# Patient Record
Sex: FEMALE | Race: WHITE | NOT HISPANIC OR LATINO | Employment: OTHER | URBAN - METROPOLITAN AREA
[De-identification: names, ages, dates, MRNs, and addresses within clinical notes are randomized per-mention and may not be internally consistent; named-entity substitution may affect disease eponyms.]

---

## 2017-01-05 ENCOUNTER — ALLSCRIPTS OFFICE VISIT (OUTPATIENT)
Dept: OTHER | Facility: OTHER | Age: 62
End: 2017-01-05

## 2017-03-10 ENCOUNTER — GENERIC CONVERSION - ENCOUNTER (OUTPATIENT)
Dept: OTHER | Facility: OTHER | Age: 62
End: 2017-03-10

## 2017-03-10 DIAGNOSIS — R79.89 OTHER SPECIFIED ABNORMAL FINDINGS OF BLOOD CHEMISTRY: ICD-10-CM

## 2017-03-10 DIAGNOSIS — F41.9 ANXIETY DISORDER: ICD-10-CM

## 2017-03-10 DIAGNOSIS — K21.9 GASTRO-ESOPHAGEAL REFLUX DISEASE WITHOUT ESOPHAGITIS: ICD-10-CM

## 2017-03-10 DIAGNOSIS — Z12.31 ENCOUNTER FOR SCREENING MAMMOGRAM FOR MALIGNANT NEOPLASM OF BREAST: ICD-10-CM

## 2017-03-10 DIAGNOSIS — E01.0 IODINE-DEFICIENCY RELATED DIFFUSE GOITER: ICD-10-CM

## 2017-03-21 ENCOUNTER — LAB CONVERSION - ENCOUNTER (OUTPATIENT)
Dept: OTHER | Facility: OTHER | Age: 62
End: 2017-03-21

## 2017-03-21 LAB
A/G RATIO (HISTORICAL): 1.5 (CALC) (ref 1–2.5)
ALBUMIN SERPL BCP-MCNC: 3.7 G/DL (ref 3.6–5.1)
ALP SERPL-CCNC: 73 U/L (ref 33–130)
ALT SERPL W P-5'-P-CCNC: 16 U/L (ref 6–29)
AST SERPL W P-5'-P-CCNC: 18 U/L (ref 10–35)
BASOPHILS # BLD AUTO: 0.3 %
BASOPHILS # BLD AUTO: 45 CELLS/UL (ref 0–200)
BILIRUB SERPL-MCNC: 0.5 MG/DL (ref 0.2–1.2)
BUN SERPL-MCNC: 18 MG/DL (ref 7–25)
BUN/CREA RATIO (HISTORICAL): ABNORMAL (CALC) (ref 6–22)
CALCIUM SERPL-MCNC: 8.5 MG/DL (ref 8.6–10.4)
CHLORIDE SERPL-SCNC: 106 MMOL/L (ref 98–110)
CO2 SERPL-SCNC: 30 MMOL/L (ref 20–31)
CREAT SERPL-MCNC: 0.87 MG/DL (ref 0.5–0.99)
DEPRECATED RDW RBC AUTO: 14 % (ref 11–15)
EGFR AFRICAN AMERICAN (HISTORICAL): 83 ML/MIN/1.73M2
EGFR-AMERICAN CALC (HISTORICAL): 72 ML/MIN/1.73M2
EOSINOPHIL # BLD AUTO: 1.9 %
EOSINOPHIL # BLD AUTO: 283 CELLS/UL (ref 15–500)
GAMMA GLOBULIN (HISTORICAL): 2.4 G/DL (CALC) (ref 1.9–3.7)
GLUCOSE (HISTORICAL): 85 MG/DL (ref 65–99)
HCT VFR BLD AUTO: 49.1 % (ref 35–45)
HGB BLD-MCNC: 15.9 G/DL (ref 11.7–15.5)
LYMPHOCYTES # BLD AUTO: 13.1 %
LYMPHOCYTES # BLD AUTO: 1952 CELLS/UL (ref 850–3900)
MCH RBC QN AUTO: 30.3 PG (ref 27–33)
MCHC RBC AUTO-ENTMCNC: 32.4 G/DL (ref 32–36)
MCV RBC AUTO: 93.4 FL (ref 80–100)
MONOCYTES # BLD AUTO: 775 CELLS/UL (ref 200–950)
MONOCYTES (HISTORICAL): 5.2 %
NEUTROPHILS # BLD AUTO: 79.5 %
NEUTROPHILS # BLD AUTO: ABNORMAL CELLS/UL (ref 1500–7800)
PLATELET # BLD AUTO: 198 THOUSAND/UL (ref 140–400)
PMV BLD AUTO: 7.8 FL (ref 7.5–12.5)
POTASSIUM SERPL-SCNC: 3.7 MMOL/L (ref 3.5–5.3)
RBC # BLD AUTO: 5.26 MILLION/UL (ref 3.8–5.1)
SODIUM SERPL-SCNC: 143 MMOL/L (ref 135–146)
TOTAL PROTEIN (HISTORICAL): 6.1 G/DL (ref 6.1–8.1)
TSH SERPL DL<=0.05 MIU/L-ACNC: 0.52 MIU/L (ref 0.4–4.5)
WBC # BLD AUTO: 14.9 THOUSAND/UL (ref 3.8–10.8)

## 2017-03-28 ENCOUNTER — ALLSCRIPTS OFFICE VISIT (OUTPATIENT)
Dept: OTHER | Facility: OTHER | Age: 62
End: 2017-03-28

## 2017-03-30 ENCOUNTER — ALLSCRIPTS OFFICE VISIT (OUTPATIENT)
Dept: OTHER | Facility: OTHER | Age: 62
End: 2017-03-30

## 2017-04-05 ENCOUNTER — GENERIC CONVERSION - ENCOUNTER (OUTPATIENT)
Dept: OTHER | Facility: OTHER | Age: 62
End: 2017-04-05

## 2017-05-10 ENCOUNTER — GENERIC CONVERSION - ENCOUNTER (OUTPATIENT)
Dept: OTHER | Facility: OTHER | Age: 62
End: 2017-05-10

## 2017-05-10 ENCOUNTER — CONVERSION ENCOUNTER (OUTPATIENT)
Dept: MAMMOGRAPHY | Facility: CLINIC | Age: 62
End: 2017-05-10

## 2017-05-15 ENCOUNTER — GENERIC CONVERSION - ENCOUNTER (OUTPATIENT)
Dept: OTHER | Facility: OTHER | Age: 62
End: 2017-05-15

## 2017-06-01 DIAGNOSIS — E04.9 NONTOXIC GOITER: ICD-10-CM

## 2017-06-01 DIAGNOSIS — E04.2 NONTOXIC MULTINODULAR GOITER: ICD-10-CM

## 2017-06-22 ENCOUNTER — ALLSCRIPTS OFFICE VISIT (OUTPATIENT)
Dept: OTHER | Facility: OTHER | Age: 62
End: 2017-06-22

## 2017-06-30 ENCOUNTER — ALLSCRIPTS OFFICE VISIT (OUTPATIENT)
Dept: OTHER | Facility: OTHER | Age: 62
End: 2017-06-30

## 2017-06-30 LAB
BACTERIA UR QL AUTO: NORMAL
CLUE CELL (HISTORICAL): NORMAL
HYPHAL YEAST (HISTORICAL): NORMAL
KOH PREP (HISTORICAL): NORMAL
TRICHOMONAS (HISTORICAL): NORMAL
YEAST (HISTORICAL): NORMAL

## 2017-07-20 ENCOUNTER — ALLSCRIPTS OFFICE VISIT (OUTPATIENT)
Dept: OTHER | Facility: OTHER | Age: 62
End: 2017-07-20

## 2017-07-26 DIAGNOSIS — Z00.00 ENCOUNTER FOR GENERAL ADULT MEDICAL EXAMINATION WITHOUT ABNORMAL FINDINGS: ICD-10-CM

## 2017-08-04 ENCOUNTER — LAB CONVERSION - ENCOUNTER (OUTPATIENT)
Dept: OTHER | Facility: OTHER | Age: 62
End: 2017-08-04

## 2017-08-04 LAB — VARICELLA ZOSTER IGG (HISTORICAL): 1284 INDEX

## 2017-10-13 ENCOUNTER — GENERIC CONVERSION - ENCOUNTER (OUTPATIENT)
Dept: OTHER | Facility: OTHER | Age: 62
End: 2017-10-13

## 2017-11-10 ENCOUNTER — LAB REQUISITION (OUTPATIENT)
Dept: LAB | Facility: HOSPITAL | Age: 62
End: 2017-11-10
Payer: COMMERCIAL

## 2017-11-10 ENCOUNTER — GENERIC CONVERSION - ENCOUNTER (OUTPATIENT)
Dept: OTHER | Facility: OTHER | Age: 62
End: 2017-11-10

## 2017-11-10 DIAGNOSIS — M25.562 PAIN IN LEFT KNEE: ICD-10-CM

## 2017-11-10 DIAGNOSIS — Z12.31 ENCOUNTER FOR SCREENING MAMMOGRAM FOR MALIGNANT NEOPLASM OF BREAST: ICD-10-CM

## 2017-11-10 DIAGNOSIS — Z12.4 ENCOUNTER FOR SCREENING FOR MALIGNANT NEOPLASM OF CERVIX: ICD-10-CM

## 2017-11-10 PROCEDURE — G0145 SCR C/V CYTO,THINLAYER,RESCR: HCPCS | Performed by: OBSTETRICS & GYNECOLOGY

## 2017-11-20 ENCOUNTER — GENERIC CONVERSION - ENCOUNTER (OUTPATIENT)
Dept: OTHER | Facility: OTHER | Age: 62
End: 2017-11-20

## 2017-11-20 LAB
LAB AP GYN PRIMARY INTERPRETATION: NORMAL
Lab: NORMAL

## 2017-11-28 ENCOUNTER — GENERIC CONVERSION - ENCOUNTER (OUTPATIENT)
Dept: OTHER | Facility: OTHER | Age: 62
End: 2017-11-28

## 2018-01-09 NOTE — PROGRESS NOTES
Assessment    1  Encounter for preventive health examination (V70 0) (Z00 00)   2  URI, acute (465 9) (J06 9)    Plan  Anxiety    · LORazepam 0 5 MG Oral Tablet (Ativan); TAKE 1 TABLET EVERY 12 HOURS  AS NEEDED  Health Maintenance    · Zostavax 97380 UNT/0 65ML Subcutaneous Solution Reconstituted; INJECT 0 5   ML Subcutaneous  Health Maintenance, URI, acute    · Follow-up visit in 1 year Evaluation and Treatment  Follow-up  Status: Hold For -  Scheduling  Requested for: 61WLQ0231  URI, acute    · LevoFLOXacin 500 MG Oral Tablet; Take 1 tablet daily    Discussion/Summary  Patient discussion: discussed with the patient  Labs reviewed with the patient  Chief Complaint  PATIENT PRESENTS TODAY FOR A YEARLY PREV  WELL VISIT  PATIENT IS DOING WELL, PATIENT WILL NEED WRITTEN SCRIPT FOR LORAZAEPAM 0 5MG REFILL FOR SHE IS GOING TO [place] FOR AWHILE  History of Present Illness  HPI: Patient is here for wellness exam  Patient status post having laboratory studies and HDL 62 triglycerides 140 blood pressure 127/62 glucose 77 and waist circumference 34 at screening  Review of Systems    Constitutional: No fever, no chills, feels well, no tiredness, no recent weight gain or weight loss  Eyes: No complaints of eye pain, no red eyes, no eyesight problems, no discharge, no dry eyes, no itching of eyes  ENT: no complaints of earache, no loss of hearing, no nose bleeds, no nasal discharge, no sore throat, no hoarseness  Cardiovascular: No complaints of slow heart rate, no fast heart rate, no chest pain, no palpitations, no leg claudication, no lower extremity edema  Respiratory: No complaints of shortness of breath, no wheezing, no cough, no SOB on exertion, no orthopnea, no PND  Gastrointestinal: No complaints of abdominal pain, no constipation, no nausea or vomiting, no diarrhea, no bloody stools     Genitourinary: No complaints of dysuria, no incontinence, no pelvic pain, no dysmenorrhea, no vaginal discharge or bleeding  Musculoskeletal: No complaints of arthralgias, no myalgias, no joint swelling or stiffness, no limb pain or swelling  Integumentary: No complaints of skin rash or lesions, no itching, no skin wounds, no breast pain or lump  Neurological: No complaints of headache, no confusion, no convulsions, no numbness, no dizziness or fainting, no tingling, no limb weakness, no difficulty walking  Psychiatric: Not suicidal, no sleep disturbance, no anxiety or depression, no change in personality, no emotional problems  Endocrine: No complaints of proptosis, no hot flashes, no muscle weakness, no deepening of the voice, no feelings of weakness  Hematologic/Lymphatic: No complaints of swollen glands, no swollen glands in the neck, does not bleed easily, does not bruise easily  Active Problems    1  Abnormal CBC (790 6) (R79 89)   2  Anxiety (300 00) (F41 9)   3  Current Every Day Smoker (305 1)   4  Dense breasts (793 82) (R92 2)   5  Dysuria (788 1) (R30 0)   6  Enlarged thyroid (240 9) (E01 0)   7  GERD (gastroesophageal reflux disease) (530 81) (K21 9)   8  Hematuria (599 70) (R31 9)   9  Denied: History of self breast exam   10  Incomplete uterovaginal prolapse (618 2) (N81 2)   11  Lung cancer (162 9) (C34 90)   12  Multiple thyroid nodules (241 1) (E04 2)   13  Osteoporosis (733 00) (M81 0)   14  Prolapsing Mitral Valve Leaflet Syndrome (424 0)   15  Sjogren's syndrome (710 2) (M35 00)   16  Systemic lupus erythematosus (710 0) (M32 9)   17  URI, acute (465 9) (J06 9)   18  Uterine prolapse (618 1) (N81 4)   19   Visit for screening mammogram (V76 12) (Z12 31)    Past Medical History    · History of Arthritis (716 90) (M19 90)   · History of Carcinoma Of The Lung (V10 11)   · History of Cardiac dysrhythmia (427 9) (I49 9)   · History of Fracture Of The Foot (827 0)   · Denied: History of Genetic screening   · History of  1 (V22 0) (Z34 00)   · H/O transvaginal ultrasound (V1 89) (Z92 89)   · Denied: History of abnormal cervical Pap smear   · History of mammography, screening (V15 89) (Z92 89)   · History of pregnancy (V13 29)   · Denied: History of self breast exam   · History of Hypertension (401 9) (I10)   · History of Pap smear for cervical cancer screening (V76 2) (Z12 4)   · History of Varicella (052 9) (B01 9)    Surgical History    · History of Lung Lobectomy   · History of Oral Surgery Tooth Extraction   · History of Reported Prior Surgical / Procedural History    Family History  Mother    · Family history of Arthritis   · Family history of Asthma   · Family history of Dementia   · Family history of Heart disease   · Family history of Hypothyroidism   · Family history of Irritable bowel syndrome (IBS)   · Family history of Uterine cancer  Father    · Family history of Myocardial infarction   · Family history of Stroke  Sister    · Family history of Hyperlipidemia   · Family history of Uterine cancer  Maternal Grandfather    · Family history of Myocardial infarction  Family History    · Family history of Asthma   · Family history of Family Health Status Of Father -    · FH: cataracts (V19 19) (Z83 518)   · Family history of Thyroid disorder    Social History    · Always uses seat belt   · Caffeine use (V49 89) (F15 90)   · Current Every Day Smoker (305 1)   · Has ceased smoking cigarette/cigar:  (1 ppd) lung CA 51 yo   ·    · No drug use   · Occasional alcohol use   · One child   · Primary spoken language English   · Mormonism   · Racial background   ·    · Tea    Current Meds   1  Gertrude-C Oral Tablet; TAKE 1 TABLET DAILY; Therapy: 19NTI1008 to Recorded   2  Fish Oil-Vitamin D CAPS; Therapy: (Brad Martinez) to Recorded   3  Hydroxychloroquine Sulfate 200 MG Oral Tablet; take 2 tablets  daily; Therapy: (Recorded:91Ked6029) to Recorded   4  Klor-Con 10 10 MEQ Oral Tablet Extended Release; TAKE 1 TABLET 3 TIMES DAILY;    Therapy: 00SSA0889 to (Shelly )  Requested for: 87NKV6662 Recorded   5  LORazepam 0 5 MG Oral Tablet; TAKE 1 TABLET EVERY 12 HOURS AS NEEDED; Therapy: 15VEC0466 to (Evaluate:17Nov2016); Last Rx:18Oct2016 Ordered   6  Multivitamins TABS; TAKE 1 TABLET DAILY; Therapy: 24YLE7354 to Recorded   7  Pepcid AC TABS; Therapy: (Recorded:59Hdm4913) to Recorded   8  PredniSONE (Terrance) 10 MG TABS; TAKE 1 TABLET DAILY; Therapy: (Recorded:49Dxp8003) to Recorded   9  PreviDent 5000 Plus 1 1 % Dental Cream;   Therapy: 05SUT5441 to (Last NP:72CFC7556)  Requested for: 06Apr2012 Ordered   10  Vitamin B12 100 MCG Oral Tablet; TAKE 1 TABLET DAILY AS DIRECTED; Therapy: 23CBF8120 to Recorded    Allergies    1  Neoporacin OINT   2  Penicillins   3  [address]   4  Bactrim TABS    5  Animal dander   6  Dust   7  Mold   8  Pollen   9  Seasonal    Vitals   Recorded: 72DDB6780 85:24WL   Systolic 419, RUE, Sitting   Diastolic 70, RUE, Sitting   Height 5 ft 4 5 in   Weight 152 lb    BMI Calculated 25 69   BSA Calculated 1 75     Physical Exam    Constitutional   General appearance: No acute distress, well appearing and well nourished  Eyes   Conjunctiva and lids: No swelling, erythema or discharge  Pupils and irises: Equal, round and reactive to light  Ears, Nose, Mouth, and Throat   External inspection of ears and nose: Normal     Otoscopic examination: Tympanic membranes translucent with normal light reflex  Canals patent without erythema  Oropharynx: Normal with no erythema, edema, exudate or lesions  Pulmonary   Respiratory effort: No increased work of breathing or signs of respiratory distress  Auscultation of lungs: Clear to auscultation  Cardiovascular   Palpation of heart: Normal PMI, no thrills  Auscultation of heart: Normal rate and rhythm, normal S1 and S2, without murmurs  Examination of extremities for edema and/or varicosities: Normal     Abdomen   Abdomen: Non-tender, no masses      Liver and spleen: No hepatomegaly or splenomegaly  Lymphatic   Palpation of lymph nodes in neck: No lymphadenopathy  Musculoskeletal   Gait and station: Normal     Digits and nails: Normal without clubbing or cyanosis  Inspection/palpation of joints, bones, and muscles: Normal     Skin   Skin and subcutaneous tissue: Normal without rashes or lesions  Neurologic   Cranial nerves: Cranial nerves 2-12 intact  Reflexes: 2+ and symmetric  Sensation: No sensory loss  Psychiatric   Orientation to person, place, and time: Normal     Mood and affect: Normal        Future Appointments    Date/Time Provider Specialty Site   06/22/2017 12:50 PM ANN-MARIE Smith   Endocrinology [facility]   03/30/2017 01:00 PM Sohail Edmond DO Obstetrics/Gynecology [facility]     Signatures   Electronically signed by : Disha Hawkins DO; Mar 28 2017  1:34PM EST                       (Author)

## 2018-01-10 NOTE — PROGRESS NOTES
Assessment    1  Encounter for preventive health examination (V70 0) (Z00 00)    Plan  Anxiety    · LORazepam 0 5 MG Oral Tablet (Ativan); TAKE 1 TABLET EVERY 12 HOURS  AS NEEDED  Health Maintenance    · Follow-up visit in 1 year Evaluation and Treatment  Follow-up  Status: Hold For -  Scheduling  Requested for: 21ULA8896    Chief Complaint  PT PRESENTS FOR A WELL VISIT  PT REPORTS DOING WELL  History of Present Illness  HPI: Patient is here for well visit  Patient doing well this time  Pt  with increased stress  pt with problems with mother  Review of Systems    Constitutional: as noted in HPI  Eyes: No complaints of eye pain, no red eyes, no eyesight problems, no discharge, no dry eyes, no itching of eyes  ENT: no complaints of earache, no loss of hearing, no nose bleeds, no nasal discharge, no sore throat, no hoarseness  Cardiovascular: No complaints of slow heart rate, no fast heart rate, no chest pain, no palpitations, no leg claudication, no lower extremity edema  Respiratory: No complaints of shortness of breath, no wheezing, no cough, no SOB on exertion, no orthopnea, no PND  Gastrointestinal: No complaints of abdominal pain, no constipation, no nausea or vomiting, no diarrhea, no bloody stools  Genitourinary: No complaints of dysuria, no incontinence, no pelvic pain, no dysmenorrhea, no vaginal discharge or bleeding  Musculoskeletal: No complaints of arthralgias, no myalgias, no joint swelling or stiffness, no limb pain or swelling  Integumentary: No complaints of skin rash or lesions, no itching, no skin wounds, no breast pain or lump  Neurological: No complaints of headache, no confusion, no convulsions, no numbness, no dizziness or fainting, no tingling, no limb weakness, no difficulty walking  Psychiatric: Not suicidal, no sleep disturbance, no anxiety or depression, no change in personality, no emotional problems     Endocrine: No complaints of proptosis, no hot flashes, no muscle weakness, no deepening of the voice, no feelings of weakness  Hematologic/Lymphatic: No complaints of swollen glands, no swollen glands in the neck, does not bleed easily, does not bruise easily  Active Problems    1  Abnormal CBC (790 6) (R79 89)   2  Anxiety (300 00) (F41 9)   3  Current Every Day Smoker (305 1)   4  Dense breasts (793 82) (R92 2)   5  Dysuria (788 1) (R30 0)   6  Encounter for screening for malignant neoplasm of cervix (V76 2) (Z12 4)   7  Enlarged thyroid (240 9) (E04 9)   8  GERD (gastroesophageal reflux disease) (530 81) (K21 9)   9  Hematuria (599 70) (R31 9)   10  Denied: History of self breast exam   11  Hypercholesterolemia (272 0) (E78 0)   12  Incomplete uterovaginal prolapse (618 2) (N81 2)   13  Lung cancer (162 9) (C34 90)   14  Multiple thyroid nodules (241 1) (E04 2)   15  Osteoporosis (733 00) (M81 0)   16  Prolapsing Mitral Valve Leaflet Syndrome (424 0)   17  Sinusitis, acute (461 9) (J01 90)   18  Sjogren's syndrome (710 2) (M35 00)   19  Systemic lupus erythematosus (710 0) (M32 9)   20  URI, acute (465 9) (J06 9)   21  Uterine prolapse (618 1) (N81 4)   22  Visit for routine gyn exam (V72 31) (Z01 419)   23   Visit for screening mammogram (V76 12) (Z12 31)    Past Medical History    · History of Arthritis (716 90) (M19 90)   · History of Carcinoma Of The Lung (V10 11)   · History of Cardiac dysrhythmia (427 9) (I49 9)   · History of Fracture Of The Foot (827 0)   · Denied: History of Genetic screening   · History of  1 (V22 0) (Z34 00)   · H/O transvaginal ultrasound (V15 89) (Z92 89)   · Denied: History of abnormal cervical Pap smear   · History of mammography, screening (V15 89) (Z92 89)   · History of pregnancy (V13 29)   · Denied: History of self breast exam   · History of Hypertension (401 9) (I10)   · History of Need for immunization against influenza (V04 81) (Z23)   · History of Pap smear for cervical cancer screening (V76 2) (Z12 4)   · History of Varicella (052 9) (B01 9)    Surgical History    · History of Lung Lobectomy   · History of Oral Surgery Tooth Extraction   · History of Reported Prior Surgical / Procedural History    Family History    · Family history of Arthritis   · Family history of Asthma   · Family history of Dementia   · Family history of Heart disease   · Family history of Hypothyroidism   · Family history of Irritable bowel syndrome (IBS)   · Family history of Uterine cancer    · Family history of Myocardial infarction   · Family history of Stroke    · Family history of Hyperlipidemia   · Family history of Uterine cancer    · Family history of Myocardial infarction    · Family history of Asthma   · Family history of Family Health Status Of Father -    · FH: cataracts (V19 19) (Z83 518)   · Family history of Thyroid disorder    Social History    · Always uses seat belt   · Caffeine use (V49 89) (F15 90)   · Current Every Day Smoker (305 1)   · Has ceased smoking cigarette/cigar:  (1 ppd) lung CA 51 yo   ·    · No drug use   · Occasional alcohol use   · One child   · Primary spoken language English   · Faith   · Racial background   ·     Current Meds   1  Gertrude-C Oral Tablet; TAKE 1 TABLET DAILY; Therapy: 72DUH3687 to Recorded   2  Fish Oil-Vitamin D CAPS; Therapy: (Alonashley Perry) to Recorded   3  Fluticasone Propionate 50 MCG/ACT Nasal Suspension; USE 1 SPRAY IN EACH   NOSTRIL TWICE DAILY; Therapy: 76QNH4197 to (Evaluate:2015)  Requested for: 47TVY2440; Last   Rx:2015 Ordered   4  Hydroxychloroquine Sulfate 200 MG Oral Tablet; take 2 tablets  daily; Therapy: (Recorded:92Bis7323) to Recorded   5  Klor-Con 10 10 MEQ Oral Tablet Extended Release; TAKE 1 TABLET 3 TIMES DAILY; Therapy: 62AGH7969 to (Lauren Duke)  Requested for: 97AVK4322 Recorded   6  LORazepam 0 5 MG Oral Tablet; TAKE 1 TABLET EVERY 12 HOURS AS NEEDED; Therapy: 71GPF4072 to (Evaluate:2015);  Last WA:70FHK1095 Ordered   7  Multivitamins TABS; TAKE 1 TABLET DAILY; Therapy: 70DTX9353 to Recorded   8  Patanol 0 1 % Ophthalmic Solution; INSTILL 1 DROP INTO BOTH EYES TWICE DAILY   AT 6-8 HOUR INTERVALS; Therapy: 98OHR0687 to (Last Rx:57Mbs1800)  Requested for: 56ZZO8798 Ordered   9  Pepcid AC TABS; Therapy: (Recorded:87Mde5660) to Recorded   10  PredniSONE (Terrance) 10 MG Oral Tablet; TAKE 1 TABLET DAILY; Therapy: (Recorded:35Dsf6557) to Recorded   11  PreviDent 5000 Plus 1 1 % Dental Cream;    Therapy: 51IOB6263 to (Last DY:84SGY5459)  Requested for: 01Rre5976 Ordered   12  Vitamin B12 100 MCG Oral Tablet; TAKE 1 TABLET DAILY AS DIRECTED; Therapy: 77ISG3750 to Recorded   13  Vitamin E 400 UNIT Oral Tablet; Therapy: (Recorded:21Mze5188) to Recorded    Allergies    1  Neoporacin OINT   2  Penicillins   3  [address]   4  Bactrim TABS    Vitals   Recorded: 46EDK1747 12:58PM   Temperature 98 F, Tympanic   Heart Rate 80, R Radial   Pulse Quality Regular, R Radial   Systolic 446, LUE, Sitting   Diastolic 64, LUE, Sitting   Height 5 ft 4 5 in   Weight 154 lb    BMI Calculated 26 03   BSA Calculated 1 76   O2 Saturation 96     Physical Exam    Constitutional   General appearance: No acute distress, well appearing and well nourished  Eyes   Conjunctiva and lids: No swelling, erythema or discharge  Pupils and irises: Equal, round and reactive to light  Ears, Nose, Mouth, and Throat   External inspection of ears and nose: Normal     Otoscopic examination: Tympanic membranes translucent with normal light reflex  Canals patent without erythema  Oropharynx: Normal with no erythema, edema, exudate or lesions  Pulmonary   Respiratory effort: No increased work of breathing or signs of respiratory distress  Auscultation of lungs: Abnormal   decreased bs  Cardiovascular   Palpation of heart: Normal PMI, no thrills  Auscultation of heart: Normal rate and rhythm, normal S1 and S2, without murmurs  Examination of extremities for edema and/or varicosities: Normal     Abdomen   Abdomen: Non-tender, no masses  Liver and spleen: No hepatomegaly or splenomegaly  Lymphatic   Palpation of lymph nodes in neck: No lymphadenopathy  Musculoskeletal   Gait and station: Normal     Digits and nails: Normal without clubbing or cyanosis  Inspection/palpation of joints, bones, and muscles: Normal     Skin   Skin and subcutaneous tissue: Normal without rashes or lesions  Neurologic   Cranial nerves: Cranial nerves 2-12 intact  Reflexes: 2+ and symmetric  Sensation: No sensory loss      Psychiatric   Orientation to person, place, and time: Normal     Mood and affect: Normal        Future Appointments    Date/Time Provider Specialty Site   05/05/2016 01:00 PM Zack Rai DO Obstetrics/Gynecology [facility]     Signatures   Electronically signed by : Spencer Concepcion DO; Mar 23 2016  1:10PM EST                       (Author)

## 2018-01-11 NOTE — MISCELLANEOUS
Message   Recorded as Task   Date: 11/20/2017 12:41 PM, Created By: Melanie Byrne   Task Name: Go to Result   Assigned To: Magnus Espinoza   Regarding Patient: Yordan Andrade, Status: Active   Comment:    Melanie Byrne - 20 Nov 2017 12:41 PM     TASK CREATED  nml reshma   Nory Wells - 20 Nov 2017 1:17 PM     TASK EDITED  normal card mailed        Active Problems    1  Abnormal CBC (790 6) (R79 89)   2  Acute upper respiratory infection (465 9) (J06 9)   3  Anxiety (300 00) (F41 9)   4  Cervical cancer screening (V76 2) (Z12 4)   5  Colon cancer screening (V76 51) (Z12 11)   6  Current Every Day Smoker (305 1)   7  Dense breasts (793 82) (R92 2)   8  Dysuria (788 1) (R30 0)   9  Enlarged thyroid (240 9) (E04 9)   10  GERD (gastroesophageal reflux disease) (530 81) (K21 9)   11  Hematuria (599 70) (R31 9)   12  Denied: History of self breast exam   13  Incomplete uterovaginal prolapse (618 2) (N81 2)   14  Lung cancer (162 9) (C34 90)   15  Multiple thyroid nodules (241 1) (E04 2)   16  Need for influenza vaccination (V04 81) (Z23)   17  Osteoporosis (733 00) (M81 0)   18  Prolapsing Mitral Valve Leaflet Syndrome (424 0)   19  Screening for HPV (human papillomavirus) (V73 81) (Z11 51)   20  Shortness of breath (786 05) (R06 02)   21  Sjogren's syndrome (710 2) (M35 00)   22  Systemic lupus erythematosus (710 0) (M32 9)   23  URI, acute (465 9) (J06 9)   24  Uterine prolapse (618 1) (N81 4)   25  Vaginal discharge (623 5) (N89 8)   26  Visit for routine gyn exam (V72 31) (Z01 419)   27  Visit for screening mammogram (V76 12) (Z12 31)   28  Vulvar burning (625 9) (N94 89)    Current Meds   1  Gertrude-C Oral Tablet; TAKE 1 TABLET DAILY; Therapy: 30JHW1404 to Recorded   2  Fish Oil-Vitamin D CAPS; Therapy: (Chitra Ramming) to Recorded   3  Hydroxychloroquine Sulfate 200 MG Oral Tablet; Take 1 tablet daily; Therapy: (Recorded:30Jun2017) to Recorded   4   Klor-Con 10 10 MEQ Oral Tablet Extended Release; TAKE 1 TABLET 3 TIMES DAILY; Therapy: 22HLU9074 to (Donnie Beach)  Requested for: 26KWS4876 Recorded   5  LORazepam 0 5 MG Oral Tablet (Ativan); TAKE 1 TABLET EVERY 12 HOURS AS   NEEDED; Therapy: 66QAN2095 to (Evaluate:27Apr2017); Last Rx:28Mar2017 Ordered   6  Magnesium 500 MG Oral Capsule; Therapy: (Recorded:30Mar2017) to Recorded   7  Multivitamins TABS; TAKE 1 TABLET DAILY; Therapy: 67QTZ2734 to Recorded   8  Pepcid AC TABS; Therapy: (Recorded:70Ytm5115) to Recorded   9  PredniSONE (Terrance) 10 MG TABS; TAKE 1 TABLET DAILY; Therapy: (Recorded:29Gzf5072) to Recorded   10  PreviDent 5000 Plus 1 1 % Dental Cream;    Therapy: 66STL8253 to (Last YE:14NZP3079)  Requested for: 06Apr2012 Ordered   11  Vitamin B12 100 MCG Oral Tablet; TAKE 1 TABLET DAILY AS DIRECTED; Therapy: 92WEH5171 to Recorded    Allergies    1  Neoporacin OINT   2  Penicillins   3  [address]   4  Bactrim TABS    5  Animal dander   6  Dust   7  Mold   8  Pollen   9   Seasonal    Signatures   Electronically signed by : Damita Osler, ; Nov 20 2017  1:17PM EST                       (Author)

## 2018-01-12 VITALS
WEIGHT: 152 LBS | DIASTOLIC BLOOD PRESSURE: 70 MMHG | BODY MASS INDEX: 25.33 KG/M2 | HEIGHT: 65 IN | SYSTOLIC BLOOD PRESSURE: 122 MMHG

## 2018-01-12 VITALS
SYSTOLIC BLOOD PRESSURE: 110 MMHG | BODY MASS INDEX: 25.18 KG/M2 | HEIGHT: 65 IN | WEIGHT: 151.13 LBS | HEART RATE: 76 BPM | DIASTOLIC BLOOD PRESSURE: 70 MMHG

## 2018-01-12 VITALS
BODY MASS INDEX: 25.08 KG/M2 | WEIGHT: 150.5 LBS | HEIGHT: 65 IN | SYSTOLIC BLOOD PRESSURE: 120 MMHG | DIASTOLIC BLOOD PRESSURE: 70 MMHG

## 2018-01-12 NOTE — MISCELLANEOUS
Message   Recorded as Task   Date: 04/04/2017 02:12 PM, Created By: Soha Hinkle   Task Name: Miscellaneous   Assigned To: Noni Crowley   Regarding Patient: Angelica Neves, Status: Active   CommentBabarchiara Benson - 04 Apr 2017 2:12 PM     TASK CREATED  Patient called to report she is happy with her new pessary  States it is much more effective and works much better  She wanted to thank you  Tierra Naval - 04 Apr 2017 10:20 PM     TASK REPLIED TO: Previously Assigned To Tierra Naval  thanks        Active Problems    1  Abnormal CBC (790 6) (R79 89)   2  Anxiety (300 00) (F41 9)   3  Current Every Day Smoker (305 1)   4  Dense breasts (793 82) (R92 2)   5  Dysuria (788 1) (R30 0)   6  Enlarged thyroid (240 9) (E01 0)   7  GERD (gastroesophageal reflux disease) (530 81) (K21 9)   8  Hematuria (599 70) (R31 9)   9  Denied: History of self breast exam   10  Incomplete uterovaginal prolapse (618 2) (N81 2)   11  Lung cancer (162 9) (C34 90)   12  Multiple thyroid nodules (241 1) (E04 2)   13  Osteoporosis (733 00) (M81 0)   14  Prolapsing Mitral Valve Leaflet Syndrome (424 0)   15  Sjogren's syndrome (710 2) (M35 00)   16  Systemic lupus erythematosus (710 0) (M32 9)   17  URI, acute (465 9) (J06 9)   18  Uterine prolapse (618 1) (N81 4)   19  Visit for screening mammogram (V76 12) (Z12 31)    Current Meds   1  Gertrude-C Oral Tablet; TAKE 1 TABLET DAILY; Therapy: 70HKC8017 to Recorded   2  Fish Oil-Vitamin D CAPS; Therapy: (Lashawn Ruiz) to Recorded   3  Hydroxychloroquine Sulfate 200 MG Oral Tablet; take 2 tablets  daily; Therapy: (Recorded:83Tth1817) to Recorded   4  Klor-Con 10 10 MEQ Oral Tablet Extended Release; TAKE 1 TABLET 3 TIMES DAILY; Therapy: 81GRS4902 to (0489 33 97 26)  Requested for: 77PKW4864 Recorded   5  LevoFLOXacin 500 MG Oral Tablet; Take 1 tablet daily; Therapy: 35ALE2106 to (Complete:07Apr2017); Last Rx:28Mar2017 Ordered   6   LORazepam 0 5 MG Oral Tablet (Ativan); TAKE 1 TABLET EVERY 12 HOURS AS   NEEDED; Therapy: 04AJN8142 to (Evaluate:27Apr2017); Last Rx:28Mar2017 Ordered   7  Magnesium 500 MG Oral Capsule; Therapy: (Recorded:30Mar2017) to Recorded   8  Multivitamins TABS; TAKE 1 TABLET DAILY; Therapy: 15WHL5941 to Recorded   9  Pepcid AC TABS; Therapy: (Recorded:53Tpj3018) to Recorded   10  PredniSONE (Terrance) 10 MG TABS; TAKE 1 TABLET DAILY; Therapy: (Recorded:00Mig8040) to Recorded   11  PreviDent 5000 Plus 1 1 % Dental Cream;    Therapy: 46KBU6392 to (Last KR:65RMC5108)  Requested for: 06Apr2012 Ordered   12  Vitamin B12 100 MCG Oral Tablet; TAKE 1 TABLET DAILY AS DIRECTED; Therapy: 89GKP3831 to Recorded   13  Zostavax 17246 UNT/0 65ML Subcutaneous Solution Reconstituted (Zostavax 96314    UNT/0 65ML Subcutaneous Solution Reconstituted); INJECT 0 5  ML Subcutaneous; Therapy: 68HPV9274 to (Last Rx:28Mar2017) Ordered    Allergies    1  Neoporacin OINT   2  Penicillins   3  [address]   4  Bactrim TABS    5  Animal dander   6  Dust   7  Mold   8  Pollen   9   Seasonal    Signatures   Electronically signed by : Lisha Price, ; Apr 5 2017  8:03AM EST                       (Author)

## 2018-01-13 VITALS
DIASTOLIC BLOOD PRESSURE: 72 MMHG | SYSTOLIC BLOOD PRESSURE: 118 MMHG | HEIGHT: 65 IN | BODY MASS INDEX: 25.01 KG/M2 | WEIGHT: 150.13 LBS

## 2018-01-13 VITALS
HEIGHT: 65 IN | DIASTOLIC BLOOD PRESSURE: 68 MMHG | WEIGHT: 149.03 LBS | HEART RATE: 84 BPM | SYSTOLIC BLOOD PRESSURE: 114 MMHG | BODY MASS INDEX: 24.83 KG/M2

## 2018-01-13 VITALS
DIASTOLIC BLOOD PRESSURE: 70 MMHG | SYSTOLIC BLOOD PRESSURE: 116 MMHG | HEIGHT: 65 IN | WEIGHT: 150.38 LBS | BODY MASS INDEX: 25.05 KG/M2

## 2018-01-14 NOTE — MISCELLANEOUS
Message   Recorded as Task   Date: 05/12/2017 12:51 PM, Created By: Donna Kearney   Task Name: Review Document   Assigned To: Kwadwo Robert   Regarding Patient: Samia Motta, Status: Active   Comment:    Donna Kearney - 12 May 2017 12:51 PM     TASK CREATED  stable osteopenia, CA, vit D, exercise   Nory Wells - 15 May 2017 1:45 PM     TASK EDITED  PT INFORMED        Active Problems    1  Abnormal CBC (790 6) (R79 89)   2  Anxiety (300 00) (F41 9)   3  Current Every Day Smoker (305 1)   4  Dense breasts (793 82) (R92 2)   5  Dysuria (788 1) (R30 0)   6  Enlarged thyroid (240 9) (E01 0)   7  GERD (gastroesophageal reflux disease) (530 81) (K21 9)   8  Hematuria (599 70) (R31 9)   9  Denied: History of self breast exam   10  Incomplete uterovaginal prolapse (618 2) (N81 2)   11  Lung cancer (162 9) (C34 90)   12  Multiple thyroid nodules (241 1) (E04 2)   13  Osteoporosis (733 00) (M81 0)   14  Prolapsing Mitral Valve Leaflet Syndrome (424 0)   15  Sjogren's syndrome (710 2) (M35 00)   16  Systemic lupus erythematosus (710 0) (M32 9)   17  URI, acute (465 9) (J06 9)   18  Uterine prolapse (618 1) (N81 4)   19  Visit for screening mammogram (V76 12) (Z12 31)    Current Meds   1  Gertrude-C Oral Tablet; TAKE 1 TABLET DAILY; Therapy: 14CZQ4692 to Recorded   2  Fish Oil-Vitamin D CAPS; Therapy: (Delroy Adams) to Recorded   3  Hydroxychloroquine Sulfate 200 MG Oral Tablet; take 2 tablets  daily; Therapy: (Recorded:95Uft6334) to Recorded   4  Klor-Con 10 10 MEQ Oral Tablet Extended Release; TAKE 1 TABLET 3 TIMES DAILY; Therapy: 51RTI1757 to (910 34 832)  Requested for: 12ASH9398 Recorded   5  LORazepam 0 5 MG Oral Tablet (Ativan); TAKE 1 TABLET EVERY 12 HOURS AS   NEEDED; Therapy: 58TOA2208 to (Evaluate:27Apr2017); Last Rx:28Mar2017 Ordered   6  Magnesium 500 MG Oral Capsule; Therapy: (Recorded:30Mar2017) to Recorded   7  Multivitamins TABS; TAKE 1 TABLET DAILY;    Therapy: 37YRA1953 to Recorded   8  Pepcid AC TABS; Therapy: (Recorded:87Kke1197) to Recorded   9  PredniSONE (Terrance) 10 MG TABS; TAKE 1 TABLET DAILY; Therapy: (Recorded:26Esu0460) to Recorded   10  PreviDent 5000 Plus 1 1 % Dental Cream;    Therapy: 57QGK3851 to (Last [id number])  Requested for: 2012 Ordered   11  Vitamin B12 100 MCG Oral Tablet; TAKE 1 TABLET DAILY AS DIRECTED; Therapy: 28CNR5371 to Recorded   12  Zostavax 58039 UNT/0 65ML Subcutaneous Solution Reconstituted (Zostavax 16444    UNT/0 65ML Subcutaneous Solution Reconstituted); INJECT 0 5  ML Subcutaneous; Therapy: 99KIG4360 to (Last Rx:2017) Ordered    Allergies    1  Neoporacin OINT   2  Penicillins   3   [address]   4  Bactrim TABS    5  Animal dander   6  Dust   7  Mold   8  Pollen   9   Seasonal    Signatures   Electronically signed by : Janae July, ; May 15 2017  1:45PM EST                       (Author)

## 2018-01-22 VITALS
TEMPERATURE: 98.5 F | HEIGHT: 65 IN | BODY MASS INDEX: 25.66 KG/M2 | SYSTOLIC BLOOD PRESSURE: 122 MMHG | DIASTOLIC BLOOD PRESSURE: 80 MMHG | WEIGHT: 154 LBS

## 2018-01-22 VITALS
BODY MASS INDEX: 25.53 KG/M2 | WEIGHT: 153.25 LBS | HEIGHT: 65 IN | DIASTOLIC BLOOD PRESSURE: 78 MMHG | SYSTOLIC BLOOD PRESSURE: 126 MMHG

## 2018-01-22 VITALS
SYSTOLIC BLOOD PRESSURE: 120 MMHG | HEIGHT: 65 IN | DIASTOLIC BLOOD PRESSURE: 76 MMHG | WEIGHT: 151 LBS | TEMPERATURE: 97.8 F | BODY MASS INDEX: 25.16 KG/M2

## 2018-02-15 ENCOUNTER — OFFICE VISIT (OUTPATIENT)
Dept: OBGYN CLINIC | Facility: CLINIC | Age: 63
End: 2018-02-15
Payer: COMMERCIAL

## 2018-02-15 VITALS
WEIGHT: 154.2 LBS | BODY MASS INDEX: 25.69 KG/M2 | DIASTOLIC BLOOD PRESSURE: 80 MMHG | SYSTOLIC BLOOD PRESSURE: 120 MMHG | HEIGHT: 65 IN

## 2018-02-15 DIAGNOSIS — N76.2 ACUTE VULVITIS: ICD-10-CM

## 2018-02-15 DIAGNOSIS — N81.4 UTERINE PROLAPSE: Primary | ICD-10-CM

## 2018-02-15 PROCEDURE — 99213 OFFICE O/P EST LOW 20 MIN: CPT | Performed by: OBSTETRICS & GYNECOLOGY

## 2018-02-15 RX ORDER — UBIDECARENONE 75 MG
1 CAPSULE ORAL DAILY
COMMUNITY
Start: 2015-01-07

## 2018-02-15 RX ORDER — FAMOTIDINE 10 MG
20 TABLET ORAL AS NEEDED
COMMUNITY

## 2018-02-15 RX ORDER — POTASSIUM CHLORIDE 750 MG/1
1 TABLET, FILM COATED, EXTENDED RELEASE ORAL 3 TIMES DAILY
COMMUNITY
Start: 2011-12-08 | End: 2020-11-16 | Stop reason: SDUPTHER

## 2018-02-15 RX ORDER — PREDNISONE 10 MG/1
1 TABLET ORAL DAILY
COMMUNITY
End: 2018-12-04 | Stop reason: SDUPTHER

## 2018-02-15 RX ORDER — FOLIC ACID 0.8 MG
TABLET ORAL DAILY
COMMUNITY

## 2018-02-15 RX ORDER — SODIUM FLUORIDE 5 MG/ML
PASTE, DENTIFRICE DENTAL
COMMUNITY
Start: 2012-04-06 | End: 2019-08-22

## 2018-02-15 RX ORDER — MULTIVIT-MINERALS/FA/LYCOPENE 0.4 MG-6
1 TABLET ORAL DAILY
COMMUNITY
Start: 2013-03-05 | End: 2018-06-26 | Stop reason: CLARIF

## 2018-02-15 RX ORDER — CHLORAL HYDRATE 500 MG
CAPSULE ORAL DAILY
COMMUNITY
End: 2019-01-03

## 2018-02-15 RX ORDER — LORAZEPAM 0.5 MG/1
1 TABLET ORAL EVERY 12 HOURS PRN
COMMUNITY
Start: 2013-03-05 | End: 2018-04-06 | Stop reason: SDUPTHER

## 2018-02-15 NOTE — PROGRESS NOTES
Assessment/Plan:     Uterine prolapse - pessary removed and reinserted without difficulty    Vulvitis -she will use some triamcinolone cream for a week or 2 and then taper this  If it recurs or worsens, she will call and we will consider vulvar biopsy  Return in 3 months for pessary check     There are no diagnoses linked to this encounter  Subjective:     Patient ID: Saranya Webb is a 58 y o  female  Pt here for pessary check, she has no concerns regarding her pessary  It is working well and she uses Replens  She has been having what appears to be a reaction and is having breakouts of blisters on her years and around her mouth  They are not evident now  She is also having vulvar burning and itching but no blisters or ulcers  She feels this is some systemic type of reaction  She is currently on an antibiotic prescribed by her family doctor  She is under a great deal of stress as her mother who has dementia is living with her and her brother-in-law just passed away        Review of Systems   Reason unable to perform ROS: Vaginal burning and itching  All other systems reviewed and are negative          Objective:     Physical Exam   Genitourinary: Vagina normal and uterus normal    Genitourinary Comments: Vulva is mildly erythematous, no lesions noted    Pessary removed, cleaned and reinserted without difficulty, no vaginal excoriation

## 2018-02-27 ENCOUNTER — TELEPHONE (OUTPATIENT)
Dept: FAMILY MEDICINE CLINIC | Facility: CLINIC | Age: 63
End: 2018-02-27

## 2018-02-27 ENCOUNTER — HOSPITAL ENCOUNTER (OUTPATIENT)
Dept: NON INVASIVE DIAGNOSTICS | Facility: HOSPITAL | Age: 63
Discharge: HOME | End: 2018-02-27
Payer: COMMERCIAL

## 2018-02-27 ENCOUNTER — OFFICE VISIT (OUTPATIENT)
Dept: FAMILY MEDICINE CLINIC | Facility: CLINIC | Age: 63
End: 2018-02-27
Payer: COMMERCIAL

## 2018-02-27 VITALS
DIASTOLIC BLOOD PRESSURE: 80 MMHG | BODY MASS INDEX: 35.83 KG/M2 | HEIGHT: 55 IN | SYSTOLIC BLOOD PRESSURE: 120 MMHG | WEIGHT: 154.8 LBS

## 2018-02-27 DIAGNOSIS — R60.0 LOCALIZED EDEMA: Primary | ICD-10-CM

## 2018-02-27 DIAGNOSIS — R60.0 LOCALIZED EDEMA: ICD-10-CM

## 2018-02-27 PROBLEM — G89.29 CHRONIC BILATERAL LOW BACK PAIN WITH BILATERAL SCIATICA: Status: ACTIVE | Noted: 2017-11-28

## 2018-02-27 PROBLEM — M54.42 CHRONIC BILATERAL LOW BACK PAIN WITH BILATERAL SCIATICA: Status: ACTIVE | Noted: 2017-11-28

## 2018-02-27 PROBLEM — M54.41 CHRONIC BILATERAL LOW BACK PAIN WITH BILATERAL SCIATICA: Status: ACTIVE | Noted: 2017-11-28

## 2018-02-27 PROCEDURE — 93971 EXTREMITY STUDY: CPT | Performed by: SURGERY

## 2018-02-27 PROCEDURE — 99213 OFFICE O/P EST LOW 20 MIN: CPT | Performed by: FAMILY MEDICINE

## 2018-02-27 PROCEDURE — 93971 EXTREMITY STUDY: CPT

## 2018-02-27 RX ORDER — CLINDAMYCIN HYDROCHLORIDE 150 MG/1
150 CAPSULE ORAL EVERY 8 HOURS SCHEDULED
Qty: 21 CAPSULE | Refills: 0 | Status: SHIPPED | OUTPATIENT
Start: 2018-02-27 | End: 2018-03-06

## 2018-02-27 NOTE — PROGRESS NOTES
Assessment/Plan:   patient go for stat Doppler left lower extremity  Patient will try to elevate leg  Patient will start clindamycin if Doppler study negative  No problem-specific Assessment & Plan notes found for this encounter  Diagnoses and all orders for this visit:    Localized edema  -     clindamycin (CLEOCIN) 150 mg capsule; Take 1 capsule (150 mg total) by mouth every 8 (eight) hours for 7 days  -     VAS lower limb venous duplex study, unilateral/limited; Future          Subjective:      Patient ID: Miguel Neighbor is a 58 y o  female  Patient is here with swelling over left leg ends along with redness and pain Achilles region over the past 3 days  No trauma noted no bite  No chest pain or shortness of breath        The following portions of the patient's history were reviewed and updated as appropriate: allergies, current medications, past family history, past medical history, past social history, past surgical history and problem list     Review of Systems   Constitutional: Negative  HENT: Negative  Eyes: Negative  Respiratory: Negative  Cardiovascular: Positive for leg swelling  Gastrointestinal: Negative  Endocrine: Negative  Genitourinary: Negative  Musculoskeletal: Positive for arthralgias  Skin: Negative  Allergic/Immunologic: Negative  Neurological: Negative  Hematological: Negative  Psychiatric/Behavioral: Negative  Objective:      /80 (BP Location: Left arm, Patient Position: Sitting, Cuff Size: Standard)   Ht 1' 7 81" (0 503 m)   Wt 70 2 kg (154 lb 12 8 oz)    28 kg/m²          Physical Exam   Constitutional: She is oriented to person, place, and time  She appears well-developed and well-nourished  No distress  HENT:   Head: Normocephalic  Right Ear: External ear normal    Left Ear: External ear normal    Mouth/Throat: Oropharynx is clear and moist  No oropharyngeal exudate     Eyes: EOM are normal  Pupils are equal, round, and reactive to light  Right eye exhibits no discharge  Left eye exhibits no discharge  No scleral icterus  Neck: Normal range of motion  Neck supple  No thyromegaly present  Cardiovascular: Normal rate, regular rhythm, normal heart sounds and intact distal pulses  Exam reveals no gallop and no friction rub  No murmur heard  Pulmonary/Chest: Effort normal and breath sounds normal  No respiratory distress  She has no wheezes  She has no rales  She exhibits no tenderness  Abdominal: Soft  Bowel sounds are normal  She exhibits no distension  There is no tenderness  There is no rebound and no guarding  Musculoskeletal: Normal range of motion  She exhibits tenderness  She exhibits no edema  Left lower extremity edema and calf tenderness with palpation   Lymphadenopathy:     She has no cervical adenopathy  Neurological: She is oriented to person, place, and time  No cranial nerve deficit  She exhibits normal muscle tone  Coordination normal    Skin: Skin is warm and dry  No rash noted  She is not diaphoretic  There is erythema  No pallor  Erythema over Achilles tendon  Psychiatric: She has a normal mood and affect  Her behavior is normal  Judgment and thought content normal    Nursing note and vitals reviewed

## 2018-02-28 ENCOUNTER — TELEPHONE (OUTPATIENT)
Dept: FAMILY MEDICINE CLINIC | Facility: CLINIC | Age: 63
End: 2018-02-28

## 2018-04-06 ENCOUNTER — OFFICE VISIT (OUTPATIENT)
Dept: FAMILY MEDICINE CLINIC | Facility: CLINIC | Age: 63
End: 2018-04-06
Payer: COMMERCIAL

## 2018-04-06 VITALS
SYSTOLIC BLOOD PRESSURE: 132 MMHG | WEIGHT: 155.2 LBS | DIASTOLIC BLOOD PRESSURE: 68 MMHG | HEIGHT: 59 IN | BODY MASS INDEX: 31.29 KG/M2

## 2018-04-06 DIAGNOSIS — G89.29 CHRONIC PAIN OF LEFT ANKLE: ICD-10-CM

## 2018-04-06 DIAGNOSIS — Z23 IMMUNIZATION DUE: ICD-10-CM

## 2018-04-06 DIAGNOSIS — N30.00 ACUTE CYSTITIS WITHOUT HEMATURIA: ICD-10-CM

## 2018-04-06 DIAGNOSIS — M15.9 PRIMARY OSTEOARTHRITIS INVOLVING MULTIPLE JOINTS: ICD-10-CM

## 2018-04-06 DIAGNOSIS — R31.9 URINARY TRACT INFECTION WITH HEMATURIA, SITE UNSPECIFIED: ICD-10-CM

## 2018-04-06 DIAGNOSIS — N39.0 URINARY TRACT INFECTION WITH HEMATURIA, SITE UNSPECIFIED: ICD-10-CM

## 2018-04-06 DIAGNOSIS — M25.572 CHRONIC PAIN OF LEFT ANKLE: ICD-10-CM

## 2018-04-06 DIAGNOSIS — F41.9 ANXIETY: Primary | ICD-10-CM

## 2018-04-06 LAB
BACTERIA UR QL AUTO: NORMAL /HPF
BILIRUB UR QL STRIP: NEGATIVE
CLARITY UR: CLEAR
COLOR UR: YELLOW
GLUCOSE UR STRIP-MCNC: NEGATIVE MG/DL
HGB UR QL STRIP.AUTO: ABNORMAL
KETONES UR STRIP-MCNC: NEGATIVE MG/DL
LEUKOCYTE ESTERASE UR QL STRIP: NEGATIVE
NITRITE UR QL STRIP: NEGATIVE
NON-SQ EPI CELLS URNS QL MICRO: NORMAL /HPF
PH UR STRIP.AUTO: 6 [PH] (ref 4.5–8)
PROT UR STRIP-MCNC: NEGATIVE MG/DL
RBC #/AREA URNS AUTO: NORMAL /HPF
SL AMB  POCT GLUCOSE, UA: NORMAL
SL AMB LEUKOCYTE ESTERASE,UA: ABNORMAL
SL AMB POCT BILIRUBIN,UA: ABNORMAL
SL AMB POCT BLOOD,UA: ABNORMAL
SL AMB POCT CLARITY,UA: ABNORMAL
SL AMB POCT COLOR,UA: YELLOW
SL AMB POCT KETONES,UA: ABNORMAL
SL AMB POCT NITRITE,UA: ABNORMAL
SL AMB POCT PH,UA: ABNORMAL
SL AMB POCT SPECIFIC GRAVITY,UA: 1.03
SL AMB POCT URINE PROTEIN: ABNORMAL
SL AMB POCT UROBILINOGEN: ABNORMAL
SP GR UR STRIP.AUTO: 1.03 (ref 1–1.03)
UROBILINOGEN UR QL STRIP.AUTO: 0.2 E.U./DL
WBC #/AREA URNS AUTO: NORMAL /HPF

## 2018-04-06 PROCEDURE — 90732 PPSV23 VACC 2 YRS+ SUBQ/IM: CPT | Performed by: FAMILY MEDICINE

## 2018-04-06 PROCEDURE — 90471 IMMUNIZATION ADMIN: CPT | Performed by: FAMILY MEDICINE

## 2018-04-06 PROCEDURE — 81001 URINALYSIS AUTO W/SCOPE: CPT | Performed by: FAMILY MEDICINE

## 2018-04-06 PROCEDURE — 81002 URINALYSIS NONAUTO W/O SCOPE: CPT | Performed by: FAMILY MEDICINE

## 2018-04-06 PROCEDURE — 99214 OFFICE O/P EST MOD 30 MIN: CPT | Performed by: FAMILY MEDICINE

## 2018-04-06 RX ORDER — LORAZEPAM 0.5 MG/1
0.5 TABLET ORAL EVERY 12 HOURS PRN
Qty: 30 TABLET | Refills: 0 | Status: CANCELLED | OUTPATIENT
Start: 2018-04-06

## 2018-04-06 RX ORDER — LEVOFLOXACIN 500 MG/1
500 TABLET, FILM COATED ORAL EVERY 24 HOURS
Qty: 7 TABLET | Refills: 0 | Status: SHIPPED | OUTPATIENT
Start: 2018-04-06 | End: 2018-04-13

## 2018-04-06 RX ORDER — LORAZEPAM 0.5 MG/1
0.5 TABLET ORAL EVERY 12 HOURS PRN
Qty: 30 TABLET | Refills: 0 | Status: SHIPPED | OUTPATIENT
Start: 2018-04-06 | End: 2019-07-11 | Stop reason: SDUPTHER

## 2018-04-06 NOTE — PROGRESS NOTES
Assessment/Plan:    No problem-specific Assessment & Plan notes found for this encounter  Diagnoses and all orders for this visit:    Anxiety  -     LORazepam (ATIVAN) 0 5 mg tablet; Take 1 tablet (0 5 mg total) by mouth every 12 (twelve) hours as needed for anxiety    Urinary tract infection with hematuria, site unspecified  -     POCT urine dip    Primary osteoarthritis involving multiple joints  -     PNEUMOCOCCAL POLYSACCHARIDE VACCINE 23-VALENT =>1YO SQ IM    Chronic pain of left ankle  -     XR ankle 3+ vw left; Future    Acute cystitis without hematuria  -     UA w Reflex to Microscopic w Reflex to Culture - Clinic Collect  -     levofloxacin (LEVAQUIN) 500 mg tablet; Take 1 tablet (500 mg total) by mouth every 24 hours for 7 days    Other orders  -     Cancel: LORazepam (ATIVAN) 0 5 mg tablet; Take 1 tablet (0 5 mg total) by mouth every 12 (twelve) hours as needed for anxiety          Subjective:      Patient ID: Davion Nolan is a 58 y o  female  Pt  Is here for anxiety, joint pain and uti  Pt  Wishes pneumovax  Pt  Needs refills  Pt with blisters on hands  Pt  With swelling left ankle  All other ros negative  The following portions of the patient's history were reviewed and updated as appropriate: allergies, current medications, past family history, past medical history, past social history, past surgical history and problem list     Review of Systems   Constitutional: Negative  HENT: Negative  Eyes: Negative  Respiratory: Negative  Cardiovascular: Negative  Gastrointestinal: Negative  Endocrine: Negative  Genitourinary: Positive for dysuria  Musculoskeletal: Positive for arthralgias  Skin: Positive for wound  Allergic/Immunologic: Negative  Neurological: Negative  Hematological: Negative  Psychiatric/Behavioral: The patient is nervous/anxious            Objective:      /68 (BP Location: Right arm, Patient Position: Sitting, Cuff Size: Standard)  4' 11 17" (1 503 m)   Wt 70 4 kg (155 lb 3 2 oz)   BMI 31 16 kg/m²          Physical Exam   Constitutional: She is oriented to person, place, and time  She appears well-developed and well-nourished  No distress  HENT:   Head: Normocephalic  Right Ear: External ear normal    Left Ear: External ear normal    Mouth/Throat: Oropharynx is clear and moist  No oropharyngeal exudate  Eyes: EOM are normal  Pupils are equal, round, and reactive to light  Right eye exhibits no discharge  Left eye exhibits no discharge  No scleral icterus  Neck: Normal range of motion  Neck supple  No thyromegaly present  Cardiovascular: Normal rate, regular rhythm, normal heart sounds and intact distal pulses  Exam reveals no gallop and no friction rub  No murmur heard  Pulmonary/Chest: Effort normal and breath sounds normal  No respiratory distress  She has no wheezes  She has no rales  She exhibits no tenderness  Abdominal: Soft  Bowel sounds are normal  She exhibits no distension  There is no tenderness  There is no rebound and no guarding  Musculoskeletal: Normal range of motion  She exhibits tenderness  She exhibits no edema  Left ankle pain posteriorly   Lymphadenopathy:     She has no cervical adenopathy  Neurological: She is oriented to person, place, and time  No cranial nerve deficit  She exhibits normal muscle tone  Coordination normal    Skin: Skin is warm and dry  Rash noted  She is not diaphoretic  No erythema  No pallor  The patient also with the lesion on right 2nd finger distally   Psychiatric: She has a normal mood and affect  Her behavior is normal  Judgment and thought content normal    Nursing note and vitals reviewed

## 2018-04-12 ENCOUNTER — HOSPITAL ENCOUNTER (OUTPATIENT)
Dept: RADIOLOGY | Facility: HOSPITAL | Age: 63
Discharge: HOME | End: 2018-04-12
Payer: COMMERCIAL

## 2018-04-12 DIAGNOSIS — M25.572 CHRONIC PAIN OF LEFT ANKLE: ICD-10-CM

## 2018-04-12 DIAGNOSIS — G89.29 CHRONIC PAIN OF LEFT ANKLE: ICD-10-CM

## 2018-04-12 PROCEDURE — 73610 X-RAY EXAM OF ANKLE: CPT

## 2018-04-16 ENCOUNTER — TELEPHONE (OUTPATIENT)
Dept: FAMILY MEDICINE CLINIC | Facility: CLINIC | Age: 63
End: 2018-04-16

## 2018-04-16 NOTE — TELEPHONE ENCOUNTER
----- Message from Maria Isabel Ramos DO sent at 4/15/2018  7:56 PM EDT -----  Call patient    Left ankle and foot x-rays normal

## 2018-05-17 ENCOUNTER — OFFICE VISIT (OUTPATIENT)
Dept: OBGYN CLINIC | Facility: CLINIC | Age: 63
End: 2018-05-17
Payer: COMMERCIAL

## 2018-05-17 VITALS
HEIGHT: 65 IN | BODY MASS INDEX: 25.99 KG/M2 | WEIGHT: 156 LBS | DIASTOLIC BLOOD PRESSURE: 60 MMHG | SYSTOLIC BLOOD PRESSURE: 112 MMHG

## 2018-05-17 DIAGNOSIS — N81.4 UTERINE PROLAPSE: Primary | ICD-10-CM

## 2018-05-17 DIAGNOSIS — N76.2 ACUTE VULVITIS: ICD-10-CM

## 2018-05-17 PROCEDURE — 99213 OFFICE O/P EST LOW 20 MIN: CPT | Performed by: OBSTETRICS & GYNECOLOGY

## 2018-05-17 RX ORDER — HYDROXYCHLOROQUINE SULFATE 200 MG/1
200 TABLET, FILM COATED ORAL 2 TIMES DAILY WITH MEALS
COMMUNITY
End: 2018-08-30

## 2018-05-17 NOTE — PROGRESS NOTES
Assessment/Plan:     Uterine prolapse-will try a slightly smaller shaatz pessary at her next visit  she currently has a 3-1/4 shaatz She will continue Replens  We also discussed the possibility of vaginal estrogen to see if this helps with removal and insertion of the pessary  She has not had an issue prior to today    Vulvitis-I recommended a vulvar biopsy to determine the cause of the itching, she is agreeable and will return for this in the near future  There are no diagnoses linked to this encounter  Subjective:     Patient ID: Haley Stephens is a 61 y o  female  Patient presents for pessary check  She has no complaints regarding reviewed the vulvar itching that started around the time of her last visit has persisted  She does not have burning, pain or bleeding  She has been using some triamcinolone cream with minimal relief  She has tried eliminating certain things to see if it could be an allergy but she has not noticed any changes  She is under a great deal of stress taking care of her elderly mother  Review of Systems   Genitourinary:        Vulvar itching   All other systems reviewed and are negative          Objective:     Physical Exam   Genitourinary: Vagina normal and uterus normal    Genitourinary Comments: Vulvar erythema, no lesions  Pessary removed, cleaned and reinserted to  Pessary was slightly difficult to remove

## 2018-05-24 ENCOUNTER — CONVERSION ENCOUNTER (OUTPATIENT)
Dept: MAMMOGRAPHY | Facility: CLINIC | Age: 63
End: 2018-05-24

## 2018-06-04 ENCOUNTER — OFFICE VISIT (OUTPATIENT)
Dept: FAMILY MEDICINE CLINIC | Facility: CLINIC | Age: 63
End: 2018-06-04
Payer: COMMERCIAL

## 2018-06-04 VITALS
BODY MASS INDEX: 25.86 KG/M2 | DIASTOLIC BLOOD PRESSURE: 80 MMHG | SYSTOLIC BLOOD PRESSURE: 126 MMHG | WEIGHT: 155.2 LBS | TEMPERATURE: 98.4 F | HEIGHT: 65 IN

## 2018-06-04 DIAGNOSIS — N39.0 URINARY TRACT INFECTION WITHOUT HEMATURIA, SITE UNSPECIFIED: Primary | ICD-10-CM

## 2018-06-04 DIAGNOSIS — R30.0 DYSURIA: ICD-10-CM

## 2018-06-04 DIAGNOSIS — N30.00 ACUTE CYSTITIS WITHOUT HEMATURIA: ICD-10-CM

## 2018-06-04 LAB
SL AMB  POCT GLUCOSE, UA: NEGATIVE
SL AMB LEUKOCYTE ESTERASE,UA: NEGATIVE
SL AMB POCT BILIRUBIN,UA: NEGATIVE
SL AMB POCT BLOOD,UA: ABNORMAL
SL AMB POCT CLARITY,UA: ABNORMAL
SL AMB POCT COLOR,UA: ABNORMAL
SL AMB POCT KETONES,UA: NEGATIVE
SL AMB POCT NITRITE,UA: POSITIVE
SL AMB POCT PH,UA: 5
SL AMB POCT SPECIFIC GRAVITY,UA: 1.03
SL AMB POCT URINE PROTEIN: ABNORMAL
SL AMB POCT UROBILINOGEN: NEGATIVE

## 2018-06-04 PROCEDURE — 81001 URINALYSIS AUTO W/SCOPE: CPT | Performed by: FAMILY MEDICINE

## 2018-06-04 PROCEDURE — 81002 URINALYSIS NONAUTO W/O SCOPE: CPT | Performed by: FAMILY MEDICINE

## 2018-06-04 PROCEDURE — 3008F BODY MASS INDEX DOCD: CPT | Performed by: FAMILY MEDICINE

## 2018-06-04 PROCEDURE — 87086 URINE CULTURE/COLONY COUNT: CPT | Performed by: FAMILY MEDICINE

## 2018-06-04 PROCEDURE — 87077 CULTURE AEROBIC IDENTIFY: CPT | Performed by: FAMILY MEDICINE

## 2018-06-04 PROCEDURE — 99213 OFFICE O/P EST LOW 20 MIN: CPT | Performed by: FAMILY MEDICINE

## 2018-06-04 PROCEDURE — 87186 SC STD MICRODIL/AGAR DIL: CPT | Performed by: FAMILY MEDICINE

## 2018-06-04 RX ORDER — CIPROFLOXACIN 500 MG/1
500 TABLET, FILM COATED ORAL EVERY 12 HOURS SCHEDULED
Qty: 20 TABLET | Refills: 0 | Status: SHIPPED | OUTPATIENT
Start: 2018-06-04 | End: 2018-06-14

## 2018-06-04 NOTE — PROGRESS NOTES
Assessment/Plan:   urinary sent for UA C&S  Patient with blood as well as positive nitrates on urine dipstick  Patient will be treated with Cipro twice daily for 10 days       Diagnoses and all orders for this visit:    Urinary tract infection without hematuria, site unspecified    Dysuria  -     POCT urine dip    Acute cystitis without hematuria  -     ciprofloxacin (CIPRO) 500 mg tablet; Take 1 tablet (500 mg total) by mouth every 12 (twelve) hours for 10 days    Other orders  -     MULTIPLE VITAMIN PO; Take by mouth          Subjective:      Patient ID: Vijay Levi is a 61 y o  female  Pt  Is here for uti s/s  No fever or vomiting  Patient with chills and dysuria and urinary frequency and urgency  The following portions of the patient's history were reviewed and updated as appropriate: allergies, current medications, past family history, past medical history, past social history, past surgical history and problem list     Review of Systems   Constitutional: Negative  HENT: Negative  Eyes: Negative  Respiratory: Negative  Cardiovascular: Negative  Gastrointestinal: Negative  Endocrine: Negative  Genitourinary: Positive for dysuria, frequency and urgency  Musculoskeletal: Negative  Skin: Negative  Allergic/Immunologic: Negative  Neurological: Negative  Hematological: Negative  Psychiatric/Behavioral: Negative  Objective:      /80 (BP Location: Right arm, Patient Position: Sitting, Cuff Size: Standard)   Temp 98 4 °F (36 9 °C)   Ht 5' 5" (1 651 m)   Wt 70 4 kg (155 lb 3 2 oz)   BMI 25 83 kg/m²          Physical Exam   Constitutional: She is oriented to person, place, and time  She appears well-developed and well-nourished  No distress  HENT:   Head: Normocephalic  Right Ear: External ear normal    Left Ear: External ear normal    Mouth/Throat: Oropharynx is clear and moist  No oropharyngeal exudate     Eyes: EOM are normal  Pupils are equal, round, and reactive to light  Right eye exhibits no discharge  Left eye exhibits no discharge  No scleral icterus  Neck: Normal range of motion  Neck supple  No thyromegaly present  Cardiovascular: Normal rate, regular rhythm, normal heart sounds and intact distal pulses  Exam reveals no gallop and no friction rub  No murmur heard  Pulmonary/Chest: Effort normal and breath sounds normal  No respiratory distress  She has no wheezes  She has no rales  She exhibits no tenderness  Abdominal: Soft  Bowel sounds are normal  She exhibits no distension  There is no tenderness  There is no rebound and no guarding  Musculoskeletal: Normal range of motion  She exhibits no edema or tenderness  Lymphadenopathy:     She has no cervical adenopathy  Neurological: She is oriented to person, place, and time  No cranial nerve deficit  She exhibits normal muscle tone  Coordination normal    Skin: Skin is warm and dry  No rash noted  She is not diaphoretic  No erythema  No pallor  Psychiatric: She has a normal mood and affect  Her behavior is normal  Judgment and thought content normal    Nursing note and vitals reviewed

## 2018-06-05 ENCOUNTER — TELEPHONE (OUTPATIENT)
Dept: OBGYN CLINIC | Facility: CLINIC | Age: 63
End: 2018-06-05

## 2018-06-05 LAB
BACTERIA UR QL AUTO: ABNORMAL /HPF
BILIRUB UR QL STRIP: NEGATIVE
CAOX CRY URNS QL MICRO: ABNORMAL /HPF
CLARITY UR: ABNORMAL
COLOR UR: YELLOW
GLUCOSE UR STRIP-MCNC: NEGATIVE MG/DL
HGB UR QL STRIP.AUTO: ABNORMAL
KETONES UR STRIP-MCNC: NEGATIVE MG/DL
LEUKOCYTE ESTERASE UR QL STRIP: ABNORMAL
MUCOUS THREADS UR QL AUTO: ABNORMAL
NITRITE UR QL STRIP: POSITIVE
NON-SQ EPI CELLS URNS QL MICRO: ABNORMAL /HPF
PH UR STRIP.AUTO: 5.5 [PH] (ref 4.5–8)
PROT UR STRIP-MCNC: NEGATIVE MG/DL
RBC #/AREA URNS AUTO: ABNORMAL /HPF
SP GR UR STRIP.AUTO: 1.02 (ref 1–1.03)
UROBILINOGEN UR QL STRIP.AUTO: 0.2 E.U./DL
WBC #/AREA URNS AUTO: ABNORMAL /HPF

## 2018-06-07 ENCOUNTER — PROCEDURE VISIT (OUTPATIENT)
Dept: OBGYN CLINIC | Facility: CLINIC | Age: 63
End: 2018-06-07
Payer: COMMERCIAL

## 2018-06-07 VITALS — SYSTOLIC BLOOD PRESSURE: 140 MMHG | WEIGHT: 156 LBS | BODY MASS INDEX: 25.96 KG/M2 | DIASTOLIC BLOOD PRESSURE: 80 MMHG

## 2018-06-07 DIAGNOSIS — N76.2 ACUTE VULVITIS: Primary | ICD-10-CM

## 2018-06-07 PROCEDURE — 88312 SPECIAL STAINS GROUP 1: CPT | Performed by: PATHOLOGY

## 2018-06-07 PROCEDURE — 88342 IMHCHEM/IMCYTCHM 1ST ANTB: CPT | Performed by: PATHOLOGY

## 2018-06-07 PROCEDURE — 56605 BIOPSY OF VULVA/PERINEUM: CPT | Performed by: OBSTETRICS & GYNECOLOGY

## 2018-06-07 PROCEDURE — 88305 TISSUE EXAM BY PATHOLOGIST: CPT | Performed by: PATHOLOGY

## 2018-06-07 PROCEDURE — 88344 IMHCHEM/IMCYTCHM EA MLT ANTB: CPT | Performed by: PATHOLOGY

## 2018-06-07 NOTE — PROGRESS NOTES
Skin excision  Date/Time: 6/8/2018 10:48 AM  Performed by: Ada Ghotra  Authorized by: Ada Ghotra     Procedure Details - Skin Excision:     Number of Lesions:  1  Lesion 1:     Body area: Anogenital    Skin lesion 1 size (mm): 4 mm punch biopsy  Malignancy: benign lesion      Surgical defect:  4 mm  Lesion 6:          Patient has had ongoing vulvar itching for several months  It does not appear to be candidiasis  We empirically treated her but she has had no relief  At her last visit, we discussed a vulvar biopsy  This was done today without difficulty  There was excellent hemostasis with silver nitrate and Monsel's  We will await results  Of note, we were going to order a smaller shaatz pessary for her, to be inserted at her next visit  She currently has a 3-1/4 shaatz

## 2018-06-08 LAB — BACTERIA UR CULT: ABNORMAL

## 2018-06-13 ENCOUNTER — TRANSCRIBE ORDERS (OUTPATIENT)
Dept: ADMINISTRATIVE | Facility: HOSPITAL | Age: 63
End: 2018-06-13

## 2018-06-13 DIAGNOSIS — R92.8 ABNORMAL MAMMOGRAM: Primary | ICD-10-CM

## 2018-06-26 ENCOUNTER — OFFICE VISIT (OUTPATIENT)
Dept: ENDOCRINOLOGY | Facility: CLINIC | Age: 63
End: 2018-06-26
Payer: COMMERCIAL

## 2018-06-26 VITALS
WEIGHT: 158.8 LBS | HEART RATE: 70 BPM | BODY MASS INDEX: 26.46 KG/M2 | DIASTOLIC BLOOD PRESSURE: 80 MMHG | HEIGHT: 65 IN | SYSTOLIC BLOOD PRESSURE: 118 MMHG

## 2018-06-26 DIAGNOSIS — M81.0 AGE-RELATED OSTEOPOROSIS WITHOUT CURRENT PATHOLOGICAL FRACTURE: ICD-10-CM

## 2018-06-26 DIAGNOSIS — E04.2 MULTIPLE THYROID NODULES: Primary | ICD-10-CM

## 2018-06-26 PROCEDURE — 99213 OFFICE O/P EST LOW 20 MIN: CPT | Performed by: INTERNAL MEDICINE

## 2018-06-26 NOTE — PROGRESS NOTES
Conner Evans 61 y o  female MRN: [id number]    Encounter: [id number]      Assessment/Plan     Assessment: This is a 61y o -year-old female with thyroid nodules and osteoporosis  Plan:  1  Thyroid nodules are stable based on the most recent ultrasound  Check TSH and free T4  Repeat ultrasound in a year  2   Osteoporosis is being managed by Rheumatology  CC:   Thyroid nodules    History of Present Illness     HPI:  27-year-old woman with thyroid nodules for about three years who presents for follow-up  She denies any difficulty with swallowing or breathing  She denies any constipation, diarrhea, hair or skin changes, weight changes and temperature intolerance  There is family history of thyroid nodules  Her osteoporosis is being managed by Rheumatology  Review of Systems   Constitutional: Negative for chills and fever  Respiratory: Negative for shortness of breath  Cardiovascular: Negative for chest pain  Gastrointestinal: Negative for constipation, diarrhea, nausea and vomiting  Endocrine: Negative for cold intolerance and heat intolerance  Neurological: Negative for numbness  All other systems reviewed and are negative  Historical Information   Past Medical History:   Diagnosis Date    Abnormal Pap smear of cervix     Arthritis 2014    Cardiac dysrhythmia 2014    Fracture of foot     Leftt foot     1 para 1     H/O transvaginal ultrasound 10/10/2007    transvaginal USG 45 yo GS P2 w/postmenopausal bleeding  Pls do TVS to assess endometrial width      Hypertension     Knee clicking     Lung cancer ([address] )     Pregnancy     Total preg's 1 F/T & living/vaginal    Varicella 2014     Past Surgical History:   Procedure Laterality Date    LUNG LOBECTOMY Left     Left lower lung lobectomy for cancer    LUNG REMOVAL, PARTIAL  2004    Prior Surgical/Procedural History - /4 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 - lung resection - subtotal    MOUTH SURGERY      Tooth extraction     Social History   History   Alcohol Use    Yes     Comment: Occasional alcohol use     History   Drug Use No     History   Smoking Status    Current Every Day Smoker   Smokeless Tobacco    Never Used     Comment: Has ceased smoking cigarette/cigare: 17/49 (1 ppd) lung CA 53 yo     Family History:   Family History   Problem Relation Age of Onset    Arthritis Mother     Asthma Mother     Dementia Mother     Heart disease Mother     Hypothyroidism Mother     Irritable bowel syndrome Mother     Uterine cancer Mother     Heart attack Father     Stroke Father     Hyperlipidemia Sister     Uterine cancer Sister     Heart attack Maternal Grandfather     Asthma Family     Cataracts Family     Thyroid disease Family        Meds/Allergies   Current Outpatient Prescriptions   Medication Sig Dispense Refill    cyanocobalamin (VITAMIN B-12) 100 mcg tablet Take 1 tablet by mouth daily      famotidine (PEPCID AC) 10 mg tablet Take by mouth as needed        LORazepam (ATIVAN) 0 5 mg tablet Take 1 tablet (0 5 mg total) by mouth every 12 (twelve) hours as needed for anxiety 30 tablet 0    Magnesium 500 MG CAPS Take by mouth daily        MULTIPLE VITAMIN PO Take by mouth      Omega-3 1000 MG CAPS Take by mouth daily        potassium chloride (KLOR-CON 10) 10 mEq tablet Take 1 tablet by mouth 3 (three) times a day      PredniSONE 10 MG (21) TBPK Take 1 tablet by mouth daily      SODIUM FLUORIDE, DENTAL GEL, (PREVIDENT 5000 PLUS) 1 1 % CREA Apply to teeth      Vitamin Mixture (JOSE RAMON-C PO) Take 1 tablet by mouth daily      hydroxychloroquine (PLAQUENIL) 200 mg tablet Take 200 mg by mouth 2 (two) times a day with meals      triamcinolone (KENALOG) 0 1 % ointment Apply topically 2 (two) times a day Apply sparingly BID to affected area for 1-2 weeks and then taper over 1-2 weeks 30 g 1     No current facility-administered medications for this visit  Allergies   Allergen Reactions    Penicillins Shortness Of Breath and Edema    Dust Mite Extract     Neomycin-Bacitracin Zn-Polymyx      Other reaction(s): REDNESS    Omeprazole     Other     Sulfamethoxazole-Trimethoprim        Objective   Vitals: Blood pressure 118/80, pulse 70, height 5' 5" (1 651 m), weight 72 kg (158 lb 12 8 oz)  Physical Exam   Constitutional: She is oriented to person, place, and time  She appears well-developed and well-nourished  No distress  HENT:   Head: Normocephalic and atraumatic  Mouth/Throat: Oropharynx is clear and moist and mucous membranes are normal  No oropharyngeal exudate  Eyes: Conjunctivae, EOM and lids are normal  Right eye exhibits no discharge  Left eye exhibits no discharge  No scleral icterus  Neck: Neck supple  There is a palpable right mid pole nodule that is approximately 1 5 cm in size  Cardiovascular: Normal rate, regular rhythm and normal heart sounds  Exam reveals no gallop and no friction rub  No murmur heard  Pulmonary/Chest: Effort normal and breath sounds normal  No respiratory distress  She has no wheezes  Abdominal: Soft  Bowel sounds are normal  She exhibits no distension  There is no tenderness  Musculoskeletal: Normal range of motion  She exhibits no edema, tenderness or deformity  Lymphadenopathy:        Head (right side): No occipital adenopathy present  Head (left side): No occipital adenopathy present  Right: No supraclavicular adenopathy present  Left: No supraclavicular adenopathy present  Neurological: She is alert and oriented to person, place, and time  No cranial nerve deficit  Skin: Skin is warm and intact  No rash noted  She is not diaphoretic  No erythema  Psychiatric: She has a normal mood and affect  Vitals reviewed  The history was obtained from the review of the chart, patient      Lab Results:        Imaging Studies:   Results for orders placed in visit on 06/12/18 US thyroid    [place] [facility] [address]  shows bilateral thyroid nodules dinner stable  Most of these are subcentimeter in size except for the right mid and lower pole nodules  I have personally reviewed pertinent reports  Portions of the record may have been created with voice recognition software  Occasional wrong word or "sound a like" substitutions may have occurred due to the inherent limitations of voice recognition software  Read the chart carefully and recognize, using context, where substitutions have occurred

## 2018-06-30 LAB
T4 FREE SERPL-MCNC: 1.1 NG/DL (ref 0.8–1.8)
TSH SERPL-ACNC: 0.19 MIU/L (ref 0.4–4.5)

## 2018-06-30 NOTE — PROGRESS NOTES
Please call the patient regarding her abnormal result  TSH is suppressed but the T4 is normal  Repeat TSH and free T4 in 6 to 8 weeks

## 2018-07-02 ENCOUNTER — TELEPHONE (OUTPATIENT)
Dept: ENDOCRINOLOGY | Facility: CLINIC | Age: 63
End: 2018-07-02

## 2018-07-02 DIAGNOSIS — E04.2 MULTIPLE THYROID NODULES: Primary | ICD-10-CM

## 2018-07-02 NOTE — TELEPHONE ENCOUNTER
----- Message from Jef Nevarez MD sent at 6/30/2018 11:18 AM EDT -----  Please call the patient regarding her abnormal result  TSH is suppressed but the T4 is normal  Repeat TSH and free T4 in 6 to 8 weeks

## 2018-07-06 ENCOUNTER — TELEPHONE (OUTPATIENT)
Dept: OBGYN CLINIC | Facility: CLINIC | Age: 63
End: 2018-07-06

## 2018-07-06 DIAGNOSIS — R92.8 ABNORMAL MAMMOGRAM: Primary | ICD-10-CM

## 2018-07-06 NOTE — TELEPHONE ENCOUNTER
[facility] called, needs order to be signed  Patient has appointment for Dx follow up on Tuesday 7/10/18

## 2018-07-10 ENCOUNTER — HOSPITAL ENCOUNTER (OUTPATIENT)
Dept: MAMMOGRAPHY | Facility: CLINIC | Age: 63
Discharge: HOME | End: 2018-07-10
Payer: COMMERCIAL

## 2018-07-10 ENCOUNTER — HOSPITAL ENCOUNTER (OUTPATIENT)
Dept: ULTRASOUND IMAGING | Facility: CLINIC | Age: 63
Discharge: HOME | End: 2018-07-10
Payer: COMMERCIAL

## 2018-07-10 DIAGNOSIS — R92.8 ABNORMAL MAMMOGRAM: ICD-10-CM

## 2018-07-10 PROCEDURE — 76642 ULTRASOUND BREAST LIMITED: CPT

## 2018-07-10 PROCEDURE — G0279 TOMOSYNTHESIS, MAMMO: HCPCS

## 2018-07-10 PROCEDURE — 77065 DX MAMMO INCL CAD UNI: CPT

## 2018-08-16 ENCOUNTER — TELEPHONE (OUTPATIENT)
Dept: ENDOCRINOLOGY | Facility: CLINIC | Age: 63
End: 2018-08-16

## 2018-08-16 LAB
ALBUMIN SERPL-MCNC: 4.1 G/DL (ref 3.6–5.1)
ALBUMIN/GLOB SERPL: 2 (CALC) (ref 1–2.5)
ALP SERPL-CCNC: 79 U/L (ref 33–130)
ALT SERPL-CCNC: 21 U/L (ref 6–29)
AST SERPL-CCNC: 20 U/L (ref 10–35)
BASOPHILS # BLD AUTO: 69 CELLS/UL (ref 0–200)
BASOPHILS NFR BLD AUTO: 0.7 %
BILIRUB SERPL-MCNC: 0.6 MG/DL (ref 0.2–1.2)
BUN SERPL-MCNC: 18 MG/DL (ref 7–25)
BUN/CREAT SERPL: NORMAL (CALC) (ref 6–22)
CALCIUM SERPL-MCNC: 9.3 MG/DL (ref 8.6–10.4)
CHLORIDE SERPL-SCNC: 105 MMOL/L (ref 98–110)
CO2 SERPL-SCNC: 31 MMOL/L (ref 20–32)
CREAT SERPL-MCNC: 0.83 MG/DL (ref 0.5–0.99)
EOSINOPHIL # BLD AUTO: 347 CELLS/UL (ref 15–500)
EOSINOPHIL NFR BLD AUTO: 3.5 %
ERYTHROCYTE [DISTWIDTH] IN BLOOD BY AUTOMATED COUNT: 13 % (ref 11–15)
ERYTHROCYTE [SEDIMENTATION RATE] IN BLOOD BY WESTERGREN METHOD: 2 MM/H
GLOBULIN SER CALC-MCNC: 2.1 G/DL (CALC) (ref 1.9–3.7)
GLUCOSE SERPL-MCNC: 94 MG/DL (ref 65–139)
HCT VFR BLD AUTO: 47.8 % (ref 35–45)
HGB BLD-MCNC: 16.3 G/DL (ref 11.7–15.5)
LYMPHOCYTES # BLD AUTO: 2703 CELLS/UL (ref 850–3900)
LYMPHOCYTES NFR BLD AUTO: 27.3 %
MCH RBC QN AUTO: 31 PG (ref 27–33)
MCHC RBC AUTO-ENTMCNC: 34.1 G/DL (ref 32–36)
MCV RBC AUTO: 91 FL (ref 80–100)
MONOCYTES # BLD AUTO: 812 CELLS/UL (ref 200–950)
MONOCYTES NFR BLD AUTO: 8.2 %
NEUTROPHILS # BLD AUTO: 5970 CELLS/UL (ref 1500–7800)
NEUTROPHILS NFR BLD AUTO: 60.3 %
PLATELET # BLD AUTO: 192 THOUSAND/UL (ref 140–400)
PMV BLD REES-ECKER: 9.7 FL (ref 7.5–12.5)
POTASSIUM SERPL-SCNC: 4 MMOL/L (ref 3.5–5.3)
PROT SERPL-MCNC: 6.2 G/DL (ref 6.1–8.1)
RBC # BLD AUTO: 5.25 MILLION/UL (ref 3.8–5.1)
SL AMB EGFR AFRICAN AMERICAN: 87 ML/MIN/1.73M2
SL AMB EGFR NON AFRICAN AMERICAN: 75 ML/MIN/1.73M2
SODIUM SERPL-SCNC: 142 MMOL/L (ref 135–146)
T4 FREE SERPL-MCNC: 1.2 NG/DL (ref 0.8–1.8)
TSH SERPL-ACNC: 0.98 MIU/L (ref 0.4–4.5)
WBC # BLD AUTO: 9.9 THOUSAND/UL (ref 3.8–10.8)

## 2018-08-16 NOTE — TELEPHONE ENCOUNTER
----- Message from Bhargavi Bauer MD sent at 8/16/2018 10:19 AM EDT -----  The laboratory testing results are normal

## 2018-08-29 ENCOUNTER — CLINICAL SUPPORT (OUTPATIENT)
Dept: FAMILY MEDICINE CLINIC | Facility: CLINIC | Age: 63
End: 2018-08-29
Payer: COMMERCIAL

## 2018-08-29 ENCOUNTER — TELEPHONE (OUTPATIENT)
Dept: FAMILY MEDICINE CLINIC | Facility: CLINIC | Age: 63
End: 2018-08-29

## 2018-08-29 DIAGNOSIS — Z23 NEED FOR ZOSTER VACCINATION: Primary | ICD-10-CM

## 2018-08-29 PROCEDURE — 90716 VAR VACCINE LIVE SUBQ: CPT | Performed by: FAMILY MEDICINE

## 2018-08-29 PROCEDURE — 90471 IMMUNIZATION ADMIN: CPT | Performed by: FAMILY MEDICINE

## 2018-08-29 NOTE — TELEPHONE ENCOUNTER
Patient called in concerns about get a shingles vaccine  because her mother is a nursing home and RN over at nursing home is request she do so   She state at her visit in April you discuss with her about getting vaccine   Does patient have to be seen before she getting vaccine  ?  Please address

## 2018-08-30 ENCOUNTER — OFFICE VISIT (OUTPATIENT)
Dept: OBGYN CLINIC | Facility: CLINIC | Age: 63
End: 2018-08-30
Payer: COMMERCIAL

## 2018-08-30 VITALS
SYSTOLIC BLOOD PRESSURE: 144 MMHG | DIASTOLIC BLOOD PRESSURE: 80 MMHG | WEIGHT: 158 LBS | HEIGHT: 65 IN | BODY MASS INDEX: 26.33 KG/M2

## 2018-08-30 DIAGNOSIS — N81.4 UTERINE PROLAPSE: Primary | ICD-10-CM

## 2018-08-30 PROCEDURE — 99213 OFFICE O/P EST LOW 20 MIN: CPT | Performed by: OBSTETRICS & GYNECOLOGY

## 2018-08-31 NOTE — PROGRESS NOTES
Assessment/Plan:     Uterine prolapse - RTO 3 months for pessary check  If needed, we can go to a 2 3/4 pessary  She will continue the replens  There are no diagnoses linked to this encounter  Subjective:     Patient ID: Meenu Edmonds is a 61 y o  female  PT here for pessary check  We planned to placed a slightly smaller shaatz pessary in today because she was uncomfortable last time  Also, her itching has resolved after being treated for yeast   She had a vulvar bx  Review of Systems   Constitutional: Negative  HENT: Negative  Eyes: Negative  Respiratory: Negative  Cardiovascular: Negative  Gastrointestinal: Negative  Endocrine: Negative  Genitourinary: Negative  Musculoskeletal: Negative  Skin: Negative  Allergic/Immunologic: Negative  Neurological: Negative  Hematological: Negative  Psychiatric/Behavioral: Negative  Objective:     Physical Exam   Genitourinary: Vagina normal and uterus normal    Genitourinary Comments: 3 1/4 shaatz removed with some difficulty and a #3 shaatz was placed  Vagina was without excoriation

## 2018-09-14 ENCOUNTER — TELEPHONE (OUTPATIENT)
Dept: OBGYN CLINIC | Facility: CLINIC | Age: 63
End: 2018-09-14

## 2018-09-14 NOTE — TELEPHONE ENCOUNTER
Patient called to ask if will be ok to wait until her yearly in January to have her next pessary check which is actually due in November  Patient states she is not having any problems or issues with her pessary  Per Dr Jacy Wong, ok to wait as long as there are no problems

## 2018-10-08 ENCOUNTER — OFFICE VISIT (OUTPATIENT)
Dept: FAMILY MEDICINE CLINIC | Facility: CLINIC | Age: 63
End: 2018-10-08
Payer: COMMERCIAL

## 2018-10-08 VITALS
DIASTOLIC BLOOD PRESSURE: 78 MMHG | BODY MASS INDEX: 26.82 KG/M2 | TEMPERATURE: 99.7 F | SYSTOLIC BLOOD PRESSURE: 134 MMHG | WEIGHT: 161 LBS | HEIGHT: 65 IN

## 2018-10-08 DIAGNOSIS — N39.0 URINARY TRACT INFECTION WITHOUT HEMATURIA, SITE UNSPECIFIED: ICD-10-CM

## 2018-10-08 DIAGNOSIS — R30.0 DYSURIA: Primary | ICD-10-CM

## 2018-10-08 LAB
SL AMB  POCT GLUCOSE, UA: NORMAL
SL AMB LEUKOCYTE ESTERASE,UA: ABNORMAL
SL AMB POCT BILIRUBIN,UA: ABNORMAL
SL AMB POCT BLOOD,UA: ABNORMAL
SL AMB POCT CLARITY,UA: ABNORMAL
SL AMB POCT COLOR,UA: ABNORMAL
SL AMB POCT KETONES,UA: ABNORMAL
SL AMB POCT NITRITE,UA: ABNORMAL
SL AMB POCT PH,UA: 5
SL AMB POCT SPECIFIC GRAVITY,UA: 1.02
SL AMB POCT URINE PROTEIN: ABNORMAL
SL AMB POCT UROBILINOGEN: NORMAL

## 2018-10-08 PROCEDURE — 81002 URINALYSIS NONAUTO W/O SCOPE: CPT | Performed by: FAMILY MEDICINE

## 2018-10-08 PROCEDURE — 99212 OFFICE O/P EST SF 10 MIN: CPT | Performed by: FAMILY MEDICINE

## 2018-10-08 RX ORDER — CIPROFLOXACIN 500 MG/1
500 TABLET, FILM COATED ORAL EVERY 12 HOURS SCHEDULED
Qty: 20 TABLET | Refills: 0 | Status: SHIPPED | OUTPATIENT
Start: 2018-10-08 | End: 2018-10-18

## 2018-10-08 NOTE — PROGRESS NOTES
Assessment/Plan:    55-year-old woman with:  UTI  Discussed treatment options with risks and benefits  Will empirically treat with Cipro and advised to call back if symptoms are not improving or if they worsen  No problem-specific Assessment & Plan notes found for this encounter  Diagnoses and all orders for this visit:    Dysuria  -     POCT urine dip  -     Cancel: Urine culture    Urinary tract infection without hematuria, site unspecified  -     ciprofloxacin (CIPRO) 500 mg tablet; Take 1 tablet (500 mg total) by mouth every 12 (twelve) hours for 10 days          Subjective:   Chief Complaint   Patient presents with    Urinary Tract Infection     Patient has had urgency and frequency since early in weekend  Urine dip results done today are finalized  If patient needs prescription, please print out prescription as patient has no prescription coverage   Immunizations     Patient was offered flu vaccine today but declined  Patient ID: Víctor Ramirez is a 61 y o  female  Patient is a 55-year-old woman who presents complaining of some urinary burning and frequency for the past several days  No fevers chills nausea vomiting  No flank pain  Tolerating p o  intake  Urinary Tract Infection          The following portions of the patient's history were reviewed and updated as appropriate: allergies, current medications, past family history, past medical history, past social history, past surgical history and problem list     Review of Systems   Constitutional: Negative  HENT: Negative  Eyes: Negative  Respiratory: Negative  Cardiovascular: Negative  Gastrointestinal: Negative  Endocrine: Negative  Genitourinary: Positive for dysuria  Musculoskeletal: Negative  Allergic/Immunologic: Negative  Neurological: Negative  Hematological: Negative  Psychiatric/Behavioral: Negative  All other systems reviewed and are negative          Objective:      /78 (BP Location: Right arm, Patient Position: Sitting, Cuff Size: Adult)   Temp 99 7 °F (37 6 °C) (Tympanic)   Ht 5' 5" (1 651 m)   Wt 73 kg (161 lb)   LMP  (LMP Unknown)   BMI 26 79 kg/m²          Physical Exam   Constitutional: She is oriented to person, place, and time  She appears well-developed and well-nourished  HENT:   Head: Atraumatic  Right Ear: External ear normal    Left Ear: External ear normal    Eyes: Pupils are equal, round, and reactive to light  Conjunctivae and EOM are normal    Neck: Normal range of motion  Cardiovascular: Normal rate, regular rhythm and normal heart sounds  Pulmonary/Chest: Effort normal and breath sounds normal  No respiratory distress  Abdominal: Soft  She exhibits no distension  There is no tenderness  There is no rebound and no guarding  Musculoskeletal: Normal range of motion  Neurological: She is alert and oriented to person, place, and time  No cranial nerve deficit  Skin: Skin is warm and dry  Psychiatric: She has a normal mood and affect   Her behavior is normal  Judgment and thought content normal

## 2018-10-16 ENCOUNTER — TELEPHONE (OUTPATIENT)
Dept: FAMILY MEDICINE CLINIC | Facility: CLINIC | Age: 63
End: 2018-10-16

## 2018-12-04 ENCOUNTER — OFFICE VISIT (OUTPATIENT)
Dept: FAMILY MEDICINE CLINIC | Facility: CLINIC | Age: 63
End: 2018-12-04
Payer: COMMERCIAL

## 2018-12-04 VITALS
WEIGHT: 160 LBS | DIASTOLIC BLOOD PRESSURE: 84 MMHG | BODY MASS INDEX: 26.66 KG/M2 | HEIGHT: 65 IN | TEMPERATURE: 97.6 F | SYSTOLIC BLOOD PRESSURE: 142 MMHG

## 2018-12-04 DIAGNOSIS — M79.7 FIBROMYALGIA: ICD-10-CM

## 2018-12-04 DIAGNOSIS — G89.29 CHRONIC BILATERAL LOW BACK PAIN WITH BILATERAL SCIATICA: ICD-10-CM

## 2018-12-04 DIAGNOSIS — M54.42 CHRONIC BILATERAL LOW BACK PAIN WITH BILATERAL SCIATICA: ICD-10-CM

## 2018-12-04 DIAGNOSIS — I99.8 ISCHEMIA OF TOE: Primary | ICD-10-CM

## 2018-12-04 DIAGNOSIS — S91.109A OPEN TOE WOUND, INITIAL ENCOUNTER: ICD-10-CM

## 2018-12-04 DIAGNOSIS — Z12.11 COLON CANCER SCREENING: ICD-10-CM

## 2018-12-04 DIAGNOSIS — M54.41 CHRONIC BILATERAL LOW BACK PAIN WITH BILATERAL SCIATICA: ICD-10-CM

## 2018-12-04 PROBLEM — G62.9 NEUROPATHY: Status: ACTIVE | Noted: 2018-12-04

## 2018-12-04 PROCEDURE — 99214 OFFICE O/P EST MOD 30 MIN: CPT | Performed by: FAMILY MEDICINE

## 2018-12-04 PROCEDURE — 3008F BODY MASS INDEX DOCD: CPT | Performed by: FAMILY MEDICINE

## 2018-12-04 RX ORDER — CLINDAMYCIN HYDROCHLORIDE 150 MG/1
150 CAPSULE ORAL EVERY 8 HOURS SCHEDULED
Qty: 30 CAPSULE | Refills: 0 | Status: SHIPPED | OUTPATIENT
Start: 2018-12-04 | End: 2018-12-14

## 2018-12-04 RX ORDER — PREDNISONE 10 MG/1
1 TABLET ORAL DAILY
Qty: 90 EACH | Refills: 1 | Status: SHIPPED | OUTPATIENT
Start: 2018-12-04 | End: 2019-02-25

## 2018-12-04 NOTE — PROGRESS NOTES
Assessment/Plan:  Patient already had flu shot for the year on October 18th  Patient will go for arterial Doppler study bilateral lower extremities  Patient use clindamycin  Diagnoses and all orders for this visit:    Ischemia of toe  -     VAS lower limb arterial duplex, complete bilateral; Future  -     CBC and differential; Future  -     Comprehensive metabolic panel; Future  -     Lipid panel; Future  -     TSH, 3rd generation with Free T4 reflex; Future  -     Hemoglobin A1C; Future  -     Magnesium; Future  -     Vitamin B12; Future    Colon cancer screening  -     Ambulatory referral to Gastroenterology; Future  -     Occult Blood, Fecal Immunochemical; Future    Chronic bilateral low back pain with bilateral sciatica  -     CBC and differential; Future  -     Comprehensive metabolic panel; Future  -     Lipid panel; Future  -     TSH, 3rd generation with Free T4 reflex; Future  -     Hemoglobin A1C; Future  -     Magnesium; Future  -     Vitamin B12; Future    Open toe wound, initial encounter  -     clindamycin (CLEOCIN) 150 mg capsule; Take 1 capsule (150 mg total) by mouth every 8 (eight) hours for 10 days    Fibromyalgia  -     Sedimentation rate, automated; Future  -     PredniSONE 10 MG (21) TBPK; Take 1 each by mouth daily          Subjective:      Patient ID: Kayla Young is a 61 y o  female  The patient is here with changes to her toes on left foot consistent with Raynaud's as her toes will turn blue and white  Patient does have a history of Raynaud's but is only affected her fingers in the past   Patient also with history of lupus fibromyalgia etc no medication changes noted  No direct trauma noted    Patient also with sciatica        The following portions of the patient's history were reviewed and updated as appropriate: allergies, current medications, past family history, past medical history, past social history, past surgical history and problem list     Review of Systems Constitutional: Negative  HENT: Negative  Eyes: Negative  Respiratory: Negative  Cardiovascular: Negative  Gastrointestinal: Negative  Endocrine: Negative  Genitourinary: Negative  Musculoskeletal: Positive for back pain  Skin: Positive for color change and wound  Allergic/Immunologic: Negative  Neurological: Negative  Hematological: Negative  Psychiatric/Behavioral: Negative  Objective:      /84 (BP Location: Right arm, Patient Position: Sitting, Cuff Size: Standard)   Temp 97 6 °F (36 4 °C) (Tympanic)   Ht 5' 5" (1 651 m)   Wt 72 6 kg (160 lb)   LMP  (LMP Unknown)   BMI 26 63 kg/m²          Physical Exam   Constitutional: She is oriented to person, place, and time  She appears well-developed and well-nourished  No distress  HENT:   Head: Normocephalic  Right Ear: External ear normal    Left Ear: External ear normal    Mouth/Throat: Oropharynx is clear and moist  No oropharyngeal exudate  Eyes: Pupils are equal, round, and reactive to light  EOM are normal  Right eye exhibits no discharge  Left eye exhibits no discharge  No scleral icterus  Neck: Normal range of motion  Neck supple  No thyromegaly present  Cardiovascular: Normal rate, regular rhythm, normal heart sounds and intact distal pulses  Exam reveals no gallop and no friction rub  No murmur heard  Pulmonary/Chest: Effort normal and breath sounds normal  No respiratory distress  She has no wheezes  She has no rales  She exhibits no tenderness  Abdominal: Soft  Bowel sounds are normal  She exhibits no distension  There is no tenderness  There is no rebound and no guarding  Musculoskeletal: She exhibits tenderness  She exhibits no edema  Lymphadenopathy:     She has no cervical adenopathy  Neurological: She is oriented to person, place, and time  No cranial nerve deficit  She exhibits normal muscle tone  Coordination normal    Skin: Skin is warm  No rash noted   She is not diaphoretic  No erythema  There is pallor  wound left 1st toe with bluish hue   Psychiatric: She has a normal mood and affect  Her behavior is normal  Judgment and thought content normal    Nursing note and vitals reviewed

## 2018-12-07 ENCOUNTER — TELEPHONE (OUTPATIENT)
Dept: FAMILY MEDICINE CLINIC | Facility: CLINIC | Age: 63
End: 2018-12-07

## 2018-12-10 NOTE — TELEPHONE ENCOUNTER
Spoke with patient about Dr Anna Montilla suggestion  Patient will try Aleve 2 tablets twice daily with food and will let us know later in week if pain is not relieved

## 2018-12-21 ENCOUNTER — TELEPHONE (OUTPATIENT)
Dept: FAMILY MEDICINE CLINIC | Facility: CLINIC | Age: 63
End: 2018-12-21

## 2018-12-21 NOTE — TELEPHONE ENCOUNTER
Pt called asking for insurance codes to be able to check with her insurance about coverage  [Perimenopausal] : perimenopausal

## 2018-12-21 NOTE — TELEPHONE ENCOUNTER
I attempted to contact the pt back - received vmail  I left a message asking pt to call back  I explained I am personally only here until 330 today and off on Monday  If it is an urgent matter she can call back and someone else should be able to help otherwise she may call me back next Wednesday to discuss further

## 2018-12-26 ENCOUNTER — TELEPHONE (OUTPATIENT)
Dept: FAMILY MEDICINE CLINIC | Facility: CLINIC | Age: 63
End: 2018-12-26

## 2018-12-26 NOTE — TELEPHONE ENCOUNTER
Spoke with pt on Friday 12/21  She was explaining she called the insurance to verify labs drawn at [facility]  The codes that were provided we coded as "routine" and the insurance will not cover the TSH w/ reflex and A1C as "routine"  She stated the insurance requires updated ICD 10 codes for coverage  She indicated she will be going after the new year to have the labs drawn so she does not need this immediately

## 2018-12-26 NOTE — TELEPHONE ENCOUNTER
Pt called on 12/21 stating she received lab slips to have drawn  She is planning on having them done after the new year but her insurance is requiring additional codes as the ones provided are "routine"  I found additional codes for the TSH w/ reflex but I am unable to find a(n) appropriate code(s) for the A1C  It was coded with ischemia of toe and back pain with sciatica  These two codes will not cover the A1C and she did not have any abnormal glucoses in the past  Can you please advise of what other codes would be appropriate that I could use?

## 2018-12-28 NOTE — TELEPHONE ENCOUNTER
I spoke with the pt  I provided her with the codes for the labs  A1C: R73 9,  TSH w/ Reflex: R94 6, E06 3 and E04 1  She will contact the insurance and/or the lab to provide these codes  She will notify our office if new scripts need to be printed with these codes on it

## 2019-01-03 ENCOUNTER — ANNUAL EXAM (OUTPATIENT)
Dept: OBGYN CLINIC | Facility: CLINIC | Age: 64
End: 2019-01-03
Payer: COMMERCIAL

## 2019-01-03 VITALS
HEIGHT: 64 IN | SYSTOLIC BLOOD PRESSURE: 148 MMHG | WEIGHT: 162.4 LBS | BODY MASS INDEX: 27.72 KG/M2 | DIASTOLIC BLOOD PRESSURE: 100 MMHG

## 2019-01-03 DIAGNOSIS — Z01.419 ENCOUNTER FOR ANNUAL ROUTINE GYNECOLOGICAL EXAMINATION: Primary | ICD-10-CM

## 2019-01-03 DIAGNOSIS — Z12.31 ENCOUNTER FOR SCREENING MAMMOGRAM FOR BREAST CANCER: ICD-10-CM

## 2019-01-03 PROCEDURE — S0612 ANNUAL GYNECOLOGICAL EXAMINA: HCPCS | Performed by: OBSTETRICS & GYNECOLOGY

## 2019-01-03 RX ORDER — MULTIVITAMIN
1 TABLET ORAL DAILY
COMMUNITY
End: 2020-09-02 | Stop reason: ALTCHOICE

## 2019-01-03 RX ORDER — PREDNISONE 10 MG/1
10 TABLET ORAL DAILY
Refills: 1 | COMMUNITY
Start: 2018-12-04 | End: 2019-01-30 | Stop reason: SDUPTHER

## 2019-01-03 NOTE — PROGRESS NOTES
Assessment/Plan:    Pap is up to date    I reviewed her diagnostic mammogram and ultrasound with her  3D mammogram ordered for later this year  Discussed self-breast exams  Uterine prolapse-her regular pessary was difficult to remove, I replaced it with a 2 1/2 shaatz, if this does not work, consider ring with support as it will be more flexible  Colon cancer screening discussed, she had been doing Hemoccult tests  Discussed regular exercise    Return in 3 months for pessary check  No problem-specific Assessment & Plan notes found for this encounter  Diagnoses and all orders for this visit:    Encounter for annual routine gynecological examination    Encounter for screening mammogram for breast cancer  -     Mammo screening bilateral w 3d & cad; Future    Other orders  -     predniSONE 10 mg tablet; Take 10 mg by mouth daily  -     Multiple Vitamin (MULTIVITAMIN) tablet; Take 1 tablet by mouth daily          Subjective:      Patient ID: Melchor Chavira is a 61 y o  female  Patient here for pessary check an yearly  She has no gyn complaints  She has a 2-3/4 Ena De which is working well for her however it is sometimes difficult to remove  She uses Replens  Normal Pap in 2017  Last year she had to return for a diagnostic mammogram and ultrasound but she can now return to routine screening  She has scattered fibroglandular densities  Her mother passed away a few months ago  The following portions of the patient's history were reviewed and updated as appropriate: allergies, current medications, past family history, past medical history, past social history, past surgical history and problem list     Review of Systems   Constitutional: Negative  HENT: Negative  Eyes: Negative  Respiratory: Negative  Cardiovascular: Negative  Gastrointestinal: Negative  Endocrine: Negative  Genitourinary: Negative  Musculoskeletal: Negative  Skin: Negative  Allergic/Immunologic: Negative  Neurological: Negative  Hematological: Negative  Psychiatric/Behavioral: Negative  Objective:      /100 (BP Location: Left arm, Patient Position: Sitting, Cuff Size: Standard)   Ht 5' 4 25" (1 632 m)   Wt 73 7 kg (162 lb 6 4 oz)   LMP  (LMP Unknown)   BMI 27 66 kg/m²          Physical Exam   Constitutional: She appears well-developed  Neck: No tracheal deviation present  No thyromegaly present  Cardiovascular: Normal rate and regular rhythm  Pulmonary/Chest: Effort normal and breath sounds normal  Right breast exhibits no inverted nipple, no mass, no nipple discharge, no skin change and no tenderness  Left breast exhibits no inverted nipple, no mass, no nipple discharge, no skin change and no tenderness  Breasts are symmetrical    Examined seated and supine   Abdominal: Soft  She exhibits no distension and no mass  There is no tenderness  Genitourinary: Rectum normal, vagina normal and uterus normal  No labial fusion  There is no rash, tenderness, lesion or injury on the right labia  There is no rash, tenderness, lesion or injury on the left labia  Cervix exhibits no motion tenderness, no discharge and no friability  Right adnexum displays no mass, no tenderness and no fullness  Left adnexum displays no mass, no tenderness and no fullness  Genitourinary Comments: Pessary removed, cleaned and replaced with a 2 and half shaatz as the to and 3/4 was very difficult to remove  No vaginal excoriation   Vitals reviewed

## 2019-01-17 ENCOUNTER — HOSPITAL ENCOUNTER (OUTPATIENT)
Dept: NON INVASIVE DIAGNOSTICS | Facility: CLINIC | Age: 64
Discharge: HOME | End: 2019-01-17
Payer: COMMERCIAL

## 2019-01-17 DIAGNOSIS — I99.8 ISCHEMIA OF TOE: ICD-10-CM

## 2019-01-17 PROCEDURE — 93925 LOWER EXTREMITY STUDY: CPT | Performed by: SURGERY

## 2019-01-17 PROCEDURE — 93923 UPR/LXTR ART STDY 3+ LVLS: CPT

## 2019-01-17 PROCEDURE — 93925 LOWER EXTREMITY STUDY: CPT

## 2019-01-17 PROCEDURE — 93922 UPR/L XTREMITY ART 2 LEVELS: CPT | Performed by: SURGERY

## 2019-01-18 ENCOUNTER — TELEPHONE (OUTPATIENT)
Dept: FAMILY MEDICINE CLINIC | Facility: CLINIC | Age: 64
End: 2019-01-18

## 2019-01-18 DIAGNOSIS — I73.9: Primary | ICD-10-CM

## 2019-01-18 NOTE — TELEPHONE ENCOUNTER
----- Message from Lanre Rodriguez DO sent at 1/18/2019  7:40 AM EST -----  Call patient  Arterial Doppler show significant inclusion on the left leg along with moderate claudication range NILSA on the right  Patient needs to see vascular surgery  Please refer to vascular surgery as soon as possible  Patient to stop smoking

## 2019-01-22 ENCOUNTER — TELEPHONE (OUTPATIENT)
Dept: FAMILY MEDICINE CLINIC | Facility: CLINIC | Age: 64
End: 2019-01-22

## 2019-01-22 NOTE — TELEPHONE ENCOUNTER
Left VM for patient to return call for lab results  As per Dr Aaron Crump notation, patients' cholesterol  was high showing 230 should be less than 200, white blood count 16 3 should be between 11 5-14 5, Hematocrit 48 9 should be between 35 0-43 0, WBC 11 1 should be between 4 0-10 0

## 2019-01-30 ENCOUNTER — OFFICE VISIT (OUTPATIENT)
Dept: VASCULAR SURGERY | Facility: CLINIC | Age: 64
End: 2019-01-30
Payer: COMMERCIAL

## 2019-01-30 ENCOUNTER — TELEPHONE (OUTPATIENT)
Dept: VASCULAR SURGERY | Facility: CLINIC | Age: 64
End: 2019-01-30

## 2019-01-30 VITALS
HEART RATE: 88 BPM | BODY MASS INDEX: 28.17 KG/M2 | WEIGHT: 165 LBS | DIASTOLIC BLOOD PRESSURE: 72 MMHG | SYSTOLIC BLOOD PRESSURE: 128 MMHG | HEIGHT: 64 IN

## 2019-01-30 DIAGNOSIS — F17.200 TOBACCO DEPENDENCE: ICD-10-CM

## 2019-01-30 DIAGNOSIS — M79.7 FIBROMYALGIA: ICD-10-CM

## 2019-01-30 DIAGNOSIS — I73.9: ICD-10-CM

## 2019-01-30 DIAGNOSIS — I73.00 RAYNAUD'S DISEASE WITHOUT GANGRENE: ICD-10-CM

## 2019-01-30 DIAGNOSIS — I70.209 OCCLUSION OF COMMON FEMORAL ARTERY (HCC): Primary | ICD-10-CM

## 2019-01-30 PROCEDURE — 99244 OFF/OP CNSLTJ NEW/EST MOD 40: CPT | Performed by: SURGERY

## 2019-01-30 NOTE — PATIENT INSTRUCTIONS

## 2019-01-30 NOTE — ASSESSMENT & PLAN NOTE
Occluded left common femoral artery per duplex with NILSA 0 57  Patient with short distance claudication    Recommend CTA abdomen pelvis with runoff

## 2019-01-30 NOTE — ASSESSMENT & PLAN NOTE
Patient reports longstanding Raynaud's disease which has been getting progressively worse over the last 6 months

## 2019-01-30 NOTE — PROGRESS NOTES
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 with NILSA 0 57  Patient with short distance claudication  Recommend CTA abdomen pelvis with runoff    Raynaud's disease without gangrene  Patient reports longstanding Raynaud's disease which has been getting progressively worse over the last 6 months  Tobacco dependence  Smokes 1 pack of cigarettes per day  Currently not interested in smoking cessation      Assessment/Plan   Diagnoses and all orders for this visit:    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HCC)  -     Basic metabolic panel; Future  -     CTA abdominal w run off w wo contrast; Future    Moderate claudication (HCC)  -     Ambulatory referral to Vascular Surgery    Tobacco dependence    Raynaud's disease without gangrene    Fibromyalgia        Chief Complaint   Patient presents with    Leg Pain       Subjective   Patient ID: Mini Lopez is a 61 y o  female  Pt is new to our office  She was referred her by Dr Lazarus Hall DO for claudication pain  Pt had a VISH done 1/17/2019  Pt has a hx of Cedric's syndrome  Pt states that she gets pain in her right calf when walking up and down steps  She describes the pain as feeling achy  She states that she has had the pain in the right leg  Pt denies leg swelling or wounds  She is a current daily smoker  Lena Toro is a 60-year-old with history of lupus/raynaud's who was referred to our office due to finding on arterial duplex suggesting 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  Patient has had long history of Raynaud disease which she reports has progressively worsened over the last 6 months  Patient reports she is in constant pain  Patient denies any rest pain  Patient attributes her left leg pain to her knee and a pulled muscle in the groin          The following portions of the patient's history were reviewed and updated as appropriate: allergies, current medications, past family history, past medical history, past social history, past surgical history and problem list     Review of Systems   Constitutional: Positive for fatigue  HENT: Positive for postnasal drip and rhinorrhea  Eyes: Negative  Respiratory: Positive for cough  Cardiovascular: Negative  Gastrointestinal: Negative  Endocrine: Positive for cold intolerance  Genitourinary: Negative  Musculoskeletal: Positive for arthralgias, back pain and joint swelling  Skin: Negative  Allergic/Immunologic: Positive for environmental allergies  Neurological: Positive for numbness  Hematological: Bruises/bleeds easily  Psychiatric/Behavioral: Positive for sleep disturbance  The patient is nervous/anxious  I have personally reviewed the ROS entered by MA and agree as documented      Patient Active Problem List   Diagnosis    Anxiety    Chronic bilateral low back pain with bilateral sciatica    Fibromyalgia    GERD (gastroesophageal reflux disease)    Lung cancer ([facility] [address] )    Osteoporosis    Primary osteoarthritis involving multiple joints    [facility] lupus erythematosus) ([facility])    Localized edema    Chronic pain of left ankle    Acute cystitis without hematuria    Ischemia of toe    Open toe wound, initial encounter    Neuropathy    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[facility])    Raynaud's disease without gangrene    Tobacco dependence       Past Surgical History:   Procedure Laterality Date    LUNG LOBECTOMY Left     Left lower lung lobectomy for cancer    LUNG REMOVAL, PARTIAL  05/01/2004    Prior Surgical/Procedural History - 5/4 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 - lung resection - subtotal    MOUTH SURGERY      Tooth extraction       Family History   Problem Relation Age of Onset    Arthritis Mother     Asthma Mother     Dementia Mother     Heart disease Mother     Hypothyroidism Mother     Irritable bowel syndrome Mother     Uterine cancer Mother     Heart attack Father     Stroke Father    [facility] Hyperlipidemia Sister     Uterine cancer Sister     Heart attack Maternal Grandfather     Asthma Family     Cataracts Family     Thyroid disease Family        Social History     Social History    Marital status: /Civil Union     Spouse name: N/A    Number of children: 1    Years of education: N/A     Occupational History    Not on file       Social History Main Topics    Smoking status: Current Every Day Smoker    Smokeless tobacco: Never Used      Comment: Has ceased smoking cigarette/cigare: 17/49 (1 ppd) lung CA 51 yo    Alcohol use Yes      Comment: Occasional alcohol use    Drug use: No    Sexual activity: No     Other Topics Concern    Not on file     Social History Narrative    Always uses seat belt    Caffeine use    Primary spoken language English    Caodaism    Racial background:      Drinks Tea       Allergies   Allergen Reactions    Penicillins Shortness Of Breath and Edema    Dust Mite Extract     Neomycin-Bacitracin Zn-Polymyx      Other reaction(s): REDNESS    Omeprazole     Other     Sulfamethoxazole-Trimethoprim          Current Outpatient Prescriptions:     cyanocobalamin (VITAMIN B-12) 100 mcg tablet, Take 1 tablet by mouth daily, Disp: , Rfl:     famotidine (PEPCID AC) 10 mg tablet, Take by mouth as needed  , Disp: , Rfl:     LORazepam (ATIVAN) 0 5 mg tablet, Take 1 tablet (0 5 mg total) by mouth every 12 (twelve) hours as needed for anxiety, Disp: 30 tablet, Rfl: 0    Magnesium 500 MG CAPS, Take by mouth daily  , Disp: , Rfl:     Multiple Vitamin (MULTIVITAMIN) tablet, Take 1 tablet by mouth daily, Disp: , Rfl:     potassium chloride (KLOR-CON 10) 10 mEq tablet, Take 1 tablet by mouth 3 (three) times a day, Disp: , Rfl:     PredniSONE 10 MG (21) TBPK, Take 1 each by mouth daily, Disp: 90 each, Rfl: 1    SODIUM FLUORIDE, DENTAL GEL, (PREVIDENT 5000 PLUS) 1 1 % CREA, Apply to teeth, Disp: , Rfl:     Vitamin Mixture (JOSE RAMON-C PO), Take 1 tablet by mouth daily, Disp: , Rfl:     Objective       Physical Exam:    General appearance: alert and oriented, in no acute distress  Skin: Skin color, texture, turgor normal  No rashes or lesions  Neurologic: Grossly normal  Head: Normocephalic, without obvious abnormality, atraumatic  Eyes: conjunctivae/corneas clear  EOM's intact  Neck: no adenopathy, no carotid bruit, no JVD and supple, symmetrical, trachea midline  Back:  No CVA tenderness  Lungs: clear to auscultation bilaterally  Chest wall: no tenderness  Heart: regular rate and rhythm, S1, S2 normal, no murmur, click, rub or gallop  Abdomen: soft, non-tender; bowel sounds normal; no masses,  no organomegaly  Extremities: extremities normal, warm and well-perfused; no cyanosis, clubbing, or edema    Pulse exam:  Radial: Right: 2+ Left[de-identified] 2+  Femoral: Right: 1+ Left: non-palpable  Popliteal: Right: non-palpable Left: non-palpable  DP: Right: non-palpable Left: non-palpable  PT: Right: non-palpable Left: non-palpable      CTA:  CONCLUSION:     Impression:  RIGHT LOWER LIMB:  This resting evaluation shows no evidence of significant lower extremity  arterial occlusive disease  Based on the decreased NILSA there is suggestion of  aortoiliac disease  Ankle/Brachial index: 0 78 Moderate claudication range  PVR/ PPG tracings are dampened  Metatarsal pressure of 53mmHg  Great toe pressure of mmHg, within the healing range  LEFT LOWER LIMB:  There is an occlusion of the common femoral artery, and it reconstitutes in the  proximal Superficial Femoral Artery  Ankle/Brachial index: 0 57 Severe claudication range  PVR/ PPG tracings are dampened  Metatarsal pressure and Great toe pressure were unobtainable secondary to  attenuated waveform

## 2019-01-30 NOTE — TELEPHONE ENCOUNTER
The patient was seen on 01/30/19 by Doctor Reji Betancourt  Doctor Reji Betancourt ordered her to get a CTA Abdominal with run off w wo contrast  The patient was adamant that she would schedule her appointment and give us a call back to scheduled her follow up with Doctor Reji Betancourt  I gave the patient the order and blood work script and highlighted the central scheduling phone number

## 2019-02-13 ENCOUNTER — HOSPITAL ENCOUNTER (OUTPATIENT)
Dept: CT IMAGING | Facility: HOSPITAL | Age: 64
Discharge: HOME | End: 2019-02-13
Attending: SURGERY
Payer: COMMERCIAL

## 2019-02-13 DIAGNOSIS — I70.209 OCCLUSION OF COMMON FEMORAL ARTERY (HCC): ICD-10-CM

## 2019-02-13 PROCEDURE — 75635 CT ANGIO ABDOMINAL ARTERIES: CPT

## 2019-02-13 RX ADMIN — IOHEXOL 145 ML: 350 INJECTION, SOLUTION INTRAVENOUS at 19:59

## 2019-02-25 ENCOUNTER — OFFICE VISIT (OUTPATIENT)
Dept: FAMILY MEDICINE CLINIC | Facility: CLINIC | Age: 64
End: 2019-02-25
Payer: COMMERCIAL

## 2019-02-25 VITALS
BODY MASS INDEX: 28.92 KG/M2 | HEIGHT: 64 IN | SYSTOLIC BLOOD PRESSURE: 134 MMHG | DIASTOLIC BLOOD PRESSURE: 90 MMHG | WEIGHT: 169.4 LBS

## 2019-02-25 DIAGNOSIS — M54.42 CHRONIC BILATERAL LOW BACK PAIN WITH BILATERAL SCIATICA: ICD-10-CM

## 2019-02-25 DIAGNOSIS — M54.41 CHRONIC BILATERAL LOW BACK PAIN WITH BILATERAL SCIATICA: ICD-10-CM

## 2019-02-25 DIAGNOSIS — R30.0 DYSURIA: ICD-10-CM

## 2019-02-25 DIAGNOSIS — M15.9 PRIMARY OSTEOARTHRITIS INVOLVING MULTIPLE JOINTS: ICD-10-CM

## 2019-02-25 DIAGNOSIS — N30.00 ACUTE CYSTITIS WITHOUT HEMATURIA: Primary | ICD-10-CM

## 2019-02-25 DIAGNOSIS — G89.29 CHRONIC BILATERAL LOW BACK PAIN WITH BILATERAL SCIATICA: ICD-10-CM

## 2019-02-25 DIAGNOSIS — M79.7 FIBROMYALGIA: ICD-10-CM

## 2019-02-25 LAB
BACTERIA UR QL AUTO: ABNORMAL /HPF
BILIRUB UR QL STRIP: NEGATIVE
CLARITY UR: ABNORMAL
COLOR UR: YELLOW
GLUCOSE UR STRIP-MCNC: NEGATIVE MG/DL
HGB UR QL STRIP.AUTO: ABNORMAL
HYALINE CASTS #/AREA URNS LPF: ABNORMAL /LPF
KETONES UR STRIP-MCNC: NEGATIVE MG/DL
LEUKOCYTE ESTERASE UR QL STRIP: ABNORMAL
NITRITE UR QL STRIP: POSITIVE
NON-SQ EPI CELLS URNS QL MICRO: ABNORMAL /HPF
PH UR STRIP.AUTO: 6.5 [PH] (ref 4.5–8)
PROT UR STRIP-MCNC: NEGATIVE MG/DL
RBC #/AREA URNS AUTO: ABNORMAL /HPF
SL AMB  POCT GLUCOSE, UA: NORMAL
SL AMB LEUKOCYTE ESTERASE,UA: NEGATIVE
SL AMB POCT BILIRUBIN,UA: NEGATIVE
SL AMB POCT BLOOD,UA: 2
SL AMB POCT CLARITY,UA: ABNORMAL
SL AMB POCT COLOR,UA: ABNORMAL
SL AMB POCT KETONES,UA: NEGATIVE
SL AMB POCT NITRITE,UA: ABNORMAL
SL AMB POCT PH,UA: 6
SL AMB POCT SPECIFIC GRAVITY,UA: 1.02
SL AMB POCT URINE PROTEIN: ABNORMAL
SL AMB POCT UROBILINOGEN: NORMAL
SP GR UR STRIP.AUTO: 1.03 (ref 1–1.03)
UROBILINOGEN UR QL STRIP.AUTO: 0.2 E.U./DL
WBC #/AREA URNS AUTO: ABNORMAL /HPF

## 2019-02-25 PROCEDURE — 99214 OFFICE O/P EST MOD 30 MIN: CPT | Performed by: FAMILY MEDICINE

## 2019-02-25 PROCEDURE — 87086 URINE CULTURE/COLONY COUNT: CPT | Performed by: FAMILY MEDICINE

## 2019-02-25 PROCEDURE — 81001 URINALYSIS AUTO W/SCOPE: CPT | Performed by: FAMILY MEDICINE

## 2019-02-25 PROCEDURE — 81002 URINALYSIS NONAUTO W/O SCOPE: CPT | Performed by: FAMILY MEDICINE

## 2019-02-25 PROCEDURE — 87077 CULTURE AEROBIC IDENTIFY: CPT | Performed by: FAMILY MEDICINE

## 2019-02-25 PROCEDURE — 87186 SC STD MICRODIL/AGAR DIL: CPT | Performed by: FAMILY MEDICINE

## 2019-02-25 RX ORDER — CIPROFLOXACIN 500 MG/1
500 TABLET, FILM COATED ORAL EVERY 12 HOURS SCHEDULED
Qty: 20 TABLET | Refills: 0 | Status: SHIPPED | OUTPATIENT
Start: 2019-02-25 | End: 2019-03-07

## 2019-02-25 RX ORDER — DULOXETIN HYDROCHLORIDE 30 MG/1
30 CAPSULE, DELAYED RELEASE ORAL DAILY
Qty: 90 CAPSULE | Refills: 0 | Status: SHIPPED | OUTPATIENT
Start: 2019-02-25 | End: 2019-03-07

## 2019-02-25 NOTE — PROGRESS NOTES
Assessment/Plan:      Diagnoses and all orders for this visit:    Acute cystitis without hematuria  -     ciprofloxacin (CIPRO) 500 mg tablet; Take 1 tablet (500 mg total) by mouth every 12 (twelve) hours for 10 days  -     Urine culture; Future  -     nystatin (MYCOSTATIN) 100,000 units/mL suspension; Apply 5 mL (500,000 Units total) to the mouth or throat 4 (four) times a day    Dysuria  -     POCT urine dip    Chronic bilateral low back pain with bilateral sciatica    Primary osteoarthritis involving multiple joints    Fibromyalgia  -     DULoxetine (CYMBALTA) 30 mg delayed release capsule; Take 1 capsule (30 mg total) by mouth daily          Subjective:      Patient ID: Abhinav Flower is a 61 y o  female  Patient is here with back pain since Saturday  The patient did have diarrhea which resolved  Patient with suprapubic pain, urgency frequency and dysuria  No gross hematuria  The following portions of the patient's history were reviewed and updated as appropriate: allergies, current medications, past family history, past medical history, past social history, past surgical history and problem list     Review of Systems   Constitutional: Negative  HENT: Negative  Eyes: Negative  Respiratory: Negative  Cardiovascular: Negative  Gastrointestinal: Negative  Endocrine: Negative  Genitourinary: Positive for dysuria, frequency and urgency  Negative for hematuria  Musculoskeletal: Negative  Skin: Negative  Allergic/Immunologic: Negative  Neurological: Negative  Hematological: Negative  Psychiatric/Behavioral: Negative  Objective:      /90 (BP Location: Right arm, Patient Position: Sitting, Cuff Size: Standard)   Ht 5' 4" (1 626 m)   Wt 76 8 kg (169 lb 6 4 oz)   LMP  (LMP Unknown)   BMI 29 08 kg/m²          Physical Exam   Constitutional: She appears well-developed and well-nourished  HENT:   Head: Normocephalic     Cardiovascular: Normal rate and regular rhythm  Pulmonary/Chest: Effort normal    Musculoskeletal: She exhibits tenderness  Neurological: She is alert  Nursing note and vitals reviewed

## 2019-02-26 NOTE — PROGRESS NOTES
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HCC)  Layla Greenwood returns to the office to review results of her CTA of the abdomen and pelvis with runoff  Her past medical history is complicated by history of fibromyalgia, chronic back pain with sciatica as well as extensive peripheral arterial occlusive disease  Her CTA does confirm aortoiliac occlusive disease with 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  The findings were reviewed with patient in the office  Patient reports that she is in constant pain from her eyes down to her feet  Her overall activity is limited by her chronic pain  A left common femoral endarterectomy with possible iliac and SFA intervention were recommended  Patient had numerous questions which I attempted to answered to the best of my ability  Patient stated that I do not think that the CT scan was going to show anything and all of this is quite overwhelming  Patient would like additional time to contemplate whether she wishes to proceed with intervention  She understands that despite successful revascularization that continued smoking would compromise overall outcomes  Having said that patient is reluctant to accept the ill effects of continued smoking on peripheral arterial occlusive disease  Assessment/Plan   Diagnoses and all orders for this visit:    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HCC)    Aortoiliac occlusive disease ([address] )    PAD (peripheral artery disease) ([address] )    Raynaud's disease without gangrene    Chronic bilateral low back pain with bilateral sciatica    Neuropathy    Tobacco dependence    Fibromyalgia        No chief complaint on file  Subjective   Patient ID: Lanny Thompson is a 61 y o  female  Chief complaint: Pt is here to review CTA of abd w/ runoff done 2/13/19  Pt is c/o bilateral calf pain when walking up and down steps       Layla Greenwood returns to the office to review results of her CTA of the abdomen and pelvis with bilateral lower extremity runoff via there been no new changes in the interim since last office visit  She continues to complain of essentially whole body pain due to her underlying fibromyalgia/chronic back pain with sciatica  She does report bilateral calf claudication (left leg worse than right leg )  She continues to smoke with no intentions on quitting  The following portions of the patient's history were reviewed and updated as appropriate: allergies, current medications, past family history, past medical history, past social history, past surgical history and problem list     Review of Systems   Constitutional: Negative  HENT: Negative  Eyes: Negative  Respiratory: Positive for shortness of breath  Cardiovascular: Positive for leg swelling  Gastrointestinal: Negative  Endocrine: Negative  Genitourinary: Negative  Musculoskeletal: Negative  Leg pain   Skin: Negative  Allergic/Immunologic: Negative  Neurological: Negative  Hematological: Negative  Psychiatric/Behavioral: Negative  I have personally reviewed the ROS entered by MA and agree as documented      Patient Active Problem List   Diagnosis    Anxiety    Chronic bilateral low back pain with bilateral sciatica    Fibromyalgia    GERD (gastroesophageal reflux disease)    Lung [facility]    Osteoporosis    Primary osteoarthritis involving multiple joints    SLE [facility]systemic lupus erythematosus) ([facility])    Localized edema    Chronic pain of left ankle    Acute cystitis without hematuria    Ischemia of toe    Open toe wound, initial encounter    Neuropathy    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HCC)    Raynaud's disease without gangrene    Tobacco dependence    Aortoiliac occlusive disease ([facility])    PAD (peripheral artery disease) ([facility])       Past Surgical History:   Procedure Laterality Date    LUNG LOBECTOMY Left     Left lower lung lobectomy for cancer    LUNG REMOVAL, PARTIAL  05/01/2004    Prior Surgical/Procedural History - 5/4 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 - lung resection - subtotal    MOUTH SURGERY      Tooth extraction       Family History   Problem Relation Age of Onset    Arthritis Mother     Asthma Mother     Dementia Mother     Heart disease Mother     Hypothyroidism Mother     Irritable bowel syndrome Mother     Uterine cancer Mother     Heart attack Father     Stroke Father     Hyperlipidemia Sister     Uterine cancer Sister     Heart attack Maternal Grandfather     Asthma Family     Cataracts Family     Thyroid disease Family        Social History     Socioeconomic History    Marital status: /Civil Union     Spouse name: Not on file    Number of children: 1    Years of education: Not on file    Highest education level: Not on file   Occupational History    Not on file   Social Needs    Financial resource strain: Not on file    Food insecurity:     Worry: Not on file     Inability: Not on file    Transportation needs:     Medical: Not on file     Non-medical: Not on file   Tobacco Use    Smoking status: Current Every Day Smoker    Smokeless tobacco: Never Used    Tobacco comment: Has ceased smoking cigarette/cigare: 17/49 (1 ppd) lung CA 53 yo   Substance and Sexual Activity    Alcohol use: Yes     Comment: Occasional alcohol use    Drug use: No    Sexual activity: Never     Partners: Male   Lifestyle    Physical activity:     Days per week: Not on file     Minutes per session: Not on file    Stress: Not on file   Relationships    Social connections:     Talks on phone: Not on file     Gets together: Not on file     Attends [employer] service: Not on file     Active member of club or organization: Not on file     Attends meetings of clubs or organizations: Not on file     Relationship status: Not on file    Intimate partner violence:     Fear of current or ex partner: Not on file     Emotionally abused: Not on file     Physically abused: Not on file     Forced sexual activity: Not on file   Other Topics Concern    Not on file   Social History Narrative    Always uses seat belt    Caffeine use    Primary spoken language English    Cheondoism    Racial background:      Drinks Tea       Allergies   Allergen Reactions    Penicillins Shortness Of Breath and Edema    Dust Mite Extract     Neomycin-Bacitracin Zn-Polymyx      Other reaction(s): REDNESS    Omeprazole     Other     Sulfamethoxazole-Trimethoprim          Current Outpatient Medications:     ciprofloxacin (CIPRO) 500 mg tablet, Take 1 tablet (500 mg total) by mouth every 12 (twelve) hours for 10 days, Disp: 20 tablet, Rfl: 0    cyanocobalamin (VITAMIN B-12) 100 mcg tablet, Take 1 tablet by mouth daily, Disp: , Rfl:     DULoxetine (CYMBALTA) 30 mg delayed release capsule, Take 1 capsule (30 mg total) by mouth daily, Disp: 90 capsule, Rfl: 0    famotidine (PEPCID AC) 10 mg tablet, Take by mouth as needed  , Disp: , Rfl:     LORazepam (ATIVAN) 0 5 mg tablet, Take 1 tablet (0 5 mg total) by mouth every 12 (twelve) hours as needed for anxiety, Disp: 30 tablet, Rfl: 0    Magnesium 500 MG CAPS, Take by mouth daily  , Disp: , Rfl:     Multiple Vitamin (MULTIVITAMIN) tablet, Take 1 tablet by mouth daily, Disp: , Rfl:     nystatin (MYCOSTATIN) 100,000 units/mL suspension, Apply 5 mL (500,000 Units total) to the mouth or throat 4 (four) times a day, Disp: 60 mL, Rfl: 0    potassium chloride (KLOR-CON 10) 10 mEq tablet, Take 1 tablet by mouth 3 (three) times a day, Disp: , Rfl:     SODIUM FLUORIDE, DENTAL GEL, (PREVIDENT 5000 PLUS) 1 1 % CREA, Apply to teeth, Disp: , Rfl:     Vitamin Mixture (JOSE RAMON-C PO), Take 1 tablet by mouth daily, Disp: , Rfl:     Objective       Physical Exam:    General appearance: alert and oriented, in no acute distress  Skin: Skin color, texture, turgor normal  No rashes or lesions  Neurologic: Grossly normal  Head: Normocephalic, without obvious abnormality, atraumatic  Eyes: conjunctivae/corneas clear, EOM's intact  Neck: no adenopathy, no carotid bruit, no JVD and supple, symmetrical, trachea midline  Back:  No CVA tenderness  Lungs: clear to auscultation bilaterally  Chest wall: no tenderness  Heart: regular rate and rhythm, S1, S2 normal, no murmur, click, rub or gallop  Abdomen: soft, non-tender; bowel sounds normal; no masses,  no organomegaly  Extremities: extremities normal, warm and well-perfused; no cyanosis, clubbing, or edema    Pulse exam:  Radial: Right: 2+ Left[de-identified] 2+  Femoral: Right: 1+ Left: non-palpable  Popliteal: Right: non-palpable Left: non-palpable  DP: Right: non-palpable Left: non-palpable  PT: Right: non-palpable Left: non-palpable     CTA:   IMPRESSION:     High-grade stenosis of the right common iliac      Multifocal disease of the left common iliac, and the origin of the left external iliac  Infrainguinal occlusion of the common femoral which extends into the origin of the superficial femoral, as well as the origin, and the proximal portions of the branches of the profunda

## 2019-02-27 ENCOUNTER — OFFICE VISIT (OUTPATIENT)
Dept: VASCULAR SURGERY | Facility: CLINIC | Age: 64
End: 2019-02-27
Payer: COMMERCIAL

## 2019-02-27 VITALS
SYSTOLIC BLOOD PRESSURE: 150 MMHG | TEMPERATURE: 96.2 F | HEIGHT: 64 IN | BODY MASS INDEX: 29.08 KG/M2 | DIASTOLIC BLOOD PRESSURE: 92 MMHG

## 2019-02-27 DIAGNOSIS — G89.29 CHRONIC BILATERAL LOW BACK PAIN WITH BILATERAL SCIATICA: ICD-10-CM

## 2019-02-27 DIAGNOSIS — I70.209 OCCLUSION OF COMMON FEMORAL ARTERY (HCC): Primary | ICD-10-CM

## 2019-02-27 DIAGNOSIS — M54.42 CHRONIC BILATERAL LOW BACK PAIN WITH BILATERAL SCIATICA: ICD-10-CM

## 2019-02-27 DIAGNOSIS — G62.9 NEUROPATHY: ICD-10-CM

## 2019-02-27 DIAGNOSIS — F17.200 TOBACCO DEPENDENCE: ICD-10-CM

## 2019-02-27 DIAGNOSIS — I73.00 RAYNAUD'S DISEASE WITHOUT GANGRENE: ICD-10-CM

## 2019-02-27 DIAGNOSIS — M54.41 CHRONIC BILATERAL LOW BACK PAIN WITH BILATERAL SCIATICA: ICD-10-CM

## 2019-02-27 DIAGNOSIS — I74.09 AORTOILIAC OCCLUSIVE DISEASE (HCC): ICD-10-CM

## 2019-02-27 DIAGNOSIS — I73.9 PAD (PERIPHERAL ARTERY DISEASE) (HCC): ICD-10-CM

## 2019-02-27 DIAGNOSIS — M79.7 FIBROMYALGIA: ICD-10-CM

## 2019-02-27 LAB — BACTERIA UR CULT: ABNORMAL

## 2019-02-27 PROCEDURE — 99214 OFFICE O/P EST MOD 30 MIN: CPT | Performed by: SURGERY

## 2019-03-01 NOTE — ASSESSMENT & PLAN NOTE
Layla Greenwood returns to the office to review results of her CTA of the abdomen and pelvis with runoff  Her past medical history is complicated by history of fibromyalgia, chronic back pain with sciatica as well as extensive peripheral arterial occlusive disease  Her CTA does confirm aortoiliac occlusive disease with 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  The findings were reviewed with patient in the office  Patient reports that she is in constant pain from her eyes down to her feet  Her overall activity is limited by her chronic pain  A left common femoral endarterectomy with possible iliac and SFA intervention were recommended  Patient had numerous questions which I attempted to answered to the best of my ability  Patient stated that I do not think that the CT scan was going to show anything and all of this is quite overwhelming  Patient would like additional time to contemplate whether she wishes to proceed with intervention  She understands that despite successful revascularization that continued smoking would compromise overall outcomes  Having said that patient is reluctant to accept the ill effects of continued smoking on peripheral arterial occlusive disease

## 2019-03-01 NOTE — PATIENT INSTRUCTIONS
Peripheral Artery Disease   AMBULATORY CARE:   Peripheral artery disease (PAD)  is narrow, weak, or blocked arteries  It may affect any arteries outside of your heart and brain  PAD is usually the result of a buildup of fat and cholesterol, also called plaque, along your artery walls  Inflammation, a blood clot, or abnormal cell growth could also block your arteries  PAD prevents normal blood flow to your legs and arms  You are at risk of an amputation if poor blood flow keeps wounds from healing or causes gangrene (tissue death)  Without treatment, PAD can also cause a heart attack or stroke  Common symptoms include:  Mild PAD usually does not cause symptoms  As the disease worsens over time, you may have the following:  · Pain or cramps in your leg or hip while you walk     · A numb, weak, or heavy feeling in your legs     · Dry, scaly, red, or pale skin on your legs     · Thick or brittle nails, or hair loss on your arms and legs     · Foot sores that will not heal     · Burning or aching in your feet and toes while resting (this may be worse when you lie down)  Call 911 for the following:   · You have any of the following signs of a heart attack:      ¨ Squeezing, pressure, or pain in your chest that lasts longer than 5 minutes or returns    ¨ Discomfort or pain in your back, neck, jaw, stomach, or arm     ¨ Trouble breathing    ¨ Nausea or vomiting    ¨ Lightheadedness or a sudden cold sweat, especially with chest pain or trouble breathing    · You have any of the following signs of a stroke:      ¨ Numbness or drooping on one side of your face     ¨ Weakness in an arm or leg    ¨ Confusion or difficulty speaking    ¨ Dizziness, a severe headache, or vision loss  Seek care immediately if:   · You have sores or wounds that will not heal      · You notice black or discolored skin on your arm or leg  · Your skin is cool to the touch    Contact your healthcare provider if:   · You have leg pain when you walk 1/8 mile (200 meters) or less, even with treatment  · Your legs are red, dry, or pale, even with treatment  · You have questions or concerns about your condition or care  Treatment for PAD  can help reduce your risk of a heart attack, stroke, or amputation  You may need more than one of the following:  · Medicines  may be given to prevent blood clots and reduce the risk of a heart attack or stroke  You may be given medicine to help prevent your PAD from getting worse  · A supervised exercise program  helps you stay active in normal daily activities and may prevent disability  Healthcare providers will help you safely walk or do strength training exercises 3 times a week for 30 to 60 minutes  You will do this for several months, then transition to walking on your own  · Angioplasty  is a procedure to open your artery so blood can flow through normally  A thin tube called a catheter is used to insert a small balloon into your artery  The balloon is inflated to open your blocked artery, and then removed  A tube called a stent may be placed in your artery to hold it open  · Bypass surgery  is used to make a new connection to your artery with a vein from another part of your body, or an artificial graft  The vein or graft is attached to your artery above and below your blockage  This allows blood to flow around the blocked portion of your artery  Manage and prevent PAD:   · Walk for 30 to 60 minutes at least 4 times a week  Your healthcare provider may also refer you to an supervised exercise program  The program helps increase how far you can walk without pain  It also helps you stay active in normal daily activities and may prevent disability caused by PAD  · Do not smoke  Nicotine and other chemicals in cigarettes and cigars can worsen PAD  They can also increase your risk for a heart attack or stroke  Ask your healthcare provider for information if you currently smoke and need help to quit  E-cigarettes or smokeless tobacco still contain nicotine  Talk to your healthcare provider before you use these products  · Manage other health conditions  Take your medicines as directed and follow your healthcare provider's instructions if you have high blood pressure or high cholesterol  Perform foot care and check your blood sugar levels as directed if you have diabetes  · Eat heart healthy foods  Eat whole grains, fruits, and vegetables every day  Limit salt and high-fat foods  Ask your healthcare provider for more information on a heart healthy diet  Ask if you need to lose weight  Your healthcare provider can help you create a healthy weight-loss plan  Follow up with your healthcare provider as directed:  Write down your questions so you remember to ask them during your visits  © 2017 [facility] Information is for End User's use only and may not be sold, redistributed or otherwise used for commercial purposes  All illustrations and images included in CareNotes® are the copyrighted property of A D A M [employer] Inc  or Anjum Acosta  The above information is an  only  It is not intended as medical advice for individual conditions or treatments  Talk to your doctor, nurse or pharmacist before following any medical regimen to see if it is safe and effective for you

## 2019-03-07 ENCOUNTER — OFFICE VISIT (OUTPATIENT)
Dept: FAMILY MEDICINE CLINIC | Facility: CLINIC | Age: 64
End: 2019-03-07
Payer: COMMERCIAL

## 2019-03-07 VITALS
SYSTOLIC BLOOD PRESSURE: 122 MMHG | TEMPERATURE: 98.7 F | WEIGHT: 163.8 LBS | BODY MASS INDEX: 27.96 KG/M2 | DIASTOLIC BLOOD PRESSURE: 78 MMHG | HEIGHT: 64 IN

## 2019-03-07 DIAGNOSIS — B37.0 THRUSH: ICD-10-CM

## 2019-03-07 DIAGNOSIS — M54.50 ACUTE RIGHT-SIDED LOW BACK PAIN WITHOUT SCIATICA: ICD-10-CM

## 2019-03-07 DIAGNOSIS — M54.50 LUMBAR PAIN: Primary | ICD-10-CM

## 2019-03-07 DIAGNOSIS — R31.29 MICROSCOPIC HEMATURIA: ICD-10-CM

## 2019-03-07 LAB
SL AMB  POCT GLUCOSE, UA: NORMAL
SL AMB LEUKOCYTE ESTERASE,UA: NEGATIVE
SL AMB POCT BILIRUBIN,UA: NEGATIVE
SL AMB POCT BLOOD,UA: ABNORMAL
SL AMB POCT CLARITY,UA: CLEAR
SL AMB POCT COLOR,UA: ABNORMAL
SL AMB POCT KETONES,UA: NEGATIVE
SL AMB POCT NITRITE,UA: NEGATIVE
SL AMB POCT PH,UA: 5
SL AMB POCT SPECIFIC GRAVITY,UA: 1.02
SL AMB POCT URINE PROTEIN: ABNORMAL
SL AMB POCT UROBILINOGEN: NORMAL

## 2019-03-07 PROCEDURE — 99214 OFFICE O/P EST MOD 30 MIN: CPT | Performed by: FAMILY MEDICINE

## 2019-03-07 PROCEDURE — 3008F BODY MASS INDEX DOCD: CPT | Performed by: FAMILY MEDICINE

## 2019-03-07 PROCEDURE — 87086 URINE CULTURE/COLONY COUNT: CPT | Performed by: FAMILY MEDICINE

## 2019-03-07 PROCEDURE — 81002 URINALYSIS NONAUTO W/O SCOPE: CPT | Performed by: FAMILY MEDICINE

## 2019-03-07 RX ORDER — METHOCARBAMOL 500 MG/1
500 TABLET, FILM COATED ORAL 4 TIMES DAILY
Qty: 60 TABLET | Refills: 0 | Status: SHIPPED | OUTPATIENT
Start: 2019-03-07 | End: 2019-07-11

## 2019-03-07 RX ORDER — CLOTRIMAZOLE 10 MG/1
10 LOZENGE ORAL; TOPICAL
Qty: 70 TABLET | Refills: 1 | Status: SHIPPED | OUTPATIENT
Start: 2019-03-07 | End: 2019-07-11

## 2019-03-07 RX ORDER — PREDNISONE 10 MG/1
20 TABLET ORAL DAILY
Qty: 60 TABLET | Refills: 0 | Status: SHIPPED | OUTPATIENT
Start: 2019-03-07 | End: 2019-04-12

## 2019-03-07 NOTE — PROGRESS NOTES
Assessment/Plan:  Urine dip negative  Will send urine for urine culture for microscopic hematuria  Patient use Mycelex for the thrush  Patient will increase prednisone 20 mg daily for the next week or so until pain resolves  Patient use methocarbamol as directed  Follow-up as needed     Diagnoses and all orders for this visit:    Lumbar pain  -     predniSONE 10 mg tablet; Take 2 tablets (20 mg total) by mouth daily  -     methocarbamol (ROBAXIN) 500 mg tablet; Take 1 tablet (500 mg total) by mouth 4 (four) times a day    Acute right-sided low back pain without sciatica  -     POCT urine dip  -     Urine culture    Thrush  -     clotrimazole (MYCELEX) 10 mg valencia; Take 1 tablet (10 mg total) by mouth 5 (five) times a day    Microscopic hematuria          Subjective:      Patient ID: Spike Purdy is a 61 y o  female  Patient is here with decreased energy along with some right sided flank pain  It starts in the lumbar region  Patient also with cough  The no significant sputum production or dysuria or hematuria or suprapubic pain  Patient woke up with the pain  No trauma noted  This started on Tuesday morning  Symptoms have worsened  The following portions of the patient's history were reviewed and updated as appropriate: allergies, current medications, past family history, past medical history, past social history, past surgical history and problem list     Review of Systems   Constitutional: Negative  HENT: Negative  Eyes: Negative  Respiratory: Negative  Cardiovascular: Negative  Gastrointestinal: Negative  Endocrine: Negative  Genitourinary: Negative  Musculoskeletal: Positive for arthralgias and back pain  Skin: Negative  Allergic/Immunologic: Negative  Neurological: Negative  Hematological: Negative  Psychiatric/Behavioral: Negative            Objective:      /78 (BP Location: Right arm, Patient Position: Sitting, Cuff Size: Adult)   Temp 98 7 °F (37 1 °C) (Tympanic)   Ht 5' 4" (1 626 m)   Wt 74 3 kg (163 lb 12 8 oz)   LMP  (LMP Unknown)   BMI 28 12 kg/m²          Physical Exam   Constitutional: She is oriented to person, place, and time  She appears well-developed and well-nourished  No distress  HENT:   Head: Normocephalic  Right Ear: External ear normal    Left Ear: External ear normal    Mouth/Throat: Oropharynx is clear and moist  No oropharyngeal exudate  Eyes: Pupils are equal, round, and reactive to light  EOM are normal  Right eye exhibits no discharge  Left eye exhibits no discharge  No scleral icterus  Neck: Normal range of motion  Neck supple  No thyromegaly present  Cardiovascular: Normal rate, regular rhythm, normal heart sounds and intact distal pulses  Exam reveals no gallop and no friction rub  No murmur heard  Pulmonary/Chest: Effort normal and breath sounds normal  No respiratory distress  She has no wheezes  She has no rales  She exhibits no tenderness  Abdominal: Soft  Bowel sounds are normal  She exhibits no distension  There is no tenderness  There is no rebound and no guarding  Musculoskeletal: She exhibits tenderness  She exhibits no edema  Lumbar pain on the right   Lymphadenopathy:     She has no cervical adenopathy  Neurological: She is oriented to person, place, and time  No cranial nerve deficit  She exhibits normal muscle tone  Coordination normal    Skin: Skin is warm and dry  No rash noted  She is not diaphoretic  No erythema  No pallor  Psychiatric: She has a normal mood and affect  Her behavior is normal  Judgment and thought content normal    Nursing note and vitals reviewed

## 2019-03-08 LAB — BACTERIA UR CULT: ABNORMAL

## 2019-03-18 DIAGNOSIS — N30.00 ACUTE CYSTITIS WITHOUT HEMATURIA: ICD-10-CM

## 2019-03-18 DIAGNOSIS — N39.0 URINARY TRACT INFECTION WITHOUT HEMATURIA, SITE UNSPECIFIED: Primary | ICD-10-CM

## 2019-03-18 RX ORDER — CIPROFLOXACIN 500 MG/1
500 TABLET, FILM COATED ORAL EVERY 12 HOURS SCHEDULED
Qty: 14 TABLET | Refills: 0 | Status: SHIPPED | OUTPATIENT
Start: 2019-03-18 | End: 2019-03-25

## 2019-03-18 NOTE — TELEPHONE ENCOUNTER
Call patient  Start Cipro 500 mg twice daily 14  No refills  Patient to stop Cymbalta due to watery diarrhea the  Patient may try to reinitiate Cymbalta after antibiotics are done    Patient will call back if recurrence of diarrhea occurs

## 2019-03-18 NOTE — TELEPHONE ENCOUNTER
Spoke with patient about starting course of Cipro and discontinuing Cymbalta until antibiotics are completed  Patient understands  Have placed order to go to [facility] pharmacy  Thank you

## 2019-03-18 NOTE — TELEPHONE ENCOUNTER
Pt called stating she was seen approximately 1 week ago for UTI and back pain  She has not heard back regarding the Urine culture from 3/7; (which was abnormal)  She stated the UTI is full force again  She is requesting something be called in for her again  Also, pt noted she took 1 dose of the cymbalta and she has "straight water diarrhea"  She noted she went 4 times in an hour  Please advise how to proceed

## 2019-03-26 ENCOUNTER — TELEPHONE (OUTPATIENT)
Dept: OBGYN CLINIC | Facility: CLINIC | Age: 64
End: 2019-03-26

## 2019-03-26 NOTE — TELEPHONE ENCOUNTER
Patient called questioning the code on her mammogram   Informed that the code is a screening code and that a screening mammogram was ordered

## 2019-04-11 ENCOUNTER — TELEPHONE (OUTPATIENT)
Dept: FAMILY MEDICINE CLINIC | Facility: CLINIC | Age: 64
End: 2019-04-11

## 2019-04-12 ENCOUNTER — OFFICE VISIT (OUTPATIENT)
Dept: FAMILY MEDICINE CLINIC | Facility: CLINIC | Age: 64
End: 2019-04-12
Payer: COMMERCIAL

## 2019-04-12 VITALS
DIASTOLIC BLOOD PRESSURE: 68 MMHG | BODY MASS INDEX: 28 KG/M2 | WEIGHT: 164 LBS | SYSTOLIC BLOOD PRESSURE: 118 MMHG | TEMPERATURE: 99.1 F | HEIGHT: 64 IN

## 2019-04-12 DIAGNOSIS — R31.29 MICROSCOPIC HEMATURIA: ICD-10-CM

## 2019-04-12 DIAGNOSIS — G89.29 CHRONIC BILATERAL LOW BACK PAIN WITH BILATERAL SCIATICA: ICD-10-CM

## 2019-04-12 DIAGNOSIS — I70.209 OCCLUSION OF COMMON FEMORAL ARTERY (HCC): ICD-10-CM

## 2019-04-12 DIAGNOSIS — M54.42 CHRONIC BILATERAL LOW BACK PAIN WITH BILATERAL SCIATICA: ICD-10-CM

## 2019-04-12 DIAGNOSIS — M15.9 PRIMARY OSTEOARTHRITIS INVOLVING MULTIPLE JOINTS: ICD-10-CM

## 2019-04-12 DIAGNOSIS — M54.50 LUMBAR PAIN: ICD-10-CM

## 2019-04-12 DIAGNOSIS — R10.9 RIGHT FLANK PAIN: ICD-10-CM

## 2019-04-12 DIAGNOSIS — Z12.11 SCREENING FOR COLON CANCER: ICD-10-CM

## 2019-04-12 DIAGNOSIS — N30.01 ACUTE CYSTITIS WITH HEMATURIA: Primary | ICD-10-CM

## 2019-04-12 DIAGNOSIS — M54.41 CHRONIC BILATERAL LOW BACK PAIN WITH BILATERAL SCIATICA: ICD-10-CM

## 2019-04-12 PROBLEM — R31.1 BENIGN ESSENTIAL MICROSCOPIC HEMATURIA: Status: ACTIVE | Noted: 2019-04-12

## 2019-04-12 LAB
SL AMB  POCT GLUCOSE, UA: 50
SL AMB LEUKOCYTE ESTERASE,UA: ABNORMAL
SL AMB POCT BILIRUBIN,UA: NORMAL
SL AMB POCT BLOOD,UA: 250
SL AMB POCT CLARITY,UA: ABNORMAL
SL AMB POCT COLOR,UA: ABNORMAL
SL AMB POCT KETONES,UA: NORMAL
SL AMB POCT NITRITE,UA: ABNORMAL
SL AMB POCT PH,UA: 5
SL AMB POCT SPECIFIC GRAVITY,UA: 1.02
SL AMB POCT URINE PROTEIN: NORMAL
SL AMB POCT UROBILINOGEN: NORMAL

## 2019-04-12 PROCEDURE — 81002 URINALYSIS NONAUTO W/O SCOPE: CPT | Performed by: FAMILY MEDICINE

## 2019-04-12 PROCEDURE — 3008F BODY MASS INDEX DOCD: CPT | Performed by: FAMILY MEDICINE

## 2019-04-12 PROCEDURE — 99214 OFFICE O/P EST MOD 30 MIN: CPT | Performed by: FAMILY MEDICINE

## 2019-04-12 PROCEDURE — 87186 SC STD MICRODIL/AGAR DIL: CPT | Performed by: FAMILY MEDICINE

## 2019-04-12 PROCEDURE — 87086 URINE CULTURE/COLONY COUNT: CPT | Performed by: FAMILY MEDICINE

## 2019-04-12 PROCEDURE — 87077 CULTURE AEROBIC IDENTIFY: CPT | Performed by: FAMILY MEDICINE

## 2019-04-12 RX ORDER — TRAMADOL HYDROCHLORIDE 50 MG/1
50 TABLET ORAL 2 TIMES DAILY PRN
Qty: 60 TABLET | Refills: 0 | Status: SHIPPED | OUTPATIENT
Start: 2019-04-12 | End: 2019-07-11 | Stop reason: SDUPTHER

## 2019-04-12 RX ORDER — PREDNISONE 10 MG/1
10 TABLET ORAL DAILY
Qty: 60 TABLET | Refills: 0
Start: 2019-04-12 | End: 2020-06-09 | Stop reason: SDUPTHER

## 2019-04-14 LAB — BACTERIA UR CULT: ABNORMAL

## 2019-04-15 DIAGNOSIS — N30.01 ACUTE CYSTITIS WITH HEMATURIA: Primary | ICD-10-CM

## 2019-04-15 RX ORDER — NITROFURANTOIN 25; 75 MG/1; MG/1
100 CAPSULE ORAL 2 TIMES DAILY
Qty: 20 CAPSULE | Refills: 0 | Status: SHIPPED | OUTPATIENT
Start: 2019-04-15 | End: 2019-04-25

## 2019-04-19 ENCOUNTER — TELEPHONE (OUTPATIENT)
Dept: FAMILY MEDICINE CLINIC | Facility: CLINIC | Age: 64
End: 2019-04-19

## 2019-04-19 DIAGNOSIS — N30.01 ACUTE CYSTITIS WITH HEMATURIA: Primary | ICD-10-CM

## 2019-04-19 RX ORDER — LEVOFLOXACIN 500 MG/1
500 TABLET, FILM COATED ORAL EVERY 24 HOURS
Qty: 7 TABLET | Refills: 0
Start: 2019-04-19 | End: 2019-04-26

## 2019-04-22 ENCOUNTER — TELEPHONE (OUTPATIENT)
Dept: ADMINISTRATIVE | Facility: HOSPITAL | Age: 64
End: 2019-04-22

## 2019-05-02 ENCOUNTER — OFFICE VISIT (OUTPATIENT)
Dept: OBGYN CLINIC | Facility: CLINIC | Age: 64
End: 2019-05-02
Payer: COMMERCIAL

## 2019-05-02 VITALS
WEIGHT: 159.6 LBS | HEIGHT: 64 IN | DIASTOLIC BLOOD PRESSURE: 70 MMHG | SYSTOLIC BLOOD PRESSURE: 124 MMHG | BODY MASS INDEX: 27.25 KG/M2

## 2019-05-02 DIAGNOSIS — N95.2 VAGINAL ATROPHY: Primary | ICD-10-CM

## 2019-05-02 DIAGNOSIS — N81.4 UTERINE PROLAPSE: ICD-10-CM

## 2019-05-02 PROCEDURE — 99213 OFFICE O/P EST LOW 20 MIN: CPT | Performed by: OBSTETRICS & GYNECOLOGY

## 2019-05-02 RX ORDER — ESTRADIOL 10 UG/1
1 INSERT VAGINAL 2 TIMES WEEKLY
Qty: 25.2 EACH | Refills: 3 | Status: SHIPPED | OUTPATIENT
Start: 2019-05-02 | End: 2020-06-09 | Stop reason: SDUPTHER

## 2019-05-28 ENCOUNTER — HOSPITAL ENCOUNTER (OUTPATIENT)
Dept: MAMMOGRAPHY | Facility: MEDICAL CENTER | Age: 64
Discharge: HOME | End: 2019-05-28
Payer: COMMERCIAL

## 2019-05-28 VITALS — HEIGHT: 64 IN | BODY MASS INDEX: 27.14 KG/M2 | WEIGHT: 159 LBS

## 2019-05-28 DIAGNOSIS — Z12.31 ENCOUNTER FOR SCREENING MAMMOGRAM FOR BREAST CANCER: ICD-10-CM

## 2019-05-28 PROCEDURE — 77063 BREAST TOMOSYNTHESIS BI: CPT

## 2019-05-28 PROCEDURE — 77067 SCR MAMMO BI INCL CAD: CPT

## 2019-07-11 ENCOUNTER — HOSPITAL ENCOUNTER (OUTPATIENT)
Dept: RADIOLOGY | Facility: HOSPITAL | Age: 64
Discharge: HOME | End: 2019-07-11
Payer: COMMERCIAL

## 2019-07-11 ENCOUNTER — OFFICE VISIT (OUTPATIENT)
Dept: FAMILY MEDICINE CLINIC | Facility: CLINIC | Age: 64
End: 2019-07-11
Payer: COMMERCIAL

## 2019-07-11 VITALS
SYSTOLIC BLOOD PRESSURE: 134 MMHG | WEIGHT: 150 LBS | BODY MASS INDEX: 25.61 KG/M2 | HEIGHT: 64 IN | DIASTOLIC BLOOD PRESSURE: 70 MMHG | TEMPERATURE: 100 F

## 2019-07-11 DIAGNOSIS — M79.671 RIGHT FOOT PAIN: Primary | ICD-10-CM

## 2019-07-11 DIAGNOSIS — M54.50 LUMBAR PAIN: ICD-10-CM

## 2019-07-11 DIAGNOSIS — M54.41 CHRONIC BILATERAL LOW BACK PAIN WITH BILATERAL SCIATICA: ICD-10-CM

## 2019-07-11 DIAGNOSIS — F41.9 ANXIETY: ICD-10-CM

## 2019-07-11 DIAGNOSIS — M54.42 CHRONIC BILATERAL LOW BACK PAIN WITH BILATERAL SCIATICA: ICD-10-CM

## 2019-07-11 DIAGNOSIS — L03.031 CELLULITIS OF TOE OF RIGHT FOOT: ICD-10-CM

## 2019-07-11 DIAGNOSIS — M79.671 RIGHT FOOT PAIN: ICD-10-CM

## 2019-07-11 DIAGNOSIS — G89.29 CHRONIC BILATERAL LOW BACK PAIN WITH BILATERAL SCIATICA: ICD-10-CM

## 2019-07-11 DIAGNOSIS — M15.9 PRIMARY OSTEOARTHRITIS INVOLVING MULTIPLE JOINTS: ICD-10-CM

## 2019-07-11 DIAGNOSIS — Z12.11 SCREENING FOR COLON CANCER: ICD-10-CM

## 2019-07-11 PROBLEM — L03.90 CELLULITIS: Status: ACTIVE | Noted: 2019-07-11

## 2019-07-11 PROCEDURE — 73630 X-RAY EXAM OF FOOT: CPT

## 2019-07-11 PROCEDURE — 99213 OFFICE O/P EST LOW 20 MIN: CPT | Performed by: FAMILY MEDICINE

## 2019-07-11 RX ORDER — LORAZEPAM 0.5 MG/1
0.5 TABLET ORAL EVERY 12 HOURS PRN
Qty: 30 TABLET | Refills: 0 | Status: SHIPPED | OUTPATIENT
Start: 2019-07-11 | End: 2020-03-19 | Stop reason: SDUPTHER

## 2019-07-11 RX ORDER — TRAMADOL HYDROCHLORIDE 50 MG/1
50 TABLET ORAL 2 TIMES DAILY PRN
Qty: 60 TABLET | Refills: 0 | Status: SHIPPED | OUTPATIENT
Start: 2019-07-11 | End: 2020-09-02 | Stop reason: ALTCHOICE

## 2019-07-11 RX ORDER — CLINDAMYCIN HYDROCHLORIDE 150 MG/1
150 CAPSULE ORAL EVERY 8 HOURS SCHEDULED
Qty: 21 CAPSULE | Refills: 0 | Status: SHIPPED | OUTPATIENT
Start: 2019-07-11 | End: 2019-07-18

## 2019-07-11 NOTE — PROGRESS NOTES
Assessment/Plan:     Diagnoses and all orders for this visit:    Screening for colon cancer  -     Cologuard; Future    Anxiety  -     LORazepam (ATIVAN) 0 5 mg tablet; Take 1 tablet (0 5 mg total) by mouth every 12 (twelve) hours as needed for anxiety    Chronic bilateral low back pain with bilateral sciatica  -     traMADol (ULTRAM) 50 mg tablet; Take 1 tablet (50 mg total) by mouth 2 (two) times a day as needed for moderate pain    Lumbar pain  -     traMADol (ULTRAM) 50 mg tablet; Take 1 tablet (50 mg total) by mouth 2 (two) times a day as needed for moderate pain    Primary osteoarthritis involving multiple joints  -     traMADol (ULTRAM) 50 mg tablet; Take 1 tablet (50 mg total) by mouth 2 (two) times a day as needed for moderate pain          Subjective:      Patient ID: Minh Mccoy is a 59 y o  female  The patient is here with open sore of right foot all lateral aspect of the 5th toe for by roughly 2 weeks  No discharge  The patient is trans ago  Patient does notice swelling of the 5th toe on the right  The following portions of the patient's history were reviewed and updated as appropriate: allergies, current medications, past family history, past medical history, past social history, past surgical history and problem list     Review of Systems   Constitutional: Negative  HENT: Negative  Eyes: Negative  Respiratory: Negative  Cardiovascular: Negative  Gastrointestinal: Negative  Endocrine: Negative  Genitourinary: Negative  Musculoskeletal: Positive for arthralgias  Skin: Positive for wound  Allergic/Immunologic: Negative  Neurological: Negative  Hematological: Negative  Psychiatric/Behavioral: Negative            Objective:      /70 (BP Location: Right arm, Patient Position: Sitting, Cuff Size: Adult)   Temp 100 °F (37 8 °C) (Tympanic)   Ht 5' 4" (1 626 m)   Wt 68 kg (150 lb)   LMP  (LMP Unknown)   BMI 25 75 kg/m²          Physical Exam Constitutional: She appears well-developed and well-nourished  Cardiovascular: Normal rate, regular rhythm and normal heart sounds  Pulmonary/Chest: Effort normal and breath sounds normal    Musculoskeletal: Normal range of motion  Skin:   Cellulitis right foot 5th toe and MTP joint the region   Nursing note and vitals reviewed

## 2019-07-12 ENCOUNTER — TELEPHONE (OUTPATIENT)
Dept: FAMILY MEDICINE CLINIC | Facility: CLINIC | Age: 64
End: 2019-07-12

## 2019-07-12 DIAGNOSIS — Z72.0 TOBACCO ABUSE: Primary | ICD-10-CM

## 2019-07-12 RX ORDER — VARENICLINE TARTRATE 25 MG
KIT ORAL
Qty: 53 TABLET | Refills: 0 | Status: SHIPPED | OUTPATIENT
Start: 2019-07-12 | End: 2020-06-09

## 2019-07-12 NOTE — TELEPHONE ENCOUNTER
Patient called and LM on the nurse line to request a script for Chantex  She said you and she discussed this at the end of her visit yesterday, but when she went to the pharmacy it was not there  Also, she said when she went for her xray yesterday, the tech told her that radiology is backed up 4-7 days for a read/report  The tech did tell her that you can view the images yourself however if you want in the system  Thank you

## 2019-07-15 ENCOUNTER — TELEPHONE (OUTPATIENT)
Dept: ADMINISTRATIVE | Facility: HOSPITAL | Age: 64
End: 2019-07-15

## 2019-07-15 NOTE — TELEPHONE ENCOUNTER
7/17/19 2:30  Hilda Castro DO [[id number]] [facility] [[id number][facility] O[facility] [[id number]]     Called pt to reschedule office visit with Nirmala Ramírez on 7/17 due to Nirmala Ramírez being in surgery- left message

## 2019-07-16 ENCOUNTER — TELEPHONE (OUTPATIENT)
Dept: ENDOCRINOLOGY | Facility: CLINIC | Age: 64
End: 2019-07-16

## 2019-07-16 NOTE — TELEPHONE ENCOUNTER
----- Message from Yordan Padilla MD sent at 7/15/2019 12:15 PM EDT -----  Please call the patient regarding her abnormal result  Thyroid nodules are stable

## 2019-07-24 ENCOUNTER — OFFICE VISIT (OUTPATIENT)
Dept: VASCULAR SURGERY | Facility: CLINIC | Age: 64
End: 2019-07-24
Payer: COMMERCIAL

## 2019-07-24 VITALS
TEMPERATURE: 99 F | BODY MASS INDEX: 25.61 KG/M2 | HEART RATE: 110 BPM | WEIGHT: 150 LBS | HEIGHT: 64 IN | SYSTOLIC BLOOD PRESSURE: 138 MMHG | DIASTOLIC BLOOD PRESSURE: 62 MMHG

## 2019-07-24 DIAGNOSIS — I74.09 AORTOILIAC OCCLUSIVE DISEASE (HCC): ICD-10-CM

## 2019-07-24 DIAGNOSIS — I70.209 OCCLUSION OF FEMORAL ARTERY (HCC): Primary | ICD-10-CM

## 2019-07-24 DIAGNOSIS — I73.9 PAD (PERIPHERAL ARTERY DISEASE) (HCC): ICD-10-CM

## 2019-07-24 DIAGNOSIS — I70.209 OCCLUSION OF COMMON FEMORAL ARTERY (HCC): Primary | ICD-10-CM

## 2019-07-24 DIAGNOSIS — F17.200 TOBACCO DEPENDENCE: ICD-10-CM

## 2019-07-24 PROCEDURE — 99214 OFFICE O/P EST MOD 30 MIN: CPT | Performed by: SURGERY

## 2019-07-24 RX ORDER — VANCOMYCIN HYDROCHLORIDE 1 G/200ML
15 INJECTION, SOLUTION INTRAVENOUS ONCE
Status: CANCELLED | OUTPATIENT
Start: 2019-09-10 | End: 2019-07-24

## 2019-07-24 RX ORDER — CHLORHEXIDINE GLUCONATE 0.12 MG/ML
15 RINSE ORAL EVERY 12 HOURS SCHEDULED
Status: CANCELLED | OUTPATIENT
Start: 2019-09-10

## 2019-07-24 NOTE — PROGRESS NOTES
Assessment/Plan:    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HCC)  Alfreda Gunderson returns to the office to rediscuss possible surgical intervention for occluded left common femoral artery noted on prior CTA in February  At that time was reluctant to proceed with surgical intervention as she did not think it was necessary  She reports that she seek a 2nd opinion at [facility]  She now returns to our office to [address] [place] [address] left common femoral endarterectomy  Once again I recommend left common femoral endarterectomy with completion arteriogram possible SFA/popliteal balloon angioplasty/stent  Of note patient had a plethora of questions today in the office  We discussed that the planned surgery will likely not improve her lower extremity swelling  She does report she has a right 5th toe wound that she recently completed a course of antibiotics and is slowly healing  Diagnoses and all orders for this visit:    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[address] )  -     Case request operating room: ENDARTERECTOMY ARTERIAL FEMORAL, completion arteriogram with possible SFA/popliteal angioplasty/stent; Standing  -     Type and screen; Future  -     Basic metabolic panel; Future  -     CBC and Platelet; Future  -     EKG 12 lead; Future  -     Case request operating room: ENDARTERECTOMY ARTERIAL FEMORAL, completion arteriogram with possible SFA/popliteal angioplasty/stent    Aortoiliac occlusive disease (HCC)    PAD (peripheral artery disease) (HCC)    Tobacco dependence    Other orders  -     Diet NPO; Sips with meds; Standing  -     Void on call to OR; Standing  -     Insert peripheral IV; Standing  -     Place sequential compression device; Standing  -     chlorhexidine (PERIDEX) 0 12 % oral rinse 15 mL  -     vancomycin (VANCOCIN) IVPB (premix) 1,000 mg         Patient ID: Markus Wilson is a 59 y o  female  Chief complaint: pt is here to discuss sx  Pt admits to bilateral calf pain and swelling   Pt admits to having a sore to right foot that is now healing  Pt admits to numbness to feet  Pt is smoking  Abi Graf returns to the office to reddovuss possible surgical intervention for known left common femoral artery occlusion which was noted on CT imaging back in February  At our last office visit I had recommended left common femoral endarterectomy however patient was reluctant to proceed at that time  She now returns to the office to rediscuss surgery  She reports that she did seek a 2nd opinion at [facility]  She continues to smoke  She reports that she is not able to [facility] away and do what she wants to  Symptoms are now adversely affecting her overall quality life  The following portions of the patient's history were reviewed and updated as appropriate: allergies, current medications, past family history, past medical history, past social history, past surgical history and problem list     Review of Systems   Constitutional: Negative  HENT: Negative  Eyes: Negative  Respiratory: Positive for shortness of breath  Cardiovascular: Positive for leg swelling  Gastrointestinal: Negative  Endocrine: Negative  Genitourinary: Negative  Musculoskeletal: Negative  Leg pain   Skin: Positive for color change  Allergic/Immunologic: Positive for environmental allergies  Neurological: Positive for weakness and numbness  Hematological: Negative  Psychiatric/Behavioral: Positive for sleep disturbance  I have personally reviewed the ROS entered by MA and agree as documented  Objective:      /62 (BP Location: Right arm, Patient Position: Sitting, Cuff Size: Adult)   Pulse (!) 110   Temp 99 °F (37 2 °C) (Tympanic)   Ht 5' 4" (1 626 m)   Wt 68 kg (150 lb)   LMP  (LMP Unknown)   BMI 25 75 kg/m²          Physical Exam   Constitutional: She is oriented to person, place, and time  She appears well-developed and well-nourished  No distress  HENT:   Head: Normocephalic and atraumatic     Eyes: Conjunctivae and EOM are normal  No scleral icterus  Neck: Normal range of motion  Neck supple  No tracheal deviation present  Cardiovascular: Normal rate, regular rhythm and normal heart sounds  Pulses:       Radial pulses are 2+ on the right side, and 2+ on the left side  Femoral pulses are 1+ on the right side, and 0 on the left side  Popliteal pulses are 0 on the right side, and 0 on the left side  Dorsalis pedis pulses are 0 on the right side, and 0 on the left side  Pulmonary/Chest: Effort normal and breath sounds normal    Abdominal: Soft  She exhibits no distension  Mass: no appreciable aortic pulstion/aneurysm  There is no tenderness  There is no rebound and no guarding  Musculoskeletal: Normal range of motion  Neurological: She is alert and oriented to person, place, and time  Skin: Skin is warm and dry  Psychiatric: She has a normal mood and affect   Her behavior is normal  Judgment and thought content normal

## 2019-07-24 NOTE — ASSESSMENT & PLAN NOTE
Nova Cristina returns to the office to rediscuss possible surgical intervention for occluded left common femoral artery noted on prior CTA and February  Once again I recommend left common femoral endarterectomy with completion arteriogram possible SFA/popliteal balloon angioplasty/stent

## 2019-07-24 NOTE — PATIENT INSTRUCTIONS
Peripheral Artery Disease   AMBULATORY CARE:   Peripheral artery disease (PAD)  is narrow, weak, or blocked arteries  It may affect any arteries outside of your heart and brain  PAD is usually the result of a buildup of fat and cholesterol, also called plaque, along your artery walls  Inflammation, a blood clot, or abnormal cell growth could also block your arteries  PAD prevents normal blood flow to your legs and arms  You are at risk of an amputation if poor blood flow keeps wounds from healing or causes gangrene (tissue death)  Without treatment, PAD can also cause a heart attack or stroke  Common symptoms include:  Mild PAD usually does not cause symptoms  As the disease worsens over time, you may have the following:  · Pain or cramps in your leg or hip while you walk     · A numb, weak, or heavy feeling in your legs     · Dry, scaly, red, or pale skin on your legs     · Thick or brittle nails, or hair loss on your arms and legs     · Foot sores that will not heal     · Burning or aching in your feet and toes while resting (this may be worse when you lie down)  Call 911 for the following:   · You have any of the following signs of a heart attack:      ¨ Squeezing, pressure, or pain in your chest that lasts longer than 5 minutes or returns    ¨ Discomfort or pain in your back, neck, jaw, stomach, or arm     ¨ Trouble breathing    ¨ Nausea or vomiting    ¨ Lightheadedness or a sudden cold sweat, especially with chest pain or trouble breathing    · You have any of the following signs of a stroke:      ¨ Numbness or drooping on one side of your face     ¨ Weakness in an arm or leg    ¨ Confusion or difficulty speaking    ¨ Dizziness, a severe headache, or vision loss  Seek care immediately if:   · You have sores or wounds that will not heal      · You notice black or discolored skin on your arm or leg  · Your skin is cool to the touch    Contact your healthcare provider if:   · You have leg pain when you walk 1/8 mile (200 meters) or less, even with treatment  · Your legs are red, dry, or pale, even with treatment  · You have questions or concerns about your condition or care  Treatment for PAD  can help reduce your risk of a heart attack, stroke, or amputation  You may need more than one of the following:  · Medicines  may be given to prevent blood clots and reduce the risk of a heart attack or stroke  You may be given medicine to help prevent your PAD from getting worse  · A supervised exercise program  helps you stay active in normal daily activities and may prevent disability  Healthcare providers will help you safely walk or do strength training exercises 3 times a week for 30 to 60 minutes  You will do this for several months, then transition to walking on your own  · Angioplasty  is a procedure to open your artery so blood can flow through normally  A thin tube called a catheter is used to insert a small balloon into your artery  The balloon is inflated to open your blocked artery, and then removed  A tube called a stent may be placed in your artery to hold it open  · Bypass surgery  is used to make a new connection to your artery with a vein from another part of your body, or an artificial graft  The vein or graft is attached to your artery above and below your blockage  This allows blood to flow around the blocked portion of your artery  Manage and prevent PAD:   · Walk for 30 to 60 minutes at least 4 times a week  Your healthcare provider may also refer you to an supervised exercise program  The program helps increase how far you can walk without pain  It also helps you stay active in normal daily activities and may prevent disability caused by PAD  · Do not smoke  Nicotine and other chemicals in cigarettes and cigars can worsen PAD  They can also increase your risk for a heart attack or stroke  Ask your healthcare provider for information if you currently smoke and need help to quit  E-cigarettes or smokeless tobacco still contain nicotine  Talk to your healthcare provider before you use these products  · Manage other health conditions  Take your medicines as directed and follow your healthcare provider's instructions if you have high blood pressure or high cholesterol  Perform foot care and check your blood sugar levels as directed if you have diabetes  · Eat heart healthy foods  Eat whole grains, fruits, and vegetables every day  Limit salt and high-fat foods  Ask your healthcare provider for more information on a heart healthy diet  Ask if you need to lose weight  Your healthcare provider can help you create a healthy weight-loss plan  Follow up with your healthcare provider as directed:  Write down your questions so you remember to ask them during your visits  © 2017 [facility] Information is for End User's use only and may not be sold, redistributed or otherwise used for commercial purposes  All illustrations and images included in CareNotes® are the copyrighted property of A D A M , Inc  or Anjum Acosta  The above information is an  only  It is not intended as medical advice for individual conditions or treatments  Talk to your doctor, nurse or pharmacist before following any medical regimen to see if it is safe and effective for you

## 2019-07-26 ENCOUNTER — TELEPHONE (OUTPATIENT)
Dept: FAMILY MEDICINE CLINIC | Facility: CLINIC | Age: 64
End: 2019-07-26

## 2019-07-31 ENCOUNTER — HOSPITAL ENCOUNTER (OUTPATIENT)
Dept: NON INVASIVE DIAGNOSTICS | Facility: CLINIC | Age: 64
Discharge: HOME | End: 2019-07-31
Payer: COMMERCIAL

## 2019-07-31 DIAGNOSIS — I70.209 OCCLUSION OF FEMORAL ARTERY (HCC): ICD-10-CM

## 2019-07-31 PROCEDURE — 93923 UPR/LXTR ART STDY 3+ LVLS: CPT

## 2019-07-31 PROCEDURE — 93925 LOWER EXTREMITY STUDY: CPT

## 2019-08-01 PROCEDURE — 93925 LOWER EXTREMITY STUDY: CPT | Performed by: SURGERY

## 2019-08-01 PROCEDURE — 93922 UPR/L XTREMITY ART 2 LEVELS: CPT | Performed by: SURGERY

## 2019-08-07 ENCOUNTER — PREP FOR PROCEDURE (OUTPATIENT)
Dept: VASCULAR SURGERY | Facility: CLINIC | Age: 64
End: 2019-08-07

## 2019-08-07 ENCOUNTER — TELEPHONE (OUTPATIENT)
Dept: VASCULAR SURGERY | Facility: CLINIC | Age: 64
End: 2019-08-07

## 2019-08-07 DIAGNOSIS — I70.209 OCCLUSION OF COMMON FEMORAL ARTERY (HCC): Primary | ICD-10-CM

## 2019-08-07 NOTE — TELEPHONE ENCOUNTER
S/w pt to schedule her surgery with Dr Brando Barksdale on 9-10-19 at [address] [facility]  She will have her blood work and EKG done next week  No holds on any medications  Nothing to eat or drink after midnight 9-9-19  Surgery scheduling form sent to the prior authorization department to get authorization for surgery

## 2019-08-15 ENCOUNTER — OFFICE VISIT (OUTPATIENT)
Dept: OBGYN CLINIC | Facility: CLINIC | Age: 64
End: 2019-08-15
Payer: COMMERCIAL

## 2019-08-15 VITALS
HEIGHT: 65 IN | BODY MASS INDEX: 24.32 KG/M2 | DIASTOLIC BLOOD PRESSURE: 70 MMHG | SYSTOLIC BLOOD PRESSURE: 128 MMHG | WEIGHT: 146 LBS

## 2019-08-15 DIAGNOSIS — N81.4 UTERINE PROLAPSE: Primary | ICD-10-CM

## 2019-08-15 PROCEDURE — 99213 OFFICE O/P EST LOW 20 MIN: CPT | Performed by: OBSTETRICS & GYNECOLOGY

## 2019-08-15 RX ORDER — SODIUM FLUORIDE 5 MG/ML
PASTE, DENTIFRICE DENTAL
COMMUNITY
Start: 2015-10-15 | End: 2020-09-02 | Stop reason: ALTCHOICE

## 2019-08-15 NOTE — PROGRESS NOTES
Assessment/Plan:     Uterine prolapse-she will continue the shaatz pessary and the yuvafem  She will return in 3 months for pessary check  If she needs the pessary remove prior to her surgery, she will call  She will also stop the yuvafem 1 week prior to surgery  I believe it adds very little risk but would not hurt to discontinue it and restart it a few weeks after surgery if her recovery is going well  She is agreeable  There are no diagnoses linked to this encounter  Subjective:     Patient ID: Quin Bartlett is a 59 y o  female  Patient here for pessary check  Recently, we placed a smaller shaatz because it was becoming difficult to remove her old 1  She is also using yuvafem and is happy with this  She is having vascular surgery in her left leg next month and is unsure if the pessary can stay in  Review of Systems   Constitutional: Negative  HENT: Negative  Eyes: Negative  Respiratory: Negative  Cardiovascular: Negative  Gastrointestinal: Negative  Endocrine: Negative  Genitourinary: Negative  Musculoskeletal: Negative  Skin: Negative  Allergic/Immunologic: Negative  Neurological: Negative  Hematological: Negative  Psychiatric/Behavioral: Negative  Objective:     Physical Exam   Genitourinary: Vagina normal and uterus normal  Cervix exhibits no motion tenderness, no discharge and no friability  Right adnexum displays no mass, no tenderness and no fullness  Left adnexum displays no mass, no tenderness and no fullness     Genitourinary Comments: shaatz pessary removed without difficulty, it was cleaned, and reinserted without difficulty  No vaginal excoriation from the pessary

## 2019-08-21 ENCOUNTER — ANESTHESIA EVENT (OUTPATIENT)
Dept: PERIOP | Facility: HOSPITAL | Age: 64
DRG: 253 | End: 2019-08-21
Payer: COMMERCIAL

## 2019-08-22 ENCOUNTER — APPOINTMENT (OUTPATIENT)
Dept: LAB | Facility: HOSPITAL | Age: 64
End: 2019-08-22
Attending: SURGERY
Payer: COMMERCIAL

## 2019-08-22 ENCOUNTER — HOSPITAL ENCOUNTER (OUTPATIENT)
Dept: NON INVASIVE DIAGNOSTICS | Facility: HOSPITAL | Age: 64
Discharge: HOME | End: 2019-08-22
Attending: SURGERY
Payer: COMMERCIAL

## 2019-08-22 DIAGNOSIS — I70.209 OCCLUSION OF COMMON FEMORAL ARTERY (HCC): ICD-10-CM

## 2019-08-22 LAB
ABO GROUP BLD: NORMAL
ANION GAP SERPL CALCULATED.3IONS-SCNC: 8 MMOL/L (ref 4–13)
BLD GP AB SCN SERPL QL: NEGATIVE
BUN SERPL-MCNC: 14 MG/DL (ref 5–25)
CALCIUM SERPL-MCNC: 9 MG/DL (ref 8.3–10.1)
CHLORIDE SERPL-SCNC: 106 MMOL/L (ref 100–108)
CO2 SERPL-SCNC: 28 MMOL/L (ref 21–32)
CREAT SERPL-MCNC: 0.8 MG/DL (ref 0.6–1.3)
ERYTHROCYTE [DISTWIDTH] IN BLOOD BY AUTOMATED COUNT: 14.2 % (ref 11.6–15.1)
GFR SERPL CREATININE-BSD FRML MDRD: 78 ML/MIN/1.73SQ M
GLUCOSE P FAST SERPL-MCNC: 77 MG/DL (ref 65–99)
HCT VFR BLD AUTO: 51.5 % (ref 34.8–46.1)
HGB BLD-MCNC: 16.6 G/DL (ref 11.5–15.4)
MCH RBC QN AUTO: 29.9 PG (ref 26.8–34.3)
MCHC RBC AUTO-ENTMCNC: 32.2 G/DL (ref 31.4–37.4)
MCV RBC AUTO: 93 FL (ref 82–98)
PLATELET # BLD AUTO: 199 THOUSANDS/UL (ref 149–390)
PMV BLD AUTO: 9.1 FL (ref 8.9–12.7)
POTASSIUM SERPL-SCNC: 4 MMOL/L (ref 3.5–5.3)
RBC # BLD AUTO: 5.56 MILLION/UL (ref 3.81–5.12)
RH BLD: POSITIVE
SODIUM SERPL-SCNC: 142 MMOL/L (ref 136–145)
SPECIMEN EXPIRATION DATE: NORMAL
WBC # BLD AUTO: 11.11 THOUSAND/UL (ref 4.31–10.16)

## 2019-08-22 PROCEDURE — 86900 BLOOD TYPING SEROLOGIC ABO: CPT

## 2019-08-22 PROCEDURE — 85027 COMPLETE CBC AUTOMATED: CPT

## 2019-08-22 PROCEDURE — 80048 BASIC METABOLIC PNL TOTAL CA: CPT

## 2019-08-22 PROCEDURE — 86850 RBC ANTIBODY SCREEN: CPT

## 2019-08-22 PROCEDURE — 36415 COLL VENOUS BLD VENIPUNCTURE: CPT

## 2019-08-22 PROCEDURE — 86901 BLOOD TYPING SEROLOGIC RH(D): CPT

## 2019-08-22 NOTE — PRE-PROCEDURE INSTRUCTIONS
Pre-Surgery Instructions:   Medication Instructions    cyanocobalamin (VITAMIN B-12) 100 mcg tablet Instructed patient per Anesthesia Guidelines   estradiol (VAGIFEM, YUVAFEM) 10 MCG TABS vaginal tablet Instructed patient per Anesthesia Guidelines   famotidine (PEPCID AC) 10 mg tablet Instructed patient per Anesthesia Guidelines   LORazepam (ATIVAN) 0 5 mg tablet Instructed patient per Anesthesia Guidelines   Magnesium 500 MG CAPS Instructed patient per Anesthesia Guidelines   Multiple Vitamin (MULTIVITAMIN) tablet Instructed patient per Anesthesia Guidelines   potassium chloride (KLOR-CON 10) 10 mEq tablet Instructed patient per Anesthesia Guidelines   predniSONE 10 mg tablet Instructed patient per Anesthesia Guidelines   SODIUM FLUORIDE, DENTAL GEL, (DENTA 5000 PLUS) 1 1 % CREA Instructed patient per Anesthesia Guidelines   traMADol (ULTRAM) 50 mg tablet Instructed patient per Anesthesia Guidelines   varenicline (CHANTIX SERAFIN) 0 5 MG X 11 & 1 MG X 42 tablet Instructed patient per Anesthesia Guidelines   Vitamin Mixture (JOSE RAMON-C PO) Instructed patient per Anesthesia Guidelines  Instructed to take prednisone am of surgery with sip ofw ater per anesthesia DR Conde Favors   To take lorazepam and or famotidine if needed

## 2019-08-22 NOTE — ANESTHESIA PREPROCEDURE EVALUATION
Review of Systems/Medical History  Patient summary reviewed  Chart reviewed  No history of anesthetic complications     Cardiovascular  Negative cardio ROS PVD,    Pulmonary  Smoker cigarette smoker  ,   Comment: LLLobectomy for CA     GI/Hepatic    GERD well controlled,   Comment: IBS     Negative  ROS        Endo/Other  Negative endo/other ROS History of thyroid disease , hypothyroidism,   Comment: SLE/raynaud's/    GYN  Negative gynecology ROS          Hematology  Negative hematology ROS      Musculoskeletal  Back pain , lumbar pain, Sciatica (bilateral),   Arthritis     Neurology    Headaches, Fibromyalgia  Comment: neuropathy Psychology   Anxiety,              Physical Exam    Airway    Mallampati score: II  TM Distance: >3 FB  Neck ROM: full     Dental   No notable dental hx     Cardiovascular  Comment: Negative ROS, Rhythm: regular, Rate: normal, Cardiovascular exam normal    Pulmonary  Pulmonary exam normal Breath sounds clear to auscultation,     Other Findings        Anesthesia Plan  ASA Score- 3     Anesthesia Type- general with ASA Monitors  Additional Monitors:   Airway Plan:         Plan Factors-Patient not instructed to abstain from smoking on day of procedure  Patient did not smoke on day of surgery  Induction- intravenous  Postoperative Plan- Plan for postoperative opioid use  Informed Consent- Anesthetic plan and risks discussed with patient

## 2019-09-10 ENCOUNTER — HOSPITAL ENCOUNTER (OUTPATIENT)
Dept: RADIOLOGY | Facility: HOSPITAL | Age: 64
Setting detail: SURGERY ADMIT
Discharge: HOME | DRG: 253 | End: 2019-09-10
Payer: COMMERCIAL

## 2019-09-10 ENCOUNTER — HOSPITAL ENCOUNTER (INPATIENT)
Facility: HOSPITAL | Age: 64
LOS: 8 days | Discharge: HOME | DRG: 253 | End: 2019-09-18
Attending: SURGERY | Admitting: SURGERY
Payer: COMMERCIAL

## 2019-09-10 ENCOUNTER — ANESTHESIA (OUTPATIENT)
Dept: PERIOP | Facility: HOSPITAL | Age: 64
DRG: 253 | End: 2019-09-10
Payer: COMMERCIAL

## 2019-09-10 DIAGNOSIS — I70.209 OCCLUSION OF COMMON FEMORAL ARTERY (HCC): ICD-10-CM

## 2019-09-10 DIAGNOSIS — Z72.0 TOBACCO USER: ICD-10-CM

## 2019-09-10 DIAGNOSIS — I73.9 PAD (PERIPHERAL ARTERY DISEASE) (HCC): ICD-10-CM

## 2019-09-10 DIAGNOSIS — M32.9 SYSTEMIC LUPUS ERYTHEMATOSUS, UNSPECIFIED SLE TYPE, UNSPECIFIED ORGAN INVOLVEMENT STATUS (HCC): Primary | ICD-10-CM

## 2019-09-10 DIAGNOSIS — L03.116 CELLULITIS OF LEFT LOWER EXTREMITY: ICD-10-CM

## 2019-09-10 DIAGNOSIS — F41.9 ANXIETY: ICD-10-CM

## 2019-09-10 DIAGNOSIS — I74.09 AORTOILIAC OCCLUSIVE DISEASE (HCC): ICD-10-CM

## 2019-09-10 LAB
INR PPP: 1.04 (ref 0.84–1.19)
KCT BLD-ACNC: 126 SEC (ref 89–137)
KCT BLD-ACNC: 224 SEC (ref 89–137)
KCT BLD-ACNC: 236 SEC (ref 89–137)
KCT BLD-ACNC: 266 SEC (ref 89–137)
PROTHROMBIN TIME: 13.7 SECONDS (ref 11.6–14.5)
SPECIMEN SOURCE: ABNORMAL
SPECIMEN SOURCE: NORMAL

## 2019-09-10 PROCEDURE — 04UL0JZ SUPPLEMENT LEFT FEMORAL ARTERY WITH SYNTHETIC SUBSTITUTE, OPEN APPROACH: ICD-10-PCS | Performed by: SURGERY

## 2019-09-10 PROCEDURE — 85610 PROTHROMBIN TIME: CPT | Performed by: SURGERY

## 2019-09-10 PROCEDURE — 04CL0ZZ EXTIRPATION OF MATTER FROM LEFT FEMORAL ARTERY, OPEN APPROACH: ICD-10-PCS | Performed by: SURGERY

## 2019-09-10 PROCEDURE — 35371 RECHANNELING OF ARTERY: CPT | Performed by: PHYSICIAN ASSISTANT

## 2019-09-10 PROCEDURE — C1894 INTRO/SHEATH, NON-LASER: HCPCS | Performed by: SURGERY

## 2019-09-10 PROCEDURE — 85347 COAGULATION TIME ACTIVATED: CPT

## 2019-09-10 PROCEDURE — 4A133B1 MONITORING OF ARTERIAL PRESSURE, PERIPHERAL, PERCUTANEOUS APPROACH: ICD-10-PCS | Performed by: SURGERY

## 2019-09-10 PROCEDURE — NC001 PR NO CHARGE: Performed by: PHYSICIAN ASSISTANT

## 2019-09-10 PROCEDURE — 4A133J1 MONITORING OF ARTERIAL PULSE, PERIPHERAL, PERCUTANEOUS APPROACH: ICD-10-PCS | Performed by: SURGERY

## 2019-09-10 PROCEDURE — 35371 RECHANNELING OF ARTERY: CPT | Performed by: SURGERY

## 2019-09-10 PROCEDURE — 03HY32Z INSERTION OF MONITORING DEVICE INTO UPPER ARTERY, PERCUTANEOUS APPROACH: ICD-10-PCS | Performed by: SURGERY

## 2019-09-10 DEVICE — XENOSURE BIOLOGIC PATCH, 0.8CM X 8CM, EIFU
Type: IMPLANTABLE DEVICE | Site: ARTERIAL | Status: FUNCTIONAL
Brand: XENOSURE BIOLOGIC PATCH

## 2019-09-10 RX ORDER — GLYCOPYRROLATE 0.2 MG/ML
INJECTION INTRAMUSCULAR; INTRAVENOUS AS NEEDED
Status: DISCONTINUED | OUTPATIENT
Start: 2019-09-10 | End: 2019-09-10 | Stop reason: SURG

## 2019-09-10 RX ORDER — OXYCODONE HYDROCHLORIDE AND ACETAMINOPHEN 5; 325 MG/1; MG/1
2 TABLET ORAL EVERY 6 HOURS PRN
Status: DISCONTINUED | OUTPATIENT
Start: 2019-09-10 | End: 2019-09-18 | Stop reason: HOSPADM

## 2019-09-10 RX ORDER — FENTANYL CITRATE 50 UG/ML
INJECTION, SOLUTION INTRAMUSCULAR; INTRAVENOUS AS NEEDED
Status: DISCONTINUED | OUTPATIENT
Start: 2019-09-10 | End: 2019-09-10 | Stop reason: SURG

## 2019-09-10 RX ORDER — TRAMADOL HYDROCHLORIDE 50 MG/1
50 TABLET ORAL 2 TIMES DAILY PRN
Status: DISCONTINUED | OUTPATIENT
Start: 2019-09-10 | End: 2019-09-18 | Stop reason: HOSPADM

## 2019-09-10 RX ORDER — FENTANYL CITRATE/PF 50 MCG/ML
25 SYRINGE (ML) INJECTION
Status: COMPLETED | OUTPATIENT
Start: 2019-09-10 | End: 2019-09-10

## 2019-09-10 RX ORDER — CLOPIDOGREL BISULFATE 75 MG/1
75 TABLET ORAL DAILY
Status: DISCONTINUED | OUTPATIENT
Start: 2019-09-10 | End: 2019-09-18 | Stop reason: HOSPADM

## 2019-09-10 RX ORDER — PROTAMINE SULFATE 10 MG/ML
INJECTION, SOLUTION INTRAVENOUS AS NEEDED
Status: DISCONTINUED | OUTPATIENT
Start: 2019-09-10 | End: 2019-09-10 | Stop reason: SURG

## 2019-09-10 RX ORDER — FAMOTIDINE 20 MG/1
10 TABLET, FILM COATED ORAL
Status: DISCONTINUED | OUTPATIENT
Start: 2019-09-11 | End: 2019-09-18 | Stop reason: HOSPADM

## 2019-09-10 RX ORDER — SODIUM CHLORIDE 9 MG/ML
125 INJECTION, SOLUTION INTRAVENOUS CONTINUOUS
Status: DISCONTINUED | OUTPATIENT
Start: 2019-09-10 | End: 2019-09-11

## 2019-09-10 RX ORDER — PREDNISONE 10 MG/1
10 TABLET ORAL DAILY
Status: DISCONTINUED | OUTPATIENT
Start: 2019-09-11 | End: 2019-09-18 | Stop reason: HOSPADM

## 2019-09-10 RX ORDER — ROCURONIUM BROMIDE 10 MG/ML
INJECTION, SOLUTION INTRAVENOUS AS NEEDED
Status: DISCONTINUED | OUTPATIENT
Start: 2019-09-10 | End: 2019-09-10 | Stop reason: SURG

## 2019-09-10 RX ORDER — DEXTROSE, SODIUM CHLORIDE, AND POTASSIUM CHLORIDE 5; .45; .15 G/100ML; G/100ML; G/100ML
100 INJECTION INTRAVENOUS CONTINUOUS
Status: DISCONTINUED | OUTPATIENT
Start: 2019-09-10 | End: 2019-09-11

## 2019-09-10 RX ORDER — ASPIRIN 81 MG/1
81 TABLET, CHEWABLE ORAL DAILY
Status: DISCONTINUED | OUTPATIENT
Start: 2019-09-10 | End: 2019-09-18 | Stop reason: HOSPADM

## 2019-09-10 RX ORDER — LORAZEPAM 1 MG/1
0.5 TABLET ORAL EVERY 12 HOURS PRN
Status: DISCONTINUED | OUTPATIENT
Start: 2019-09-10 | End: 2019-09-18 | Stop reason: HOSPADM

## 2019-09-10 RX ORDER — HEPARIN SODIUM 1000 [USP'U]/ML
INJECTION, SOLUTION INTRAVENOUS; SUBCUTANEOUS AS NEEDED
Status: DISCONTINUED | OUTPATIENT
Start: 2019-09-10 | End: 2019-09-10 | Stop reason: SURG

## 2019-09-10 RX ORDER — HEPARIN SODIUM 5000 [USP'U]/ML
5000 INJECTION, SOLUTION INTRAVENOUS; SUBCUTANEOUS EVERY 8 HOURS SCHEDULED
Status: DISCONTINUED | OUTPATIENT
Start: 2019-09-10 | End: 2019-09-18 | Stop reason: HOSPADM

## 2019-09-10 RX ORDER — HYDROMORPHONE HCL/PF 1 MG/ML
0.5 SYRINGE (ML) INJECTION EVERY 6 HOURS PRN
Status: DISCONTINUED | OUTPATIENT
Start: 2019-09-10 | End: 2019-09-18 | Stop reason: HOSPADM

## 2019-09-10 RX ORDER — MIDAZOLAM HYDROCHLORIDE 1 MG/ML
INJECTION INTRAMUSCULAR; INTRAVENOUS AS NEEDED
Status: DISCONTINUED | OUTPATIENT
Start: 2019-09-10 | End: 2019-09-10 | Stop reason: SURG

## 2019-09-10 RX ORDER — CHLORHEXIDINE GLUCONATE 0.12 MG/ML
15 RINSE ORAL EVERY 12 HOURS SCHEDULED
Status: DISCONTINUED | OUTPATIENT
Start: 2019-09-10 | End: 2019-09-10 | Stop reason: HOSPADM

## 2019-09-10 RX ORDER — ONDANSETRON 2 MG/ML
4 INJECTION INTRAMUSCULAR; INTRAVENOUS EVERY 6 HOURS PRN
Status: DISCONTINUED | OUTPATIENT
Start: 2019-09-10 | End: 2019-09-18 | Stop reason: HOSPADM

## 2019-09-10 RX ORDER — HYDROMORPHONE HCL/PF 1 MG/ML
0.5 SYRINGE (ML) INJECTION
Status: DISCONTINUED | OUTPATIENT
Start: 2019-09-10 | End: 2019-09-10 | Stop reason: HOSPADM

## 2019-09-10 RX ORDER — DEXAMETHASONE SODIUM PHOSPHATE 4 MG/ML
INJECTION, SOLUTION INTRA-ARTICULAR; INTRALESIONAL; INTRAMUSCULAR; INTRAVENOUS; SOFT TISSUE AS NEEDED
Status: DISCONTINUED | OUTPATIENT
Start: 2019-09-10 | End: 2019-09-10 | Stop reason: SURG

## 2019-09-10 RX ORDER — SODIUM CHLORIDE 9 MG/ML
INJECTION, SOLUTION INTRAVENOUS CONTINUOUS PRN
Status: DISCONTINUED | OUTPATIENT
Start: 2019-09-10 | End: 2019-09-10 | Stop reason: SURG

## 2019-09-10 RX ORDER — TRAMADOL HYDROCHLORIDE 50 MG/1
50 TABLET ORAL 2 TIMES DAILY PRN
Status: DISCONTINUED | OUTPATIENT
Start: 2019-09-10 | End: 2019-09-10

## 2019-09-10 RX ORDER — PROPOFOL 10 MG/ML
INJECTION, EMULSION INTRAVENOUS AS NEEDED
Status: DISCONTINUED | OUTPATIENT
Start: 2019-09-10 | End: 2019-09-10 | Stop reason: SURG

## 2019-09-10 RX ORDER — OXYCODONE HYDROCHLORIDE AND ACETAMINOPHEN 5; 325 MG/1; MG/1
1 TABLET ORAL EVERY 4 HOURS PRN
Status: DISCONTINUED | OUTPATIENT
Start: 2019-09-10 | End: 2019-09-18 | Stop reason: HOSPADM

## 2019-09-10 RX ORDER — SUCCINYLCHOLINE/SOD CL,ISO/PF 100 MG/5ML
SYRINGE (ML) INTRAVENOUS AS NEEDED
Status: DISCONTINUED | OUTPATIENT
Start: 2019-09-10 | End: 2019-09-10 | Stop reason: SURG

## 2019-09-10 RX ORDER — ONDANSETRON 2 MG/ML
4 INJECTION INTRAMUSCULAR; INTRAVENOUS ONCE AS NEEDED
Status: DISCONTINUED | OUTPATIENT
Start: 2019-09-10 | End: 2019-09-10 | Stop reason: HOSPADM

## 2019-09-10 RX ORDER — ONDANSETRON 2 MG/ML
INJECTION INTRAMUSCULAR; INTRAVENOUS AS NEEDED
Status: DISCONTINUED | OUTPATIENT
Start: 2019-09-10 | End: 2019-09-10 | Stop reason: SURG

## 2019-09-10 RX ORDER — VARENICLINE TARTRATE 1 MG/1
1 TABLET, FILM COATED ORAL 2 TIMES DAILY
Status: DISCONTINUED | OUTPATIENT
Start: 2019-09-10 | End: 2019-09-18 | Stop reason: HOSPADM

## 2019-09-10 RX ORDER — VANCOMYCIN HYDROCHLORIDE 1 G/200ML
15 INJECTION, SOLUTION INTRAVENOUS ONCE
Status: COMPLETED | OUTPATIENT
Start: 2019-09-10 | End: 2019-09-10

## 2019-09-10 RX ORDER — NEOSTIGMINE METHYLSULFATE 1 MG/ML
INJECTION INTRAVENOUS AS NEEDED
Status: DISCONTINUED | OUTPATIENT
Start: 2019-09-10 | End: 2019-09-10 | Stop reason: SURG

## 2019-09-10 RX ADMIN — MIDAZOLAM 2 MG: 1 INJECTION INTRAMUSCULAR; INTRAVENOUS at 11:59

## 2019-09-10 RX ADMIN — FENTANYL CITRATE 50 MCG: 50 INJECTION, SOLUTION INTRAMUSCULAR; INTRAVENOUS at 13:15

## 2019-09-10 RX ADMIN — PROPOFOL 140 MG: 10 INJECTION, EMULSION INTRAVENOUS at 12:12

## 2019-09-10 RX ADMIN — PHENYLEPHRINE HYDROCHLORIDE 100 MCG: 10 INJECTION INTRAVENOUS at 12:58

## 2019-09-10 RX ADMIN — VANCOMYCIN HYDROCHLORIDE 1000 MG: 1 INJECTION, SOLUTION INTRAVENOUS at 11:53

## 2019-09-10 RX ADMIN — HEPARIN SODIUM 5000 UNITS: 5000 INJECTION INTRAVENOUS; SUBCUTANEOUS at 21:21

## 2019-09-10 RX ADMIN — PHENYLEPHRINE HYDROCHLORIDE 100 MCG: 10 INJECTION INTRAVENOUS at 12:37

## 2019-09-10 RX ADMIN — HEPARIN SODIUM 1000 UNITS: 1000 INJECTION INTRAVENOUS; SUBCUTANEOUS at 14:09

## 2019-09-10 RX ADMIN — NEOSTIGMINE METHYLSULFATE 3 MG: 1 INJECTION, SOLUTION INTRAVENOUS at 14:56

## 2019-09-10 RX ADMIN — HEPARIN SODIUM 7000 UNITS: 1000 INJECTION INTRAVENOUS; SUBCUTANEOUS at 13:18

## 2019-09-10 RX ADMIN — FENTANYL CITRATE 25 MCG: 50 INJECTION INTRAMUSCULAR; INTRAVENOUS at 15:36

## 2019-09-10 RX ADMIN — Medication 100 MG: at 12:12

## 2019-09-10 RX ADMIN — SODIUM CHLORIDE: 0.9 INJECTION, SOLUTION INTRAVENOUS at 12:20

## 2019-09-10 RX ADMIN — PROTAMINE SULFATE 30 MG: 10 INJECTION, SOLUTION INTRAVENOUS at 14:49

## 2019-09-10 RX ADMIN — ONDANSETRON 4 MG: 2 INJECTION INTRAMUSCULAR; INTRAVENOUS at 14:53

## 2019-09-10 RX ADMIN — ASPIRIN 81 MG 81 MG: 81 TABLET ORAL at 18:32

## 2019-09-10 RX ADMIN — ROCURONIUM BROMIDE 5 MG: 50 INJECTION, SOLUTION INTRAVENOUS at 12:12

## 2019-09-10 RX ADMIN — GLYCOPYRROLATE 0.5 MG: 0.2 INJECTION INTRAMUSCULAR; INTRAVENOUS at 14:56

## 2019-09-10 RX ADMIN — VARENICLINE TARTRATE 1 MG: 1 TABLET, FILM COATED ORAL at 20:14

## 2019-09-10 RX ADMIN — DEXAMETHASONE SODIUM PHOSPHATE 4 MG: 4 INJECTION, SOLUTION INTRAMUSCULAR; INTRAVENOUS at 12:12

## 2019-09-10 RX ADMIN — DEXTROSE, SODIUM CHLORIDE, AND POTASSIUM CHLORIDE 100 ML/HR: 5; .45; .15 INJECTION INTRAVENOUS at 19:18

## 2019-09-10 RX ADMIN — PHENYLEPHRINE HYDROCHLORIDE 40 MCG/MIN: 10 INJECTION INTRAVENOUS at 13:34

## 2019-09-10 RX ADMIN — FENTANYL CITRATE 50 MCG: 50 INJECTION, SOLUTION INTRAMUSCULAR; INTRAVENOUS at 14:43

## 2019-09-10 RX ADMIN — PHENYLEPHRINE HYDROCHLORIDE 100 MCG: 10 INJECTION INTRAVENOUS at 12:28

## 2019-09-10 RX ADMIN — FENTANYL CITRATE 25 MCG: 50 INJECTION INTRAMUSCULAR; INTRAVENOUS at 15:59

## 2019-09-10 RX ADMIN — LIDOCAINE HYDROCHLORIDE 60 MG: 20 INJECTION, SOLUTION INTRAVENOUS at 12:12

## 2019-09-10 RX ADMIN — HYDROMORPHONE HYDROCHLORIDE 0.5 MG: 1 INJECTION, SOLUTION INTRAMUSCULAR; INTRAVENOUS; SUBCUTANEOUS at 16:29

## 2019-09-10 RX ADMIN — CHLORHEXIDINE GLUCONATE 0.12% ORAL RINSE 15 ML: 1.2 LIQUID ORAL at 10:12

## 2019-09-10 RX ADMIN — SODIUM CHLORIDE: 0.9 INJECTION, SOLUTION INTRAVENOUS at 11:57

## 2019-09-10 RX ADMIN — FENTANYL CITRATE 25 MCG: 50 INJECTION INTRAMUSCULAR; INTRAVENOUS at 15:47

## 2019-09-10 RX ADMIN — OXYCODONE HYDROCHLORIDE AND ACETAMINOPHEN 1 TABLET: 5; 325 TABLET ORAL at 18:42

## 2019-09-10 RX ADMIN — FENTANYL CITRATE 100 MCG: 50 INJECTION, SOLUTION INTRAMUSCULAR; INTRAVENOUS at 12:12

## 2019-09-10 RX ADMIN — ROCURONIUM BROMIDE 45 MG: 50 INJECTION, SOLUTION INTRAVENOUS at 12:33

## 2019-09-10 RX ADMIN — PHENYLEPHRINE HYDROCHLORIDE 100 MCG: 10 INJECTION INTRAVENOUS at 13:25

## 2019-09-10 RX ADMIN — FENTANYL CITRATE 25 MCG: 50 INJECTION INTRAMUSCULAR; INTRAVENOUS at 16:16

## 2019-09-10 RX ADMIN — SODIUM CHLORIDE: 0.9 INJECTION, SOLUTION INTRAVENOUS at 12:54

## 2019-09-10 NOTE — PLAN OF CARE
Problem: Prexisting or High Potential for Compromised Skin Integrity  Goal: Skin integrity is maintained or improved  Description  INTERVENTIONS:  - Identify patients at risk for skin breakdown  - Assess and monitor skin integrity  - Assess and monitor nutrition and hydration status  - Monitor labs   - Assess for incontinence   - Turn and reposition patient  - Assist with mobility/ambulation  - Relieve pressure over bony prominences  - Avoid friction and shearing  - Provide appropriate hygiene as needed including keeping skin clean and dry  - Evaluate need for skin moisturizer/barrier cream  - Collaborate with interdisciplinary team   - Patient/family teaching  - Consider wound care consult   Outcome: Progressing     Problem: PAIN - ADULT  Goal: Verbalizes/displays adequate comfort level or baseline comfort level  Description  Interventions:  - Encourage patient to monitor pain and request assistance  - Assess pain using appropriate pain scale  - Administer analgesics based on type and severity of pain and evaluate response  - Implement non-pharmacological measures as appropriate and evaluate response  - Consider cultural and social influences on pain and pain management  - Notify physician/advanced practitioner if interventions unsuccessful or patient reports new pain  Outcome: Progressing     Problem: SAFETY ADULT  Goal: Patient will remain free of falls  Description  INTERVENTIONS:  - Assess patient frequently for physical needs  -  Identify cognitive and physical deficits and behaviors that affect risk of falls    -  [place] fall precautions as indicated by assessment   - Educate patient/family on patient safety including physical limitations  - Instruct patient to call for assistance with activity based on assessment  - Modify environment to reduce risk of injury  - Consider OT/PT consult to assist with strengthening/mobility  Outcome: Progressing  Goal: Maintain or return to baseline ADL function  Description  INTERVENTIONS:  -  Assess patient's ability to carry out ADLs; assess patient's baseline for ADL function and identify physical deficits which impact ability to perform ADLs (bathing, care of mouth/teeth, toileting, grooming, dressing, etc )  - Assess/evaluate cause of self-care deficits   - Assess range of motion  - Assess patient's mobility; develop plan if impaired  - Assess patient's need for assistive devices and provide as appropriate  - Encourage maximum independence but intervene and supervise when necessary  - Involve family in performance of ADLs  - Assess for home care needs following discharge   - Consider OT consult to assist with ADL evaluation and planning for discharge  - Provide patient education as appropriate  Outcome: Progressing  Goal: Maintain or return mobility status to optimal level  Description  INTERVENTIONS:  - Assess patient's baseline mobility status (ambulation, transfers, stairs, etc )    - Identify cognitive and physical deficits and behaviors that affect mobility  - Identify mobility aids required to assist with transfers and/or ambulation (gait belt, sit-to-stand, lift, walker, cane, etc )  - [place] fall precautions as indicated by assessment  - Record patient progress and toleration of activity level on Mobility SBAR; progress patient to next Phase/Stage  - Instruct patient to call for assistance with activity based on assessment  - Consider rehabilitation consult to assist with strengthening/weightbearing, etc   Outcome: Progressing     Problem: DISCHARGE PLANNING  Goal: Discharge to home or other facility with appropriate resources  Description  INTERVENTIONS:  - Identify barriers to discharge w/patient and caregiver  - Arrange for needed discharge resources and transportation as appropriate  - Identify discharge learning needs (meds, wound care, etc )  - Arrange for interpretive services to assist at discharge as needed  - Refer to Case Management Department for coordinating discharge planning if the patient needs post-hospital services based on physician/advanced practitioner order or complex needs related to functional status, cognitive ability, or social support system  Outcome: Progressing     Problem: Knowledge Deficit  Goal: Patient/family/caregiver demonstrates understanding of disease process, treatment plan, medications, and discharge instructions  Description  Complete learning assessment and assess knowledge base    Interventions:  - Provide teaching at level of understanding  - Provide teaching via preferred learning methods  Outcome: Progressing     Problem: HEMATOLOGIC - ADULT  Goal: Maintains hematologic stability  Description  INTERVENTIONS  - Assess for signs and symptoms of bleeding or hemorrhage  - Monitor labs  - Administer supportive blood products/factors as ordered and appropriate  Outcome: Progressing

## 2019-09-10 NOTE — ANESTHESIA PROCEDURE NOTES
Arterial Line Insertion  Performed by: Aisha Ferguson CRNA  Authorized by: Rey Rodriges DO   Consent: Verbal consent obtained  Written consent obtained  Risks and benefits: risks, benefits and alternatives were discussed  Consent given by: patient  Patient understanding: patient states understanding of the procedure being performed  Patient consent: the patient's understanding of the procedure matches consent given  Procedure consent: procedure consent matches procedure scheduled  Relevant documents: relevant documents present and verified  Test results: test results available and properly labeled  Site marked: the operative site was marked  Radiology Images: Radiology Images displayed and confirmed  If images not available, report reviewed  Required items: required blood products, implants, devices, and special equipment available  Patient identity confirmed: arm band and provided demographic data  Time out: Immediately prior to procedure a "time out" was called to verify the correct patient, procedure, equipment, support staff and site/side marked as required  Preparation: Patient was prepped and draped in the usual sterile fashion    Indications: hemodynamic monitoring  Orientation:  Right  Location: radial artery  Sedation:  Patient sedated: no    Procedure Details:  Dano's test normal: no  Needle gauge: 20  Seldinger technique: Seldinger technique used  Number of attempts: 1    Post-procedure:  Post-procedure: dressing applied  Waveform: good waveform

## 2019-09-10 NOTE — H&P
H&P Exam - Vascular Surgery   Torie Chacon 59 y o  female MRN: [id number]  Unit/Bed#: OR POOL Encounter: [id number]    Assessment/Plan     Assessment:  59year old smoker presenting with an 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  She is scheduled to undergo left CFEA with Dr Diego Guidry today  Plan:    1  Occlusion of common femoral artery, left   -Pt last seen by Dr Diego Guidry in the office 7/27/19- imaging reviewed, pt would benefit from CFEA with possible SFA/pop balloon angioplasty/stenting   -She denies change in symptoms, increased pain or swelling   -Labs and vitals reviewed   -Can also refer to H&P from 7/24/19  -Plan to proceed with left CFEA with possible angioplasty/stenting     History of Present Illness     HPI:  Torie Chacon is a 59 y o  female who presents with an 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  She reports bilateral calf pain and swelling which has not changed since her last visit in the office  She also has been experiencing numbness of the feel bilaterally  This has also not changed from the last visit in the office  She denies chest pain, chest tightness or shortness of breath  She does also report left knee pain which is chronic and has not changed  She is in agreement with the current plan and all questions/concerns addressed at this time  Review of Systems   Constitutional: Negative for activity change, chills and fever  HENT: Negative for congestion, hearing loss and trouble swallowing  Eyes: Negative for pain, discharge and itching  Respiratory: Negative for cough, chest tightness and shortness of breath  Cardiovascular: Negative for chest pain, palpitations and leg swelling  Gastrointestinal: Negative for abdominal distention, abdominal pain, constipation, nausea and vomiting  Endocrine: Negative for cold intolerance and heat intolerance  Genitourinary: Negative for difficulty urinating, flank pain and hematuria     Musculoskeletal: Positive for arthralgias  Negative for back pain and joint swelling  Skin: Positive for color change  Negative for rash and wound  Allergic/Immunologic: Negative for environmental allergies, food allergies and immunocompromised state  Neurological: Positive for numbness  Negative for dizziness and syncope  Hematological: Negative for adenopathy  Does not bruise/bleed easily  Psychiatric/Behavioral: Negative          Historical Information   Past Medical History:   Diagnosis Date    Anxiety     Arthritis 12/20/2014    Cataract     eulalia    Discolored skin     bles    Disease of thyroid gland     nodules    Fibromyalgia, primary     Frequent UTI     Irritable bowel syndrome     Knee clicking     left    Lactose intolerance     Lung cancer (HCC)     lower left lobe removed - 2004    Lupus ([address] )     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    PAD (peripheral artery disease) (HCC)     Presence of pessary     Raynaud disease     Sciatica     Sinus headache     Sjogren's syndrome (HCC)     Smoker     Use of cane as ambulatory aid     quad cane or walker    Wears glasses     Wears partial dentures     upper and lowers     Past Surgical History:   Procedure Laterality Date    LUNG REMOVAL, PARTIAL  05/01/2004    Prior Surgical/Procedural History - 5/4 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 - lung resection - subtotal    MOUTH SURGERY      Tooth extraction    SINUS SURGERY       Social History   Social History     Substance and Sexual Activity   Alcohol Use Yes    Frequency: 2-4 times a month    Drinks per session: 1 or 2    Binge frequency: Never    Comment: liquor     Social History     Substance and Sexual Activity   Drug Use No     Social History     Tobacco Use   Smoking Status Current Every Day Smoker    Packs/day: 1 00    Years: 40 00    Pack years: 40 00    Types: Cigarettes   Smokeless Tobacco Never Used   Tobacco Comment    on chantix - started 9/1/2019     Family History:   Family History   Problem Relation Age of Onset    Arthritis Mother     Asthma Mother     Dementia Mother     Heart disease Mother     Hypothyroidism Mother     Irritable bowel syndrome Mother     Uterine cancer Mother 72    Heart attack Father    Tyro Ravel Stroke Father     Hyperlipidemia Sister     Uterine cancer Sister         age unknown    Heart attack Maternal Grandfather     Asthma Family     Cataracts Family     Thyroid disease Family     No Known Problems Paternal Aunt     No Known Problems Maternal Aunt        Meds/Allergies   current meds:   Current Facility-Administered Medications   Medication Dose Route Frequency    chlorhexidine (PERIDEX) 0 12 % oral rinse 15 mL  15 mL Swish & Spit Q12H [facility]    sodium chloride 0 9 % infusion  125 mL/hr Intravenous Continuous    vancomycin (VANCOCIN) IVPB (premix) 1,000 mg  15 mg/kg Intravenous Once     Allergies   Allergen Reactions    Penicillins Shortness Of Breath and Edema    Cymbalta [Duloxetine Hcl] Diarrhea    Duloxetine Diarrhea    Dust Mite Extract     Lactose GI Intolerance    Neosporin [Neomycin-Bacitracin Zn-Polymyx]      Other reaction(s): REDNESS    Nitrofurantoin Rash    Omeprazole Rash    Sulfamethoxazole-Trimethoprim Rash       Objective   Vitals: Blood pressure 117/56, pulse 75, temperature 97 8 °F (36 6 °C), temperature source Tympanic, resp  rate 16, height 5' 4" (1 626 m), weight 66 2 kg (146 lb), SpO2 100 %, not currently breastfeeding  ,Body mass index is 25 06 kg/m²  No intake or output data in the 24 hours ending 09/10/19 1052  Invasive Devices     Peripheral Intravenous Line            Peripheral IV 09/10/19 Left Forearm less than 1 day                Physical Exam   Constitutional: She is oriented to person, place, and time  She appears well-developed and well-nourished  No distress  Pt sitting comfortably in chair, in no acute distress  HENT:   Head: Normocephalic and atraumatic     Eyes: Pupils are equal, round, and reactive to light  EOM are normal    Neck: Normal range of motion  Neck supple  Cardiovascular: Normal rate, regular rhythm and normal heart sounds  Exam reveals no gallop and no friction rub  No murmur heard  2+ radial pulses bilaterally  Nonpalpable left femoral, popliteal, DP or PT pulses  Pulmonary/Chest: Effort normal and breath sounds normal  No respiratory distress  Abdominal: Soft  There is no tenderness  Musculoskeletal: Normal range of motion  Neurological: She is alert and oriented to person, place, and time  Skin: Skin is warm and dry  She is not diaphoretic  Psychiatric: She has a normal mood and affect  Her behavior is normal        Lab Results: Coags: No results found for: PT, PTT, INR, Creatinine: No results found for: CREATININE, CBC with diff: No results found for: WBC, HGB, HCT, MCV, PLT, ADJUSTEDWBC, MCH, MCHC, RDW, MPV, NRBC  Imaging: I have personally reviewed pertinent reports  EKG, Pathology, and Other Studies: I have personally reviewed pertinent reports        Code Status: No Order  Advance Directive and Living Will:      Power of :    POLST:

## 2019-09-10 NOTE — INTERIM OP NOTE
ENDARTERECTOMY ARTERIAL FEMORAL,  WITH BOVINE GRAFT AND PROFUNDOPLASTY  Postoperative Note  PATIENT NAME: Quin Bartlett  : [id number]  MRN: [id number]  [facility] OR ROOM 02    Surgery Date: 9/10/2019    Preop Diagnosis:  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[address] ) [I70 209]    Post-Op Diagnosis Codes:     * 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HCC) [I70 209]    Procedure(s) (LRB):  ENDARTERECTOMY ARTERIAL FEMORAL,  WITH BOVINE GRAFT AND PROFUNDOPLASTY (Left)    Surgeon(s) and Role:     * Nolvia Howard DO - Primary     * Jean Fagan PA-C - Assisting    Specimens:  * No specimens in log *  IVF: 2100ml  UOP: 350ml  Estimated Blood Loss: 100ml      Anesthesia Type:   General     Findings:   L CFA occlusion with eccentric plaque and central chronic appearing thrombus  Post endarterctomy patient with L DP & PT doppler signals  Complications:   None    SIGNATURE: Nolvia Howard DO   DATE: September 10, 2019   TIME: 3:00 PM

## 2019-09-10 NOTE — ANESTHESIA POSTPROCEDURE EVALUATION
Post-Op Assessment Note    CV Status:  Stable    Pain management: adequate     Mental Status:  Alert and awake   Hydration Status:  Euvolemic   PONV Controlled:  Controlled   Airway Patency:  Patent  Airway: intubated   Post Op Vitals Reviewed: Yes      Staff: Anesthesiologist           /57 (09/10/19 1640)    Temp     Pulse 58 (09/10/19 1640)   Resp 19 (09/10/19 1640)    SpO2 98 % (09/10/19 1640)

## 2019-09-10 NOTE — QUICK NOTE
Lazara Regalado is POD#0 s/p left common femoral endarterectomy with patch secondary to an 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  Vitals:    09/10/19 1626 09/10/19 1640 09/10/19 1654 09/10/19 1800   BP: 108/53 117/57     Pulse: 56 58 (!) 42 (!) 46   Resp: 15 19 (!) 10 21   Temp:       TempSrc:       SpO2: 98% 98% 98% 98%   Weight:       Height:         Post operatively she is stable, nontoxic  She had a few episodes of asymptomatic bradycardia after surgery  Her pressures have remained stable and she continues to be asymptomatic  She complains of some post operative pain to the left groin which is to be expected  The LLE is neurovascularly intact, pink/warm with +DP/PT signals  Will continue bedrest x2 hours  After that she can be up with assistance  Will ask PT to evaluate prior to discharge  ASA and Plavix to be started tonight  Continue to trend vitals, neurovascular exams & pain control as needed

## 2019-09-11 PROBLEM — E87.6 CHRONIC HYPOKALEMIA: Status: ACTIVE | Noted: 2019-09-11

## 2019-09-11 PROBLEM — Z72.0 TOBACCO USER: Status: ACTIVE | Noted: 2019-09-11

## 2019-09-11 LAB
ANION GAP SERPL CALCULATED.3IONS-SCNC: 10 MMOL/L (ref 4–13)
BUN SERPL-MCNC: 7 MG/DL (ref 5–25)
CALCIUM SERPL-MCNC: 7.8 MG/DL (ref 8.3–10.1)
CHLORIDE SERPL-SCNC: 107 MMOL/L (ref 100–108)
CO2 SERPL-SCNC: 22 MMOL/L (ref 21–32)
CREAT SERPL-MCNC: 0.71 MG/DL (ref 0.6–1.3)
ERYTHROCYTE [DISTWIDTH] IN BLOOD BY AUTOMATED COUNT: 13.9 % (ref 11.6–15.1)
GFR SERPL CREATININE-BSD FRML MDRD: 90 ML/MIN/1.73SQ M
GLUCOSE SERPL-MCNC: 144 MG/DL (ref 65–140)
HCT VFR BLD AUTO: 39.3 % (ref 34.8–46.1)
HGB BLD-MCNC: 12.8 G/DL (ref 11.5–15.4)
MCH RBC QN AUTO: 29.7 PG (ref 26.8–34.3)
MCHC RBC AUTO-ENTMCNC: 32.6 G/DL (ref 31.4–37.4)
MCV RBC AUTO: 91 FL (ref 82–98)
PLATELET # BLD AUTO: 138 THOUSANDS/UL (ref 149–390)
PMV BLD AUTO: 8.9 FL (ref 8.9–12.7)
POTASSIUM SERPL-SCNC: 4.2 MMOL/L (ref 3.5–5.3)
RBC # BLD AUTO: 4.31 MILLION/UL (ref 3.81–5.12)
SODIUM SERPL-SCNC: 139 MMOL/L (ref 136–145)
WBC # BLD AUTO: 11.43 THOUSAND/UL (ref 4.31–10.16)

## 2019-09-11 PROCEDURE — 97163 PT EVAL HIGH COMPLEX 45 MIN: CPT

## 2019-09-11 PROCEDURE — G8979 MOBILITY GOAL STATUS: HCPCS

## 2019-09-11 PROCEDURE — 80048 BASIC METABOLIC PNL TOTAL CA: CPT | Performed by: SURGERY

## 2019-09-11 PROCEDURE — G8978 MOBILITY CURRENT STATUS: HCPCS

## 2019-09-11 PROCEDURE — 99024 POSTOP FOLLOW-UP VISIT: CPT | Performed by: PHYSICIAN ASSISTANT

## 2019-09-11 PROCEDURE — 85027 COMPLETE CBC AUTOMATED: CPT | Performed by: SURGERY

## 2019-09-11 PROCEDURE — 99253 IP/OBS CNSLTJ NEW/EST LOW 45: CPT | Performed by: INTERNAL MEDICINE

## 2019-09-11 RX ORDER — POTASSIUM CHLORIDE 20 MEQ/1
20 TABLET, EXTENDED RELEASE ORAL ONCE
Status: COMPLETED | OUTPATIENT
Start: 2019-09-11 | End: 2019-09-11

## 2019-09-11 RX ORDER — ATORVASTATIN CALCIUM 10 MG/1
10 TABLET, FILM COATED ORAL
Status: DISCONTINUED | OUTPATIENT
Start: 2019-09-11 | End: 2019-09-18 | Stop reason: HOSPADM

## 2019-09-11 RX ADMIN — PREDNISONE 10 MG: 10 TABLET ORAL at 09:06

## 2019-09-11 RX ADMIN — SODIUM CHLORIDE 125 ML/HR: 0.9 INJECTION, SOLUTION INTRAVENOUS at 05:21

## 2019-09-11 RX ADMIN — ATORVASTATIN CALCIUM 10 MG: 10 TABLET, FILM COATED ORAL at 18:13

## 2019-09-11 RX ADMIN — HEPARIN SODIUM 5000 UNITS: 5000 INJECTION INTRAVENOUS; SUBCUTANEOUS at 13:45

## 2019-09-11 RX ADMIN — OXYCODONE HYDROCHLORIDE AND ACETAMINOPHEN 1 TABLET: 5; 325 TABLET ORAL at 13:39

## 2019-09-11 RX ADMIN — HEPARIN SODIUM 5000 UNITS: 5000 INJECTION INTRAVENOUS; SUBCUTANEOUS at 05:18

## 2019-09-11 RX ADMIN — FAMOTIDINE 10 MG: 20 TABLET ORAL at 09:05

## 2019-09-11 RX ADMIN — POTASSIUM CHLORIDE 30 MEQ: 750 TABLET, EXTENDED RELEASE ORAL at 21:17

## 2019-09-11 RX ADMIN — CLOPIDOGREL BISULFATE 75 MG: 75 TABLET ORAL at 13:39

## 2019-09-11 RX ADMIN — HEPARIN SODIUM 5000 UNITS: 5000 INJECTION INTRAVENOUS; SUBCUTANEOUS at 21:18

## 2019-09-11 RX ADMIN — OXYCODONE HYDROCHLORIDE AND ACETAMINOPHEN 1 TABLET: 5; 325 TABLET ORAL at 09:06

## 2019-09-11 RX ADMIN — POTASSIUM CHLORIDE 20 MEQ: 1500 TABLET, EXTENDED RELEASE ORAL at 11:07

## 2019-09-11 RX ADMIN — OXYCODONE HYDROCHLORIDE AND ACETAMINOPHEN 2 TABLET: 5; 325 TABLET ORAL at 18:13

## 2019-09-11 RX ADMIN — ASPIRIN 81 MG 81 MG: 81 TABLET ORAL at 13:39

## 2019-09-11 RX ADMIN — VARENICLINE TARTRATE 1 MG: 1 TABLET, FILM COATED ORAL at 09:08

## 2019-09-11 RX ADMIN — VARENICLINE TARTRATE 1 MG: 1 TABLET, FILM COATED ORAL at 18:45

## 2019-09-11 NOTE — OP NOTE
OPERATIVE REPORT  PATIENT NAME: Vanessa Rosado    :    MRN: [id number]  Pt Location: [facility] OR ROOM 02    SURGERY DATE: 9/10/2019    Surgeon(s) and Role:     * Nolvia Howard,  - Primary     * Dreama Hodgkin, PA-C - Assisting    Preop Diagnosis:  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[address] ) [I70 209]    Post-Op Diagnosis Codes:     * 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HCC) [I70 209]    Procedure(s) (LRB):  ENDARTERECTOMY ARTERIAL FEMORAL,  WITH BOVINE GRAFT AND PROFUNDOPLASTY (Left)    Specimen(s):  * No specimens in log *  IVF: 2100ml  UOP: 350mlEstimated Blood Loss:   100 mL    Drains:  [REMOVED] Urethral Catheter Latex 16 Fr  (Removed)   Site Assessment Clean;Skin intact 9/10/2019  4:40 PM   Collection Container Standard drainage bag 9/10/2019  4:40 PM   Securement Method Securing device (Describe) 9/10/2019  4:40 PM   Output (mL) 250 mL 2019  5:08 AM   Number of days: 1       Anesthesia Type:   General    Operative Indications:  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[address] ) [I70 209]  Chris Lewis is a pleasant 70-year-old woman longstanding smoker her with known left common femoral artery occlusion  She was seen back in the office in July at which time left common femoral artery endarterectomy was recommended however patient was reluctant to proceed attributing to her symptoms to her chronic back pain/issues  She returns to the office recently with persistent/worsening left lower extremity symptoms  Noninvasive studies again confirmed common femoral artery occlusion  Left common femoral artery endarterectomy was again recommended  After a lengthy discussion regarding procedure, risks, benefits, alternatives and anticipated postop course patient was agreeable to proceed  Written consent was obtained  Patient brought to the operating room for the above-mentioned procedure      Operative Findings:  L CFA occlusion with eccentric plaque and central chronic appearing thrombus  Post endarterctomy patient with L DP & PT doppler signals    Complications:   None    Procedure and Technique:  Patient was properly identified in the preop holding area  Patient's name, laterality, and nature procedure verified  The operative site was marked  Patient brought to the operating room where she was positioned supine on the OR table  After adequate induction general anesthesia via endotracheal tube the patient's left lower abdomen/groin and thigh were prepped using chlorhexidine and draped in usual sterile fashion  Ioban skin barrier was also placed  Preop dose of antibiotics was administered  Radial artery monitoring line was placed by Anesthesia  A formal OR time-out was performed  A left groin vertical incision was carried out with a 15  Scalpel  This was deepened down through the subcutaneous tissue using the electrocautery  Self-retaining retractors were positioned to facilitate exposure  Sharp dissection was carried down onto the common femoral artery which was noted to be pulseless and heavily calcified consistent with noninvasive studies  Dissection was carried cephalad to the inguinal ligament  The distal external iliac artery was circumferentially dissected free from surrounding tissue and encircled with vessel loops  Several circumflex vessels were also isolated and encircled with vessel loops  Dissection was next carried distally and both the proximal superficial femoral and profunda femoris were similarly dissected and encircled with silastic loops  Patient was systemically heparinized  After appropriate circulation time proximal control was obtained using a Murillo Derra clamp  The remaining vessel loops were pulled taut  A 11  Scalpel was used to create the arteriotomy was extended from the proximal SFA up to the distal left external iliac artery  A Saco elevator was next used to perform the endarterectomy in the usual fashion    Following endarterectomy the endarterectomized surface was copiously irrigated with heparinized saline  All visible loose debris was removed  Proximal inflow was checked and was noted to be adequate  There was good backbleeding from the superficial femoral artery  There was however no backbleeding from the profunda femoris  As such a limited profundoplasty was performed/eversion endarterectomy  Following this there was now adequate backbleeding from from the profunda femoris  A bovine pericardial patch 0 8 cm x 8 cm was opened, prepped and brought onto the field  This was sewn into place using 6-0 Prolene suture  Prior to completing the suture line the vessels were appropriately back bled and flushed with heparinized saline  The suture line was completed and flow restored to the left lower extremity  Topical hemostatic agents were applied to assist with needle hole oozing  The vessels were interrogated with Doppler pencil and noted to have good sounds  Patient was also now noted to have a palpable 1+ dorsalis pedis pulse  The left groin wound was irrigated  The wound was closed in a multilayer fashion using Monocryl suture  The skin was reapproximated using a 4-0 subcuticular Monocryl stitch  A sterile dressing was applied  All needles, instruments, sponge counts reported correct  Patient tolerated procedure well  She was awakened, extubated and transferred to recovery room in stable condition     I was present for the entire procedure, A qualified resident physician was not available and A physician assistant was required during the procedure for retraction tissue handling,dissection and suturing    Patient Disposition:  PACU     SIGNATURE: Cassandra Ríos DO  DATE: September 11, 2019  TIME: 10:45 AM

## 2019-09-11 NOTE — PROGRESS NOTES
Progress Note - Azul Montemayor 1955, 59 y o  female MRN: [id number]    Unit/Bed#: ICU 09 Encounter: [id number]    Primary Care Provider: Mendel Rodriguez DO   Date and time admitted to hospital: 9/10/2019  9:18 AM      * 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[address] )  Assessment & Plan  59 F aortoiliac occlusive disease, extensive peripheral arterial disease, claudication, systemic lupus erythematosus with polyarthralgias, fibromyalgia, Raynaud's, sciatica, neuropathy, Hx lung CA who is admitted for treatment of LEFT common femoral artery occlusion for which she underwent a common femoral artery endarterectomy and profundoplasty  LEFT groin moderate pain, swelling and tenderness  She has been up to commode  POD #1 LEFT CFA endarterectomy and profundoplasty  - Doing well  Mild to moderate groin pain and asking for percocet  - Bandage c/d/i  - Feet warm; DP/PT signals present   - Advance activity and physical therapy  - Add asa, Plavix and atorvastatin  - Ok to transfer out of ICU to medical floor  Tobacco user  Assessment & Plan  -smoking cessation was encouraged    SLE (systemic lupus erythematosus) ([facility])  Assessment & Plan  -chronic prednisone; patient being followed by Internal Medicine      Subjective:    POD #1  "My swelling is better "  Patient with history of claudication without rest pain or wounds  She is asking for Percocet due to LEFT groin pain and the area is quite tender  She has been up to the commode twice  She has not been up walking or to physical therapy yet  She has no chest pain or shortness of breath  No fevers or chills  She reports concern about taking antiplatelet therapy  We discussed that she should be on aspirin enteric-coated 81 long-term and Plavix at least intermediate term  She should also be on a cholesterol medicine which she is somewhat resistant to starting    We discussed the rationale for cholesterol medication and will start just a low dose to see how she tolerates that as she does have baseline musculoskeletal problems  Nursing reports asymptomatic bradycardia down to 38 last night while she was sleeping  Heart rate improves while she is awake and walking  Lipid panel profile 1/16/19:  Tg 157 H 57 L 142    Vitals:  /51   Pulse 68   Temp 98 8 °F (37 1 °C) (Temporal)   Resp 22   Ht 5' 4" (1 626 m)   Wt 66 2 kg (146 lb)   LMP  (LMP Unknown)   SpO2 97%   Breastfeeding? No   BMI 25 06 kg/m²     I/Os:  I/O last 3 completed shifts:   In: 3783 3 [I V :3783 3]  Out: 2700 [Urine:1715; Blood:100]  I/O this shift:  In: 636 3 [P O :180; I V :456 3]  Out: 50 [Urine:50]    Lab Results and Cultures:   Lab Results   Component Value Date    WBC 11 43 (H) 09/11/2019    HGB 12 8 09/11/2019    HCT 39 3 09/11/2019    MCV 91 09/11/2019     (L) 09/11/2019     Lab Results   Component Value Date    CALCIUM 7 8 (L) 09/11/2019     03/20/2017    K 4 2 09/11/2019    CO2 22 09/11/2019     09/11/2019    BUN 7 09/11/2019    CREATININE 0 71 09/11/2019     Lab Results   Component Value Date    INR 1 04 09/10/2019    PROTIME 13 7 09/10/2019     Blood Culture: No results found for: BLOODCX,       Medications:  Current Facility-Administered Medications   Medication Dose Route Frequency    aspirin chewable tablet 81 mg  81 mg Oral Daily    clopidogrel (PLAVIX) tablet 75 mg  75 mg Oral Daily    famotidine (PEPCID) tablet 10 mg  10 mg Oral Daily Before Breakfast    heparin (porcine) subcutaneous injection 5,000 Units  5,000 Units Subcutaneous Q8H [address]    HYDROmorphone (DILAUDID) injection 0 5 mg  0 5 mg Intravenous Q6H PRN    LORazepam (ATIVAN) tablet 0 5 mg  0 5 mg Oral Q12H PRN    ondansetron (ZOFRAN) injection 4 mg  4 mg Intravenous Q6H PRN    oxyCODONE-acetaminophen (PERCOCET) 5-325 mg per tablet 1 tablet  1 tablet Oral Q4H PRN    oxyCODONE-acetaminophen (PERCOCET) 5-325 mg per tablet 2 tablet  2 tablet Oral Q6H PRN    potassium chloride (K-DUR,KLOR-CON) CR tablet 30 mEq  30 mEq Oral HS    predniSONE tablet 10 mg  10 mg Oral Daily    sodium chloride 0 9 % infusion  125 mL/hr Intravenous Continuous    traMADol (ULTRAM) tablet 50 mg  50 mg Oral BID PRN    varenicline (CHANTIX) tablet 1 mg  1 mg Oral BID       Imaging:    PreOp VISH 7/31/19    Impression:  RIGHT LOWER LIMB:  This resting evaluation shows no evidence of significant lower extremity  arterial occlusive disease  Based on the decreased NILSA there is suggestion of  aortoiliac disease  Ankle/Brachial index: 0 79, which is within the moderate claudication range  Prior: 0 78  PVR/ PPG tracings are dampened  Metatarsal pressure of 72 mmHg  Prior: 53 mmHg  Great toe pressure of 46 mmHg, within the healing range  Prior: Not obtained  LEFT LOWER LIMB:  There is an occlusion of the common femoral artery, with a collateral vessel  seen supplying flow to the proximal Superficial Femoral Artery  The proximal profunda artery is patent and is receiving blood supply from  retrograde flow in the proximal superficial femoral artery  Ankle/Brachial index: 0 64, which is within the severe claudication range  Prior: 0 57  PVR/ PPG tracings are dampened  Metatarsal pressure of 41 mmHg  Prior: Not obtained  Great toe pressure of 34 mmHg, within the healing range, Prior: Not obtained  Physical Exam:    General appearance: alert and oriented, in no acute distress  Skin: Skin color, texture, turgor normal  No rashes or lesions  Neurologic: Grossly normal  Head: Normocephalic, without obvious abnormality, atraumatic  Eyes: non-icteric  Throat: lips, mucosa, and tongue normal; teeth and gums normal  Neck: no adenopathy, no carotid bruit, no JVD and supple, symmetrical, trachea midline  Back: symmetric, no curvature  ROM normal  No CVA tenderness    Lungs:  Absent breath sounds left lower lung; otherwise, clear to auscultation bilaterally  Chest wall: no tenderness  Heart: regular rate and rhythm, S1, S2 normal, no murmur, click, rub or gallop  Abdomen: soft, non-tender; bowel sounds normal; no masses,  no organomegaly  Extremities: Trace edema  Feet warm and well-perfused  Chronic venous stasis changes  Incision:  LEFT groin surgical bandage in place C/D/I  There is mild to moderate edema in the L thigh and mild ecchymosis  Feet are warm, M-S intact     R and L DP/AT signals          [place], [place]  9/11/2019  [facility]

## 2019-09-11 NOTE — PHYSICAL THERAPY NOTE
PT EVALUATION    Pt  Name: Quin Bartlett  Pt  Age: 59 y o    MRN: [id number]  LENGTH OF STAY: 1    Patient Active Problem List   Diagnosis    Anxiety    Chronic bilateral low back pain with bilateral sciatica    Fibromyalgia    GERD (gastroesophageal reflux disease)    Lung cancer ([facility] [address] )    Osteoporosis    Primary osteoarthritis involving multiple joints    SLE (systemic lupus erythematosus) (HCC)    Localized edema    Chronic pain of left ankle    Acute cystitis without hematuria    Ischemia of toe    Open toe wound, initial encounter    Neuropathy    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HCC)    Raynaud's disease without gangrene    Tobacco dependence    Aortoiliac occlusive disease (HCC)    [facility]AD [facility]) ([facility]    Lumbar pain    Thrush    Microscopic hematuria    Right flank pain    Benign essential microscopic hematuria    Cellulitis    Right foot pain    History of lung cancer    Chronic hypokalemia    Tobacco user       Admitting Diagnoses:   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[facility] [address] ) [[id number]]    Past Medical History:   Diagnosis Date    Anxiety     Arthritis 12/20/2014    Cataract     eulalia    Disease of thyroid gland     nodules    Fibromyalgia, primary     Frequent UTI     History of lung cancer     lower left lobe removed - 2004    Irritable bowel syndrome     Lactose intolerance     Lupus (HCC)     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    PAD [facility]peripheral artery disease) ([facility])     Presence of pessary     Raynaud disease     Sciatica     Sinus headache     Sjogren's syndrome ([facility] [address] )     Smoker        Past Surgical History:   Procedure Laterality Date    LUNG REMOVAL, PARTIAL  05/01/2004    Prior Surgical/Procedural History - 5/4 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 - lung resection - subtotal    MOUTH SURGERY      Tooth extraction    ID THROMBOENDARTECTMY FEMORAL COMMON Left 9/10/2019    Procedure: ENDARTERECTOMY ARTERIAL FEMORAL,  WITH BOVINE GRAFT AND PROFUNDOPLASTY;  Surgeon: Beverly Pulido DO;  Location: [facility];  Service: Vascular    SINUS SURGERY         Imaging Studies:  No orders to display        09/11/19 1608   Note Type   Note type Eval only   Pain Assessment   Pain Score 5   Pain Type Acute pain;Surgical pain   Pain Location Groin   Pain Orientation Left   Hospital Pain Intervention(s) Medication (See MAR); Repositioned; Ambulation/increased activity; Emotional support; Rest   Home Living   Type of [address] One level; Laundry in basement  (1STE; full flight to basement laundry)   Home Equipment Walker;Quad cane   Prior Function   Level of [place] Independent with ADLs and functional mobility  (w/ RW household amb; SBQC for community amb)   Lives With Spouse   Receives Help From Family   ADL Assistance Independent   Falls in the last 6 months 0   Restrictions/Precautions   Weight Bearing Precautions Per Order No   Other Precautions Fall Risk;Pain   General   Family/Caregiver Present No   Cognition   Overall Cognitive Status WFL   Arousal/Participation Alert   Orientation Level Oriented X4   Following Commands Follows all commands and directions without difficulty   RUE Assessment   RUE Assessment WFL  (4+/5 grossly)   LUE Assessment   LUE Assessment WFL  (4+/5 grossly)   RLE Assessment   RLE Assessment WFL  (4+/5 grossly)   LLE Assessment   LLE Assessment X  (4/5 grossly except hip 3-/5)   Coordination   Movements are Fluid and Coordinated 1   Sensation WFL   Bed Mobility   Supine to Sit 4  Minimal assistance   Additional items Assist x 1;HOB elevated; Increased time required;Verbal cues;LE management   Sit to Supine 4  Minimal assistance   Additional items Assist x 1; Increased time required;Verbal cues;LE management   Additional Comments cues for techniques   Transfers   Sit to Stand 5  Supervision   Additional items Increased time required;Verbal cues   Stand to Sit 5  Supervision Additional items Increased time required;Verbal cues   Additional Comments cues for techniques   Ambulation/Elevation   Gait pattern Antalgic; Wide YOUSUF; Decreased L stance; Excessively slow   Gait Assistance 5  Supervision   Additional items Verbal cues   Assistive Device Rolling walker   Distance 80'x1   Balance   Static Sitting Good   Static Standing Fair +  (w/ RW)   Ambulatory Fair  (w/ RW)   Activity Tolerance   Activity Tolerance Patient limited by pain   Nurse Made Aware RN Porfirio   Assessment   Prognosis Good   Problem List Decreased strength;Decreased endurance; Impaired balance;Decreased mobility;Pain   Assessment Pt  59 y  o female admitted for 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[address] )  S/p L common femoral endarterectomy with patch on 9/10/19  Pt referred to PT for mobility assessment & D/C planning w/ orders of up w/ assistance  PTA, pt reports being I w/ RW household amb; QC for community amb  Pt lives w/  in 1 level house w/ 1 WICHO & full flight to basement (laundry)  On eval, pt demonstrate minimal dec mobility, balance, endurance & amb  Pt require S for transfers & amb w/ RW however require minAx1 for bed mobility for LLE mgt in/OOB + cues for techniques  Gait deviations as above, slow & antalgic but no gross LOB noted  No dizziness reported t/o session  Nsg staff most recent vital signs as follows: /56 (BP Location: Left arm)   Pulse 56   Temp 97 6 °F (36 4 °C) (Temporal)   Resp 20   Ht 5' 4" (1 626 m)   Wt 66 2 kg (146 lb)   LMP  (LMP Unknown)   SpO2 96%   Breastfeeding? No   BMI 25 06 kg/m²   At end of session, pt back in bed w/o issues, call bell & phone in reach  Fall precautions reinforced w/ good understanding  Will continue skilled PT to improve function & safety  At this time, will anticipate good progress for safe D/C to home w/ family support    Nsg staff to continue to mobilized pt (OOB in chair for all meals & ambulate in room/unit) as tolerated to prevent further decline in function  Nsg notified  Barriers to Discharge None   Goals   Patient Goals to go home   STG Expiration Date 09/18/19   Short Term Goal #1  Goals to be met in 7 days; pt will be able to: 1) inc strength & balance by 1/2 grade to improve overall functional mobility & dec fall risk; 2) inc bed mobility to modified I for pt to be able to get in/OOB safely w/ proper techniques 100% of the time, to dec caregiver assistance & safely function at home; 3) inc transfers to modified I for pt to transition safely from one surface to another w/o % of the time, to dec caregiver assistance & safely function at home; 4) inc amb w/ RW approx  >350' w/ modified I for pt to ambulate community distances w/o any % of the time, to dec caregiver assistance & safely function at home; 5) inc barthel score to 100 to decrease overall risk for falls; 6) negotiate stairs w/ modified I for inc safety during stair mgt inside/outside of home & dec caregiver assistance; 7) pt/caregiver ed   Treatment Day 0   Plan   Treatment/Interventions Functional transfer training;LE strengthening/ROM; Elevations; Therapeutic exercise; Endurance training;Patient/family training;Bed mobility;Gait training;Spoke to nursing   PT Frequency 2-3x/wk   Recommendation   Recommendation Home with family support   Equipment Recommended Walker  (RW)   PT - OK to Discharge Yes  (when medically cleared)   Barthel Index   Feeding 10   Bathing 5   Grooming Score 5   Dressing Score 5   Bladder Score 10   Bowels Score 10   Toilet Use Score 5   Transfers (Bed/Chair) Score 10   Mobility (Level Surface) Score 0   Stairs Score 0   Barthel Index Score 60   Hx/personal factors: co-morbidities, inaccessible home, use of AD, pain, fall risk and assist w/ ADL's  Examination: dec mobility, dec balance, dec endurance, dec amb, moderate fall risk, pain  Clinical: unpredictable (ongoing medical status, abnormal lab values, moderate fall risk and pain mgt)  Complexity: high Jeremiah Decker, PT

## 2019-09-11 NOTE — PLAN OF CARE
Problem: Prexisting or High Potential for Compromised Skin Integrity  Goal: Skin integrity is maintained or improved  Description  INTERVENTIONS:  - Identify patients at risk for skin breakdown  - Assess and monitor skin integrity  - Assess and monitor nutrition and hydration status  - Monitor labs   - Assess for incontinence   - Turn and reposition patient  - Assist with mobility/ambulation  - Relieve pressure over bony prominences  - Avoid friction and shearing  - Provide appropriate hygiene as needed including keeping skin clean and dry  - Evaluate need for skin moisturizer/barrier cream  - Collaborate with interdisciplinary team   - Patient/family teaching  - Consider wound care consult   Outcome: Progressing     Problem: PAIN - ADULT  Goal: Verbalizes/displays adequate comfort level or baseline comfort level  Description  Interventions:  - Encourage patient to monitor pain and request assistance  - Assess pain using appropriate pain scale  - Administer analgesics based on type and severity of pain and evaluate response  - Implement non-pharmacological measures as appropriate and evaluate response  - Consider cultural and social influences on pain and pain management  - Notify physician/advanced practitioner if interventions unsuccessful or patient reports new pain  Outcome: Progressing     Problem: SAFETY ADULT  Goal: Patient will remain free of falls  Description  INTERVENTIONS:  - Assess patient frequently for physical needs  -  Identify cognitive and physical deficits and behaviors that affect risk of falls    -  [place] fall precautions as indicated by assessment   - Educate patient/family on patient safety including physical limitations  - Instruct patient to call for assistance with activity based on assessment  - Modify environment to reduce risk of injury  - Consider OT/PT consult to assist with strengthening/mobility  Outcome: Progressing  Goal: Maintain or return to baseline ADL function  Description  INTERVENTIONS:  -  Assess patient's ability to carry out ADLs; assess patient's baseline for ADL function and identify physical deficits which impact ability to perform ADLs (bathing, care of mouth/teeth, toileting, grooming, dressing, etc )  - Assess/evaluate cause of self-care deficits   - Assess range of motion  - Assess patient's mobility; develop plan if impaired  - Assess patient's need for assistive devices and provide as appropriate  - Encourage maximum independence but intervene and supervise when necessary  - Involve family in performance of ADLs  - Assess for home care needs following discharge   - Consider OT consult to assist with ADL evaluation and planning for discharge  - Provide patient education as appropriate  Outcome: Progressing  Goal: Maintain or return mobility status to optimal level  Description  INTERVENTIONS:  - Assess patient's baseline mobility status (ambulation, transfers, stairs, etc )    - Identify cognitive and physical deficits and behaviors that affect mobility  - Identify mobility aids required to assist with transfers and/or ambulation (gait belt, sit-to-stand, lift, walker, cane, etc )  - [place] fall precautions as indicated by assessment  - Record patient progress and toleration of activity level on Mobility SBAR; progress patient to next Phase/Stage  - Instruct patient to call for assistance with activity based on assessment  - Consider rehabilitation consult to assist with strengthening/weightbearing, etc   Outcome: Progressing     Problem: Knowledge Deficit  Goal: Patient/family/caregiver demonstrates understanding of disease process, treatment plan, medications, and discharge instructions  Description  Complete learning assessment and assess knowledge base    Interventions:  - Provide teaching at level of understanding  - Provide teaching via preferred learning methods  Outcome: Progressing     Problem: HEMATOLOGIC - ADULT  Goal: Maintains hematologic stability  Description  INTERVENTIONS  - Assess for signs and symptoms of bleeding or hemorrhage  - Monitor labs  - Administer supportive blood products/factors as ordered and appropriate  Outcome: Progressing     Problem: Potential for Falls  Goal: Patient will remain free of falls  Description  INTERVENTIONS:  - Assess patient frequently for physical needs  -  Identify cognitive and physical deficits and behaviors that affect risk of falls    -  [place] fall precautions as indicated by assessment   - Educate patient/family on patient safety including physical limitations  - Instruct patient to call for assistance with activity based on assessment  - Modify environment to reduce risk of injury  - Consider OT/PT consult to assist with strengthening/mobility  Outcome: Progressing

## 2019-09-11 NOTE — ASSESSMENT & PLAN NOTE
Systemic lupus on prednisone 10 mg daily    No evidence of adrenal insufficiency requiring stress dosing of steroids

## 2019-09-11 NOTE — PROGRESS NOTES
Patient refusing Plavix, stated the medication was never reviewed with her and that she was never informed she would have to be taking it post-op  Education provided to patient on importance of Plavix post-op  At this time patient is still refusing to take, an education hand-out was provided to the patient

## 2019-09-11 NOTE — CONSULTS
Consult- Lakshmi Gonzalez 1955, 59 y o  female MRN: [id number]  Unit/Bed#: ICU 09 Encounter: [id number]  Primary Care Provider: Karma Estes DO   Date and time admitted to hospital: 9/10/2019  9:18 AM      Inpatient consult to Internal Medicine  Consult performed by: Mireya Acosta DO  Consult ordered by: Mio Sierra DO      ASSESSMENT AND PLAN  * 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[address] )  Assessment & Plan  Common femoral artery occlusion status post endarterectomy  Management per primary service  Tobacco user  Assessment & Plan  Tobacco use on varenicline    Chronic hypokalemia  Assessment & Plan  Chronic hypokalemia on potassium 30 mEq daily    History of lung cancer  Assessment & Plan  History of lung cancer status post left lower lobectomy    SLE (systemic lupus erythematosus) (HCC)  Assessment & Plan  Systemic lupus on prednisone 10 mg daily  No evidence of adrenal insufficiency requiring stress dosing of steroids    GERD (gastroesophageal reflux disease)  Assessment & Plan  GERD  Continue famotidine    Fibromyalgia  Assessment & Plan  Reported history of fibromyalgia on tramadol prior to admission as needed for pain      Thank you for this consultation, we will follow along with you during this hospitalization  _____________________________________________________________________________    HPI: Lakshmi Gonzalez is a 59y o  year old female who has been having claudication of left lower extremity  She was subsequently found to have occlusion of left common femoral artery and presented for elective endarterectomy and bovine grafting  At time of examination the patient denies any chest pain or shortness of breath  She feels well without any nausea or vomiting        ALLERGIES  Allergies   Allergen Reactions    Penicillins Shortness Of Breath and Edema    Cymbalta [Duloxetine Hcl] Diarrhea    Duloxetine Diarrhea    Dust Mite Extract     Lactose GI Intolerance    Neosporin [Neomycin-Bacitracin Zn-Polymyx]      Other reaction(s): REDNESS    Nitrofurantoin Rash    Omeprazole Rash    Sulfamethoxazole-Trimethoprim Rash     HOME MEDICATIONS    Prior to Admission Medications   Prescriptions Last Dose Informant Patient Reported? Taking?    LORazepam (ATIVAN) 0 5 mg tablet 9/10/2019 at 0830  No Yes   Sig: Take 1 tablet (0 5 mg total) by mouth every 12 (twelve) hours as needed for anxiety   Magnesium 500 MG CAPS 2019 at Unknown time Self Yes Yes   Sig: Take by mouth daily     Multiple Vitamin (MULTIVITAMIN) tablet 2019 at Unknown time Self Yes Yes   Sig: Take 1 tablet by mouth daily   SODIUM FLUORIDE, DENTAL GEL, (DENTA 5000 PLUS) 1 1 % CREA   Yes Yes   Si-2 x wk   Vitamin Mixture (JOSE RAMON-C PO) 2019 at Unknown time Self Yes Yes   Sig: Take 1 tablet by mouth daily   cyanocobalamin (VITAMIN B-12) 100 mcg tablet 2019 at Unknown time Self Yes Yes   Sig: Take 1 tablet by mouth daily   estradiol (VAGIFEM, YUVAFEM) 10 MCG TABS vaginal tablet   No Yes   Sig: Insert 1 tablet (10 mcg total) into the vagina 2 (two) times a week   Patient taking differently: Insert 1 tablet into the vagina Every 5-6 days   famotidine (PEPCID AC) 10 mg tablet 9/10/2019 at 0830 Self Yes Yes   Sig: Take by mouth as needed     potassium chloride (KLOR-CON 10) 10 mEq tablet 2019 at Unknown time Self Yes Yes   Sig: Take 1 tablet by mouth 3 (three) times a day   predniSONE 10 mg tablet 9/10/2019 at 0830  No Yes   Sig: Take 1 tablet (10 mg total) by mouth daily   traMADol (ULTRAM) 50 mg tablet 2019  No Yes   Sig: Take 1 tablet (50 mg total) by mouth 2 (two) times a day as needed for moderate pain   varenicline (CHANTIX SERAFIN) 0 5 MG X 11 & 1 MG X 42 tablet 9/10/2019 at 0100  No Yes   Sig: Take one 0 5mg tab by mouth 1x daily for 3 days, then increase to one 0 5mg tab 2x daily for 3 days, then increase to one 1mg tab 2x daily      Facility-Administered Medications: None     CURRENT MEDICATIONS  Current Facility-Administered Medications   Medication Dose Route Frequency Provider Last Rate Last Dose    aspirin chewable tablet 81 mg  81 mg Oral Daily Calogero Anita, DO   81 mg at 09/10/19 1832    clopidogrel (PLAVIX) tablet 75 mg  75 mg Oral Daily Calogero Anita, DO        famotidine (PEPCID) tablet 10 mg  10 mg Oral Daily Before Breakfast Calogero Anita, DO   10 mg at 09/11/19 0905    heparin (porcine) subcutaneous injection 5,000 Units  5,000 Units Subcutaneous Q8H [address] Calogero Anita, DO   5,000 Units at 09/11/19 0518    HYDROmorphone (DILAUDID) injection 0 5 mg  0 5 mg Intravenous Q6H PRN Calogero Anita, DO        LORazepam (ATIVAN) tablet 0 5 mg  0 5 mg Oral Q12H PRN Calogero Anita, DO        ondansetron (ZOFRAN) injection 4 mg  4 mg Intravenous Q6H PRN Calogero Anita, DO        oxyCODONE-acetaminophen (PERCOCET) 5-325 mg per tablet 1 tablet  1 tablet Oral Q4H PRN Calogero Anita, DO   1 tablet at 09/11/19 0906    oxyCODONE-acetaminophen (PERCOCET) 5-325 mg per tablet 2 tablet  2 tablet Oral Q6H PRN Calogero Anita, DO        potassium chloride (K-DUR,KLOR-CON) CR tablet 20 mEq  20 mEq Oral Once Juan Payton,         potassium chloride (K-DUR,KLOR-CON) CR tablet 30 mEq  30 mEq Oral HS Juan Payton,         predniSONE tablet 10 mg  10 mg Oral Daily Calogero Anita, DO   10 mg at 09/11/19 1259    sodium chloride 0 9 % infusion  125 mL/hr Intravenous Continuous Elver Ferrari, DO   Stopped at 09/11/19 0900    traMADol (ULTRAM) tablet 50 mg  50 mg Oral BID PRN Calogero Anita, DO        varenicline (CHANTIX) tablet 1 mg  1 mg Oral BID Juan Payton, DO   1 mg at 09/11/19 0908     PAST HISTORY  Past Medical History:   Diagnosis Date    Anxiety     Arthritis 12/20/2014    Cataract     eulalia    Disease of thyroid gland     nodules    Fibromyalgia, primary     Frequent UTI     History of lung cancer     lower left lobe removed - 2004    Irritable bowel syndrome  Lactose intolerance     Lupus (HCC)     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    PAD (peripheral artery disease) (HCC)     Presence of pessary     Raynaud disease     Sciatica     Sinus headache     Sjogren's syndrome ([facility] [address]     Smoker      Past Surgical History:   Procedure Laterality Date    LUNG REMOVAL, PARTIAL  05/01/2004    Prior Surgical/Procedural History - 5/4 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 - lung resection - subtotal    MOUTH SURGERY      Tooth extraction    WA THROMBOENDARTECTMY FEMORAL COMMON Left 9/10/2019    Procedure: ENDARTERECTOMY ARTERIAL FEMORAL,  WITH BOVINE GRAFT AND PROFUNDOPLASTY;  Surgeon: Erika Licea DO;  Location: [facility];  Service: Vascular    SINUS SURGERY       SOCIAL HISTORY  Social History     Socioeconomic History    Marital status: /Civil Union     Spouse name: Not on file    Number of children: 1    Years of education: Not on file    Highest education level: Not on file   Occupational History    Not on file   Social Needs    Financial resource strain: Not on file    Food insecurity:     Worry: Not on file     Inability: Not on file    Transportation needs:     Medical: Not on file     Non-medical: Not on file   Tobacco Use    Smoking status: Current Every Day Smoker     Packs/day: 1 00     Years: 40 00     Pack years: 40 00     Types: Cigarettes    Smokeless tobacco: Never Used    Tobacco comment: on chantix - started 9/1/2019   Substance and Sexual Activity    Alcohol use: Yes     Frequency: 2-4 times a month     Drinks per session: 1 or 2     Binge frequency: Never     Comment: liquor    Drug use: No    Sexual activity: Never     Partners: Male   Lifestyle    Physical activity:     Days per week: Not on file     Minutes per session: Not on file    Stress: Not on file   Relationships    Social connections:     Talks on phone: Not on file     Gets together: Not on file     Attends Zoroastrian service: Not on file     Active member of club or organization: Not on file     Attends meetings of clubs or organizations: Not on file     Relationship status: Not on file    Intimate partner violence:     Fear of current or ex partner: Not on file     Emotionally abused: Not on file     Physically abused: Not on file     Forced sexual activity: Not on file   Other Topics Concern    Not on file   Social History Narrative    Always uses seat belt    Caffeine use    Primary spoken language English    [employer]    Racial background:      Drinks Tea     FAMILY HISTORY  Family History   Problem Relation Age of Onset    Arthritis Mother     Asthma Mother     Dementia Mother     Heart disease Mother     Hypothyroidism Mother     Irritable bowel syndrome Mother     Uterine cancer Mother 72    Heart attack Father     Stroke Father     Hyperlipidemia Sister     Uterine cancer Sister         age unknown    Heart attack Maternal Grandfather     Asthma Family     Cataracts Family     Thyroid disease Family     No Known Problems Paternal Aunt     No Known Problems Maternal Aunt        REVIEW OF SYSTEMS  History obtained from chart review and the patient  General ROS: negative for - chills or fever  Psychological ROS: negative for - hallucinations or irritability  Ophthalmic ROS: negative for - blurry vision or loss of vision  Respiratory ROS: negative for - cough or shortness of breath  Cardiovascular ROS: negative for - chest pain  Gastrointestinal ROS: negative for - abdominal pain  Genito-Urinary ROS: negative for - hematuria  Musculoskeletal ROS: positive for - muscle pain  Neurological ROS: negative for - seizures  Otherwise, all other 12 point review of systems normal     OBJECTIVE  Temp:  [97 1 °F (36 2 °C)-98 6 °F (37 °C)] 98 6 °F (37 °C)  HR:  [42-68] 68  Resp:  [10-39] 22  BP: (100-125)/(23-59) 101/51  Arterial Line BP: ()/(38-71) 118/52    PHYSICAL EXAM  General appearance: alert, appears stated age and cooperative  Skin: Skin color, texture, turgor normal  No rashes or lesions  Head: Normocephalic, without obvious abnormality, atraumatic  Eyes: conjunctivae/corneas clear  PERRL, EOM's intact  Lungs: clear to auscultation bilaterally  Heart: regular rate and rhythm  Abdomen: soft, non-tender; bowel sounds normal; no masses,  no organomegaly  Back: range of motion normal  Extremities: no ulcers, gangrene or trophic changes  Neurologic: Grossly normal    Lab Results: I have personally reviewed pertinent reports  Labs:  Results from last 7 days   Lab Units 09/11/19  0501 09/10/19  1047   WBC Thousand/uL 11 43*  --    HEMOGLOBIN g/dL 12 8  --    HEMATOCRIT % 39 3  --    MCV fL 91  --    PLATELETS Thousands/uL 138*  --    INR   --  1 04       Results from last 7 days   Lab Units 09/11/19  0501   SODIUM mmol/L 139   POTASSIUM mmol/L 4 2   CHLORIDE mmol/L 107   CO2 mmol/L 22   ANION GAP mmol/L 10   BUN mg/dL 7   CREATININE mg/dL 0 71   CALCIUM mg/dL 7 8*   EGFR ml/min/1 73sq m 90   GLUCOSE RANDOM mg/dL 144*       Imaging:   No results found  Total Time for Visit, including Counseling / Coordination of Care: 35 mins  Greater than 50% of this total time spent on direct patient counseling and coordination of care  ** Please Note: This note has been constructed using a voice recognition system   **

## 2019-09-11 NOTE — ASSESSMENT & PLAN NOTE
59 F aortoiliac occlusive disease, extensive peripheral arterial disease, claudication, systemic lupus erythematosus with polyarthralgias, fibromyalgia, Raynaud's, sciatica, neuropathy, Hx lung CA who is admitted for treatment of LEFT common femoral artery occlusion for which she underwent a common femoral artery endarterectomy and profundoplasty  LEFT groin moderate pain, swelling and tenderness  She has been up to commode  POD #1 LEFT CFA endarterectomy and profundoplasty  - Doing well  Mild to moderate groin pain and asking for percocet  - Bandage c/d/i  - Feet warm; DP/PT signals present   - Advance activity and physical therapy  - Add asa, Plavix and atorvastatin  - Ok to transfer out of ICU to medical floor

## 2019-09-11 NOTE — PLAN OF CARE
Problem: PHYSICAL THERAPY ADULT  Goal: Performs mobility at highest level of function for planned discharge setting  See evaluation for individualized goals  Description  Treatment/Interventions: Functional transfer training, LE strengthening/ROM, Elevations, Therapeutic exercise, Endurance training, Patient/family training, Bed mobility, Gait training, Spoke to nursing  Equipment Recommended: Walker(RW)       See flowsheet documentation for full assessment, interventions and recommendations  Note:   Prognosis: Good  Problem List: Decreased strength, Decreased endurance, Impaired balance, Decreased mobility, Pain  Assessment: Pt  59 y  o female admitted for 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[facility] [address] )  S/p L common femoral endarterectomy with patch on 9/10/19  Pt referred to PT for mobility assessment & D/C planning w/ orders of up w/ assistance  PTA, pt reports being I w/ RW household amb; QC for community amb  Pt lives w/  in 1 level house w/ 1 WICHO & full flight to basement (laundry)  On eval, pt demonstrate minimal dec mobility, balance, endurance & amb  Pt require S for transfers & amb w/ RW however require minAx1 for bed mobility for LLE mgt in/OOB + cues for techniques  Gait deviations as above, slow & antalgic but no gross LOB noted  No dizziness reported t/o session  Nsg staff most recent vital signs as follows: /56 (BP Location: Left arm)   Pulse 56   Temp 97 6 °F (36 4 °C) (Temporal)   Resp 20   Ht 5' 4" (1 626 m)   Wt 66 2 kg (146 lb)   LMP  (LMP Unknown)   SpO2 96%   Breastfeeding? No   BMI 25 06 kg/m²   At end of session, pt back in bed w/o issues, call bell & phone in reach  Fall precautions reinforced w/ good understanding  Will continue skilled PT to improve function & safety  At this time, will anticipate good progress for safe D/C to home w/ family support    Nsg staff to continue to mobilized pt (OOB in chair for all meals & ambulate in room/unit) as tolerated to prevent further decline in function  Nsg notified  Barriers to Discharge: None     Recommendation: Home with family support     PT - OK to Discharge: Yes(when medically cleared)    See flowsheet documentation for full assessment

## 2019-09-12 PROBLEM — D62 ACUTE BLOOD LOSS AS CAUSE OF POSTOPERATIVE ANEMIA: Status: ACTIVE | Noted: 2019-09-12

## 2019-09-12 LAB
ALBUMIN SERPL BCP-MCNC: 2.6 G/DL (ref 3.5–5)
ALP SERPL-CCNC: 73 U/L (ref 46–116)
ALT SERPL W P-5'-P-CCNC: 17 U/L (ref 12–78)
ANION GAP SERPL CALCULATED.3IONS-SCNC: 9 MMOL/L (ref 4–13)
AST SERPL W P-5'-P-CCNC: 16 U/L (ref 5–45)
BASOPHILS # BLD AUTO: 0.05 THOUSANDS/ΜL (ref 0–0.1)
BASOPHILS NFR BLD AUTO: 0 % (ref 0–1)
BILIRUB SERPL-MCNC: 0.26 MG/DL (ref 0.2–1)
BUN SERPL-MCNC: 14 MG/DL (ref 5–25)
CALCIUM SERPL-MCNC: 8.3 MG/DL (ref 8.3–10.1)
CHLORIDE SERPL-SCNC: 108 MMOL/L (ref 100–108)
CO2 SERPL-SCNC: 24 MMOL/L (ref 21–32)
CREAT SERPL-MCNC: 0.73 MG/DL (ref 0.6–1.3)
EOSINOPHIL # BLD AUTO: 0.13 THOUSAND/ΜL (ref 0–0.61)
EOSINOPHIL NFR BLD AUTO: 1 % (ref 0–6)
ERYTHROCYTE [DISTWIDTH] IN BLOOD BY AUTOMATED COUNT: 14.3 % (ref 11.6–15.1)
GFR SERPL CREATININE-BSD FRML MDRD: 87 ML/MIN/1.73SQ M
GLUCOSE SERPL-MCNC: 103 MG/DL (ref 65–140)
HCT VFR BLD AUTO: 43.5 % (ref 34.8–46.1)
HGB BLD-MCNC: 13.9 G/DL (ref 11.5–15.4)
IMM GRANULOCYTES # BLD AUTO: 0.08 THOUSAND/UL (ref 0–0.2)
IMM GRANULOCYTES NFR BLD AUTO: 1 % (ref 0–2)
LYMPHOCYTES # BLD AUTO: 1.58 THOUSANDS/ΜL (ref 0.6–4.47)
LYMPHOCYTES NFR BLD AUTO: 13 % (ref 14–44)
MCH RBC QN AUTO: 29.4 PG (ref 26.8–34.3)
MCHC RBC AUTO-ENTMCNC: 32 G/DL (ref 31.4–37.4)
MCV RBC AUTO: 92 FL (ref 82–98)
MONOCYTES # BLD AUTO: 1.09 THOUSAND/ΜL (ref 0.17–1.22)
MONOCYTES NFR BLD AUTO: 9 % (ref 4–12)
NEUTROPHILS # BLD AUTO: 9.24 THOUSANDS/ΜL (ref 1.85–7.62)
NEUTS SEG NFR BLD AUTO: 76 % (ref 43–75)
NRBC BLD AUTO-RTO: 0 /100 WBCS
PLATELET # BLD AUTO: 167 THOUSANDS/UL (ref 149–390)
PMV BLD AUTO: 9.4 FL (ref 8.9–12.7)
POTASSIUM SERPL-SCNC: 3.5 MMOL/L (ref 3.5–5.3)
PROT SERPL-MCNC: 5.7 G/DL (ref 6.4–8.2)
RBC # BLD AUTO: 4.72 MILLION/UL (ref 3.81–5.12)
SODIUM SERPL-SCNC: 141 MMOL/L (ref 136–145)
WBC # BLD AUTO: 12.17 THOUSAND/UL (ref 4.31–10.16)

## 2019-09-12 PROCEDURE — 85025 COMPLETE CBC W/AUTO DIFF WBC: CPT | Performed by: INTERNAL MEDICINE

## 2019-09-12 PROCEDURE — 99232 SBSQ HOSP IP/OBS MODERATE 35: CPT | Performed by: INTERNAL MEDICINE

## 2019-09-12 PROCEDURE — 99024 POSTOP FOLLOW-UP VISIT: CPT | Performed by: NURSE PRACTITIONER

## 2019-09-12 PROCEDURE — 80053 COMPREHEN METABOLIC PANEL: CPT | Performed by: INTERNAL MEDICINE

## 2019-09-12 RX ORDER — CALCIUM CARBONATE 200(500)MG
500 TABLET,CHEWABLE ORAL 3 TIMES DAILY PRN
Status: DISCONTINUED | OUTPATIENT
Start: 2019-09-12 | End: 2019-09-18 | Stop reason: HOSPADM

## 2019-09-12 RX ADMIN — VARENICLINE TARTRATE 1 MG: 1 TABLET, FILM COATED ORAL at 09:46

## 2019-09-12 RX ADMIN — FAMOTIDINE 10 MG: 20 TABLET ORAL at 06:34

## 2019-09-12 RX ADMIN — VARENICLINE TARTRATE 1 MG: 1 TABLET, FILM COATED ORAL at 17:23

## 2019-09-12 RX ADMIN — HEPARIN SODIUM 5000 UNITS: 5000 INJECTION INTRAVENOUS; SUBCUTANEOUS at 06:35

## 2019-09-12 RX ADMIN — POTASSIUM CHLORIDE 30 MEQ: 750 TABLET, EXTENDED RELEASE ORAL at 21:54

## 2019-09-12 RX ADMIN — HEPARIN SODIUM 5000 UNITS: 5000 INJECTION INTRAVENOUS; SUBCUTANEOUS at 14:55

## 2019-09-12 RX ADMIN — OXYCODONE HYDROCHLORIDE AND ACETAMINOPHEN 2 TABLET: 5; 325 TABLET ORAL at 06:35

## 2019-09-12 RX ADMIN — PREDNISONE 10 MG: 10 TABLET ORAL at 08:58

## 2019-09-12 RX ADMIN — CALCIUM CARBONATE (ANTACID) CHEW TAB 500 MG 500 MG: 500 CHEW TAB at 12:37

## 2019-09-12 RX ADMIN — OXYCODONE HYDROCHLORIDE AND ACETAMINOPHEN 2 TABLET: 5; 325 TABLET ORAL at 12:33

## 2019-09-12 RX ADMIN — ATORVASTATIN CALCIUM 10 MG: 10 TABLET, FILM COATED ORAL at 17:23

## 2019-09-12 RX ADMIN — CLOPIDOGREL BISULFATE 75 MG: 75 TABLET ORAL at 08:58

## 2019-09-12 RX ADMIN — TRAMADOL HYDROCHLORIDE 50 MG: 50 TABLET, FILM COATED ORAL at 09:46

## 2019-09-12 RX ADMIN — HEPARIN SODIUM 5000 UNITS: 5000 INJECTION INTRAVENOUS; SUBCUTANEOUS at 21:54

## 2019-09-12 RX ADMIN — ASPIRIN 81 MG 81 MG: 81 TABLET ORAL at 08:58

## 2019-09-12 RX ADMIN — OXYCODONE HYDROCHLORIDE AND ACETAMINOPHEN 2 TABLET: 5; 325 TABLET ORAL at 19:45

## 2019-09-12 NOTE — UTILIZATION REVIEW
Continued Stay Review    Date: 9/12/19              POD:  #2 ENDARTERECTOMY ARTERIAL FEMORAL,  WITH BOVINE GRAFT AND PROFUNDOPLASTY (Left)           Current Patient Class: IP    Current Level of Care: MED SURG     HPI:64 y o  female initially admitted on 9/10/19    Assessment/Plan:   POD #2 L CFA ENDARTERECTOMY - PT C/O PAIN AND SWELLING AT SURGICAL SITE   + ERYTHEMA AND ECCYMOSIS AT SITE WITH NO INCREASE IN WBC  CHECKING CBC OVERNIGHT  CONTINUE [address]  CONTINUE ASA, PLAVIX  NEEDS TO ADVANCE ACTIVITY  PAIN MEDS PRN        Pertinent Labs/Diagnostic Results:   Results from last 7 days   Lab Units 09/12/19  1111 09/11/19  0501   WBC Thousand/uL 12 17* 11 43*   HEMOGLOBIN g/dL 13 9 12 8   HEMATOCRIT % 43 5 39 3   PLATELETS Thousands/uL 167 138*   NEUTROS ABS Thousands/µL 9 24*  --      Results from last 7 days   Lab Units 09/12/19  1111 09/11/19  0501   SODIUM mmol/L 141 139   POTASSIUM mmol/L 3 5 4 2   CHLORIDE mmol/L 108 107   CO2 mmol/L 24 22   ANION GAP mmol/L 9 10   BUN mg/dL 14 7   CREATININE mg/dL 0 73 0 71   EGFR ml/min/1 73sq m 87 90   CALCIUM mg/dL 8 3 7 8*     Results from last 7 days   Lab Units 09/12/19  1111   AST U/L 16   ALT U/L 17   ALK PHOS U/L 73   TOTAL PROTEIN g/dL 5 7*   ALBUMIN g/dL 2 6*   TOTAL BILIRUBIN mg/dL 0 26     Results from last 7 days   Lab Units 09/12/19  1111 09/11/19  0501   GLUCOSE RANDOM mg/dL 103 144*     Results from last 7 days   Lab Units 09/10/19  1047   PROTIME seconds 13 7   INR  1 04     Vital Signs:   09/12/19 1519  97 °F (36 1 °C)Abnormal   63  18  112/52        95 %  None (Room air)   09/12/19 0700  97 5 °F (36 4 °C)  61  20  123/60        93 %  None (Room air)   09/12/19 0006  97 °F (36 1 °C)Abnormal   55  20  109/52        94 %     09/11/19 1527  97 6 °F (36 4 °C)  56  20  109/56        96 %  None (Room air)   09/11/19 1310    62  20  123/53  75             Medications:   Scheduled Meds:   Current Facility-Administered Medications:  aspirin 81 mg Oral Daily    atorvastatin 10 mg Oral Daily With Dinner    calcium carbonate 500 mg Oral TID PRN X1 9/12   clopidogrel 75 mg Oral Daily    famotidine 10 mg Oral Daily Before Breakfast    heparin (porcine) 5,000 Units Subcutaneous Q8H [address]    HYDROmorphone 0 5 mg Intravenous Q6H PRN    LORazepam 0 5 mg Oral Q12H PRN    ondansetron 4 mg Intravenous Q6H PRN    oxyCODONE-acetaminophen 1 tablet Oral Q4H PRN    oxyCODONE-acetaminophen 2 tablet Oral Q6H PRN X 3 IN LAST 24 HR   potassium chloride 30 mEq Oral HS    predniSONE 10 mg Oral Daily    traMADol 50 mg Oral BID PRN X1 9/12   varenicline 1 mg Oral BID        Discharge Plan: HOME TOMORROW  Network Utilization Review Department  Phone: [phone]; Fax [phone]  [email or web address]  ATTENTION: Please call with any questions or concerns to [phone]  and carefully listen to the prompts so that you are directed to the right person  Send all requests for admission clinical reviews, approved or denied determinations and any other requests to fax [phone]   All voicemails are confidential

## 2019-09-12 NOTE — PROGRESS NOTES
Progress Note - Capo Bernardo 1955, 59 y o  female MRN: [id number]    Unit/Bed#: E5 -01 Encounter: [id number]    Primary Care Provider: Rosalva Jaocbson DO   Date and time admitted to hospital: 9/10/2019  9:18 AM        * Occlusion of common femoral artery [facility])  Assessment & Plan  Left common femoral artery occlusion status post endarterectomy  Management per primary service  Discussed with vascular to re-evaluate as patient complains of pain and swelling which may be expected postoperative    Acute blood loss as cause of postoperative anemia  Assessment & Plan  Acute blood loss anemia  Prior to admission hemoglobin 16 6  Recheck now and in a m  Lab Results   Component Value Date    HGB 12 8 09/11/2019    HGB 16 6 (H) 08/22/2019    HGB 16 3 (H) 08/15/2018    HGB 15 9 (H) 03/20/2017       Tobacco user  Assessment & Plan  Tobacco use on varenicline    Chronic hypokalemia  Assessment & Plan  Chronic hypokalemia on potassium 30 mEq daily    History of lung cancer  Assessment & Plan  History of lung cancer status post left lower lobectomy    SLE (systemic lupus erythematosus) (HCC)  Assessment & Plan  Systemic lupus on prednisone 10 mg daily  No evidence of adrenal insufficiency requiring stress dosing of steroids    GERD (gastroesophageal reflux disease)  Assessment & Plan  GERD  Continue famotidine    Fibromyalgia  Assessment & Plan  Reported history of fibromyalgia on tramadol prior to admission as needed for pain        VTE Pharmacologic Prophylaxis: Heparin    Patient Centered Rounds: I have performed bedside rounds with nursing staff today  Discussions with Specialists or Other Care Team Provider:  Vascular surgery  Education and Discussions with Family / Patient:     Time Spent for Care: 30 mins  More than 50% of total time spent on counseling and coordination of care as described above  Current Length of Stay: 2 day(s)  Current Patient Status: Inpatient     Certification Statement:  The patient will continue to require additional inpatient hospital stay due to Occlusion of common femoral artery [facility])  Discharge Plan / Estimated Discharge Date:     Code Status: No Order  ______________________________________________________________________________    Subjective:   Patient seen and examined  Complains of left leg swelling and pain  Afebrile    Objective:   Vitals: Blood pressure 123/60, pulse 61, temperature 97 5 °F (36 4 °C), temperature source Temporal, resp  rate 20, height 5' 4" (1 626 m), weight 66 2 kg (146 lb), SpO2 93 %, not currently breastfeeding  Physical Exam:   General appearance: alert, appears stated age and cooperative  Head: atraumatic  Lungs: clear to auscultation bilaterally  Heart: regular rate and rhythm  Abdomen: soft, non-tender, positive bowel sounds   Back: negative, range of motion normal  Extremities: edema and erythema left inner thigh  Neurologic: Grossly normal    Additional Data:   Labs:  Results from last 7 days   Lab Units 09/11/19  0501 09/10/19  1047   WBC Thousand/uL 11 43*  --    HEMOGLOBIN g/dL 12 8  --    HEMATOCRIT % 39 3  --    MCV fL 91  --    PLATELETS Thousands/uL 138*  --    INR   --  1 04     Results from last 7 days   Lab Units 09/11/19  0501   SODIUM mmol/L 139   POTASSIUM mmol/L 4 2   CHLORIDE mmol/L 107   CO2 mmol/L 22   ANION GAP mmol/L 10   BUN mg/dL 7   CREATININE mg/dL 0 71   CALCIUM mg/dL 7 8*   EGFR ml/min/1 73sq m 90   GLUCOSE RANDOM mg/dL 144*     * I Have Reviewed All Lab Data Listed Above  Cultures:           Imaging:  Imaging Reports Reviewed Today Include:   No results found    Scheduled Meds:  Current Facility-Administered Medications:  aspirin 81 mg Oral Daily Juan Payton, DO   atorvastatin 10 mg Oral Daily With [employer], DO   clopidogrel 75 mg Oral Daily Juan Payton, DO   famotidine 10 mg Oral Daily Before Breakfast Juan Payton,    heparin (porcine) 5,000 Units Subcutaneous [facility] Angelita Peralta,  HYDROmorphone 0 5 mg Intravenous Q6H PRN Jamey Flash, DO   LORazepam 0 5 mg Oral Q12H PRN Jamey Flash, DO   ondansetron 4 mg Intravenous Q6H PRN Jamey Flash, DO   oxyCODONE-acetaminophen 1 tablet Oral Q4H PRN Jamey Flash, DO   oxyCODONE-acetaminophen 2 tablet Oral Q6H PRN Jamey Flash, DO   potassium chloride 30 mEq Oral HS Juan Payton, DO   predniSONE 10 mg Oral Daily Juan Payton, DO   traMADol 50 mg Oral BID PRN Jamey Flash, DO   varenicline 1 mg Oral BID Jamey Flash, DO       Jamey Flash, DO  [facility]  Hospitalist    ** Please Note: This note has been constructed using a voice recognition system   **

## 2019-09-12 NOTE — PLAN OF CARE
Problem: Prexisting or High Potential for Compromised Skin Integrity  Goal: Skin integrity is maintained or improved  Description  INTERVENTIONS:  - Identify patients at risk for skin breakdown  - Assess and monitor skin integrity  - Assess and monitor nutrition and hydration status  - Monitor labs   - Assess for incontinence   - Turn and reposition patient  - Assist with mobility/ambulation  - Relieve pressure over bony prominences  - Avoid friction and shearing  - Provide appropriate hygiene as needed including keeping skin clean and dry  - Evaluate need for skin moisturizer/barrier cream  - Collaborate with interdisciplinary team   - Patient/family teaching  - Consider wound care consult   Outcome: Progressing     Problem: PAIN - ADULT  Goal: Verbalizes/displays adequate comfort level or baseline comfort level  Description  Interventions:  - Encourage patient to monitor pain and request assistance  - Assess pain using appropriate pain scale  - Administer analgesics based on type and severity of pain and evaluate response  - Implement non-pharmacological measures as appropriate and evaluate response  - Consider cultural and social influences on pain and pain management  - Notify physician/advanced practitioner if interventions unsuccessful or patient reports new pain  Outcome: Progressing     Problem: SAFETY ADULT  Goal: Patient will remain free of falls  Description  INTERVENTIONS:  - Assess patient frequently for physical needs  -  Identify cognitive and physical deficits and behaviors that affect risk of falls    -  [place] fall precautions as indicated by assessment   - Educate patient/family on patient safety including physical limitations  - Instruct patient to call for assistance with activity based on assessment  - Modify environment to reduce risk of injury  - Consider OT/PT consult to assist with strengthening/mobility  Outcome: Progressing  Goal: Maintain or return to baseline ADL function  Description  INTERVENTIONS:  -  Assess patient's ability to carry out ADLs; assess patient's baseline for ADL function and identify physical deficits which impact ability to perform ADLs (bathing, care of mouth/teeth, toileting, grooming, dressing, etc )  - Assess/evaluate cause of self-care deficits   - Assess range of motion  - Assess patient's mobility; develop plan if impaired  - Assess patient's need for assistive devices and provide as appropriate  - Encourage maximum independence but intervene and supervise when necessary  - Involve family in performance of ADLs  - Assess for home care needs following discharge   - Consider OT consult to assist with ADL evaluation and planning for discharge  - Provide patient education as appropriate  Outcome: Progressing  Goal: Maintain or return mobility status to optimal level  Description  INTERVENTIONS:  - Assess patient's baseline mobility status (ambulation, transfers, stairs, etc )    - Identify cognitive and physical deficits and behaviors that affect mobility  - Identify mobility aids required to assist with transfers and/or ambulation (gait belt, sit-to-stand, lift, walker, cane, etc )  - [place] fall precautions as indicated by assessment  - Record patient progress and toleration of activity level on Mobility SBAR; progress patient to next Phase/Stage  - Instruct patient to call for assistance with activity based on assessment  - Consider rehabilitation consult to assist with strengthening/weightbearing, etc   Outcome: Progressing     Problem: DISCHARGE PLANNING  Goal: Discharge to home or other facility with appropriate resources  Description  INTERVENTIONS:  - Identify barriers to discharge w/patient and caregiver  - Arrange for needed discharge resources and transportation as appropriate  - Identify discharge learning needs (meds, wound care, etc )  - Arrange for interpretive services to assist at discharge as needed  - Refer to Case Management Department for coordinating discharge planning if the patient needs post-hospital services based on physician/advanced practitioner order or complex needs related to functional status, cognitive ability, or social support system  Outcome: Progressing     Problem: Knowledge Deficit  Goal: Patient/family/caregiver demonstrates understanding of disease process, treatment plan, medications, and discharge instructions  Description  Complete learning assessment and assess knowledge base  Interventions:  - Provide teaching at level of understanding  - Provide teaching via preferred learning methods  Outcome: Progressing     Problem: HEMATOLOGIC - ADULT  Goal: Maintains hematologic stability  Description  INTERVENTIONS  - Assess for signs and symptoms of bleeding or hemorrhage  - Monitor labs  - Administer supportive blood products/factors as ordered and appropriate  Outcome: Progressing     Problem: Potential for Falls  Goal: Patient will remain free of falls  Description  INTERVENTIONS:  - Assess patient frequently for physical needs  -  Identify cognitive and physical deficits and behaviors that affect risk of falls    -  [place] fall precautions as indicated by assessment   - Educate patient/family on patient safety including physical limitations  - Instruct patient to call for assistance with activity based on assessment  - Modify environment to reduce risk of injury  - Consider OT/PT consult to assist with strengthening/mobility  Outcome: Progressing

## 2019-09-12 NOTE — ASSESSMENT & PLAN NOTE
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

## 2019-09-12 NOTE — ASSESSMENT & PLAN NOTE
Acute blood loss anemia  Prior to admission hemoglobin 16 6  Recheck now and in a m      Lab Results   Component Value Date    HGB 12 8 09/11/2019    HGB 16 6 (H) 08/22/2019    HGB 16 3 (H) 08/15/2018    HGB 15 9 (H) 03/20/2017

## 2019-09-12 NOTE — NURSING NOTE
Pt stated she had redness/pain at site of fem art occlusion  RN looked at site and noted warmth  SLIM contacted and aware  Will make next shift aware

## 2019-09-12 NOTE — PROGRESS NOTES
Progress Note - Harlene Pump 1955, 59 y o  female MRN: [id number]    Unit/Bed#: E5 -01 Encounter: [id number]    Primary Care Provider: Dhiraj Villalba DO   Date and time admitted to hospital: 9/10/2019  9:18 AM        * Occlusion of common femoral artery [facility])  Assessment & Plan  60 y/o F with aortoiliac and infrainguinal occlusive disease with limiting claudication, systemic lupus erythematosus with polyarthralgias, fibromyalgia, Raynaud's, sciatica, neuropathy, and hx lung cancer, electively admitted for left femoral artery endarterectomy on 9/10/19  Plan:  POD #2 LEFT CFA endarterectomy and profundoplasty ([facility])  -Patient with erythema, ecchymosis and pain to left inner  She had unchanged CBC with no increasing leukocytosis  Discussed at length with patient that these are likely reactionary inflammatory changes and not necessarily consistent with infection  Incision site itself is spared from erythema and ecchymosis/erythema is localized to medial thigh  -Will trend CBC and monitor overnight for any changes  If no change patient is agreeable to D/C home tomorrow  -Maintain silver mepilex x3-5 days  -Continue aspirin, Plavix and atorvastatin  -PT following  Encouraged patient to advance activities  -Pain medication PRN  -Anticipate discharge home tomorrow        Subjective:  Patient seen in bed  She c/o pain to the left groin incision site and inner thigh  She has been out of bed and ambulating to bathroom, c/o increased pain to incision site with ambulation  Hemodynamically stable  Vitals:  /60 (BP Location: Left arm)   Pulse 61   Temp 97 5 °F (36 4 °C) (Temporal)   Resp 20   Ht 5' 4" (1 626 m)   Wt 66 2 kg (146 lb)   LMP  (LMP Unknown)   SpO2 93%   Breastfeeding? No   BMI 25 06 kg/m²     I/Os:  I/O last 3 completed shifts: In: 1739 6 [P O :300;  I V :1439 6]  Out: 1490 [Urine:1490]  I/O this shift:  In: -   Out: 200 [Urine:200]    Lab Results and Cultures:   Lab Results   Component Value Date    WBC 12 17 (H) 09/12/2019    HGB 13 9 09/12/2019    HCT 43 5 09/12/2019    MCV 92 09/12/2019     09/12/2019     Lab Results   Component Value Date    CALCIUM 8 3 09/12/2019     03/20/2017    K 3 5 09/12/2019    CO2 24 09/12/2019     09/12/2019    BUN 14 09/12/2019    CREATININE 0 73 09/12/2019     Lab Results   Component Value Date    INR 1 04 09/10/2019    PROTIME 13 7 09/10/2019        Blood Culture: No results found for: BLOODCX,   Urinalysis:   Lab Results   Component Value Date    COLORU reva 04/12/2019    COLORU Yellow 02/25/2019    COLORU Reva 11/21/2016    CLARITYU dark   04/12/2019    CLARITYU Cloudy 02/25/2019    CLARITYU Cloudy 11/21/2016    SPECGRAV 1 027 02/25/2019    SPECGRAV 1 020 11/21/2016    PHUR 6 5 02/25/2019    PHUR 5 11/21/2016    LEUKOCYTESUR neg 04/12/2019    LEUKOCYTESUR Trace (A) 02/25/2019    LEUKOCYTESUR Negative 11/21/2016    NITRITE + 04/12/2019    NITRITE Positive (A) 02/25/2019    NITRITE Positive 11/21/2016    PROTEINUA negative 11/21/2016    GLUCOSEU 50 04/12/2019    GLUCOSEU Negative 02/25/2019    GLUCOSEU Negative 01/07/2015    KETONESU normal 04/12/2019    KETONESU Negative 02/25/2019    KETONESU negative 11/21/2016    BILIRUBINUR normal 04/12/2019    BILIRUBINUR Negative 02/25/2019    BILIRUBINUR Negative 11/21/2016    BLOODU 250 04/12/2019    BLOODU Small (A) 02/25/2019    BLOODU Trace 11/21/2016   ,   Urine Culture:   Lab Results   Component Value Date    URINECX >100,000 cfu/ml Klebsiella variicola (A) 04/12/2019   ,   Wound Culure: No results found for: WOUNDCULT    Medications:  Current Facility-Administered Medications   Medication Dose Route Frequency    aspirin chewable tablet 81 mg  81 mg Oral Daily    atorvastatin (LIPITOR) tablet 10 mg  10 mg Oral Daily With Dinner    calcium carbonate (TUMS) chewable tablet 500 mg  500 mg Oral TID PRN    clopidogrel (PLAVIX) tablet 75 mg  75 mg Oral Daily    famotidine (PEPCID) tablet 10 mg  10 mg Oral Daily Before Breakfast    heparin (porcine) subcutaneous injection 5,000 Units  5,000 Units Subcutaneous Q8H [facility]    HYDROmorphone (DILAUDID) injection 0 5 mg  0 5 mg Intravenous Q6H PRN    LORazepam (ATIVAN) tablet 0 5 mg  0 5 mg Oral Q12H PRN    ondansetron (ZOFRAN) injection 4 mg  4 mg Intravenous Q6H PRN    oxyCODONE-acetaminophen (PERCOCET) 5-325 mg per tablet 1 tablet  1 tablet Oral Q4H PRN    oxyCODONE-acetaminophen (PERCOCET) 5-325 mg per tablet 2 tablet  2 tablet Oral Q6H PRN    potassium chloride (K-DUR,KLOR-CON) CR tablet 30 mEq  30 mEq Oral HS    predniSONE tablet 10 mg  10 mg Oral Daily    traMADol (ULTRAM) tablet 50 mg  50 mg Oral BID PRN    varenicline (CHANTIX) tablet 1 mg  1 mg Oral BID       Physical Exam:    General appearance: alert and oriented, in no acute distress  Skin: Skin color, texture, turgor normal  No rashes or lesions  Neurologic: Grossly normal  Head: Normocephalic, without obvious abnormality, atraumatic  Neck: no JVD and supple, symmetrical, trachea midline  Lungs: clear to auscultation bilaterally  Chest wall: no tenderness  Heart: regular rate and rhythm, S1, S2 normal, no murmur, click, rub or gallop  Abdomen: Soft, NT/ND, +BS  Extremities: Left groin incision well approximated with no dehiscence or drainage, silver meplix replaced, ecchymosis and erythema to left inner thigh possibly from retraction, erythema not tracking to incision site or extending to lateral aspect of wound    Wound/Incision:  As above    Pulse exam:  DP:  Left: non-palpable  PT:  Left: non-palpable      LISA Seth  9/12/2019  [facility]  [phone]

## 2019-09-12 NOTE — ASSESSMENT & PLAN NOTE
58 y/o F with aortoiliac and infrainguinal occlusive disease with limiting claudication, systemic lupus erythematosus with polyarthralgias, fibromyalgia, Raynaud's, sciatica, neuropathy, and hx lung cancer, electively admitted for left femoral artery endarterectomy on 9/10/19  Plan:  POD #2 LEFT CFA endarterectomy and profundoplasty ([facility])  -Patient with erythema, ecchymosis and pain to left inner  She had unchanged CBC with no increasing leukocytosis  Discussed at length with patient that these are likely reactionary inflammatory changes and not necessarily consistent with infection  Incision site itself is spared from erythema and ecchymosis/erythema is localized to medial thigh  -Will trend CBC and monitor overnight for any changes  If no change patient is agreeable to D/C home tomorrow  -Maintain silver mepilex x3-5 days  -Continue aspirin, Plavix and atorvastatin  -PT following    Encouraged patient to advance activities  -Pain medication PRN  -Anticipate discharge home tomorrow

## 2019-09-13 ENCOUNTER — APPOINTMENT (INPATIENT)
Dept: NON INVASIVE DIAGNOSTICS | Facility: HOSPITAL | Age: 64
DRG: 253 | End: 2019-09-13
Payer: COMMERCIAL

## 2019-09-13 PROBLEM — K59.00 CONSTIPATION: Status: ACTIVE | Noted: 2019-09-13

## 2019-09-13 LAB
ALBUMIN SERPL BCP-MCNC: 2.4 G/DL (ref 3.5–5)
ALP SERPL-CCNC: 74 U/L (ref 46–116)
ALT SERPL W P-5'-P-CCNC: 18 U/L (ref 12–78)
ANION GAP SERPL CALCULATED.3IONS-SCNC: 6 MMOL/L (ref 4–13)
ANION GAP SERPL CALCULATED.3IONS-SCNC: 6 MMOL/L (ref 4–13)
AST SERPL W P-5'-P-CCNC: 17 U/L (ref 5–45)
BILIRUB SERPL-MCNC: 0.22 MG/DL (ref 0.2–1)
BUN SERPL-MCNC: 16 MG/DL (ref 5–25)
BUN SERPL-MCNC: 17 MG/DL (ref 5–25)
CALCIUM SERPL-MCNC: 8.3 MG/DL (ref 8.3–10.1)
CALCIUM SERPL-MCNC: 8.7 MG/DL (ref 8.3–10.1)
CHLORIDE SERPL-SCNC: 105 MMOL/L (ref 100–108)
CHLORIDE SERPL-SCNC: 107 MMOL/L (ref 100–108)
CO2 SERPL-SCNC: 26 MMOL/L (ref 21–32)
CO2 SERPL-SCNC: 30 MMOL/L (ref 21–32)
CREAT SERPL-MCNC: 0.61 MG/DL (ref 0.6–1.3)
CREAT SERPL-MCNC: 0.77 MG/DL (ref 0.6–1.3)
ERYTHROCYTE [DISTWIDTH] IN BLOOD BY AUTOMATED COUNT: 14.6 % (ref 11.6–15.1)
GFR SERPL CREATININE-BSD FRML MDRD: 82 ML/MIN/1.73SQ M
GFR SERPL CREATININE-BSD FRML MDRD: 96 ML/MIN/1.73SQ M
GLUCOSE SERPL-MCNC: 110 MG/DL (ref 65–140)
GLUCOSE SERPL-MCNC: 98 MG/DL (ref 65–140)
HCT VFR BLD AUTO: 41.5 % (ref 34.8–46.1)
HGB BLD-MCNC: 13.5 G/DL (ref 11.5–15.4)
MCH RBC QN AUTO: 29.9 PG (ref 26.8–34.3)
MCHC RBC AUTO-ENTMCNC: 32.5 G/DL (ref 31.4–37.4)
MCV RBC AUTO: 92 FL (ref 82–98)
PLATELET # BLD AUTO: 169 THOUSANDS/UL (ref 149–390)
PMV BLD AUTO: 9.4 FL (ref 8.9–12.7)
POTASSIUM SERPL-SCNC: 3.6 MMOL/L (ref 3.5–5.3)
POTASSIUM SERPL-SCNC: 3.8 MMOL/L (ref 3.5–5.3)
PROT SERPL-MCNC: 5.6 G/DL (ref 6.4–8.2)
RBC # BLD AUTO: 4.51 MILLION/UL (ref 3.81–5.12)
SODIUM SERPL-SCNC: 139 MMOL/L (ref 136–145)
SODIUM SERPL-SCNC: 141 MMOL/L (ref 136–145)
WBC # BLD AUTO: 10.59 THOUSAND/UL (ref 4.31–10.16)

## 2019-09-13 PROCEDURE — 97110 THERAPEUTIC EXERCISES: CPT

## 2019-09-13 PROCEDURE — 93971 EXTREMITY STUDY: CPT

## 2019-09-13 PROCEDURE — 87081 CULTURE SCREEN ONLY: CPT | Performed by: INTERNAL MEDICINE

## 2019-09-13 PROCEDURE — 80053 COMPREHEN METABOLIC PANEL: CPT | Performed by: INTERNAL MEDICINE

## 2019-09-13 PROCEDURE — 99254 IP/OBS CNSLTJ NEW/EST MOD 60: CPT | Performed by: INTERNAL MEDICINE

## 2019-09-13 PROCEDURE — 99232 SBSQ HOSP IP/OBS MODERATE 35: CPT | Performed by: INTERNAL MEDICINE

## 2019-09-13 PROCEDURE — 97530 THERAPEUTIC ACTIVITIES: CPT

## 2019-09-13 PROCEDURE — 99024 POSTOP FOLLOW-UP VISIT: CPT | Performed by: NURSE PRACTITIONER

## 2019-09-13 PROCEDURE — 85027 COMPLETE CBC AUTOMATED: CPT | Performed by: INTERNAL MEDICINE

## 2019-09-13 PROCEDURE — 80048 BASIC METABOLIC PNL TOTAL CA: CPT | Performed by: NURSE PRACTITIONER

## 2019-09-13 RX ORDER — CEFAZOLIN SODIUM 2 G/50ML
2000 SOLUTION INTRAVENOUS EVERY 8 HOURS
Status: DISCONTINUED | OUTPATIENT
Start: 2019-09-13 | End: 2019-09-14

## 2019-09-13 RX ORDER — AMOXICILLIN 250 MG
1 CAPSULE ORAL 2 TIMES DAILY
Status: DISCONTINUED | OUTPATIENT
Start: 2019-09-13 | End: 2019-09-15

## 2019-09-13 RX ADMIN — SENNOSIDES AND DOCUSATE SODIUM 1 TABLET: 8.6; 5 TABLET ORAL at 16:46

## 2019-09-13 RX ADMIN — VARENICLINE TARTRATE 1 MG: 1 TABLET, FILM COATED ORAL at 18:06

## 2019-09-13 RX ADMIN — POTASSIUM CHLORIDE 30 MEQ: 750 TABLET, EXTENDED RELEASE ORAL at 21:23

## 2019-09-13 RX ADMIN — CEFAZOLIN SODIUM 2000 MG: 2 SOLUTION INTRAVENOUS at 18:06

## 2019-09-13 RX ADMIN — VARENICLINE TARTRATE 1 MG: 1 TABLET, FILM COATED ORAL at 10:47

## 2019-09-13 RX ADMIN — HEPARIN SODIUM 5000 UNITS: 5000 INJECTION INTRAVENOUS; SUBCUTANEOUS at 14:10

## 2019-09-13 RX ADMIN — FAMOTIDINE 10 MG: 20 TABLET ORAL at 06:10

## 2019-09-13 RX ADMIN — CEFAZOLIN SODIUM 2000 MG: 2 SOLUTION INTRAVENOUS at 11:05

## 2019-09-13 RX ADMIN — ASPIRIN 81 MG 81 MG: 81 TABLET ORAL at 10:47

## 2019-09-13 RX ADMIN — HEPARIN SODIUM 5000 UNITS: 5000 INJECTION INTRAVENOUS; SUBCUTANEOUS at 06:10

## 2019-09-13 RX ADMIN — PREDNISONE 10 MG: 10 TABLET ORAL at 10:48

## 2019-09-13 RX ADMIN — ATORVASTATIN CALCIUM 10 MG: 10 TABLET, FILM COATED ORAL at 16:46

## 2019-09-13 RX ADMIN — HEPARIN SODIUM 5000 UNITS: 5000 INJECTION INTRAVENOUS; SUBCUTANEOUS at 21:23

## 2019-09-13 RX ADMIN — OXYCODONE HYDROCHLORIDE AND ACETAMINOPHEN 2 TABLET: 5; 325 TABLET ORAL at 16:46

## 2019-09-13 RX ADMIN — OXYCODONE HYDROCHLORIDE AND ACETAMINOPHEN 2 TABLET: 5; 325 TABLET ORAL at 02:17

## 2019-09-13 RX ADMIN — OXYCODONE HYDROCHLORIDE AND ACETAMINOPHEN 1 TABLET: 5; 325 TABLET ORAL at 10:59

## 2019-09-13 RX ADMIN — CLOPIDOGREL BISULFATE 75 MG: 75 TABLET ORAL at 10:47

## 2019-09-13 NOTE — ASSESSMENT & PLAN NOTE
Chronic hypokalemia on potassium 30 mEq daily    Results from last 7 days   Lab Units 09/13/19  1439 09/13/19  0528 09/12/19  1111 09/11/19  0501   POTASSIUM mmol/L 3 6 3 8 3 5 4 2

## 2019-09-13 NOTE — PHYSICAL THERAPY NOTE
Physical Therapy Progress Note     09/13/19 5970   Pain Assessment   Pain Assessment 0-10   Pain Score 8   Pain Type Acute pain   Pain Location Groin   Pain Orientation Left;Upper   Hospital Pain Intervention(s) Ambulation/increased activity;Repositioned   Restrictions/Precautions   Weight Bearing Precautions Per Order No   Other Precautions Fall Risk;Pain   General   Chart Reviewed Yes   Response to Previous Treatment Patient reporting fatigue but able to participate  Family/Caregiver Present Yes   Subjective   Subjective "I'm doing okay "   Bed Mobility   Supine to Sit 5  Supervision   Additional items Assist x 1;HOB elevated; Bedrails;Leg ; Increased time required;LE management;Verbal cues   Sit to Supine 5  Supervision   Additional items Assist x 1;Bedrails;Leg ; Increased time required;Verbal cues;LE management   Activity Tolerance   Activity Tolerance Patient tolerated treatment well   Nurse Made Aware Yes   Exercises   THR Supine;10 reps;AAROM; Bilateral   Assessment   Prognosis Good   Problem List Decreased strength;Decreased range of motion;Decreased endurance; Impaired balance;Decreased mobility; Decreased skin integrity;Pain   Assessment Pt  supine in bed upon my arrival  Pt  reports she is having no issues with walking at this time  However, pt  reporting increased pain in L leg, explained to pt  importance of continued amb  and is agreeable however prefers to remain in bed at this time due to pain  Performance of HEP supine in bed with cues provided for proper completion  Pt  repositioned supine in bed at end of treatment session  PT will continue to recommend d/c home with family support as needed when medically stable  Barriers to Discharge None   Goals   Patient Goals To go home  STG Expiration Date 09/18/19   Treatment Day 1   Plan   Treatment/Interventions Functional transfer training;LE strengthening/ROM; Endurance training; Therapeutic exercise; Bed mobility;Spoke to nursing;Spoke to case management; Family   Progress Progressing toward goals   PT Frequency 2-3x/wk   Recommendation   Recommendation Home with family support   Equipment Recommended Walker  (RW)   PT - OK to Discharge Yes  (if d/c when medically stable )     Pranav Sweeney, PTA

## 2019-09-13 NOTE — CONSULTS
Consultation - Infectious Disease   Kaitlynn Branham 59 y o  female MRN: [id number]  Unit/Bed#: E5 -01 Encounter: [id number]    IMPRESSION & RECOMMENDATIONS:   1  Left thigh cellulitis  Developing since patient's left CFA endarterectomy with bovine graft placement  Distal end of surgical incision is weeping serous fluid but otherwise the incision is well approximated  Erythema surrounding the incision site has spread distally towards the knee and is wrapping circumferentially towards the medial and middle posterior thigh  Fortunately the patient has remained clinically stable and nontoxic  She is afebrile  She has a mild elevation in her WBC count but has chronic leukocytosis related to steroid use  The patient continues to follow closely vascular surgery who performed a dressing change today  They have added orders for a venous duplex to rule out DVT  Recommend starting the patient on IV cefazolin now  Will continue to monitor   -start IV cefazolin, 2 g q8 hours  -check CBC and BMP tomorrow  -follow up venous duplex  -monitor vitals  -serial left thigh exams  -wound care per vascular surgery  -continue close follow-up with vascular surgery    2  Leukocytosis  Likely secondary to chronic prednisone use  I performed an extensive review of patient's past medical records and it revealed chronic elevation of her WBC count  Her inpatient WBC count has been within her normal range  Also consider left thigh cellulitis above  Fortunately she has remained afebrile  Recommend checking WBC count again tomorrow   -antibiotic as above  -check CBC tomorrow am  -monitor vitals    3  Status post left CFA endarterectomy, bovine graft, and profundoplasty  Patient with 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  She unfortunately continues to smoke  4  PAD  Patient with history of bilateral calf pain, swelling, right foot wound feet    I personally reviewed patient's CTA from 02/13/2019 which showed occlusion of her left common femoral artery as well as proximal portions of the profounda  She also had high-grade stenosis noted in her right common iliac artery  At that time it was recommended the patient proceed with surgery for revascularization  She declined and decided to go get a 2nd opinion  She later returned to the office in July 2019 and agreed to the surgery  She is now status post femoral artery endarterectomy with bovine graft and profundoplasty on 09/10/2019  Vascular surgery continues to follow the patient closely   -serial bilateral lower extremity exams  -surgical site care/wound care per vascular surgery  -continue close follow-up with vascular surgery    5  Nicotine dependence  Despite extensive documented efforts from multiple providers the patient continues to choose to smoke  Will continue to recommend patient committed full smoking cessation immediately  -NRT per primary service    6  SLE  Patient on chronic daily prednisone  She follows up as an outpatient with [facility] rheumatologist, Dr Cady Feng  7  History of antibiotic allergies  Patient reports reactions to penicillins, Neosporin, nitrofurantoin, and Bactrim in the past   She does not recall having issues with cephalosporins  Will start patient on cefazolin above and monitor for adverse reaction   -antibiotic as above  -monitor for adverse events    Thank you for the consult  We will continue to follow along  Above plan was discussed in detail with patient at the bedside  Above plan was discussed in detail with vascular surgery Ahmet SCHWARTZ  HISTORY OF PRESENT ILLNESS:  Reason for Consult: PAD  HPI: 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 on 09/10/2019 for a scheduled left femoral endarterectomy  Patient with significant aortoiliac occlusive disease, occlusion of the left femoral artery, claudication, and history of foot ulceration    She continues to smoke  She was encouraged to proceed with this procedure earlier in the year but declined and decided to seek a 2nd opinion  She then returned to her vascular surgeon seeing she was ready to proceed  Upon admission for surgery that patient's temperature was 97 8° with a heart rate of 75  She underwent a left arterial femoral endarterectomy with bovine graft and profundoplasty  She received an intraoperative dose of vancomycin  Patient was stable postop and was admitted for additional medical management  After after surgery the patient had a slight elevation in her white blood cell count (11 43 to 12 17, to 10 59)  She has been afebrile without tachycardia or hypotension  On postop day two the patient noticed redness spreading from the bandage over her groin  The erythema continued to expand and her left medial thigh became swollen  Distal and the incision is draining a small amount of serous fluid  Fortunately the patient has remained clinically stable  We have been asked for formal consult for PAD  The patient has a past medical and surgical history significant for CAD, lupus, sciatica, smoking, fibromyalgia, thyroid disease, anxiety, occlusion of left common femoral artery, dysuria, pessary, aortoiliac occlusive disease, Raynaud's disease, sinus headaches, claudication, Sjogren syndrome, lactose intolerance, IBS, thrush, cystitis, osteoarthritis, foot ulceration, lumbar spine pain, neuropathy, osteoarthritis, lung cancer with removal of the left lower lobe in 2004, frequent UTIs, bilateral cataracts, hematuria, tooth extraction, uterine prolapse, and sinus surgery  She has allergies to penicillins, Cymbalta, sulfa drugs, omeprazole, nitrofurantoin, Neosporin, dust mites, and lactose  REVIEW OF SYSTEMS:  Patient reports she is feeling fine other than pain in her left thigh    States on Wednesday her surgical dressing was intact and the thigh appeared normal   States Thursday afternoon she started to see redness spreading from the bandage site and it became painful to touch  Reports this morning the redness had wrapped around the back of her leg and down to her knee an the area has become more painful  She reports that she has been using ice pack which helps relieve the redness/pain temporarily but the 2nd she removed the ice pack it all comes back  She denies nausea, vomiting, diarrhea  She denies fever, chills, shakes, sweats  She denies cough, shortness of breath, chest pain  She reports no other skin issues, no rashes or lesions  A complete 12 point system-based review of systems is otherwise negative      PAST MEDICAL HISTORY:  Past Medical History:   Diagnosis Date    Anxiety     Arthritis 12/20/2014    Cataract     eulalia    Disease of thyroid gland     nodules    Fibromyalgia, primary     Frequent UTI     History of lung cancer     lower left lobe removed - 2004    Irritable bowel syndrome     Lactose intolerance     Lupus (HCC)     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    PAD (peripheral artery disease) (HCC)     Presence of pessary     Raynaud disease     Sciatica     Sinus headache     Sjogren's syndrome ([facility] [address] )     Smoker      Past Surgical History:   Procedure Laterality Date    LUNG REMOVAL, PARTIAL  05/01/2004    Prior Surgical/Procedural History - 5/4 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 - lung resection - subtotal    MOUTH SURGERY      Tooth extraction    LA THROMBOENDARTECTMY FEMORAL COMMON Left 9/10/2019    Procedure: ENDARTERECTOMY ARTERIAL FEMORAL,  WITH BOVINE GRAFT AND PROFUNDOPLASTY;  Surgeon: Neda Diez DO;  Location: [facility];  Service: Vascular    SINUS SURGERY       FAMILY HISTORY:  Non-contributory    SOCIAL HISTORY:  Social History   Social History     Substance and Sexual Activity   Alcohol Use Yes    Frequency: 2-4 times a month    Drinks per session: 1 or 2    Binge frequency: Never Comment: liquor     Social History     Substance and Sexual Activity   Drug Use No     Social History     Tobacco Use   Smoking Status Current Every Day Smoker    Packs/day: 1 00    Years: 40 00    Pack years: 40 00    Types: Cigarettes   Smokeless Tobacco Never Used   Tobacco Comment    on chantix - started 2019     ALLERGIES:  Allergies   Allergen Reactions    Penicillins Shortness Of Breath and Edema    Cymbalta [Duloxetine Hcl] Diarrhea    Duloxetine Diarrhea    Dust Mite Extract     Lactose GI Intolerance    Neosporin [Neomycin-Bacitracin Zn-Polymyx]      Other reaction(s): REDNESS    Nitrofurantoin Rash    Omeprazole Rash    Sulfamethoxazole-Trimethoprim Rash     MEDICATIONS:  All current active medications have been reviewed  ANTIBIOTICS:  Cefazolin 1  Antibiotics 1  Postop 3    PHYSICAL EXAM:  Temp:  [97 °F (36 1 °C)-97 5 °F (36 4 °C)] 97 4 °F (36 3 °C)  HR:  [61-63] 62  Resp:  [18] 18  BP: (112-125)/(52-60) 125/60  SpO2:  [92 %-95 %] 92 %  Temp (24hrs), Av 3 °F (36 3 °C), Min:97 °F (36 1 °C), Max:97 5 °F (36 4 °C)  Current: Temperature: (!) 97 4 °F (36 3 °C)    Intake/Output Summary (Last 24 hours) at 2019 1012  Last data filed at 2019 1730  Gross per 24 hour   Intake 480 ml   Output    Net 480 ml     General Appearance:  Appearing well, anxious, nontoxic, and in no distress   Head:  Normocephalic, without obvious abnormality, atraumatic   Eyes:  Conjunctiva pink and sclera anicteric, both eyes   Nose: Nares normal, mucosa normal, no drainage   Throat: Oropharynx moist without lesions   Back:   No CVA tenderness   Lungs:   Clear to auscultation bilaterally, respirations unlabored   Chest Wall:  No tenderness or deformity   Heart:  RRR; no murmur, rub or gallop   Abdomen:   Soft, obese  non-tender, non-distended, positive bowel sounds throughout   Extremities: No cyanosis or clubbing, no edema;  Dense erythema and mild edema on the L upper anterior thigh which extends to the medial and medial/posterior thigh and extends distally towards the knee, outer region is blanchable, is warm to touch, very tender with palpation   Skin: Surgical incision in L groin extends to the L upper anterior thigh, is well approximated until distal end where there is a punctate opening with slight serous drainage, slight ecchymosis on the upper anterior thigh near the incision site  Lymph nodes: Cervical, supraclavicular nodes normal   Neurologic: Alert and oriented times 3, follows commands, equal hand grasps, symmetric facial movement         LABS, IMAGING, & OTHER STUDIES:  Lab Results:  I have personally reviewed pertinent labs  Results from last 7 days   Lab Units 09/13/19  0528 09/12/19  1111 09/11/19  0501   WBC Thousand/uL 10 59* 12 17* 11 43*   HEMOGLOBIN g/dL 13 5 13 9 12 8   PLATELETS Thousands/uL 169 167 138*     Results from last 7 days   Lab Units 09/13/19  0528 09/12/19  1111   POTASSIUM mmol/L 3 8 3 5   CHLORIDE mmol/L 107 108   CO2 mmol/L 26 24   BUN mg/dL 17 14   CREATININE mg/dL 0 61 0 73   EGFR ml/min/1 73sq m 96 87   CALCIUM mg/dL 8 3 8 3   AST U/L 17 16   ALT U/L 18 17   ALK PHOS U/L 74 73     Imaging Studies:   No imaging to review so far during this hospitalization  VAS lower limb duplex study has been ordered

## 2019-09-13 NOTE — PROGRESS NOTES
Progress Note - Fabián Crow 1955, 59 y o  female MRN: [id number]    Unit/Bed#: E5 -01 Encounter: [id number]    Primary Care Provider: Madiha Erwin DO   Date and time admitted to hospital: 9/10/2019  9:18 AM        * Occlusion of common femoral artery [facility])  Assessment & Plan  60 y/o F with aortoiliac and infrainguinal occlusive disease with limiting claudication, systemic lupus erythematosus with polyarthralgias, fibromyalgia, Raynaud's, sciatica, neuropathy, and hx lung cancer, electively admitted for left femoral artery endarterectomy on 9/10/19  Plan:  POD #3 LEFT CFA endarterectomy and profundoplasty (Leanne)  -Increasing erythema of the left medial thigh overnight with significant appearing cellulitis  See clinical images  Left groin dressing removed  Incision cleansed with saline and silver mepilex dressing placed  -ID consulted  Appreciate input  Will start IV Cefazolin  -Will evaluate with venous duplex to rule out DVT/SVT  -Will continue to monitor    -Continue aspirin, Plavix and atorvastatin  -PT following  Encouraged patient to advance activities  Ambulated around room with walker  No needs for home PT per PT  -Pain medication PRN  -Discussed with SLIM  -Discussed with Dr Alpa Steel            Subjective:  POD #3 LEFT CFA endarterectomy and profundoplasty (Leanne)    Patient in bed  No acute distress  She c/o of pain at the left groin incision which is well controlled with percocet 2 tabes every 6 hours  Patient and nurse report increasing erythema of the left medial thigh since yesterday  There is warmth and swelling  She is afebrile without leukocytosis  She denies fever/chills/malaise  Post op dressing removed  Incision intact with mild swelling and small 2mm area of superficial dehiscence at the distal aspect of the incision otherwise remainder of incision intact with surgical glue  Clinical image taken  She notes improvement in left foot temperature and color postoperatively  Vitals:  /60 (BP Location: Left arm)   Pulse 62   Temp (!) 97 4 °F (36 3 °C) (Temporal)   Resp 18   Ht 5' 4" (1 626 m)   Wt 66 2 kg (146 lb)   LMP  (LMP Unknown)   SpO2 92%   Breastfeeding? No   BMI 25 06 kg/m²     I/Os:  I/O last 3 completed shifts: In: 600 [P O :600]  Out: 200 [Urine:200]  No intake/output data recorded  Lab Results and Cultures:   Lab Results   Component Value Date    WBC 10 59 (H) 09/13/2019    HGB 13 5 09/13/2019    HCT 41 5 09/13/2019    MCV 92 09/13/2019     09/13/2019     Lab Results   Component Value Date    CALCIUM 8 3 09/13/2019     03/20/2017    K 3 8 09/13/2019    CO2 26 09/13/2019     09/13/2019    BUN 17 09/13/2019    CREATININE 0 61 09/13/2019     Lab Results   Component Value Date    INR 1 04 09/10/2019    PROTIME 13 7 09/10/2019        Blood Culture: No results found for: BLOODCX,   Urinalysis:   Lab Results   Component Value Date    COLORU reva 04/12/2019    COLORU Yellow 02/25/2019    COLORU Reva 11/21/2016    CLARITYU dark   04/12/2019    CLARITYU Cloudy 02/25/2019    CLARITYU Cloudy 11/21/2016    SPECGRAV 1 027 02/25/2019    SPECGRAV 1 020 11/21/2016    PHUR 6 5 02/25/2019    PHUR 5 11/21/2016    LEUKOCYTESUR neg 04/12/2019    LEUKOCYTESUR Trace (A) 02/25/2019    LEUKOCYTESUR Negative 11/21/2016    NITRITE + 04/12/2019    NITRITE Positive (A) 02/25/2019    NITRITE Positive 11/21/2016    PROTEINUA negative 11/21/2016    GLUCOSEU 50 04/12/2019    GLUCOSEU Negative 02/25/2019    GLUCOSEU Negative 01/07/2015    KETONESU normal 04/12/2019    KETONESU Negative 02/25/2019    KETONESU negative 11/21/2016    BILIRUBINUR normal 04/12/2019    BILIRUBINUR Negative 02/25/2019    BILIRUBINUR Negative 11/21/2016    BLOODU 250 04/12/2019    BLOODU Small (A) 02/25/2019    BLOODU Trace 11/21/2016   ,   Urine Culture:   Lab Results   Component Value Date    URINECX >100,000 cfu/ml Klebsiella variicola (A) 04/12/2019   ,   Wound Culure: No results found for: WOUNDCULT    Medications:  Current Facility-Administered Medications   Medication Dose Route Frequency    aspirin chewable tablet 81 mg  81 mg Oral Daily    atorvastatin (LIPITOR) tablet 10 mg  10 mg Oral Daily With Dinner    calcium carbonate (TUMS) chewable tablet 500 mg  500 mg Oral TID PRN    ceFAZolin (ANCEF) IVPB (premix) 2,000 mg  2,000 mg Intravenous Q8H    clopidogrel (PLAVIX) tablet 75 mg  75 mg Oral Daily    famotidine (PEPCID) tablet 10 mg  10 mg Oral Daily Before Breakfast    heparin (porcine) subcutaneous injection 5,000 Units  5,000 Units Subcutaneous Q8H [address]    HYDROmorphone (DILAUDID) injection 0 5 mg  0 5 mg Intravenous Q6H PRN    LORazepam (ATIVAN) tablet 0 5 mg  0 5 mg Oral Q12H PRN    ondansetron (ZOFRAN) injection 4 mg  4 mg Intravenous Q6H PRN    oxyCODONE-acetaminophen (PERCOCET) 5-325 mg per tablet 1 tablet  1 tablet Oral Q4H PRN    oxyCODONE-acetaminophen (PERCOCET) 5-325 mg per tablet 2 tablet  2 tablet Oral Q6H PRN    potassium chloride (K-DUR,KLOR-CON) CR tablet 30 mEq  30 mEq Oral HS    predniSONE tablet 10 mg  10 mg Oral Daily    traMADol (ULTRAM) tablet 50 mg  50 mg Oral BID PRN    varenicline (CHANTIX) tablet 1 mg  1 mg Oral BID       Imaging:  No new imaging studies to review  LEV pending  Physical Exam:    General appearance: alert and oriented, in no acute distress  Skin: Skin color, texture, turgor normal  No rashes or lesions  Neurologic: Grossly normal  Head: Normocephalic, without obvious abnormality, atraumatic  Eyes: EOMI   Throat: lips, mucosa, and tongue normal; teeth and gums normal  Extremities: Left medial thigh erythema with warmth extending from groin to distal knee and posterior thigh  Left groin inicsion intact with surgical glue with minute area of superficial dehiscence at the distal aspect  See clincal images  1+ bilateral ankle swelling  Doppler signal strongest at PT and AT/DP audible       Wound/Incision:  Left groin  incision clean, dry, and intact with moderate amount of erythema of the medial and posterior thigh               LISA Gold  9/13/2019  [facility]  [phone]

## 2019-09-13 NOTE — SOCIAL WORK
CM met with pt at bedside   was present  Pt lives with her  in a ranch home with 1STE  She has to climb steps to the basement to do laundry  Pt uses RW when at home and cane when out to assist with ambulation  Pt is indp in ADLs  No hx of VNA/STR  Pharm is [facility]  PCP is Dr Ken Araujo  Pt denied any mh hx  POA is son, More Valadez, if  needed assistance with any decision making  Pt either drives self or gets rides from family  Pt reported that depending, she may need home IV Abx  CM dept following for any dcp needs

## 2019-09-13 NOTE — PLAN OF CARE
Problem: PHYSICAL THERAPY ADULT  Goal: Performs mobility at highest level of function for planned discharge setting  See evaluation for individualized goals  Description  Treatment/Interventions: Functional transfer training, LE strengthening/ROM, Elevations, Therapeutic exercise, Endurance training, Patient/family training, Bed mobility, Gait training, Spoke to nursing  Equipment Recommended: Walker(KENIA)       See flowsheet documentation for full assessment, interventions and recommendations  Outcome: Progressing  Note:   Prognosis: Good  Problem List: Decreased strength, Decreased range of motion, Decreased endurance, Impaired balance, Decreased mobility, Decreased skin integrity, Pain  Assessment: Pt  supine in bed upon my arrival  Pt  reports she is having no issues with walking at this time  However, pt  reporting increased pain in L leg, explained to pt  importance of continued amb  and is agreeable however prefers to remain in bed at this time due to pain  Performance of HEP supine in bed with cues provided for proper completion  Pt  repositioned supine in bed at end of treatment session  PT will continue to recommend d/c home with family support as needed when medically stable  Barriers to Discharge: None     Recommendation: Home with family support     PT - OK to Discharge: Yes(if d/c when medically stable )    See flowsheet documentation for full assessment

## 2019-09-13 NOTE — PLAN OF CARE
Problem: Prexisting or High Potential for Compromised Skin Integrity  Goal: Skin integrity is maintained or improved  Description  INTERVENTIONS:  - Identify patients at risk for skin breakdown  - Assess and monitor skin integrity  - Assess and monitor nutrition and hydration status  - Monitor labs   - Assess for incontinence   - Turn and reposition patient  - Assist with mobility/ambulation  - Relieve pressure over bony prominences  - Avoid friction and shearing  - Provide appropriate hygiene as needed including keeping skin clean and dry  - Evaluate need for skin moisturizer/barrier cream  - Collaborate with interdisciplinary team   - Patient/family teaching  - Consider wound care consult   Outcome: Progressing     Problem: PAIN - ADULT  Goal: Verbalizes/displays adequate comfort level or baseline comfort level  Description  Interventions:  - Encourage patient to monitor pain and request assistance  - Assess pain using appropriate pain scale  - Administer analgesics based on type and severity of pain and evaluate response  - Implement non-pharmacological measures as appropriate and evaluate response  - Consider cultural and social influences on pain and pain management  - Notify physician/advanced practitioner if interventions unsuccessful or patient reports new pain  Outcome: Progressing     Problem: SAFETY ADULT  Goal: Patient will remain free of falls  Description  INTERVENTIONS:  - Assess patient frequently for physical needs  -  Identify cognitive and physical deficits and behaviors that affect risk of falls    -  [place] fall precautions as indicated by assessment   - Educate patient/family on patient safety including physical limitations  - Instruct patient to call for assistance with activity based on assessment  - Modify environment to reduce risk of injury  - Consider OT/PT consult to assist with strengthening/mobility  Outcome: Progressing  Goal: Maintain or return to baseline ADL function  Description  INTERVENTIONS:  -  Assess patient's ability to carry out ADLs; assess patient's baseline for ADL function and identify physical deficits which impact ability to perform ADLs (bathing, care of mouth/teeth, toileting, grooming, dressing, etc )  - Assess/evaluate cause of self-care deficits   - Assess range of motion  - Assess patient's mobility; develop plan if impaired  - Assess patient's need for assistive devices and provide as appropriate  - Encourage maximum independence but intervene and supervise when necessary  - Involve family in performance of ADLs  - Assess for home care needs following discharge   - Consider OT consult to assist with ADL evaluation and planning for discharge  - Provide patient education as appropriate  Outcome: Progressing  Goal: Maintain or return mobility status to optimal level  Description  INTERVENTIONS:  - Assess patient's baseline mobility status (ambulation, transfers, stairs, etc )    - Identify cognitive and physical deficits and behaviors that affect mobility  - Identify mobility aids required to assist with transfers and/or ambulation (gait belt, sit-to-stand, lift, walker, cane, etc )  - [place] fall precautions as indicated by assessment  - Record patient progress and toleration of activity level on Mobility SBAR; progress patient to next Phase/Stage  - Instruct patient to call for assistance with activity based on assessment  - Consider rehabilitation consult to assist with strengthening/weightbearing, etc   Outcome: Progressing     Problem: DISCHARGE PLANNING  Goal: Discharge to home or other facility with appropriate resources  Description  INTERVENTIONS:  - Identify barriers to discharge w/patient and caregiver  - Arrange for needed discharge resources and transportation as appropriate  - Identify discharge learning needs (meds, wound care, etc )  - Arrange for interpretive services to assist at discharge as needed  - Refer to Case Management Department for coordinating discharge planning if the patient needs post-hospital services based on physician/advanced practitioner order or complex needs related to functional status, cognitive ability, or social support system  Outcome: Progressing     Problem: Knowledge Deficit  Goal: Patient/family/caregiver demonstrates understanding of disease process, treatment plan, medications, and discharge instructions  Description  Complete learning assessment and assess knowledge base  Interventions:  - Provide teaching at level of understanding  - Provide teaching via preferred learning methods  Outcome: Progressing     Problem: HEMATOLOGIC - ADULT  Goal: Maintains hematologic stability  Description  INTERVENTIONS  - Assess for signs and symptoms of bleeding or hemorrhage  - Monitor labs  - Administer supportive blood products/factors as ordered and appropriate  Outcome: Progressing     Problem: Potential for Falls  Goal: Patient will remain free of falls  Description  INTERVENTIONS:  - Assess patient frequently for physical needs  -  Identify cognitive and physical deficits and behaviors that affect risk of falls    -  [place] fall precautions as indicated by assessment   - Educate patient/family on patient safety including physical limitations  - Instruct patient to call for assistance with activity based on assessment  - Modify environment to reduce risk of injury  - Consider OT/PT consult to assist with strengthening/mobility  Outcome: Progressing

## 2019-09-13 NOTE — DISCHARGE INSTRUCTIONS
DISCHARGE INSTRUCTIONS       Remember to call the vascular office tomorrow for follow up appointment with Dr Robbie Sky to be seen in the next 7-10 days  Basic activity as tolerated  Do not take Magnesium supplement for the next 3 weeks as it may interfere with your antibiotics  LEG SURGERY    Following discharge from the hospital, you may have some questions about your operation, your activities or your general condition  These instructions may answer some of your questions and help you adjust during the first few weeks following your operation  ACTIVITY:  Limit your physical activity to slow walking  Avoid climbing or heavy lifting for the first four weeks after surgery  Initially some discomfort will be felt when walking as the skin and muscle tissues heal  You may require help with walking or feel more secure with someone to lean on  Walking up steps and normal activities may be resumed, as you feel ready  You should not drive a car for one week following discharge from the hospital   You may ride in a car for short trips upon discharge  DIET:  Resume your normal diet  Try to eat low fat and low cholesterol foods  Good nutrition is important for healing of your incision  INCISION:  You may include the operated area in a shower on the third day following surgery  Wash your incision daily with soap and water  Pat the incision dry and leave open to air  Do not apply lotions, ointments, creams or powders to incision  Sitting in a tub is not recommended for the first two weeks following surgery or if you have any open wounds  It is normal to have swelling or discoloration around the incision  If increasing redness or pain develops, call our office immediately  Numbness in the region of the incision may occur following the surgery  This normally resolves in six to twelve months    If any of your incisions are open and require dressing changes, you will be given instruction for your daily incision care  If you are not able to change the dressings, a visiting nurse will be arranged  Your physician may have chosen to use a type of adhesive glue to close your incision  There are stitches present under the skin which will absorb on their own  The glue is used to cover the incision, assist in closure, and prevent contamination  This adhesive will darken and peel away on its own within one to two weeks  Alternatively, your surgeon may have chosen to use skin staples to close your incision which will be removed in the office at the time of your follow-up appointment  You may wash the incision with the staples present  LEG SWELLING: Most patients have noticeable leg swelling after leg surgery  This usually disappears within a few weeks  If swelling is present, elevate the leg whenever possible  Avoid sitting with the leg hanging down for prolonged time periods  Walking is beneficial   An ACE bandage or support stocking may be helpful, but this should be discussed with your physician prior to use  FOLLOW UP STUDIES:  Doppler ultrasound studies are very important to your post-operative care  Your surgeon will arrange for them at your first postoperative visit  Repeat studies are then scheduled every three months for the first year and periodically after this  PLEASE CALL THE OFFICE IF YOU HAVE ANY QUESTIONS    [phone] Patrick Linn [phone] [facility] FREE [phone]  [address] [place] [address] , [address], [facility], [address] [facility]  [address] [facility] [address], [address] [facility] [address], [place], [address] [facility]  [address], [address]  [address] [facility], [address] [facility]  [address], [address], [address]  [address], [address] [facility], [address] [facility] [address]   [address] [facility], [address] [place], [facility] [place], [address]  [address] [facility], [address] [facility], [facility], [address], Tayla Zheng 6

## 2019-09-13 NOTE — ASSESSMENT & PLAN NOTE
Acute blood loss anemia  Prior to admission hemoglobin 16 6    Current hemoglobin stable    Lab Results   Component Value Date    HGB 13 5 09/13/2019    HGB 13 9 09/12/2019    HGB 12 8 09/11/2019    HGB 16 6 (H) 08/22/2019

## 2019-09-13 NOTE — PLAN OF CARE
Problem: Prexisting or High Potential for Compromised Skin Integrity  Goal: Skin integrity is maintained or improved  Description  INTERVENTIONS:  - Identify patients at risk for skin breakdown  - Assess and monitor skin integrity  - Assess and monitor nutrition and hydration status  - Monitor labs   - Assess for incontinence   - Turn and reposition patient  - Assist with mobility/ambulation  - Relieve pressure over bony prominences  - Avoid friction and shearing  - Provide appropriate hygiene as needed including keeping skin clean and dry  - Evaluate need for skin moisturizer/barrier cream  - Collaborate with interdisciplinary team   - Patient/family teaching  - Consider wound care consult   Outcome: Progressing     Problem: PAIN - ADULT  Goal: Verbalizes/displays adequate comfort level or baseline comfort level  Description  Interventions:  - Encourage patient to monitor pain and request assistance  - Assess pain using appropriate pain scale  - Administer analgesics based on type and severity of pain and evaluate response  - Implement non-pharmacological measures as appropriate and evaluate response  - Consider cultural and social influences on pain and pain management  - Notify physician/advanced practitioner if interventions unsuccessful or patient reports new pain  Outcome: Progressing     Problem: SAFETY ADULT  Goal: Patient will remain free of falls  Description  INTERVENTIONS:  - Assess patient frequently for physical needs  -  Identify cognitive and physical deficits and behaviors that affect risk of falls    -  [place] fall precautions as indicated by assessment   - Educate patient/family on patient safety including physical limitations  - Instruct patient to call for assistance with activity based on assessment  - Modify environment to reduce risk of injury  - Consider OT/PT consult to assist with strengthening/mobility  Outcome: Progressing  Goal: Maintain or return to baseline ADL function  Description  INTERVENTIONS:  -  Assess patient's ability to carry out ADLs; assess patient's baseline for ADL function and identify physical deficits which impact ability to perform ADLs (bathing, care of mouth/teeth, toileting, grooming, dressing, etc )  - Assess/evaluate cause of self-care deficits   - Assess range of motion  - Assess patient's mobility; develop plan if impaired  - Assess patient's need for assistive devices and provide as appropriate  - Encourage maximum independence but intervene and supervise when necessary  - Involve family in performance of ADLs  - Assess for home care needs following discharge   - Consider OT consult to assist with ADL evaluation and planning for discharge  - Provide patient education as appropriate  Outcome: Progressing  Goal: Maintain or return mobility status to optimal level  Description  INTERVENTIONS:  - Assess patient's baseline mobility status (ambulation, transfers, stairs, etc )    - Identify cognitive and physical deficits and behaviors that affect mobility  - Identify mobility aids required to assist with transfers and/or ambulation (gait belt, sit-to-stand, lift, walker, cane, etc )  - [place] fall precautions as indicated by assessment  - Record patient progress and toleration of activity level on Mobility SBAR; progress patient to next Phase/Stage  - Instruct patient to call for assistance with activity based on assessment  - Consider rehabilitation consult to assist with strengthening/weightbearing, etc   Outcome: Progressing     Problem: DISCHARGE PLANNING  Goal: Discharge to home or other facility with appropriate resources  Description  INTERVENTIONS:  - Identify barriers to discharge w/patient and caregiver  - Arrange for needed discharge resources and transportation as appropriate  - Identify discharge learning needs (meds, wound care, etc )  - Arrange for interpretive services to assist at discharge as needed  - Refer to Case Management Department for coordinating discharge planning if the patient needs post-hospital services based on physician/advanced practitioner order or complex needs related to functional status, cognitive ability, or social support system  Outcome: Progressing     Problem: Knowledge Deficit  Goal: Patient/family/caregiver demonstrates understanding of disease process, treatment plan, medications, and discharge instructions  Description  Complete learning assessment and assess knowledge base  Interventions:  - Provide teaching at level of understanding  - Provide teaching via preferred learning methods  Outcome: Progressing     Problem: HEMATOLOGIC - ADULT  Goal: Maintains hematologic stability  Description  INTERVENTIONS  - Assess for signs and symptoms of bleeding or hemorrhage  - Monitor labs  - Administer supportive blood products/factors as ordered and appropriate  Outcome: Progressing     Problem: Potential for Falls  Goal: Patient will remain free of falls  Description  INTERVENTIONS:  - Assess patient frequently for physical needs  -  Identify cognitive and physical deficits and behaviors that affect risk of falls    -  [place] fall precautions as indicated by assessment   - Educate patient/family on patient safety including physical limitations  - Instruct patient to call for assistance with activity based on assessment  - Modify environment to reduce risk of injury  - Consider OT/PT consult to assist with strengthening/mobility  Outcome: Progressing

## 2019-09-13 NOTE — PROGRESS NOTES
Progress Note - Cassandra Koch 1955, 59 y o  female MRN: [id number]    Unit/Bed#: E5 -01 Encounter: [id number]    Primary Care Provider: Mark Linder DO   Date and time admitted to hospital: 9/10/2019  9:18 AM        * Occlusion of common femoral artery [facility])  Assessment & Plan  Left common femoral artery occlusion status post endarterectomy  Management per primary service  Constipation  Assessment & Plan  Constipation  Add senna/docusate    Acute blood loss as cause of postoperative anemia  Assessment & Plan  Acute blood loss anemia  Prior to admission hemoglobin 16 6  Current hemoglobin stable    Lab Results   Component Value Date    HGB 13 5 09/13/2019    HGB 13 9 09/12/2019    HGB 12 8 09/11/2019    HGB 16 6 (H) 08/22/2019       Tobacco user  Assessment & Plan  Tobacco use on varenicline    Chronic hypokalemia  Assessment & Plan  Chronic hypokalemia on potassium 30 mEq daily    Results from last 7 days   Lab Units 09/13/19  1439 09/13/19  0528 09/12/19  1111 09/11/19  0501   POTASSIUM mmol/L 3 6 3 8 3 5 4 2       History of lung cancer  Assessment & Plan  History of lung cancer status post left lower lobectomy    SLE (systemic lupus erythematosus) (HCC)  Assessment & Plan  Systemic lupus on prednisone 10 mg daily  No evidence of adrenal insufficiency requiring stress dosing of steroids    GERD (gastroesophageal reflux disease)  Assessment & Plan  GERD  Continue famotidine    Fibromyalgia  Assessment & Plan  Reported history of fibromyalgia on tramadol prior to admission as needed for pain      VTE Pharmacologic Prophylaxis: Heparin    Patient Centered Rounds: I have performed bedside rounds with nursing staff today  Discussions with Specialists or Other Care Team Provider:  Vascular surgery  Education and Discussions with Family / Patient:     Time Spent for Care: 25 mins  More than 50% of total time spent on counseling and coordination of care as described above      Current Length of Stay: 3 day(s)  Current Patient Status: Inpatient     Certification Statement: The patient will continue to require additional inpatient hospital stay due to Occlusion of common femoral artery [facility])  Discharge Plan / Estimated Discharge Date:     Code Status: No Order  ______________________________________________________________________________    Subjective:   Patient seen and examined  Complains of worsening erythema of leg    Objective:   Vitals: Blood pressure 140/77, pulse 67, temperature 97 5 °F (36 4 °C), temperature source Temporal, resp  rate 18, height 5' 4" (1 626 m), weight 66 2 kg (146 lb), SpO2 97 %, not currently breastfeeding      Physical Exam:   General appearance: alert, appears stated age and cooperative  Head: Normocephalic, without obvious abnormality, atraumatic  Lungs: clear to auscultation bilaterally  Heart: regular rate and rhythm  Abdomen: soft, non-tender, positive bowel sounds   Back: negative, range of motion normal  Extremities: edema and erythema of left leg  Neurologic: Grossly normal    Additional Data:   Labs:  Results from last 7 days   Lab Units 09/13/19  0528 09/12/19  1111 09/11/19  0501 09/10/19  1047   WBC Thousand/uL 10 59* 12 17* 11 43*  --    HEMOGLOBIN g/dL 13 5 13 9 12 8  --    HEMATOCRIT % 41 5 43 5 39 3  --    MCV fL 92 92 91  --    PLATELETS Thousands/uL 169 167 138*  --    INR   --   --   --  1 04     Results from last 7 days   Lab Units 09/13/19  1439 09/13/19  0528 09/12/19  1111 09/11/19  0501   SODIUM mmol/L 141 139 141 139   POTASSIUM mmol/L 3 6 3 8 3 5 4 2   CHLORIDE mmol/L 105 107 108 107   CO2 mmol/L 30 26 24 22   ANION GAP mmol/L 6 6 9 10   BUN mg/dL 16 17 14 7   CREATININE mg/dL 0 77 0 61 0 73 0 71   CALCIUM mg/dL 8 7 8 3 8 3 7 8*   ALBUMIN g/dL  --  2 4* 2 6*  --    TOTAL BILIRUBIN mg/dL  --  0 22 0 26  --    ALK PHOS U/L  --  74 73  --    ALT U/L  --  18 17  --    AST U/L  --  17 16  --    EGFR ml/min/1 73sq m 82 96 87 90   GLUCOSE RANDOM mg/dL 98 110 103 144*                                  * I Have Reviewed All Lab Data Listed Above  Cultures:           Imaging:  Imaging Reports Reviewed Today Include:   No results found  Scheduled Meds:  Current Facility-Administered Medications:  aspirin 81 mg Oral Daily Juan Payton, DO    atorvastatin 10 mg Oral Daily With BrandYourself, DO    calcium carbonate 500 mg Oral TID PRN Courtney Brome, DO    cefazolin 2,000 mg Intravenous 763 [facility] Last Rate: Stopped (09/13/19 1135)   clopidogrel 75 mg Oral Daily Juan Payton, DO    famotidine 10 mg Oral Daily Before Breakfast Juan Payton, DO    heparin (porcine) 5,000 Units Subcutaneous [facility] Juan Payton, DO    HYDROmorphone 0 5 mg Intravenous Q6H PRN Courtney Brome, DO    LORazepam 0 5 mg Oral Q12H PRN Courtney Brome, DO    ondansetron 4 mg Intravenous Q6H PRN Courtney Brome, DO    oxyCODONE-acetaminophen 1 tablet Oral Q4H PRN Courtney Brome, DO    oxyCODONE-acetaminophen 2 tablet Oral Q6H PRN Courtney Brome, DO    potassium chloride 30 mEq Oral HS Juan Payton, DO    predniSONE 10 mg Oral Daily Juan Payton, DO    traMADol 50 mg Oral BID PRN Courtney Brome, DO    varenicline 1 mg Oral BID Courtney Brome, DO        Courtney Brome, DO  [facility] Internal Medicine  Hospitalist    ** Please Note: This note has been constructed using a voice recognition system   **

## 2019-09-13 NOTE — ASSESSMENT & PLAN NOTE
58 y/o F with aortoiliac and infrainguinal occlusive disease with limiting claudication, systemic lupus erythematosus with polyarthralgias, fibromyalgia, Raynaud's, sciatica, neuropathy, and hx lung cancer, electively admitted for left femoral artery endarterectomy on 9/10/19  Plan:  POD #3 LEFT CFA endarterectomy and profundoplasty (Leanne)  -Increasing erythema of the left medial thigh overnight with significant appearing cellulitis  See clinical images  Left groin dressing removed  Incision cleansed with saline and silver mepilex dressing placed  -ID consulted  Appreciate input  Will start IV Cefazolin  -Will evaluate with venous duplex to rule out DVT/SVT  -Will continue to monitor    -Continue aspirin, Plavix and atorvastatin  -PT following  Encouraged patient to advance activities  Ambulated around room with walker  No needs for home PT per PT  -Pain medication PRN  -Discussed with SLIM    -Discussed with Dr Deandra Will

## 2019-09-14 LAB
ANION GAP SERPL CALCULATED.3IONS-SCNC: 9 MMOL/L (ref 4–13)
BASOPHILS # BLD AUTO: 0.05 THOUSANDS/ΜL (ref 0–0.1)
BASOPHILS NFR BLD AUTO: 0 % (ref 0–1)
BUN SERPL-MCNC: 14 MG/DL (ref 5–25)
CALCIUM SERPL-MCNC: 8.8 MG/DL (ref 8.3–10.1)
CHLORIDE SERPL-SCNC: 106 MMOL/L (ref 100–108)
CO2 SERPL-SCNC: 24 MMOL/L (ref 21–32)
CREAT SERPL-MCNC: 0.62 MG/DL (ref 0.6–1.3)
EOSINOPHIL # BLD AUTO: 0.22 THOUSAND/ΜL (ref 0–0.61)
EOSINOPHIL NFR BLD AUTO: 2 % (ref 0–6)
ERYTHROCYTE [DISTWIDTH] IN BLOOD BY AUTOMATED COUNT: 14.6 % (ref 11.6–15.1)
GFR SERPL CREATININE-BSD FRML MDRD: 96 ML/MIN/1.73SQ M
GLUCOSE SERPL-MCNC: 89 MG/DL (ref 65–140)
HCT VFR BLD AUTO: 43.3 % (ref 34.8–46.1)
HGB BLD-MCNC: 14.2 G/DL (ref 11.5–15.4)
IMM GRANULOCYTES # BLD AUTO: 0.08 THOUSAND/UL (ref 0–0.2)
IMM GRANULOCYTES NFR BLD AUTO: 1 % (ref 0–2)
LYMPHOCYTES # BLD AUTO: 2.66 THOUSANDS/ΜL (ref 0.6–4.47)
LYMPHOCYTES NFR BLD AUTO: 22 % (ref 14–44)
MCH RBC QN AUTO: 30 PG (ref 26.8–34.3)
MCHC RBC AUTO-ENTMCNC: 32.8 G/DL (ref 31.4–37.4)
MCV RBC AUTO: 91 FL (ref 82–98)
MONOCYTES # BLD AUTO: 0.91 THOUSAND/ΜL (ref 0.17–1.22)
MONOCYTES NFR BLD AUTO: 8 % (ref 4–12)
NEUTROPHILS # BLD AUTO: 8.12 THOUSANDS/ΜL (ref 1.85–7.62)
NEUTS SEG NFR BLD AUTO: 67 % (ref 43–75)
NRBC BLD AUTO-RTO: 0 /100 WBCS
PLATELET # BLD AUTO: 204 THOUSANDS/UL (ref 149–390)
PMV BLD AUTO: 9.6 FL (ref 8.9–12.7)
POTASSIUM SERPL-SCNC: 3.7 MMOL/L (ref 3.5–5.3)
RBC # BLD AUTO: 4.74 MILLION/UL (ref 3.81–5.12)
SODIUM SERPL-SCNC: 139 MMOL/L (ref 136–145)
WBC # BLD AUTO: 12.04 THOUSAND/UL (ref 4.31–10.16)

## 2019-09-14 PROCEDURE — 99024 POSTOP FOLLOW-UP VISIT: CPT | Performed by: NURSE PRACTITIONER

## 2019-09-14 PROCEDURE — 80048 BASIC METABOLIC PNL TOTAL CA: CPT | Performed by: INTERNAL MEDICINE

## 2019-09-14 PROCEDURE — 93971 EXTREMITY STUDY: CPT | Performed by: SURGERY

## 2019-09-14 PROCEDURE — 85025 COMPLETE CBC W/AUTO DIFF WBC: CPT | Performed by: NURSE PRACTITIONER

## 2019-09-14 PROCEDURE — 99232 SBSQ HOSP IP/OBS MODERATE 35: CPT | Performed by: INTERNAL MEDICINE

## 2019-09-14 RX ORDER — VANCOMYCIN HYDROCHLORIDE 1 G/200ML
15 INJECTION, SOLUTION INTRAVENOUS ONCE
Status: COMPLETED | OUTPATIENT
Start: 2019-09-14 | End: 2019-09-14

## 2019-09-14 RX ORDER — VANCOMYCIN HYDROCHLORIDE 1 G/200ML
15 INJECTION, SOLUTION INTRAVENOUS EVERY 12 HOURS
Status: DISCONTINUED | OUTPATIENT
Start: 2019-09-14 | End: 2019-09-14

## 2019-09-14 RX ADMIN — VARENICLINE TARTRATE 1 MG: 1 TABLET, FILM COATED ORAL at 10:52

## 2019-09-14 RX ADMIN — ASPIRIN 81 MG 81 MG: 81 TABLET ORAL at 08:42

## 2019-09-14 RX ADMIN — CLOPIDOGREL BISULFATE 75 MG: 75 TABLET ORAL at 08:42

## 2019-09-14 RX ADMIN — PREDNISONE 10 MG: 10 TABLET ORAL at 08:42

## 2019-09-14 RX ADMIN — SENNOSIDES AND DOCUSATE SODIUM 1 TABLET: 8.6; 5 TABLET ORAL at 08:42

## 2019-09-14 RX ADMIN — HYDROMORPHONE HYDROCHLORIDE 0.5 MG: 1 INJECTION, SOLUTION INTRAMUSCULAR; INTRAVENOUS; SUBCUTANEOUS at 08:40

## 2019-09-14 RX ADMIN — CEFAZOLIN SODIUM 2000 MG: 2 SOLUTION INTRAVENOUS at 10:49

## 2019-09-14 RX ADMIN — CEFAZOLIN SODIUM 2000 MG: 2 SOLUTION INTRAVENOUS at 02:16

## 2019-09-14 RX ADMIN — HEPARIN SODIUM 5000 UNITS: 5000 INJECTION INTRAVENOUS; SUBCUTANEOUS at 14:03

## 2019-09-14 RX ADMIN — VANCOMYCIN HYDROCHLORIDE 1000 MG: 1 INJECTION, SOLUTION INTRAVENOUS at 14:04

## 2019-09-14 RX ADMIN — POTASSIUM CHLORIDE 30 MEQ: 750 TABLET, EXTENDED RELEASE ORAL at 22:20

## 2019-09-14 RX ADMIN — HEPARIN SODIUM 5000 UNITS: 5000 INJECTION INTRAVENOUS; SUBCUTANEOUS at 06:07

## 2019-09-14 RX ADMIN — HEPARIN SODIUM 5000 UNITS: 5000 INJECTION INTRAVENOUS; SUBCUTANEOUS at 22:20

## 2019-09-14 RX ADMIN — ATORVASTATIN CALCIUM 10 MG: 10 TABLET, FILM COATED ORAL at 17:19

## 2019-09-14 RX ADMIN — FAMOTIDINE 10 MG: 20 TABLET ORAL at 06:07

## 2019-09-14 RX ADMIN — VARENICLINE TARTRATE 1 MG: 1 TABLET, FILM COATED ORAL at 23:50

## 2019-09-14 RX ADMIN — SENNOSIDES AND DOCUSATE SODIUM 1 TABLET: 8.6; 5 TABLET ORAL at 17:19

## 2019-09-14 NOTE — PLAN OF CARE
Problem: Prexisting or High Potential for Compromised Skin Integrity  Goal: Skin integrity is maintained or improved  Description  INTERVENTIONS:  - Identify patients at risk for skin breakdown  - Assess and monitor skin integrity  - Assess and monitor nutrition and hydration status  - Monitor labs   - Assess for incontinence   - Turn and reposition patient  - Assist with mobility/ambulation  - Relieve pressure over bony prominences  - Avoid friction and shearing  - Provide appropriate hygiene as needed including keeping skin clean and dry  - Evaluate need for skin moisturizer/barrier cream  - Collaborate with interdisciplinary team   - Patient/family teaching  - Consider wound care consult   Outcome: Progressing     Problem: PAIN - ADULT  Goal: Verbalizes/displays adequate comfort level or baseline comfort level  Description  Interventions:  - Encourage patient to monitor pain and request assistance  - Assess pain using appropriate pain scale  - Administer analgesics based on type and severity of pain and evaluate response  - Implement non-pharmacological measures as appropriate and evaluate response  - Consider cultural and social influences on pain and pain management  - Notify physician/advanced practitioner if interventions unsuccessful or patient reports new pain  Outcome: Progressing     Problem: SAFETY ADULT  Goal: Patient will remain free of falls  Description  INTERVENTIONS:  - Assess patient frequently for physical needs  -  Identify cognitive and physical deficits and behaviors that affect risk of falls    -  [place] fall precautions as indicated by assessment   - Educate patient/family on patient safety including physical limitations  - Instruct patient to call for assistance with activity based on assessment  - Modify environment to reduce risk of injury  - Consider OT/PT consult to assist with strengthening/mobility  Outcome: Progressing  Goal: Maintain or return to baseline ADL function  Description  INTERVENTIONS:  -  Assess patient's ability to carry out ADLs; assess patient's baseline for ADL function and identify physical deficits which impact ability to perform ADLs (bathing, care of mouth/teeth, toileting, grooming, dressing, etc )  - Assess/evaluate cause of self-care deficits   - Assess range of motion  - Assess patient's mobility; develop plan if impaired  - Assess patient's need for assistive devices and provide as appropriate  - Encourage maximum independence but intervene and supervise when necessary  - Involve family in performance of ADLs  - Assess for home care needs following discharge   - Consider OT consult to assist with ADL evaluation and planning for discharge  - Provide patient education as appropriate  Outcome: Progressing  Goal: Maintain or return mobility status to optimal level  Description  INTERVENTIONS:  - Assess patient's baseline mobility status (ambulation, transfers, stairs, etc )    - Identify cognitive and physical deficits and behaviors that affect mobility  - Identify mobility aids required to assist with transfers and/or ambulation (gait belt, sit-to-stand, lift, walker, cane, etc )  - [place] fall precautions as indicated by assessment  - Record patient progress and toleration of activity level on Mobility SBAR; progress patient to next Phase/Stage  - Instruct patient to call for assistance with activity based on assessment  - Consider rehabilitation consult to assist with strengthening/weightbearing, etc   Outcome: Progressing     Problem: DISCHARGE PLANNING  Goal: Discharge to home or other facility with appropriate resources  Description  INTERVENTIONS:  - Identify barriers to discharge w/patient and caregiver  - Arrange for needed discharge resources and transportation as appropriate  - Identify discharge learning needs (meds, wound care, etc )  - Arrange for interpretive services to assist at discharge as needed  - Refer to Case Management Department for coordinating discharge planning if the patient needs post-hospital services based on physician/advanced practitioner order or complex needs related to functional status, cognitive ability, or social support system  Outcome: Progressing     Problem: Knowledge Deficit  Goal: Patient/family/caregiver demonstrates understanding of disease process, treatment plan, medications, and discharge instructions  Description  Complete learning assessment and assess knowledge base  Interventions:  - Provide teaching at level of understanding  - Provide teaching via preferred learning methods  Outcome: Progressing     Problem: HEMATOLOGIC - ADULT  Goal: Maintains hematologic stability  Description  INTERVENTIONS  - Assess for signs and symptoms of bleeding or hemorrhage  - Monitor labs  - Administer supportive blood products/factors as ordered and appropriate  Outcome: Progressing     Problem: Potential for Falls  Goal: Patient will remain free of falls  Description  INTERVENTIONS:  - Assess patient frequently for physical needs  -  Identify cognitive and physical deficits and behaviors that affect risk of falls    -  [place] fall precautions as indicated by assessment   - Educate patient/family on patient safety including physical limitations  - Instruct patient to call for assistance with activity based on assessment  - Modify environment to reduce risk of injury  - Consider OT/PT consult to assist with strengthening/mobility  Outcome: Progressing

## 2019-09-14 NOTE — PROGRESS NOTES
Vancomycin Assessment    Haley Stephens is a 59 y o  female who is currently receiving vancomycin 1000 mg IV loading dose for skin-soft tissue infection     Relevant clinical data and objective history reviewed:  Creatinine   Date Value Ref Range Status   09/14/2019 0 62 0 60 - 1 30 mg/dL Final     Comment:     Standardized to IDMS reference method   09/13/2019 0 77 0 60 - 1 30 mg/dL Final     Comment:     Standardized to IDMS reference method   09/13/2019 0 61 0 60 - 1 30 mg/dL Final     Comment:     Standardized to IDMS reference method   03/20/2017 0 87 0 50 - 0 99 mg/dL Final     Comment:     Result Comment: For patients >52years of age, the reference limit  for Creatinine is approximately 13% higher for people  identified as -American      03/17/2016 0 92 0 50 - 0 99 mg/dL Final     Comment:     Result Comment: For patients >52years of age, the reference limit  for Creatinine is approximately 13% higher for people  identified as -American  /56 (BP Location: Left arm)   Pulse 76   Temp (!) 97 2 °F (36 2 °C) (Temporal)   Resp 18   Ht 5' 4" (1 626 m)   Wt 66 2 kg (146 lb)   LMP  (LMP Unknown)   SpO2 95%   Breastfeeding? No   BMI 25 06 kg/m²   I/O last 3 completed shifts:   In: 100 [IV Piggyback:100]  Out: -   Lab Results   Component Value Date/Time    BUN 14 09/14/2019 05:00 AM    BUN 18 08/15/2018 11:14 AM    WBC 12 04 (H) 09/14/2019 05:00 AM    WBC 14 9 (H) 03/20/2017 11:28 AM    HGB 14 2 09/14/2019 05:00 AM    HGB 15 9 (H) 03/20/2017 11:28 AM    HCT 43 3 09/14/2019 05:00 AM    HCT 49 1 (H) 03/20/2017 11:28 AM    MCV 91 09/14/2019 05:00 AM    MCV 93 4 03/20/2017 11:28 AM     09/14/2019 05:00 AM     03/20/2017 11:28 AM     Temp Readings from Last 3 Encounters:   09/14/19 (!) 97 2 °F (36 2 °C) (Temporal)   07/24/19 99 °F (37 2 °C) (Tympanic)   07/11/19 100 °F (37 8 °C) (Tympanic)     Vancomycin Days of Therapy: 1    Assessment/Plan  The patient is currently on vancomycin utilizing scheduled dosing based on actual body weight  Baseline risks associated with therapy include: advanced age and dehydration  The patient is currently receiving 1000 mg IV loading dose and after clinical evaluation will be changed to 750  mg IV every 12 hours  Pharmacy will also follow closely for s/sx of nephrotoxicity, infusion reactions and appropriateness of therapy  BMP and CBC will be ordered per protocol  Plan for trough as patient approaches steady state, prior to the 5th  dose at approximately 1330 on 9/16/19  Due to infection severity, will target a trough of 10-15 (mild infection/cellulitis)   Pharmacy will continue to follow the patients culture results and clinical progress daily      Tiara Honeycutt, Pharmacist

## 2019-09-14 NOTE — PROGRESS NOTES
Progress Note - Infectious Disease   Ministerio Davidson 59 y o  female MRN: [id number]  Unit/Bed#: E5 -01 Encounter: [id number]      Impression/Recommendations:  1  Left thigh cellulitis  Developing since patient's left CFA endarterectomy with bovine graft placement  Distal end of surgical incision is weeping serous fluid but otherwise the incision is well approximated  Element of ecchymosis noted on exam   Erythema today may be partially due to ice pack use which was removed just prior to my exam   Clinically stable without sepsis  Patient reports no improvement on cefazolin  Consider MRSA  -change antibiotics to vancomycin  -pharmacy consult for vancomycin dosing  -follow up venous duplex  -monitor vitals  -serial left thigh exams  -wound care per vascular surgery with close follow-up ongoing     2  Leukocytosis  Likely secondary to chronic prednisone use  Also consider left thigh cellulitis above  Fortunately she has remained afebrile   -antibiotic as above  -follow CBC closely  -monitor vitals     3  Status post left CFA endarterectomy, bovine graft, and profundoplasty  On 09/10/2019        4  PAD  Patient with history of bilateral calf pain, swelling, right foot wound feet  Now status post femoral artery endarterectomy with bovine graft and profundoplasty on 09/10/2019    -serial bilateral lower extremity exams  -surgical site care/wound care per vascular surgery  -continue close follow-up with vascular surgery     5  Nicotine dependence  Despite extensive documented efforts from multiple providers the patient continues to choose to smoke  Will continue to recommend patient committed full smoking cessation immediately  -NRT per primary service     6  SLE  Patient on chronic daily prednisone  She follows [facility] as an outpatient with [facility] [address] rheumatologist, Dr Lazar Rosiclare  Risk factor for wound nonhealing      7  History of antibiotic allergies    Patient reports reactions to penicillins, Neosporin, nitrofurantoin, and Bactrim in the past   She does not recall having issues with cephalosporins  Discussed in detail with the patient as well as the vascular surgery service  Antibiotics:  Cefazolin #2    Subjective:  Patient seen on AM rounds  Having discomfort to wound wound which she attributes to the adhesive dressing  Thinks her thigh redness is unchanged  She has been placing ice on this and was noted to have just removed this prior to our exam     24 Hour Events:  No documented fevers, chills, sweats, nausea, vomiting, or diarrhea  Objective:  Vitals:  Temp:  [97 °F (36 1 °C)-97 5 °F (36 4 °C)] 97 3 °F (36 3 °C)  HR:  [67-75] 74  Resp:  [18] 18  BP: (111-140)/(62-77) 111/62  SpO2:  [96 %-97 %] 97 %  Temp (24hrs), Av 3 °F (36 3 °C), Min:97 °F (36 1 °C), Max:97 5 °F (36 4 °C)  Current: Temperature: (!) 97 3 °F (36 3 °C)    Physical Exam:   General:  No acute distress  Psychiatric:  Awake and alert  Pulmonary:  Normal respiratory excursion without accessory muscle use  Abdomen:  Soft, nontender  Extremities:  Left thigh with nonpitting edema  Surgical site well approximated with some dusky incisional changes distally  Ecchymosis noted medially and groin  Thigh is cool but ice pack just recently removed  Erythema of inner thigh which appears unchanged from yesterday's photo  Skin:  No rashes    Lab Results:  I have personally reviewed pertinent labs    Results from last 7 days   Lab Units 19  0500 19  1439 19  0528 19  1111   POTASSIUM mmol/L 3 7 3 6 3 8 3 5   CHLORIDE mmol/L 106 105 107 108   CO2 mmol/L 24 30 26 24   BUN mg/dL 14 16 17 14   CREATININE mg/dL 0 62 0 77 0 61 0 73   EGFR ml/min/1 73sq m 96 82 96 87   CALCIUM mg/dL 8 8 8 7 8 3 8 3   AST U/L  --   --  17 16   ALT U/L  --   --  18 17   ALK PHOS U/L  --   --  74 73     Results from last 7 days   Lab Units 19  0500 19  0528 19  1111   WBC Thousand/uL 12 04* 10 59* 12 17*   HEMOGLOBIN g/dL 14 2 13 5 13 9   PLATELETS Thousands/uL 204 169 167           Imaging Studies:   I have personally reviewed pertinent imaging study reports and images in PACS  EKG, Pathology, and Other Studies:   I have personally reviewed pertinent reports

## 2019-09-14 NOTE — ASSESSMENT & PLAN NOTE
58 y/o F with aortoiliac and infrainguinal occlusive disease with limiting claudication, systemic lupus erythematosus with polyarthralgias, fibromyalgia, Raynaud's, sciatica, neuropathy, and hx lung cancer, electively admitted for left femoral artery endarterectomy on 9/10/19  Plan:  POD #4 LEFT CFA endarterectomy and profundoplasty (Leanne)  -Continued erythema of the left medial thigh with cellulitis  See clinical images  Left groin dressing removed  Incision cleansed with saline, well approximated, no drainage, small area of erythema, area is soft, no lump or firm mass palpated  Telfa applied with light tape  -ID consulted  Appreciate input  ABX changed per ID (Vanco)  -WBC elevated with slight increase today (12 04)  -LEV preliminary negative for DVT/SVT  -Will continue to monitor    -Continue aspirin, Plavix and atorvastatin  -PT following  Encouraged patient to advance activities  Ambulated around room with walker  No need for home PT per PT  -Pain medication PRN  -Discussed with SLIM    -Discussed with Dr Ingrid Agarwal

## 2019-09-14 NOTE — ASSESSMENT & PLAN NOTE
Acute blood loss anemia  Prior to admission hemoglobin 16 6    Current hemoglobin stable    Lab Results   Component Value Date    HGB 14 2 09/14/2019    HGB 13 5 09/13/2019    HGB 13 9 09/12/2019    HGB 12 8 09/11/2019

## 2019-09-14 NOTE — ASSESSMENT & PLAN NOTE
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

## 2019-09-14 NOTE — PROGRESS NOTES
Progress Note - Tiffanie Terrazas 1955, 59 y o  female MRN: [id number]    Unit/Bed#: E5 -01 Encounter: [id number]    Primary Care Provider: Candis Velazquez DO   Date and time admitted to hospital: 9/10/2019  9:18 AM        * Occlusion of common femoral artery [facility])  Assessment & Plan  60 y/o F with aortoiliac and infrainguinal occlusive disease with limiting claudication, systemic lupus erythematosus with polyarthralgias, fibromyalgia, Raynaud's, sciatica, neuropathy, and hx lung cancer, electively admitted for left femoral artery endarterectomy on 9/10/19  Plan:  POD #4 LEFT CFA endarterectomy and profundoplasty (Leanne)  -Continued erythema of the left medial thigh with cellulitis  See clinical images  Left groin dressing removed  Incision cleansed with saline, well approximated, no drainage, small area of erythema, area is soft, no lump or firm mass palpated  Telfa applied with light tape  -ID consulted  Appreciate input  ABX changed per ID (Vanco)  -WBC elevated with slight increase today (12 04)  -LEV preliminary negative for DVT/SVT  -Will continue to monitor    -Continue aspirin, Plavix and atorvastatin  -PT following  Encouraged patient to advance activities  Ambulated around room with walker  No need for home PT per PT  -Pain medication PRN  -Discussed with SLIM  -Discussed with Dr Kate Blanco            Subjective:  Patient denies any significant pain to the left lower extremity are going site  She does continue to report redness, warmth to the area of the left upper thigh and medial thigh  She is using an ice pack to alleviate some of the discomfort  She has been ambulating around the room and into the bathroom without difficulties  She denies any claudication symptoms at this time  She denies fever, chills, chest pain, shortness of breath  She tolerated dressing change to the left groin area without difficulty  VSS      Vitals:  /62 (BP Location: Left arm)   Pulse 74   Temp (!) 97 3 °F (36 3 °C) (Temporal)   Resp 18   Ht 5' 4" (1 626 m)   Wt 66 2 kg (146 lb)   LMP  (LMP Unknown)   SpO2 97%   Breastfeeding? No   BMI 25 06 kg/m²     I/Os:  I/O last 3 completed shifts: In: 100 [IV Piggyback:100]  Out: -   I/O this shift: In: 530 [P O :480; IV Piggyback:50]  Out: -     Lab Results and Cultures:   Lab Results   Component Value Date    WBC 12 04 (H) 09/14/2019    HGB 14 2 09/14/2019    HCT 43 3 09/14/2019    MCV 91 09/14/2019     09/14/2019     Lab Results   Component Value Date    CALCIUM 8 8 09/14/2019     03/20/2017    K 3 7 09/14/2019    CO2 24 09/14/2019     09/14/2019    BUN 14 09/14/2019    CREATININE 0 62 09/14/2019     Lab Results   Component Value Date    INR 1 04 09/10/2019    PROTIME 13 7 09/10/2019        Blood Culture: No results found for: BLOODCX,   Urinalysis:   Lab Results   Component Value Date    COLORU reva 04/12/2019    COLORU Yellow 02/25/2019    COLORU Reva 11/21/2016    CLARITYU dark   04/12/2019    CLARITYU Cloudy 02/25/2019    CLARITYU Cloudy 11/21/2016    SPECGRAV 1 027 02/25/2019    SPECGRAV 1 020 11/21/2016    PHUR 6 5 02/25/2019    PHUR 5 11/21/2016    LEUKOCYTESUR neg 04/12/2019    LEUKOCYTESUR Trace (A) 02/25/2019    LEUKOCYTESUR Negative 11/21/2016    NITRITE + 04/12/2019    NITRITE Positive (A) 02/25/2019    NITRITE Positive 11/21/2016    PROTEINUA negative 11/21/2016    GLUCOSEU 50 04/12/2019    GLUCOSEU Negative 02/25/2019    GLUCOSEU Negative 01/07/2015    KETONESU normal 04/12/2019    KETONESU Negative 02/25/2019    KETONESU negative 11/21/2016    BILIRUBINUR normal 04/12/2019    BILIRUBINUR Negative 02/25/2019    BILIRUBINUR Negative 11/21/2016    BLOODU 250 04/12/2019    BLOODU Small (A) 02/25/2019    BLOODU Trace 11/21/2016   ,   Urine Culture:   Lab Results   Component Value Date    URINECX >100,000 cfu/ml Klebsiella variicola (A) 04/12/2019   ,   Wound Culure: No results found for: WOUNDCULT    Medications:  Current Facility-Administered Medications   Medication Dose Route Frequency    aspirin chewable tablet 81 mg  81 mg Oral Daily    atorvastatin (LIPITOR) tablet 10 mg  10 mg Oral Daily With Dinner    calcium carbonate (TUMS) chewable tablet 500 mg  500 mg Oral TID PRN    clopidogrel (PLAVIX) tablet 75 mg  75 mg Oral Daily    famotidine (PEPCID) tablet 10 mg  10 mg Oral Daily Before Breakfast    heparin (porcine) subcutaneous injection 5,000 Units  5,000 Units Subcutaneous Q8H [facility]    HYDROmorphone (DILAUDID) injection 0 5 mg  0 5 mg Intravenous Q6H PRN    LORazepam (ATIVAN) tablet 0 5 mg  0 5 mg Oral Q12H PRN    ondansetron (ZOFRAN) injection 4 mg  4 mg Intravenous Q6H PRN    oxyCODONE-acetaminophen (PERCOCET) 5-325 mg per tablet 1 tablet  1 tablet Oral Q4H PRN    oxyCODONE-acetaminophen (PERCOCET) 5-325 mg per tablet 2 tablet  2 tablet Oral Q6H PRN    potassium chloride (K-DUR,KLOR-CON) CR tablet 30 mEq  30 mEq Oral HS    predniSONE tablet 10 mg  10 mg Oral Daily    senna-docusate sodium (SENOKOT S) 8 6-50 mg per tablet 1 tablet  1 tablet Oral BID    traMADol (ULTRAM) tablet 50 mg  50 mg Oral BID PRN    vancomycin (VANCOCIN) IVPB (premix) 1,000 mg  15 mg/kg Intravenous Once    varenicline (CHANTIX) tablet 1 mg  1 mg Oral BID       Imaging:  LEV pending: preliminary is negative for DVT/SVT    Physical Exam:    General appearance: alert and oriented, in no acute distress, cooperative and no distress  Skin: skin normal color and texture with the exception of the left leg which has erythema to the upper thigh and medial thigh  The left lower extremity has chronic venous discoloration  There is an incision at the left femoral artery site which is well approximated, clean, dry, intact  No oozing or drainage noted from the incision site    Neurologic: Grossly normal  Neck: no adenopathy, no carotid bruit, no JVD, supple, symmetrical, trachea midline and thyroid not enlarged, symmetric, no tenderness/mass/nodules  Lungs: clear to auscultation bilaterally  Heart: regular rate and rhythm, S1, S2 normal, no murmur, click, rub or gallop  Abdomen: soft, non-tender; bowel sounds normal; no masses,  no organomegaly  Extremities: Extremities are warm and dry  Easily palpable DP pulses bilaterally  Femoral artery area to the left is soft, slight tenderness to the incision site area with no palpable lump or mass noted  Incision is well approximated with no drainage noted  There is erythema as noted above in the skin assessment  Doppler PT signals bilaterally  motor and sensory remain intact      Wound/Incision:    Left groin and leg      Pulse exam:  Radial: Right: 2+ Left[de-identified] 2+  Femoral: Right: 2+ Left: 1+  Popliteal: Right: non-palpable Left: non-palpable  DP: Right: 1+ Left: 2+  PT: Right: doppler signal Left: doppler signal      LISA Govea  9/14/2019  [facility]  [phone]

## 2019-09-14 NOTE — ASSESSMENT & PLAN NOTE
Chronic hypokalemia on potassium 30 mEq daily    Results from last 7 days   Lab Units 09/14/19  0500 09/13/19  1439 09/13/19  0528 09/12/19  1111 09/11/19  0501   POTASSIUM mmol/L 3 7 3 6 3 8 3 5 4 2

## 2019-09-14 NOTE — PROGRESS NOTES
Progress Note - Beata Batch 1955, 59 y o  female MRN: [id number]    Unit/Bed#: E5 -01 Encounter: [id number]    Primary Care Provider: Baudilio Hobson DO   Date and time admitted to hospital: 9/10/2019  9:18 AM        * Occlusion of common femoral artery [facility])  Assessment & Plan  Left common femoral artery occlusion status post endarterectomy  Aspirin clopidogrel and atorvastatin  Question of surrounding cellulitis; IV antibiotics changed from cefazolin to vancomycin per ID    Constipation  Assessment & Plan  Constipation  Continue senna/docusate    Acute blood loss as cause of postoperative anemia  Assessment & Plan  Acute blood loss anemia  Prior to admission hemoglobin 16 6  Current hemoglobin stable    Lab Results   Component Value Date    HGB 14 2 09/14/2019    HGB 13 5 09/13/2019    HGB 13 9 09/12/2019    HGB 12 8 09/11/2019       Tobacco user  Assessment & Plan  Tobacco use on varenicline    Chronic hypokalemia  Assessment & Plan  Chronic hypokalemia on potassium 30 mEq daily    Results from last 7 days   Lab Units 09/14/19  0500 09/13/19  1439 09/13/19  0528 09/12/19  1111 09/11/19  0501   POTASSIUM mmol/L 3 7 3 6 3 8 3 5 4 2       History of lung cancer  Assessment & Plan  History of lung cancer status post left lower lobectomy    SLE (systemic lupus erythematosus) (HCC)  Assessment & Plan  Systemic lupus on prednisone 10 mg daily  No evidence of adrenal insufficiency requiring stress dosing of steroids    GERD (gastroesophageal reflux disease)  Assessment & Plan  GERD  Continue famotidine    Fibromyalgia  Assessment & Plan  Reported history of fibromyalgia on tramadol prior to admission as needed for pain      VTE Pharmacologic Prophylaxis: Heparin    Patient Centered Rounds: I have performed bedside rounds with nursing staff today  Discussions with Specialists or Other Care Team Provider:   Education and Discussions with Family / Patient:     Time Spent for Care: 25 mins    More than 50% of total time spent on counseling and coordination of care as described above  Current Length of Stay: 4 day(s)  Current Patient Status: Inpatient     Certification Statement: The patient will continue to require additional inpatient hospital stay due to Occlusion of common femoral artery [facility])  Discharge Plan / Estimated Discharge Date:     Code Status: No Order  ______________________________________________________________________________    Subjective:   Patient seen and examined  Complains that leg is getting more swollen    Objective:   Vitals: Blood pressure 112/56, pulse 76, temperature (!) 97 2 °F (36 2 °C), temperature source Temporal, resp  rate 18, height 5' 4" (1 626 m), weight 66 2 kg (146 lb), SpO2 95 %, not currently breastfeeding      Physical Exam:   General appearance: alert, appears stated age and cooperative  Head: Normocephalic, without obvious abnormality, atraumatic  Lungs: clear to auscultation bilaterally  Heart: regular rate and rhythm  Abdomen: soft, non-tender, positive bowel sounds   Back: negative, range of motion normal  Extremities: erythema and edema of left thigh  Neurologic: Grossly normal    Additional Data:   Labs:  Results from last 7 days   Lab Units 09/14/19  0500 09/13/19  0528 09/12/19  1111 09/11/19  0501 09/10/19  1047   WBC Thousand/uL 12 04* 10 59* 12 17* 11 43*  --    HEMOGLOBIN g/dL 14 2 13 5 13 9 12 8  --    HEMATOCRIT % 43 3 41 5 43 5 39 3  --    MCV fL 91 92 92 91  --    PLATELETS Thousands/uL 204 169 167 138*  --    INR   --   --   --   --  1 04     Results from last 7 days   Lab Units 09/14/19  0500 09/13/19  1439 09/13/19  0528 09/12/19  1111 09/11/19  0501   SODIUM mmol/L 139 141 139 141 139   POTASSIUM mmol/L 3 7 3 6 3 8 3 5 4 2   CHLORIDE mmol/L 106 105 107 108 107   CO2 mmol/L 24 30 26 24 22   ANION GAP mmol/L 9 6 6 9 10   BUN mg/dL 14 16 17 14 7   CREATININE mg/dL 0 62 0 77 0 61 0 73 0 71   CALCIUM mg/dL 8 8 8 7 8 3 8 3 7 8*   ALBUMIN g/dL  --   --  2 4* 2 6*  --    TOTAL BILIRUBIN mg/dL  --   --  0 22 0 26  --    ALK PHOS U/L  --   --  74 73  --    ALT U/L  --   --  18 17  --    AST U/L  --   --  17 16  --    EGFR ml/min/1 73sq m 96 82 96 87 90   GLUCOSE RANDOM mg/dL 89 98 110 103 144*                                  * I Have Reviewed All Lab Data Listed Above  Cultures:           Imaging:  Imaging Reports Reviewed Today Include:   Procedure: Vas Lower Limb Venous Duplex Study, Unilateral/limited    Result Date: 9/14/2019  Narrative:  [facility] REPORT CLINICAL: Indications: Left Limb Pain [M79 609]  The patient is s/p Left common femoral artery endarterectomy and profundoplasty (9/10/2019)  She is having left groin and inner thigh pain and redness  Operative History: 2019-09-10 Left common femoral artery endarterectomy and profundoplasty Risk Factors The patient has history of HTN, PAD and smoking (current) 1-2 ppd  FINDINGS:  Segment  Right            Left              Impression       Impression       CFV      Normal (Patent)  Normal (Patent)     CONCLUSION: Impression: RIGHT LOWER LIMB LIMITED: Evaluation shows no evidence of thrombus in the common femoral vein  Doppler evaluation shows a normal response to augmentation maneuvers  LEFT LOWER LIMB: No evidence of acute or chronic deep vein thrombosis No evidence of superficial thrombophlebitis noted  Doppler evaluation shows a normal response to augmentation maneuvers  Popliteal, posterior tibial and anterior tibial arterial Doppler waveforms are triphasic/monophasic  Tech Note: Examination of the proximal leg was technically difficult/limited due to meriplex dressing in place in the groin/proximal thigh  Patient unable to bend her knee for positioning    SIGNATURE: Electronically Signed by: Ruddy Joyner MD, RPVI on 2019-09-14 01:16:13 PM    Scheduled Meds:  Current Facility-Administered Medications:  aspirin 81 mg Oral Daily Juan Payton,    atorvastatin 10 mg Oral Daily With Next Safety Fito,    calcium carbonate 500 mg Oral TID PRN Pat Aschoff, DO   clopidogrel 75 mg Oral Daily Juan Payton, DO   famotidine 10 mg Oral Daily Before Breakfast Juan Payton,    heparin (porcine) 5,000 Units Subcutaneous [facility] Juan Payton, DO   HYDROmorphone 0 5 mg Intravenous Q6H PRN Pat Aschoff, DO   LORazepam 0 5 mg Oral Q12H PRN Pat Aschoff, DO   ondansetron 4 mg Intravenous Q6H PRN Pat Aschoff, DO   oxyCODONE-acetaminophen 1 tablet Oral Q4H PRN Pat Aschoff, DO   oxyCODONE-acetaminophen 2 tablet Oral Q6H PRN Pat Aschoff, DO   potassium chloride 30 mEq Oral HS Juan Payton, DO   predniSONE 10 mg Oral Daily Juan Payton,    senna-docusate sodium 1 tablet Oral BID Pat Aschoff, DO   traMADol 50 mg Oral BID PRN Pat Aschoff, DO   [START ON 9/15/2019] vancomycin 12 5 mg/kg Intravenous Q12H Shahida Fu MD   varenicline 1 mg Oral BID Pat Aschoff, DO       Pat Aschoff, DO  [facility]  Hospitalist    ** Please Note: This note has been constructed using a voice recognition system   **

## 2019-09-15 LAB — MRSA NOSE QL CULT: NORMAL

## 2019-09-15 PROCEDURE — 99232 SBSQ HOSP IP/OBS MODERATE 35: CPT | Performed by: INTERNAL MEDICINE

## 2019-09-15 PROCEDURE — 99024 POSTOP FOLLOW-UP VISIT: CPT | Performed by: NURSE PRACTITIONER

## 2019-09-15 RX ADMIN — OXYCODONE HYDROCHLORIDE AND ACETAMINOPHEN 2 TABLET: 5; 325 TABLET ORAL at 00:33

## 2019-09-15 RX ADMIN — CLOPIDOGREL BISULFATE 75 MG: 75 TABLET ORAL at 10:21

## 2019-09-15 RX ADMIN — PREDNISONE 10 MG: 10 TABLET ORAL at 10:22

## 2019-09-15 RX ADMIN — VARENICLINE TARTRATE 1 MG: 1 TABLET, FILM COATED ORAL at 23:12

## 2019-09-15 RX ADMIN — HEPARIN SODIUM 5000 UNITS: 5000 INJECTION INTRAVENOUS; SUBCUTANEOUS at 06:11

## 2019-09-15 RX ADMIN — VARENICLINE TARTRATE 1 MG: 1 TABLET, FILM COATED ORAL at 10:23

## 2019-09-15 RX ADMIN — HEPARIN SODIUM 5000 UNITS: 5000 INJECTION INTRAVENOUS; SUBCUTANEOUS at 21:47

## 2019-09-15 RX ADMIN — POTASSIUM CHLORIDE 30 MEQ: 750 TABLET, EXTENDED RELEASE ORAL at 21:47

## 2019-09-15 RX ADMIN — SENNOSIDES AND DOCUSATE SODIUM 1 TABLET: 8.6; 5 TABLET ORAL at 10:22

## 2019-09-15 RX ADMIN — ATORVASTATIN CALCIUM 10 MG: 10 TABLET, FILM COATED ORAL at 17:13

## 2019-09-15 RX ADMIN — HEPARIN SODIUM 5000 UNITS: 5000 INJECTION INTRAVENOUS; SUBCUTANEOUS at 15:20

## 2019-09-15 RX ADMIN — VANCOMYCIN HYDROCHLORIDE 750 MG: 750 INJECTION, SOLUTION INTRAVENOUS at 15:20

## 2019-09-15 RX ADMIN — FAMOTIDINE 10 MG: 20 TABLET ORAL at 06:11

## 2019-09-15 RX ADMIN — VANCOMYCIN HYDROCHLORIDE 750 MG: 750 INJECTION, SOLUTION INTRAVENOUS at 04:20

## 2019-09-15 RX ADMIN — ASPIRIN 81 MG 81 MG: 81 TABLET ORAL at 10:23

## 2019-09-15 NOTE — PROGRESS NOTES
Progress Note - Infectious Disease   Rick Schultz 59 y o  female MRN: [id number]  Unit/Bed#: E5 -01 Encounter: [id number]      Impression/Recommendations:  1  Left thigh cellulitis   Developing since patient's left CFA endarterectomy with bovine graft placement   Distal end of surgical incision is weeping serous fluid but otherwise the incision is well approximated    Element of ecchymosis noted on exam   Dopplers negative for DVT  Clinically stable without sepsis  Patient reports no improvement on cefazolin  Consider MRSA  Now dramatically improved with change to vancomycin  -continue vancomycin  -pharmacy consult for vancomycin dosing  -monitor vitals  -serial left thigh exams  -wound care per vascular surgery with close follow-up ongoing  -if continues to improve anticipate hopeful transition to oral antibiotics in AM, possibly doxycycline     2  Leukocytosis  Erin Mendez secondary to chronic prednisone use  Also consider left thigh cellulitis above   Fortunately she has remained afebrile   -antibiotic as above  -follow CBC closely  -monitor vitals     3  Status post left CFA endarterectomy, bovine graft, and profundoplasty  On 09/10/2019        4  PAD   Patient with history of bilateral calf pain, swelling, right foot wound feet   Now status post femoral artery endarterectomy with bovine graft and profundoplasty on 09/10/2019    -serial bilateral lower extremity exams  -surgical site care/wound care per vascular surgery  -continue close follow-up with vascular surgery     5  Nicotine dependence   Despite extensive documented efforts from multiple providers the patient continues to choose to smoke   Will continue to recommend patient committed full smoking cessation immediately  -NRT per primary service     6  SLE   Patient on chronic daily prednisone   She follows up as an outpatient with [facility] rheumatologist, Dr Sunshine Purdy    Risk factor for wound nonhealing      7  History of antibiotic allergies   Patient reports reactions to penicillins, Neosporin, nitrofurantoin, and Bactrim in the past   She does not recall having issues with cephalosporins        Antibiotics:  Vancomycin #2  Antibiotics #3    Subjective:  Patient seen on AM rounds  Denies fevers, chills, sweats, nausea, vomiting, or diarrhea  Thinks leg is markedly improved in terms of redness and pain  24 Hour Events:  No documented fevers, chills, sweats, nausea, vomiting, or diarrhea  Objective:  Vitals:  Temp:  [97 2 °F (36 2 °C)-97 8 °F (36 6 °C)] 97 7 °F (36 5 °C)  HR:  [72-76] 75  Resp:  [18-20] 20  BP: (112-126)/(56-58) 126/56  SpO2:  [95 %-98 %] 97 %  Temp (24hrs), Av 6 °F (36 4 °C), Min:97 2 °F (36 2 °C), Max:97 8 °F (36 6 °C)  Current: Temperature: 97 7 °F (36 5 °C)    Physical Exam:   General:  No acute distress  Psychiatric:  Awake and alert  Pulmonary:  Normal respiratory excursion without accessory muscle use  Abdomen:  Soft, nontender  Extremities:  Nonpitting edema left thigh, markedly improved erythema  Skin:  No rashes    Lab Results:  I have personally reviewed pertinent labs  Results from last 7 days   Lab Units 19  0500 19  1439 19  0519  1111   POTASSIUM mmol/L 3 7 3 6 3 8 3 5   CHLORIDE mmol/L 106 105 107 108   CO2 mmol/L 24 30 26 24   BUN mg/dL 14 16 17 14   CREATININE mg/dL 0 62 0 77 0 61 0 73   EGFR ml/min/1 73sq m 96 82 96 87   CALCIUM mg/dL 8 8 8 7 8 3 8 3   AST U/L  --   --  17 16   ALT U/L  --   --  18 17   ALK PHOS U/L  --   --  74 73     Results from last 7 days   Lab Units 19  0500 19  0528 19  1111   WBC Thousand/uL 12 04* 10 59* 12 17*   HEMOGLOBIN g/dL 14 2 13 5 13 9   PLATELETS Thousands/uL 204 169 167           Imaging Studies:   I have personally reviewed pertinent imaging study reports and images in PACS  EKG, Pathology, and Other Studies:   I have personally reviewed pertinent reports

## 2019-09-15 NOTE — PLAN OF CARE
Problem: Prexisting or High Potential for Compromised Skin Integrity  Goal: Skin integrity is maintained or improved  Description  INTERVENTIONS:  - Identify patients at risk for skin breakdown  - Assess and monitor skin integrity  - Assess and monitor nutrition and hydration status  - Monitor labs   - Assess for incontinence   - Turn and reposition patient  - Assist with mobility/ambulation  - Relieve pressure over bony prominences  - Avoid friction and shearing  - Provide appropriate hygiene as needed including keeping skin clean and dry  - Evaluate need for skin moisturizer/barrier cream  - Collaborate with interdisciplinary team   - Patient/family teaching  - Consider wound care consult   Outcome: Progressing     Problem: PAIN - ADULT  Goal: Verbalizes/displays adequate comfort level or baseline comfort level  Description  Interventions:  - Encourage patient to monitor pain and request assistance  - Assess pain using appropriate pain scale  - Administer analgesics based on type and severity of pain and evaluate response  - Implement non-pharmacological measures as appropriate and evaluate response  - Consider cultural and social influences on pain and pain management  - Notify physician/advanced practitioner if interventions unsuccessful or patient reports new pain  Outcome: Progressing     Problem: SAFETY ADULT  Goal: Patient will remain free of falls  Description  INTERVENTIONS:  - Assess patient frequently for physical needs  -  Identify cognitive and physical deficits and behaviors that affect risk of falls    -  [place] fall precautions as indicated by assessment   - Educate patient/family on patient safety including physical limitations  - Instruct patient to call for assistance with activity based on assessment  - Modify environment to reduce risk of injury  - Consider OT/PT consult to assist with strengthening/mobility  Outcome: Progressing  Goal: Maintain or return to baseline ADL function  Description  INTERVENTIONS:  -  Assess patient's ability to carry out ADLs; assess patient's baseline for ADL function and identify physical deficits which impact ability to perform ADLs (bathing, care of mouth/teeth, toileting, grooming, dressing, etc )  - Assess/evaluate cause of self-care deficits   - Assess range of motion  - Assess patient's mobility; develop plan if impaired  - Assess patient's need for assistive devices and provide as appropriate  - Encourage maximum independence but intervene and supervise when necessary  - Involve family in performance of ADLs  - Assess for home care needs following discharge   - Consider OT consult to assist with ADL evaluation and planning for discharge  - Provide patient education as appropriate  Outcome: Progressing  Goal: Maintain or return mobility status to optimal level  Description  INTERVENTIONS:  - Assess patient's baseline mobility status (ambulation, transfers, stairs, etc )    - Identify cognitive and physical deficits and behaviors that affect mobility  - Identify mobility aids required to assist with transfers and/or ambulation (gait belt, sit-to-stand, lift, walker, cane, etc )  - [place] fall precautions as indicated by assessment  - Record patient progress and toleration of activity level on Mobility SBAR; progress patient to next Phase/Stage  - Instruct patient to call for assistance with activity based on assessment  - Consider rehabilitation consult to assist with strengthening/weightbearing, etc   Outcome: Progressing     Problem: DISCHARGE PLANNING  Goal: Discharge to home or other facility with appropriate resources  Description  INTERVENTIONS:  - Identify barriers to discharge w/patient and caregiver  - Arrange for needed discharge resources and transportation as appropriate  - Identify discharge learning needs (meds, wound care, etc )  - Arrange for interpretive services to assist at discharge as needed  - Refer to Case Management Department for coordinating discharge planning if the patient needs post-hospital services based on physician/advanced practitioner order or complex needs related to functional status, cognitive ability, or social support system  Outcome: Progressing     Problem: Knowledge Deficit  Goal: Patient/family/caregiver demonstrates understanding of disease process, treatment plan, medications, and discharge instructions  Description  Complete learning assessment and assess knowledge base  Interventions:  - Provide teaching at level of understanding  - Provide teaching via preferred learning methods  Outcome: Progressing     Problem: HEMATOLOGIC - ADULT  Goal: Maintains hematologic stability  Description  INTERVENTIONS  - Assess for signs and symptoms of bleeding or hemorrhage  - Monitor labs  - Administer supportive blood products/factors as ordered and appropriate  Outcome: Progressing     Problem: Potential for Falls  Goal: Patient will remain free of falls  Description  INTERVENTIONS:  - Assess patient frequently for physical needs  -  Identify cognitive and physical deficits and behaviors that affect risk of falls    -  [place] fall precautions as indicated by assessment   - Educate patient/family on patient safety including physical limitations  - Instruct patient to call for assistance with activity based on assessment  - Modify environment to reduce risk of injury  - Consider OT/PT consult to assist with strengthening/mobility  Outcome: Progressing

## 2019-09-15 NOTE — ASSESSMENT & PLAN NOTE
58 y/o F with aortoiliac and infrainguinal occlusive disease with limiting claudication, systemic lupus erythematosus with polyarthralgias, fibromyalgia, Raynaud's, sciatica, neuropathy, and hx lung cancer, electively admitted for left femoral artery endarterectomy on 9/10/19  Plan:  POD #5 LEFT CFA endarterectomy and profundoplasty ([facility])  -Continued erythema of the left medial thigh with cellulitis, now improving with Vancomycin  See clinical images  Left groin dressing removed  Incision cleansed with saline, well approximated, no drainage, small area of erythema, area is soft, no lump or firm mass palpated  Telfa applied with light tape  -ID consulted  Appreciate input  ABX changed per ID (Vanco)  -LEV negative for DVT/SVT  -Will continue to monitor  Possible d/c tomorrow on PO ABX  -Continue aspirin, Plavix and atorvastatin  -PT following  Encouraged patient to advance activities  Ambulated around room with walker  No need for home PT per PT  -Pain medication PRN  -Discussed with SLIM    -Discussed with Dr Bernardino Patel

## 2019-09-15 NOTE — PROGRESS NOTES
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

## 2019-09-15 NOTE — ASSESSMENT & PLAN NOTE
Left common femoral artery occlusion status post endarterectomy  Aspirin clopidogrel and atorvastatin  Question of surrounding cellulitis; IV antibiotics changed from cefazolin to vancomycin per ID with much improvement

## 2019-09-15 NOTE — ASSESSMENT & PLAN NOTE
-smoking cessation was encouraged  -patient on Chantix inpatient, management per [facility]

## 2019-09-15 NOTE — PROGRESS NOTES
Progress Note - Haley Stephens 1955, 59 y o  female MRN: [id number]    Unit/Bed#: E5 -01 Encounter: [id number]    Primary Care Provider: Mel Hong DO   Date and time admitted to hospital: 9/10/2019  9:18 AM        * Occlusion of common femoral artery [facility])  Assessment & Plan  60 y/o F with aortoiliac and infrainguinal occlusive disease with limiting claudication, systemic lupus erythematosus with polyarthralgias, fibromyalgia, Raynaud's, sciatica, neuropathy, and hx lung cancer, electively admitted for left femoral artery endarterectomy on 9/10/19  Plan:  POD #5 LEFT CFA endarterectomy and profundoplasty (Leanne)  -Continued erythema of the left medial thigh with cellulitis, now improving with Vancomycin  See clinical images  Left groin dressing removed  Incision cleansed with saline, well approximated, no drainage, small area of erythema, area is soft, no lump or firm mass palpated  Telfa applied with light tape  -ID consulted  Appreciate input  ABX changed per ID (Vanco)  -LEV negative for DVT/SVT  -Will continue to monitor  Possible d/c tomorrow on PO ABX  -Continue aspirin, Plavix and atorvastatin  -PT following  Encouraged patient to advance activities  Ambulated around room with walker  No need for home PT per PT  -Pain medication PRN  -Discussed with SLIM  -Discussed with Dr Jax Lemus user  Kelsey Gomez  -smoking cessation was encouraged  -patient on Chantix inpatient, management per SLIM        Subjective:  Patient reports that her left thigh and groin area feeling much better  She denies any fever, chills, chest pain, shortness of breath  She continues with swelling to the left leg and tenderness but reports that it is much improved  VSS  Vitals:  /56 (BP Location: Right arm)   Pulse 75   Temp 97 7 °F (36 5 °C) (Temporal)   Resp 20   Ht 5' 4" (1 626 m)   Wt 66 2 kg (146 lb)   LMP  (LMP Unknown)   SpO2 97%   Breastfeeding?  No   BMI 25 06 kg/m² I/Os:  I/O last 3 completed shifts: In: 760 [P O :660; IV Piggyback:100]  Out: -   I/O this shift:  In: 240 [P O :240]  Out: -     Lab Results and Cultures:   Lab Results   Component Value Date    WBC 12 04 (H) 09/14/2019    HGB 14 2 09/14/2019    HCT 43 3 09/14/2019    MCV 91 09/14/2019     09/14/2019     Lab Results   Component Value Date    CALCIUM 8 8 09/14/2019     03/20/2017    K 3 7 09/14/2019    CO2 24 09/14/2019     09/14/2019    BUN 14 09/14/2019    CREATININE 0 62 09/14/2019     Lab Results   Component Value Date    INR 1 04 09/10/2019    PROTIME 13 7 09/10/2019        Blood Culture: No results found for: BLOODCX,   Urinalysis:   Lab Results   Component Value Date    COLORU reva 04/12/2019    COLORU Yellow 02/25/2019    COLORU Reva 11/21/2016    CLARITYU dark   04/12/2019    CLARITYU Cloudy 02/25/2019    CLARITYU Cloudy 11/21/2016    SPECGRAV 1 027 02/25/2019    SPECGRAV 1 020 11/21/2016    PHUR 6 5 02/25/2019    PHUR 5 11/21/2016    LEUKOCYTESUR neg 04/12/2019    LEUKOCYTESUR Trace (A) 02/25/2019    LEUKOCYTESUR Negative 11/21/2016    NITRITE + 04/12/2019    NITRITE Positive (A) 02/25/2019    NITRITE Positive 11/21/2016    PROTEINUA negative 11/21/2016    GLUCOSEU 50 04/12/2019    GLUCOSEU Negative 02/25/2019    GLUCOSEU Negative 01/07/2015    KETONESU normal 04/12/2019    KETONESU Negative 02/25/2019    KETONESU negative 11/21/2016    BILIRUBINUR normal 04/12/2019    BILIRUBINUR Negative 02/25/2019    BILIRUBINUR Negative 11/21/2016    BLOODU 250 04/12/2019    BLOODU Small (A) 02/25/2019    BLOODU Trace 11/21/2016   ,   Urine Culture:   Lab Results   Component Value Date    URINECX >100,000 cfu/ml Klebsiella variicola (A) 04/12/2019   ,   Wound Culure: No results found for: WOUNDCULT    Medications:  Current Facility-Administered Medications   Medication Dose Route Frequency    aspirin chewable tablet 81 mg  81 mg Oral Daily    atorvastatin (LIPITOR) tablet 10 mg  10 mg Oral Daily With Dinner    calcium carbonate (TUMS) chewable tablet 500 mg  500 mg Oral TID PRN    clopidogrel (PLAVIX) tablet 75 mg  75 mg Oral Daily    famotidine (PEPCID) tablet 10 mg  10 mg Oral Daily Before Breakfast    heparin (porcine) subcutaneous injection 5,000 Units  5,000 Units Subcutaneous Q8H [address]    HYDROmorphone (DILAUDID) injection 0 5 mg  0 5 mg Intravenous Q6H PRN    LORazepam (ATIVAN) tablet 0 5 mg  0 5 mg Oral Q12H PRN    ondansetron (ZOFRAN) injection 4 mg  4 mg Intravenous Q6H PRN    oxyCODONE-acetaminophen (PERCOCET) 5-325 mg per tablet 1 tablet  1 tablet Oral Q4H PRN    oxyCODONE-acetaminophen (PERCOCET) 5-325 mg per tablet 2 tablet  2 tablet Oral Q6H PRN    potassium chloride (K-DUR,KLOR-CON) CR tablet 30 mEq  30 mEq Oral HS    predniSONE tablet 10 mg  10 mg Oral Daily    senna-docusate sodium (SENOKOT S) 8 6-50 mg per tablet 1 tablet  1 tablet Oral BID    traMADol (ULTRAM) tablet 50 mg  50 mg Oral BID PRN    vancomycin (VANCOCIN) IVPB (premix) 750 mg  12 5 mg/kg Intravenous Q12H    varenicline (CHANTIX) tablet 1 mg  1 mg Oral BID       Imaging:  No new imaging    Physical Exam:    General appearance: alert and oriented, in no acute distress, cooperative and no distress  Skin: Skin color, texture, turgor normal  No rashes or lesions  Neurologic: Grossly normal  Neck: no adenopathy, no carotid bruit, no JVD, supple, symmetrical, trachea midline and thyroid not enlarged, symmetric, no tenderness/mass/nodules  Lungs: clear to auscultation bilaterally  Heart: regular rate and rhythm, S1, S2 normal, no murmur, click, rub or gallop  Abdomen: soft, non-tender; bowel sounds normal; no masses,  no organomegaly  Extremities: Bilateral lower extremities are warm and dry  The left lower extremity has +1-2 edema, slight erythema continues and ecchymosis noted to the upper thigh and groin area at this time    The left groin incision is well approximated with some erythema noted to the distal aspect of the incision  There is minimal to no drainage noted on the gauze pad and nothing actively using  No lumps or palpable mass to the groin area at this time  Incision was cleansed with normal saline solution and a dry Telfa was applied  Patient refused Betadine to incision area  Wound/Incision:  Left groin incision with slight erythema to the distal portion of the incision, well-approximated, minimal to no drainage noted        Pulse exam:  Radial: Right: 2+ Left[de-identified] 2+  Femoral: Right: 2+ Left: 2+  Popliteal: Right: non-palpable Left: non-palpable  DP: Right: 2+ Left: 2+  PT: Right: doppler signal and non-palpable Left: doppler signal and non-palpable      LISA Garcia  9/15/2019  [facility]  [phone]

## 2019-09-15 NOTE — PROGRESS NOTES
Progress Note - Tiffanie Terrazas 1955, 59 y o  female MRN: [id number]    Unit/Bed#: E5 -01 Encounter: [id number]    Primary Care Provider: Candis Velazquez DO   Date and time admitted to hospital: 9/10/2019  9:18 AM        * Occlusion of common femoral artery [facility])  Assessment & Plan  Left common femoral artery occlusion status post endarterectomy  Aspirin clopidogrel and atorvastatin  Question of surrounding cellulitis; IV antibiotics changed from cefazolin to vancomycin per ID with much improvement  Constipation  Assessment & Plan  Constipation resolved  Patient requesting discontinuation of senna/docusate    Acute blood loss as cause of postoperative anemia  Assessment & Plan  Acute blood loss anemia  Prior to admission hemoglobin 16 6  Current hemoglobin stable    Lab Results   Component Value Date    HGB 14 2 09/14/2019    HGB 13 5 09/13/2019    HGB 13 9 09/12/2019    HGB 12 8 09/11/2019       Tobacco user  Assessment & Plan  Tobacco use on varenicline    Chronic hypokalemia  Assessment & Plan  Chronic hypokalemia on potassium 30 mEq daily    Results from last 7 days   Lab Units 09/14/19  0500 09/13/19  1439 09/13/19  0528 09/12/19  1111 09/11/19  0501   POTASSIUM mmol/L 3 7 3 6 3 8 3 5 4 2       History of lung cancer  Assessment & Plan  History of lung cancer status post left lower lobectomy    SLE (systemic lupus erythematosus) (HCC)  Assessment & Plan  Systemic lupus on prednisone 10 mg daily  No evidence of adrenal insufficiency requiring stress dosing of steroids    GERD (gastroesophageal reflux disease)  Assessment & Plan  GERD  Continue famotidine    Fibromyalgia  Assessment & Plan  Reported history of fibromyalgia on tramadol prior to admission as needed for pain      VTE Pharmacologic Prophylaxis: Heparin    Patient Centered Rounds: I have performed bedside rounds with nursing staff today    Discussions with Specialists or Other Care Team Provider:   Education and Discussions with Family / Patient:     Time Spent for Care: 25 mins  More than 50% of total time spent on counseling and coordination of care as described above  Current Length of Stay: 5 day(s)  Current Patient Status: Inpatient     Certification Statement: The patient will continue to require additional inpatient hospital stay due to Occlusion of common femoral artery [facility])  Discharge Plan / Estimated Discharge Date:     Code Status: No Order  ______________________________________________________________________________    Subjective:   Patient seen and examined  No new complaints, improvement in left thigh erythema  No further constipation    Objective:   Vitals: Blood pressure 129/59, pulse 78, temperature 98 3 °F (36 8 °C), temperature source Tympanic, resp  rate 20, height 5' 4" (1 626 m), weight 66 2 kg (146 lb), SpO2 95 %, not currently breastfeeding      Physical Exam:   General appearance: alert, appears stated age and cooperative  Head: Normocephalic, without obvious abnormality, atraumatic  Lungs: clear to auscultation bilaterally  Heart: regular rate and rhythm  Abdomen: soft, non-tender, positive bowel sounds   Back: negative, range of motion normal  Extremities: much improved left thigh erythema  Neurologic: Grossly normal    Additional Data:   Labs:  Results from last 7 days   Lab Units 09/14/19  0500 09/13/19  0528 09/12/19  1111 09/11/19  0501 09/10/19  1047   WBC Thousand/uL 12 04* 10 59* 12 17* 11 43*  --    HEMOGLOBIN g/dL 14 2 13 5 13 9 12 8  --    HEMATOCRIT % 43 3 41 5 43 5 39 3  --    MCV fL 91 92 92 91  --    PLATELETS Thousands/uL 204 169 167 138*  --    INR   --   --   --   --  1 04     Results from last 7 days   Lab Units 09/14/19  0500 09/13/19  1439 09/13/19  0528 09/12/19  1111 09/11/19  0501   SODIUM mmol/L 139 141 139 141 139   POTASSIUM mmol/L 3 7 3 6 3 8 3 5 4 2   CHLORIDE mmol/L 106 105 107 108 107   CO2 mmol/L 24 30 26 24 22   ANION GAP mmol/L 9 6 6 9 10   BUN mg/dL 14 16 17 14 7 CREATININE mg/dL 0 62 0 77 0 61 0 73 0 71   CALCIUM mg/dL 8 8 8 7 8 3 8 3 7 8*   ALBUMIN g/dL  --   --  2 4* 2 6*  --    TOTAL BILIRUBIN mg/dL  --   --  0 22 0 26  --    ALK PHOS U/L  --   --  74 73  --    ALT U/L  --   --  18 17  --    AST U/L  --   --  17 16  --    EGFR ml/min/1 73sq m 96 82 96 87 90   GLUCOSE RANDOM mg/dL 89 98 110 103 144*             * I Have Reviewed All Lab Data Listed Above  Cultures:           Imaging:  Imaging Reports Reviewed Today Include:   Procedure: Vas Lower Limb Venous Duplex Study, Unilateral/limited  Result Date: 9/14/2019  RIGHT LOWER LIMB LIMITED: Evaluation shows no evidence of thrombus in the common femoral vein  Doppler evaluation shows a normal response to augmentation maneuvers  LEFT LOWER LIMB: No evidence of acute or chronic deep vein thrombosis No evidence of superficial thrombophlebitis noted  Doppler evaluation shows a normal response to augmentation maneuvers  Popliteal, posterior tibial and anterior tibial arterial Doppler waveforms are triphasic/monophasic  Tech Note: Examination of the proximal leg was technically difficult/limited due to meriplex dressing in place in the groin/proximal thigh  Patient unable to bend her knee for positioning      SIGNATURE: Electronically Signed by: Steffany Chamberlain MD, RPVI on 2019-09-14 01:16:13 PM    Scheduled Meds:  Current Facility-Administered Medications:  aspirin 81 mg Oral Daily Juan Payton, DO    atorvastatin 10 mg Oral Daily With Tweetflow, DO    calcium carbonate 500 mg Oral TID PRN Cecilia Mariam, DO    clopidogrel 75 mg Oral Daily Juan Payton, DO    famotidine 10 mg Oral Daily Before Breakfast Juan Payton, DO    heparin (porcine) 5,000 Units Subcutaneous [facility] Juan Payton, DO    HYDROmorphone 0 5 mg Intravenous Q6H PRN Juan Payton, DO    LORazepam 0 5 mg Oral Q12H PRN Juan Payton, DO    ondansetron 4 mg Intravenous Q6H PRN Cecilia Mariam, DO    oxyCODONE-acetaminophen 1 tablet Oral Q4H PRN Isela Austin, DO    oxyCODONE-acetaminophen 2 tablet Oral Q6H PRN Isela Austin, DO    potassium chloride 30 mEq Oral HS Juan Payton,     predniSONE 10 mg Oral Daily Juan Payton DO    senna-docusate sodium 1 tablet Oral BID Isela Austin, DO    traMADol 50 mg Oral BID PRN Isela Austin, DO    vancomycin 12 5 mg/kg Intravenous Q12H Estelita Arcos MD Last Rate: 750 mg (09/15/19 1520)   varenicline 1 mg Oral BID Isela Austin, DO        Isela Austin, DO  [facility]  Hospitalist    ** Please Note: This note has been constructed using a voice recognition system   **

## 2019-09-16 PROBLEM — J02.9 ACUTE SORE THROAT: Status: ACTIVE | Noted: 2019-09-16

## 2019-09-16 LAB
ALBUMIN SERPL BCP-MCNC: 2.4 G/DL (ref 3.5–5)
ALP SERPL-CCNC: 80 U/L (ref 46–116)
ALT SERPL W P-5'-P-CCNC: 48 U/L (ref 12–78)
ANION GAP SERPL CALCULATED.3IONS-SCNC: 8 MMOL/L (ref 4–13)
AST SERPL W P-5'-P-CCNC: 39 U/L (ref 5–45)
BILIRUB SERPL-MCNC: 0.43 MG/DL (ref 0.2–1)
BUN SERPL-MCNC: 18 MG/DL (ref 5–25)
CALCIUM SERPL-MCNC: 8.6 MG/DL (ref 8.3–10.1)
CHLORIDE SERPL-SCNC: 105 MMOL/L (ref 100–108)
CO2 SERPL-SCNC: 26 MMOL/L (ref 21–32)
CREAT SERPL-MCNC: 0.74 MG/DL (ref 0.6–1.3)
ERYTHROCYTE [DISTWIDTH] IN BLOOD BY AUTOMATED COUNT: 14.7 % (ref 11.6–15.1)
GFR SERPL CREATININE-BSD FRML MDRD: 86 ML/MIN/1.73SQ M
GLUCOSE SERPL-MCNC: 125 MG/DL (ref 65–140)
HCT VFR BLD AUTO: 41.9 % (ref 34.8–46.1)
HGB BLD-MCNC: 13.5 G/DL (ref 11.5–15.4)
MCH RBC QN AUTO: 29.9 PG (ref 26.8–34.3)
MCHC RBC AUTO-ENTMCNC: 32.2 G/DL (ref 31.4–37.4)
MCV RBC AUTO: 93 FL (ref 82–98)
PLATELET # BLD AUTO: 219 THOUSANDS/UL (ref 149–390)
PMV BLD AUTO: 9.1 FL (ref 8.9–12.7)
POTASSIUM SERPL-SCNC: 3.7 MMOL/L (ref 3.5–5.3)
PROT SERPL-MCNC: 5.7 G/DL (ref 6.4–8.2)
RBC # BLD AUTO: 4.52 MILLION/UL (ref 3.81–5.12)
SODIUM SERPL-SCNC: 139 MMOL/L (ref 136–145)
VANCOMYCIN TROUGH SERPL-MCNC: 10.3 UG/ML (ref 10–20)
WBC # BLD AUTO: 11.58 THOUSAND/UL (ref 4.31–10.16)

## 2019-09-16 PROCEDURE — 99024 POSTOP FOLLOW-UP VISIT: CPT | Performed by: NURSE PRACTITIONER

## 2019-09-16 PROCEDURE — 80202 ASSAY OF VANCOMYCIN: CPT | Performed by: INTERNAL MEDICINE

## 2019-09-16 PROCEDURE — 85027 COMPLETE CBC AUTOMATED: CPT | Performed by: INTERNAL MEDICINE

## 2019-09-16 PROCEDURE — 99233 SBSQ HOSP IP/OBS HIGH 50: CPT | Performed by: INTERNAL MEDICINE

## 2019-09-16 PROCEDURE — 80053 COMPREHEN METABOLIC PANEL: CPT | Performed by: INTERNAL MEDICINE

## 2019-09-16 PROCEDURE — 99232 SBSQ HOSP IP/OBS MODERATE 35: CPT | Performed by: FAMILY MEDICINE

## 2019-09-16 RX ORDER — LORATADINE 10 MG/1
10 TABLET ORAL DAILY
Status: DISCONTINUED | OUTPATIENT
Start: 2019-09-16 | End: 2019-09-18 | Stop reason: HOSPADM

## 2019-09-16 RX ADMIN — OXYCODONE HYDROCHLORIDE AND ACETAMINOPHEN 2 TABLET: 5; 325 TABLET ORAL at 00:59

## 2019-09-16 RX ADMIN — OXYCODONE HYDROCHLORIDE AND ACETAMINOPHEN 2 TABLET: 5; 325 TABLET ORAL at 20:55

## 2019-09-16 RX ADMIN — FAMOTIDINE 10 MG: 20 TABLET ORAL at 06:08

## 2019-09-16 RX ADMIN — PREDNISONE 10 MG: 10 TABLET ORAL at 10:19

## 2019-09-16 RX ADMIN — ASPIRIN 81 MG 81 MG: 81 TABLET ORAL at 10:19

## 2019-09-16 RX ADMIN — VANCOMYCIN HYDROCHLORIDE 750 MG: 750 INJECTION, SOLUTION INTRAVENOUS at 16:01

## 2019-09-16 RX ADMIN — VARENICLINE TARTRATE 1 MG: 1 TABLET, FILM COATED ORAL at 10:19

## 2019-09-16 RX ADMIN — VANCOMYCIN HYDROCHLORIDE 750 MG: 750 INJECTION, SOLUTION INTRAVENOUS at 04:31

## 2019-09-16 RX ADMIN — HEPARIN SODIUM 5000 UNITS: 5000 INJECTION INTRAVENOUS; SUBCUTANEOUS at 21:03

## 2019-09-16 RX ADMIN — HEPARIN SODIUM 5000 UNITS: 5000 INJECTION INTRAVENOUS; SUBCUTANEOUS at 13:23

## 2019-09-16 RX ADMIN — VARENICLINE TARTRATE 1 MG: 1 TABLET, FILM COATED ORAL at 22:17

## 2019-09-16 RX ADMIN — CLOPIDOGREL BISULFATE 75 MG: 75 TABLET ORAL at 10:19

## 2019-09-16 RX ADMIN — ATORVASTATIN CALCIUM 10 MG: 10 TABLET, FILM COATED ORAL at 15:49

## 2019-09-16 RX ADMIN — HEPARIN SODIUM 5000 UNITS: 5000 INJECTION INTRAVENOUS; SUBCUTANEOUS at 06:08

## 2019-09-16 RX ADMIN — POTASSIUM CHLORIDE 30 MEQ: 750 TABLET, EXTENDED RELEASE ORAL at 21:03

## 2019-09-16 NOTE — PROGRESS NOTES
Progress Note - Infectious Disease   Awa Terrazas 59 y o  female MRN: [id number]  Unit/Bed#: E5 -01 Encounter: [id number]    Impression/Plan:  1  Left thigh cellulitis   Developing since patient's left CFA endarterectomy with bovine graft placement   Distal end of surgical incision is weeping serous fluid but otherwise the incision is well approximated    Element of ecchymosis noted on exam   Dopplers negative for DVT   Clinically stable without sepsis   Patient did not improve on cefazolin and was transitioned to vancomycin due to concern for MRSA  She is making clinical improvement with use of IV vancomycin and appears to be tolerating without difficulty  -continue IV vancomycin  -continue pharmacy consult for guidance on vancomycin dosage  -monitor CBC and BMP  -monitor vitals  -serial left thigh exams  -wound care per vascular surgery with close follow-up ongoing  -continue close follow-up with vascular surgery    2  Leukocytosis  Domnick Halt secondary to chronic prednisone use   Also consider left thigh cellulitis above   Fortunately she has remained afebrile  Today her WBC count is 11 58   -antibiotic as above  -monitor CBC  -monitor vitals     3  Status post left CFA endarterectomy, bovine graft, and profundoplasty   On 09/10/2019   She is following closely with vascular surgery   -continue close follow-up with vascular surgery     4  PAD   Patient with history of bilateral calf pain, swelling, right foot wound feet   Now status post femoral artery endarterectomy with bovine graft and profundoplasty on 09/10/2019    -serial bilateral lower extremity exams  -surgical site care/wound care per vascular surgery  -continue close follow-up with vascular surgery     5  Nicotine dependence   Despite extensive documented efforts from multiple providers the patient continues to choose to smoke   Will continue to recommend patient committed full smoking cessation immediately  -NRT per primary service     6  SLE   Patient on chronic daily prednisone   She follows up as an outpatient with [address] rheumatologist, Dr Ghislaine Lester factor for wound nonhealing      7  History of antibiotic allergies   Patient reports reactions to penicillins, Neosporin, nitrofurantoin, and Bactrim in the past   She does not recall having issues with cephalosporins         8  Neck pain  Patient states her symptoms started on Saturday, 2019, in the evening  Reports it feels like she has swollen lymph nodes but is unable to feel any lumps  Has not been coughing  She denies sore throat or runny nose  She does reports the pain radiates up towards her right ear   -monitor symptoms    Above plan was discussed in detail with patient at the bedside  Above plan was discussed in detail with vascular surgery Luis SCHWARTZ  Antibiotics:  Vancomycin 3  Antibiotics 4    Subjective:  Patient reports that her left thigh is looking better today  States about an hour after she received her IV antibiotic the redness clears at  She states that the longer she goes from the infusion the redder her leg gets  However, she reports the area of redness is greatly reduced and is now only up near the groin and upper thigh  The patient reports her incision has only been draining small amounts and never broke through the bandage  She also describes some pain in her neck  States bilateral and feels to her like she should have swollen lymph nodes  However, when she feels her neck she cannot feel any lumps  She does not have a sore throat or runny nose  She says the pain does radiate on the right side up towards her ear  She denies fever, chills, sweats, shakes; no nausea, vomiting, abdominal pain, diarrhea, or dysuria      Objective:  Vitals:  Temp:  [97 7 °F (36 5 °C)-98 3 °F (36 8 °C)] 98 °F (36 7 °C)  HR:  [75-80] 75  Resp:  [18-20] 18  BP: (118-129)/(54-59) 118/54  SpO2:  [95 %-98 %] 98 %  Temp (24hrs), Av °F (36 7 °C), Min:97 7 °F (36 5 °C), Max:98 3 °F (36 8 °C)  Current: Temperature: 98 °F (36 7 °C)    Physical Exam:   General Appearance:  Alert, interactive, nontoxic, no acute distress  Throat: Oropharynx moist without lesions  No erythema or exudate in the pharynx  Lungs:   Clear to auscultation bilaterally; no wheezes, rhonchi or rales; respirations unlabored   Heart:  RRR; no murmur, rub or gallop   Abdomen:   Soft, obese, non-tender, non-distended, positive bowel sounds  Lymph nodes Cervical, supraclavicular nodes are normal, the patient reports tenderness with palpation of the bilateral neck   Extremities: No clubbing or cyanosis; mild edema noted in the left upper medial thigh, reduced since last exam, with overlying erythema, less dense than last exam, is warm to touch, tender to palpate   Skin: No new rashes, lesions, or draining wounds noted on exposed skin  Surgical incision in left groin extends to the upper anterior thigh, is well approximated without drainage, some areas of ecchymosis surround surgical site  Labs, Imaging, & Other studies:   All pertinent labs and imaging studies were personally reviewed  Results from last 7 days   Lab Units 09/16/19  0508 09/14/19  0500 09/13/19  0528   WBC Thousand/uL 11 58* 12 04* 10 59*   HEMOGLOBIN g/dL 13 5 14 2 13 5   PLATELETS Thousands/uL 219 204 169     Results from last 7 days   Lab Units 09/16/19  0508  09/13/19  0528 09/12/19  1111   POTASSIUM mmol/L 3 7   < > 3 8 3 5   CHLORIDE mmol/L 105   < > 107 108   CO2 mmol/L 26   < > 26 24   BUN mg/dL 18   < > 17 14   CREATININE mg/dL 0 74   < > 0 61 0 73   EGFR ml/min/1 73sq m 86   < > 96 87   CALCIUM mg/dL 8 6   < > 8 3 8 3   AST U/L 39  --  17 16   ALT U/L 48  --  18 17   ALK PHOS U/L 80  --  74 73    < > = values in this interval not displayed       Results from last 7 days   Lab Units 09/13/19  1647   MRSA CULTURE ONLY  No Methicillin Resistant Staphlyococcus aureus (MRSA) isolated

## 2019-09-16 NOTE — PROGRESS NOTES
Progress Note - Yumiko Arnold 1955, 59 y o  female MRN: [id number]    Unit/Bed#: E5 -01 Encounter: [id number]    Primary Care Provider: Tierra Ge DO   Date and time admitted to hospital: 9/10/2019  9:18 AM        Acute sore throat  Assessment & Plan  Patient reports sore throat radiating to right ear  No pharyngeal exudates noted  Patient has been receiving antiobitic treatment, currently on IV vancomycin  Trial of Claritin  Supportive management, serial exams      Tobacco user  Assessment & Plan  Tobacco use on varenicline    Chronic hypokalemia  Assessment & Plan  Chronic hypokalemia on potassium 30 mEq daily  Monitor BMP  Results from last 7 days   Lab Units 09/16/19  0508 09/14/19  0500 09/13/19  1439 09/13/19  0528 09/12/19  1111 09/11/19  0501   POTASSIUM mmol/L 3 7 3 7 3 6 3 8 3 5 4 2       History of lung cancer  Assessment & Plan  History of lung cancer status post left lower lobectomy    SLE (systemic lupus erythematosus) ([facility], [facility])  Assessment & Plan  Systemic lupus on prednisone 10 mg daily  No evidence of adrenal insufficiency requiring stress dosing of steroids    GERD (gastroesophageal reflux disease)  Assessment & Plan  Continue famotidine    Fibromyalgia  Assessment & Plan  Reported history of fibromyalgia on tramadol prior to admission as needed for pain    * 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HCC)  Assessment & Plan  Left common femoral artery occlusion status post endarterectomy  On aspirin, clopidogrel and atorvastatin  Possible surrounding cellulitis; IV antibiotics changed from cefazolin to vancomycin per ID with much improvement  VTE Pharmacologic Prophylaxis:   Pharmacologic: Heparin  Mechanical VTE Prophylaxis in Place: Yes    Patient Centered Rounds: I have performed bedside rounds with nursing staff today      Discussions with Specialists or Other Care Team Provider: Vascular surgery    Education and Discussions with Family / Patient: Patient    Time Spent for Care: 30 minutes  More than 50% of total time spent on counseling and coordination of care as described above  Current Length of Stay: 6 day(s)    Current Patient Status: Inpatient   Certification Statement: The patient will continue to require additional inpatient hospital stay due to PER PRIMARY TEAM    Discharge Plan: per primary team    Code Status: No Order      Subjective:   Patient seen and examined  She reports sore throat that she feels is radiating to her right ear  Otherwise reports feeling improved, receding right thigh erythema, afebrile; no overnight events  Objective:     Vitals:   Temp (24hrs), Av °F (36 7 °C), Min:97 9 °F (36 6 °C), Max:98 2 °F (36 8 °C)    Temp:  [97 9 °F (36 6 °C)-98 2 °F (36 8 °C)] 98 2 °F (36 8 °C)  HR:  [72-80] 72  Resp:  [18] 18  BP: (118-127)/(54-57) 127/54  SpO2:  [94 %-98 %] 94 %  Body mass index is 25 06 kg/m²  Input and Output Summary (last 24 hours): Intake/Output Summary (Last 24 hours) at 2019 194  Last data filed at 2019 0830  Gross per 24 hour   Intake 210 ml   Output    Net 210 ml       Physical Exam:     Physical Exam   Constitutional: She is oriented to person, place, and time  No distress  HENT:   Head: Normocephalic and atraumatic  Eyes: Conjunctivae are normal    Neck: No JVD present  Cardiovascular: Normal rate and regular rhythm  No murmur heard  Pulmonary/Chest: Effort normal    Musculoskeletal: She exhibits no edema  Neurological: She is alert and oriented to person, place, and time  Skin:   Left medial thigh mild diffuse erythema and warmth, per chart significantly improved    Psychiatric: She has a normal mood and affect           Additional Data:     Labs:    Results from last 7 days   Lab Units 19  0508 19  0500   WBC Thousand/uL 11 58* 12 04*   HEMOGLOBIN g/dL 13 5 14 2   HEMATOCRIT % 41 9 43 3   PLATELETS Thousands/uL 219 204   NEUTROS PCT %  --  67   LYMPHS PCT %  --  22   MONOS PCT %  --  8   EOS PCT %  --  2     Results from last 7 days   Lab Units 09/16/19  0508   SODIUM mmol/L 139   POTASSIUM mmol/L 3 7   CHLORIDE mmol/L 105   CO2 mmol/L 26   BUN mg/dL 18   CREATININE mg/dL 0 74   ANION GAP mmol/L 8   CALCIUM mg/dL 8 6   ALBUMIN g/dL 2 4*   TOTAL BILIRUBIN mg/dL 0 43   ALK PHOS U/L 80   ALT U/L 48   AST U/L 39   GLUCOSE RANDOM mg/dL 125     Results from last 7 days   Lab Units 09/10/19  1047   INR  1 04                       * I Have Reviewed All Lab Data Listed Above  * Additional Pertinent Lab Tests Reviewed: Declan 66 Admission Reviewed    Imaging:    Imaging Reports Reviewed Today Include: no new      Recent Cultures (last 7 days):           Last 24 Hours Medication List:     Current Facility-Administered Medications:  aspirin 81 mg Oral Daily Juan Payton, DO    atorvastatin 10 mg Oral Daily With [employer], DO    calcium carbonate 500 mg Oral TID PRN Ludmila Linda, DO    clopidogrel 75 mg Oral Daily Juan Payton, DO    famotidine 10 mg Oral Daily Before Breakfast Juan Payton, DO    heparin (porcine) 5,000 Units Subcutaneous Q8H [address] Juan Payton, DO    HYDROmorphone 0 5 mg Intravenous Q6H PRN Ludmila Linda, DO    loratadine 10 mg Oral Daily Kyle Stover MD    LORazepam 0 5 mg Oral Q12H PRN Ludmila Mckeon, DO    ondansetron 4 mg Intravenous Q6H PRN Ludmila Linda, DO    oxyCODONE-acetaminophen 1 tablet Oral Q4H PRN Ludmila Linda, DO    oxyCODONE-acetaminophen 2 tablet Oral Q6H PRN Ludmila Linda, DO    potassium chloride 30 mEq Oral HS Juan Payton, DO    predniSONE 10 mg Oral Daily Juan Payton, DO    traMADol 50 mg Oral BID PRN Ludmilashanae Mckeon, DO    vancomycin 12 5 mg/kg Intravenous Q12H Elza Amador MD Last Rate: 750 mg (09/16/19 1601)   varenicline 1 mg Oral BID Ludmila Mckeon DO         Today, Patient Was Seen By: Omar Wang MD    ** Please Note: Dictation voice to text software may have been used in the creation of this document  **

## 2019-09-16 NOTE — ASSESSMENT & PLAN NOTE
Chronic hypokalemia on potassium 30 mEq daily  Monitor BMP       Results from last 7 days   Lab Units 09/16/19  0508 09/14/19  0500 09/13/19  1439 09/13/19  0528 09/12/19  1111 09/11/19  0501   POTASSIUM mmol/L 3 7 3 7 3 6 3 8 3 5 4 2

## 2019-09-16 NOTE — PROGRESS NOTES
Vancomycin IV Pharmacy-to-Dose Consultation    Conner Evans is a 59 y o  female who is currently receiving Vancomycin IV with management by the Pharmacy Consult service  Assessment/Plan:  The patient was reviewed  Renal function is stable and no signs or symptoms of nephrotoxicity and/or infusion reactions were documented in the chart  The most recent vancomycin level is 10 3, therapeutic (goal trough 10-15)  Based on todays assessment, continue current vancomycin (day # 3) dosing of 750mg IV every 12 hours  Plan to repeat trough in a week on 9/23/19 at 1600  We will continue to follow the patients culture results and clinical progress daily      Audrey Tobin, Pharmacist

## 2019-09-16 NOTE — ASSESSMENT & PLAN NOTE
58 y/o F with aortoiliac and infrainguinal occlusive disease with limiting claudication, systemic lupus erythematosus with polyarthralgias, fibromyalgia, Raynaud's, sciatica, neuropathy, and hx lung cancer, electively admitted for left femoral artery endarterectomy on 9/10/19  Plan:  POD #6 LEFT CFA endarterectomy w/ bovine patch angioplasty and profundoplasty ([facility]anne)  -Continued erythema of the left medial thigh with cellulitis, improving with Vancomycin  See clinical images  Left groin dressing removed  Incision cleansed with saline, well approximated, no drainage, small area of erythema distal incision, area is soft, no lump or firm mass palpated  Telfa applied with light tape  -ID following  Appreciate input  ABX changed per ID (Vanco) and erythema and pain much improved  Will continue intravenous antibiotics an 24 hours and then reassess  Will transition to doxycycline when stable for discharge  -LEV negative for DVT/SVT  -Will continue to monitor  Possible d/c tomorrow on PO ABX (Doxycycline)  -Continue aspirin, Plavix and atorvastatin  -Smoking cessation encouraged  Continue Chantix  -Encouraged patient to advance activities  Ambulated around room with walker  No need for home PT per PT  -Pain medication PRN  Pain much improved  Oxycodone q h s   With relief  -Will d/c attending   -Discussed with SLIM

## 2019-09-16 NOTE — ASSESSMENT & PLAN NOTE
Patient reports sore throat radiating to right ear  No pharyngeal exudates noted  Patient has been receiving antiobitic treatment, currently on IV vancomycin  Trial of Claritin  Supportive management, serial exams

## 2019-09-16 NOTE — UTILIZATION REVIEW
Continued Stay Review    Date: 9/14                         Current Patient Class: IP  Current Level of Care: tele    HPI:64 y o  female initially admitted on 9/10    Assessment/Plan: POD#4  CFA endarterectomy and profundoplasty Continued erythema of the left medial thigh with cellulitis  ID was consulted and abx changed to vanco   White count increased   Will cont  To monitor, pt ambulating in room w/ walker                Pertinent Labs/Diagnostic Results:   Results from last 7 days   Lab Units 09/14/19  0500 09/13/19  0528 09/12/19  1111 09/11/19  0501   WBC Thousand/uL 12 04* 10 59* 12 17* 11 43*   HEMOGLOBIN g/dL 14 2 13 5 13 9 12 8   HEMATOCRIT % 43 3 41 5 43 5 39 3   PLATELETS Thousands/uL 204 169 167 138*   NEUTROS ABS Thousands/µL 8 12*  --  9 24*  --      Results from last 7 days   Lab Units 09/14/19  0500 09/13/19  1439 09/13/19  0528 09/12/19  1111   SODIUM mmol/L 139 141 139 141   POTASSIUM mmol/L 3 7 3 6 3 8 3 5   CHLORIDE mmol/L 106 105 107 108   CO2 mmol/L 24 30 26 24   ANION GAP mmol/L 9 6 6 9   BUN mg/dL 14 16 17 14   CREATININE mg/dL 0 62 0 77 0 61 0 73   EGFR ml/min/1 73sq m 96 82 96 87   CALCIUM mg/dL 8 8 8 7 8 3 8 3     Results from last 7 days   Lab Units 09/13/19  0528 09/12/19  1111   AST U/L 17 16   ALT U/L 18 17   ALK PHOS U/L 74 73   TOTAL PROTEIN g/dL 5 6* 5 7*   ALBUMIN g/dL 2 4* 2 6*   TOTAL BILIRUBIN mg/dL 0 22 0 26     Results from last 7 days   Lab Units 09/14/19  0500 09/13/19  1439 09/13/19  0528 09/12/19  1111 09/11/19  0501   GLUCOSE RANDOM mg/dL 89 98 110 103 144*     Results from last 7 days   Lab Units 09/10/19  1047   PROTIME seconds 13 7   INR  1 04       Vital Signs:   09/14/19 2300  97 8 °F (36 6 °C)  72  18  122/58  98 %  None (Room air)  Lying   09/14/19 1425  97 2 °F (36 2 °C)Abnormal   76  18  112/56  95 %  None (Room air)  Lying   09/14/19 0859            None (Room air)     09/14/19 0719  97 3 °F (36 3 °C)Abnormal   74  18  111/62  97 %  None (Room air)  Lying Medications:   Scheduled Meds:   Current Facility-Administered Medications:  aspirin 81 mg Oral Daily     atorvastatin 10 mg Oral Daily With Dinner     calcium carbonate 500 mg Oral TID PRN     clopidogrel 75 mg Oral Daily     famotidine 10 mg Oral Daily Before Breakfast     heparin (porcine) 5,000 Units Subcutaneous Q8H [address]     HYDROmorphone 0 5 mg Intravenous Q6H PRN x1    LORazepam 0 5 mg Oral Q12H PRN     ondansetron 4 mg Intravenous Q6H PRN     oxyCODONE-acetaminophen 1 tablet Oral Q4H PRN     oxyCODONE-acetaminophen 2 tablet Oral Q6H PRN     potassium chloride 30 mEq Oral HS     predniSONE 10 mg Oral Daily     traMADol 50 mg Oral BID PRN     vancomycin 12 5 mg/kg Intravenous Q12H     varenicline 1 mg Oral BID         Discharge Plan: D    Network Utilization Review Department  Phone: [phone]; Fax [phone]  [email or web address]  ATTENTION: Please call with any questions or concerns to [phone]  and carefully listen to the prompts so that you are directed to the right person  Send all requests for admission clinical reviews, approved or denied determinations and any other requests to fax [phone]   All voicemails are confidential

## 2019-09-16 NOTE — PROGRESS NOTES
Progress Note - Fabián Crow 1955, 59 y o  female MRN: [id number]    Unit/Bed#: E5 -01 Encounter: [id number]    Primary Care Provider: Madiha Erwin DO   Date and time admitted to hospital: 9/10/2019  9:18 AM        * Occlusion of common femoral artery [facility])  Assessment & Plan  58 y/o F with aortoiliac and infrainguinal occlusive disease with limiting claudication, systemic lupus erythematosus with polyarthralgias, fibromyalgia, Raynaud's, sciatica, neuropathy, and hx lung cancer, electively admitted for left femoral artery endarterectomy on 9/10/19  Plan:  POD #6 LEFT CFA endarterectomy w/ bovine patch angioplasty and profundoplasty ([facility]anne)  -Continued erythema of the left medial thigh with cellulitis, improving with Vancomycin  See clinical images  Left groin dressing removed  Incision cleansed with saline, well approximated, no drainage, small area of erythema distal incision, area is soft, no lump or firm mass palpated  Telfa applied with light tape  -ID following  Appreciate input  ABX changed per ID (Vanco) and erythema and pain much improved  Will continue intravenous antibiotics an 24 hours and then reassess  Will transition to doxycycline when stable for discharge  -LEV negative for DVT/SVT  -Will continue to monitor  Possible d/c tomorrow on PO ABX (Doxycycline)  -Continue aspirin, Plavix and atorvastatin  -Smoking cessation encouraged  Continue Chantix  -Encouraged patient to advance activities  Ambulated around room with walker  No need for home PT per PT  -Pain medication PRN  Pain much improved  Oxycodone q h s  With relief  -Will d/c attending   -Discussed with SLIM            Subjective:  Patient in bed  No acute distress  Left thigh erythema continues though much improved with vancomycin  Postoperative pain also improving  Left groin incision intact with mild erythema at the distal aspect of the incision  She complains of right ear pain and sore throat   WBC 11 5   She is afebrile  Vital signs stable  Patient seen at bedside with ID    Vitals:  /54 (BP Location: Left arm)   Pulse 75   Temp 98 °F (36 7 °C) (Temporal)   Resp 18   Ht 5' 4" (1 626 m)   Wt 66 2 kg (146 lb)   LMP  (LMP Unknown)   SpO2 98%   Breastfeeding? No   BMI 25 06 kg/m²     I/Os:  I/O last 3 completed shifts: In: 240 [P O :240]  Out: -   I/O this shift:  In: 210 [P O :210]  Out: -     Lab Results and Cultures:   Lab Results   Component Value Date    WBC 11 58 (H) 09/16/2019    HGB 13 5 09/16/2019    HCT 41 9 09/16/2019    MCV 93 09/16/2019     09/16/2019     Lab Results   Component Value Date    CALCIUM 8 6 09/16/2019     03/20/2017    K 3 7 09/16/2019    CO2 26 09/16/2019     09/16/2019    BUN 18 09/16/2019    CREATININE 0 74 09/16/2019     Lab Results   Component Value Date    INR 1 04 09/10/2019    PROTIME 13 7 09/10/2019        Blood Culture: No results found for: BLOODCX,   Urinalysis:   Lab Results   Component Value Date    COLORU reva 04/12/2019    COLORU Yellow 02/25/2019    COLORU Reva 11/21/2016    CLARITYU dark   04/12/2019    CLARITYU Cloudy 02/25/2019    CLARITYU Cloudy 11/21/2016    SPECGRAV 1 027 02/25/2019    SPECGRAV 1 020 11/21/2016    PHUR 6 5 02/25/2019    PHUR 5 11/21/2016    LEUKOCYTESUR neg 04/12/2019    LEUKOCYTESUR Trace (A) 02/25/2019    LEUKOCYTESUR Negative 11/21/2016    NITRITE + 04/12/2019    NITRITE Positive (A) 02/25/2019    NITRITE Positive 11/21/2016    PROTEINUA negative 11/21/2016    GLUCOSEU 50 04/12/2019    GLUCOSEU Negative 02/25/2019    GLUCOSEU Negative 01/07/2015    KETONESU normal 04/12/2019    KETONESU Negative 02/25/2019    KETONESU negative 11/21/2016    BILIRUBINUR normal 04/12/2019    BILIRUBINUR Negative 02/25/2019    BILIRUBINUR Negative 11/21/2016    BLOODU 250 04/12/2019    BLOODU Small (A) 02/25/2019    BLOODU Trace 11/21/2016   ,   Urine Culture:   Lab Results   Component Value Date    URINECX >100,000 cfu/ml Klebsiella ruben (A) 04/12/2019   ,   Wound Culure: No results found for: WOUNDCULT    Medications:  Current Facility-Administered Medications   Medication Dose Route Frequency    aspirin chewable tablet 81 mg  81 mg Oral Daily    atorvastatin (LIPITOR) tablet 10 mg  10 mg Oral Daily With Dinner    calcium carbonate (TUMS) chewable tablet 500 mg  500 mg Oral TID PRN    clopidogrel (PLAVIX) tablet 75 mg  75 mg Oral Daily    famotidine (PEPCID) tablet 10 mg  10 mg Oral Daily Before Breakfast    heparin (porcine) subcutaneous injection 5,000 Units  5,000 Units Subcutaneous Q8H [address]    HYDROmorphone (DILAUDID) injection 0 5 mg  0 5 mg Intravenous Q6H PRN    LORazepam (ATIVAN) tablet 0 5 mg  0 5 mg Oral Q12H PRN    ondansetron (ZOFRAN) injection 4 mg  4 mg Intravenous Q6H PRN    oxyCODONE-acetaminophen (PERCOCET) 5-325 mg per tablet 1 tablet  1 tablet Oral Q4H PRN    oxyCODONE-acetaminophen (PERCOCET) 5-325 mg per tablet 2 tablet  2 tablet Oral Q6H PRN    potassium chloride (K-DUR,KLOR-CON) CR tablet 30 mEq  30 mEq Oral HS    predniSONE tablet 10 mg  10 mg Oral Daily    traMADol (ULTRAM) tablet 50 mg  50 mg Oral BID PRN    vancomycin (VANCOCIN) IVPB (premix) 750 mg  12 5 mg/kg Intravenous Q12H    varenicline (CHANTIX) tablet 1 mg  1 mg Oral BID       Imaging:  No new imaging studies to review    Physical Exam:    General appearance: alert and oriented, in no acute distress  Skin: Skin color, texture, turgor normal  No rashes or lesions  Neurologic: Grossly normal  Head: Normocephalic, without obvious abnormality, atraumatic  Eyes: EOMI  Extremities: Trace to 1+ Left thigh and ankle swelling w/ chronic hyperpigmentation of distal shin  Left groin incision intact without dehiscence or drainage  Mild erythema at distal aspect of incision  Left medial thigh with decreased erythema extending from proximal to mid thigh, no posterior thigh erythema present and previously erythema extended medially to the knee   Foot is warm pink and well perfused       Wound/Incision:  Left groin  incision clean, dry, and intact    Pulse exam:  Radial: Right: 2+ Left[de-identified] 2+   Left: doppler signal   Left: doppler signal      LISA Ramírez  9/16/2019  [facility]  [phone]

## 2019-09-16 NOTE — ASSESSMENT & PLAN NOTE
Left common femoral artery occlusion status post endarterectomy  On aspirin, clopidogrel and atorvastatin  Possible surrounding cellulitis; IV antibiotics changed from cefazolin to vancomycin per ID with much improvement

## 2019-09-17 ENCOUNTER — APPOINTMENT (INPATIENT)
Dept: CT IMAGING | Facility: HOSPITAL | Age: 64
DRG: 253 | End: 2019-09-17
Payer: COMMERCIAL

## 2019-09-17 LAB
ANION GAP SERPL CALCULATED.3IONS-SCNC: 10 MMOL/L (ref 4–13)
BASOPHILS # BLD AUTO: 0.05 THOUSANDS/ΜL (ref 0–0.1)
BASOPHILS NFR BLD AUTO: 1 % (ref 0–1)
BUN SERPL-MCNC: 19 MG/DL (ref 5–25)
CALCIUM SERPL-MCNC: 8.4 MG/DL (ref 8.3–10.1)
CHLORIDE SERPL-SCNC: 106 MMOL/L (ref 100–108)
CO2 SERPL-SCNC: 24 MMOL/L (ref 21–32)
CREAT SERPL-MCNC: 0.71 MG/DL (ref 0.6–1.3)
EOSINOPHIL # BLD AUTO: 0.3 THOUSAND/ΜL (ref 0–0.61)
EOSINOPHIL NFR BLD AUTO: 3 % (ref 0–6)
ERYTHROCYTE [DISTWIDTH] IN BLOOD BY AUTOMATED COUNT: 14.8 % (ref 11.6–15.1)
GFR SERPL CREATININE-BSD FRML MDRD: 90 ML/MIN/1.73SQ M
GLUCOSE SERPL-MCNC: 132 MG/DL (ref 65–140)
HCT VFR BLD AUTO: 40.8 % (ref 34.8–46.1)
HGB BLD-MCNC: 13 G/DL (ref 11.5–15.4)
IMM GRANULOCYTES # BLD AUTO: 0.12 THOUSAND/UL (ref 0–0.2)
IMM GRANULOCYTES NFR BLD AUTO: 1 % (ref 0–2)
LYMPHOCYTES # BLD AUTO: 2.52 THOUSANDS/ΜL (ref 0.6–4.47)
LYMPHOCYTES NFR BLD AUTO: 26 % (ref 14–44)
MCH RBC QN AUTO: 29.7 PG (ref 26.8–34.3)
MCHC RBC AUTO-ENTMCNC: 31.9 G/DL (ref 31.4–37.4)
MCV RBC AUTO: 93 FL (ref 82–98)
MONOCYTES # BLD AUTO: 0.77 THOUSAND/ΜL (ref 0.17–1.22)
MONOCYTES NFR BLD AUTO: 8 % (ref 4–12)
NEUTROPHILS # BLD AUTO: 6 THOUSANDS/ΜL (ref 1.85–7.62)
NEUTS SEG NFR BLD AUTO: 61 % (ref 43–75)
NRBC BLD AUTO-RTO: 0 /100 WBCS
PLATELET # BLD AUTO: 232 THOUSANDS/UL (ref 149–390)
PMV BLD AUTO: 9.1 FL (ref 8.9–12.7)
POTASSIUM SERPL-SCNC: 3.4 MMOL/L (ref 3.5–5.3)
RBC # BLD AUTO: 4.37 MILLION/UL (ref 3.81–5.12)
SODIUM SERPL-SCNC: 140 MMOL/L (ref 136–145)
WBC # BLD AUTO: 9.76 THOUSAND/UL (ref 4.31–10.16)

## 2019-09-17 PROCEDURE — 99232 SBSQ HOSP IP/OBS MODERATE 35: CPT | Performed by: FAMILY MEDICINE

## 2019-09-17 PROCEDURE — 97110 THERAPEUTIC EXERCISES: CPT | Performed by: PHYSICAL THERAPIST

## 2019-09-17 PROCEDURE — 73701 CT LOWER EXTREMITY W/DYE: CPT

## 2019-09-17 PROCEDURE — 99024 POSTOP FOLLOW-UP VISIT: CPT | Performed by: SURGERY

## 2019-09-17 PROCEDURE — 85025 COMPLETE CBC W/AUTO DIFF WBC: CPT | Performed by: INTERNAL MEDICINE

## 2019-09-17 PROCEDURE — 80048 BASIC METABOLIC PNL TOTAL CA: CPT | Performed by: INTERNAL MEDICINE

## 2019-09-17 PROCEDURE — 99232 SBSQ HOSP IP/OBS MODERATE 35: CPT | Performed by: INTERNAL MEDICINE

## 2019-09-17 RX ORDER — DOXYCYCLINE HYCLATE 100 MG/1
100 CAPSULE ORAL EVERY 12 HOURS SCHEDULED
Status: DISCONTINUED | OUTPATIENT
Start: 2019-09-17 | End: 2019-09-18 | Stop reason: HOSPADM

## 2019-09-17 RX ORDER — POTASSIUM CHLORIDE 20 MEQ/1
40 TABLET, EXTENDED RELEASE ORAL ONCE
Status: DISCONTINUED | OUTPATIENT
Start: 2019-09-17 | End: 2019-09-17

## 2019-09-17 RX ORDER — DOCUSATE SODIUM 100 MG/1
100 CAPSULE, LIQUID FILLED ORAL 2 TIMES DAILY
Status: DISCONTINUED | OUTPATIENT
Start: 2019-09-17 | End: 2019-09-18 | Stop reason: HOSPADM

## 2019-09-17 RX ADMIN — POTASSIUM CHLORIDE 30 MEQ: 750 TABLET, EXTENDED RELEASE ORAL at 21:28

## 2019-09-17 RX ADMIN — DOCUSATE SODIUM 100 MG: 100 CAPSULE, LIQUID FILLED ORAL at 18:19

## 2019-09-17 RX ADMIN — HEPARIN SODIUM 5000 UNITS: 5000 INJECTION INTRAVENOUS; SUBCUTANEOUS at 14:18

## 2019-09-17 RX ADMIN — IOHEXOL 100 ML: 350 INJECTION, SOLUTION INTRAVENOUS at 12:05

## 2019-09-17 RX ADMIN — ATORVASTATIN CALCIUM 10 MG: 10 TABLET, FILM COATED ORAL at 15:41

## 2019-09-17 RX ADMIN — ASPIRIN 81 MG 81 MG: 81 TABLET ORAL at 08:52

## 2019-09-17 RX ADMIN — FAMOTIDINE 10 MG: 20 TABLET ORAL at 06:01

## 2019-09-17 RX ADMIN — HEPARIN SODIUM 5000 UNITS: 5000 INJECTION INTRAVENOUS; SUBCUTANEOUS at 06:02

## 2019-09-17 RX ADMIN — VARENICLINE TARTRATE 1 MG: 1 TABLET, FILM COATED ORAL at 12:31

## 2019-09-17 RX ADMIN — HEPARIN SODIUM 5000 UNITS: 5000 INJECTION INTRAVENOUS; SUBCUTANEOUS at 21:28

## 2019-09-17 RX ADMIN — VANCOMYCIN HYDROCHLORIDE 750 MG: 750 INJECTION, SOLUTION INTRAVENOUS at 04:05

## 2019-09-17 RX ADMIN — DOXYCYCLINE HYCLATE 100 MG: 100 CAPSULE ORAL at 21:28

## 2019-09-17 RX ADMIN — CLOPIDOGREL BISULFATE 75 MG: 75 TABLET ORAL at 08:52

## 2019-09-17 RX ADMIN — PREDNISONE 10 MG: 10 TABLET ORAL at 08:52

## 2019-09-17 RX ADMIN — DOCUSATE SODIUM 100 MG: 100 CAPSULE, LIQUID FILLED ORAL at 12:32

## 2019-09-17 RX ADMIN — VARENICLINE TARTRATE 1 MG: 1 TABLET, FILM COATED ORAL at 23:52

## 2019-09-17 RX ADMIN — OXYCODONE HYDROCHLORIDE AND ACETAMINOPHEN 2 TABLET: 5; 325 TABLET ORAL at 12:32

## 2019-09-17 NOTE — ASSESSMENT & PLAN NOTE
Chronic hypokalemia on potassium 30 mEq daily  Monitor BMP       Results from last 7 days   Lab Units 09/17/19  0532 09/16/19  0508 09/14/19  0500 09/13/19  1439 09/13/19  0528 09/12/19  1111 09/11/19  0501   POTASSIUM mmol/L 3 4* 3 7 3 7 3 6 3 8 3 5 4 2

## 2019-09-17 NOTE — ASSESSMENT & PLAN NOTE
Acute blood loss anemia  Prior to admission hemoglobin 16 6    Current hemoglobin stable    Lab Results   Component Value Date    HGB 13 0 09/17/2019    HGB 13 5 09/16/2019    HGB 14 2 09/14/2019    HGB 13 5 09/13/2019

## 2019-09-17 NOTE — PROGRESS NOTES
Progress Note - Infectious Disease   Yumiko Arnold 59 y o  female MRN: [id number]  Unit/Bed#: E5 -01 Encounter: [id number]      Impression/Plan:  1  Left thigh cellulitis   Developing since patient's left CFA endarterectomy with bovine graft placement   Distal end of surgical incision was weeping serous fluid but this appears to be resolved and the incision is well approximated   Element of ecchymosis noted on exam   Dopplers were negative for DVT    Patient has remained clinically stable without sepsis   The patient has been improving with IV vancomycin but is now having more pain at distal incision site today  I recommend obtaining a CT of the L thigh to assess for underlying abscess  Also consider an endovascular infection  Will continue to monitor closely   -continue IV vancomycin  -continue pharmacy consult for guidance on vancomycin dosage  -monitor CBC and BMP  -monitor vitals  -serial left thigh exams  -check CT of the L thigh  -wound care per vascular surgery with close follow-up ongoing  -continue close follow-up with vascular surgery     2  Leukocytosis  Miguel Ángel Yoo secondary to chronic prednisone use   Also consider left thigh cellulitis above   Fortunately she has remained afebrile    Today her WBC count has improved to 9 76   -antibiotic as above  -monitor CBC  -monitor vitals     3  Status post left CFA endarterectomy, bovine graft, and profundoplasty   On 09/10/2019   She is following closely with vascular surgery   -continue close follow-up with vascular surgery     4  PAD   Patient with history of bilateral calf pain, swelling, right foot wound feet   Now status post femoral artery endarterectomy with bovine graft and profundoplasty on 09/10/2019    -serial bilateral lower extremity exams  -surgical site care/wound care per vascular surgery  -continue close follow-up with vascular surgery     5  Nicotine dependence   Despite extensive documented efforts from multiple providers the patient continues to choose to smoke  [facility] continue to recommend patient committed full smoking cessation immediately  -NRT per primary service     6  SLE   Patient on chronic daily prednisone   She follows up as an outpatient with [facility] rheumatologist, Dr Bismark Flores factor for wound nonhealing      7  History of antibiotic allergies   Patient reports reactions to penicillins, Neosporin, nitrofurantoin, and Bactrim in the past   She does not recall having issues with cephalosporins  She has tolerated cefazolin and vancomycin during this hospitalization without difficulty      8  Neck/throat pain  Patient states her symptoms started on Saturday, 09/14/2019, in the evening  Reports it feels like she has swollen lymph nodes but is unable to feel any lumps  She has not been coughing  She does reports the pain radiates up towards her right ear  Today she feels her symptoms have improved slightly  SLIM is following   -monitor symptoms  -symptomatic treatment per SLIM    Above plan was discussed in detail with the patient at the bedside  Discussed above plan in detail with vascular surgery Mena SCHWARTZ  Discussed above plan in detail with vascular surgery Indira SCHWARTZ  Antibiotics:  Vancomycin 4  Antibiotics 5    Subjective:  Patient tells me she's having a very bad day  Reports she's feeling panicked that her leg hasn't made more improvement  Reports she wants to speak to Dr Romy Martinez and has reached out to his office  She reports the pain in her L upper thigh feels worse today and she feels the swelling and redness are not improved since yesterday  States she doesn't want to go home until her leg is looking better  States she has no fever, chills, sweats, shakes; no nausea, vomiting, abdominal pain, diarrhea, or dysuria; no shortness of breath or chest pain  The pain in her neck and throat seems better to her but she states it still annoys her at times   No runny nose, chest or sinus congestion, or cough  Objective:  Vitals:  Temp:  [97 7 °F (36 5 °C)-98 2 °F (36 8 °C)] 97 9 °F (36 6 °C)  HR:  [72-81] 81  Resp:  [18] 18  BP: (121-127)/(54-65) 121/65  SpO2:  [94 %-95 %] 95 %  Temp (24hrs), Av 9 °F (36 6 °C), Min:97 7 °F (36 5 °C), Max:98 2 °F (36 8 °C)  Current: Temperature: 97 9 °F (36 6 °C)    Physical Exam:   General Appearance:  Alert, anxious, tearful, interactive, nontoxic   Throat: Oropharynx moist without lesions  No erythema or exudate in the pharynx  Neck: No cervical, submandibular, or supraclavicular lymphadenopathy   Lungs:   Clear to auscultation bilaterally; no wheezes, rhonchi or rales; respirations unlabored   Heart:  RRR; no murmur, rub or gallop   Abdomen:   Soft, obese, non-tender, non-distended, positive bowel sounds  Extremities: No clubbing or cyanosis; mild edema of L medial and anterior upper thigh, stable since last exam, with overlying erythema, erythematous area is overall reduced in size but not density, is warm to touch and tender to palpate  Skin: No new rashes, lesions, or draining wounds noted on exposed skin  Surgical incision in the left groin extends to the upper anterior thigh, is well approximated, no active drainage, has some patchy surrounding ecchymosis       Labs, Imaging, & Other studies:   All pertinent labs and imaging studies were personally reviewed  Results from last 7 days   Lab Units 19  0532 19  0508 19  0500   WBC Thousand/uL 9 76 11 58* 12 04*   HEMOGLOBIN g/dL 13 0 13 5 14 2   PLATELETS Thousands/uL 232 219 204     Results from last 7 days   Lab Units 19  0532 19  0508  19  0528 19  1111   POTASSIUM mmol/L 3 4* 3 7   < > 3 8 3 5   CHLORIDE mmol/L 106 105   < > 107 108   CO2 mmol/L 24 26   < > 26 24   BUN mg/dL 19 18   < > 17 14   CREATININE mg/dL 0 71 0 74   < > 0 61 0 73   EGFR ml/min/1 73sq m 90 86   < > 96 87   CALCIUM mg/dL 8 4 8 6   < > 8 3 8 3   AST U/L  --  39  --  17 16   ALT U/L --  48  --  18 17   ALK PHOS U/L  --  80  --  74 73    < > = values in this interval not displayed       Results from last 7 days   Lab Units 09/13/19  1647   MRSA CULTURE ONLY  No Methicillin Resistant Staphlyococcus aureus (MRSA) isolated

## 2019-09-17 NOTE — PROGRESS NOTES
Progress Note - Torie Chacon 1955, 59 y o  female MRN: [id number]    Unit/Bed#: E5 -01 Encounter: [id number]    Primary Care Provider: Salma Egan,    Date and time admitted to hospital: 9/10/2019  9:18 AM        * Occlusion of common femoral artery [facility])  Assessment & Plan  58 y/o F with aortoiliac and infrainguinal occlusive disease with limiting claudication, systemic lupus erythematosus with polyarthralgias, fibromyalgia, Raynaud's, sciatica, neuropathy, and hx lung cancer, electively admitted for left femoral artery endarterectomy on 9/10/19  Plan:  POD #7 LEFT CFA endarterectomy w/ bovine patch angioplasty and profundoplasty (Leanne)  -Left thigh cellulitis with improved erythema, decreased pain, leukocytosis normalized  -ID following, appreciate input  ABX changed to Vanco and erythema and pain improved  Continue IV ABX; will plan for D/C home on Doxycycline per ID  Anticipating possible discharge tomorrow  -CT left femur reviewed, small fluid collection, not consistent with abscess  Clinically improved, so will continue to monitor  -Continue aspirin, Plavix and atorvastatin  -Smoking cessation encouraged  Continue Chantix  -Encouraged patient to advance activities  No need for home PT per PT  -Pain medication PRN  Pain much improved  Taking oxycodone with good relief  -Patient seen/examined with Dr Romy Martinez        Subjective:  Patient seen in bed  Left groin pain improved, still painful when walking, +pain relief with oxycodone PRN  Mild erythema, leukocytosis improved, hemodynamically stable  Vitals:  /65 (BP Location: Left arm)   Pulse 81   Temp 97 9 °F (36 6 °C) (Temporal)   Resp 18   Ht 5' 4" (1 626 m)   Wt 66 2 kg (146 lb)   LMP  (LMP Unknown)   SpO2 95%   Breastfeeding? No   BMI 25 06 kg/m²     I/Os:  I/O last 3 completed shifts:   In: 210 [P O :210]  Out: -   I/O this shift:  In: 660 [P O :660]  Out: -     Lab Results and Cultures:   Lab Results   Component Value Date    WBC 9 76 09/17/2019    HGB 13 0 09/17/2019    HCT 40 8 09/17/2019    MCV 93 09/17/2019     09/17/2019     Lab Results   Component Value Date    CALCIUM 8 4 09/17/2019     03/20/2017    K 3 4 (L) 09/17/2019    CO2 24 09/17/2019     09/17/2019    BUN 19 09/17/2019    CREATININE 0 71 09/17/2019     Lab Results   Component Value Date    INR 1 04 09/10/2019    PROTIME 13 7 09/10/2019        Blood Culture: No results found for: BLOODCX,   Urinalysis:   Lab Results   Component Value Date    COLORU reva 04/12/2019    COLORU Yellow 02/25/2019    COLORU Reva 11/21/2016    CLARITYU dark   04/12/2019    CLARITYU Cloudy 02/25/2019    CLARITYU Cloudy 11/21/2016    SPECGRAV 1 027 02/25/2019    SPECGRAV 1 020 11/21/2016    PHUR 6 5 02/25/2019    PHUR 5 11/21/2016    LEUKOCYTESUR neg 04/12/2019    LEUKOCYTESUR Trace (A) 02/25/2019    LEUKOCYTESUR Negative 11/21/2016    NITRITE + 04/12/2019    NITRITE Positive (A) 02/25/2019    NITRITE Positive 11/21/2016    PROTEINUA negative 11/21/2016    GLUCOSEU 50 04/12/2019    GLUCOSEU Negative 02/25/2019    GLUCOSEU Negative 01/07/2015    KETONESU normal 04/12/2019    KETONESU Negative 02/25/2019    KETONESU negative 11/21/2016    BILIRUBINUR normal 04/12/2019    BILIRUBINUR Negative 02/25/2019    BILIRUBINUR Negative 11/21/2016    BLOODU 250 04/12/2019    BLOODU Small (A) 02/25/2019    BLOODU Trace 11/21/2016   ,   Urine Culture:   Lab Results   Component Value Date    URINECX >100,000 cfu/ml Klebsiella variicola (A) 04/12/2019   ,   Wound Culure: No results found for: WOUNDCULT    Medications:  Current Facility-Administered Medications   Medication Dose Route Frequency    aspirin chewable tablet 81 mg  81 mg Oral Daily    atorvastatin (LIPITOR) tablet 10 mg  10 mg Oral Daily With Dinner    bisacodyl (DULCOLAX) EC tablet 5 mg  5 mg Oral Daily PRN    calcium carbonate (TUMS) chewable tablet 500 mg  500 mg Oral TID PRN    clopidogrel (PLAVIX) tablet 75 mg 75 mg Oral Daily    docusate sodium (COLACE) capsule 100 mg  100 mg Oral BID    doxycycline hyclate (VIBRAMYCIN) capsule 100 mg  100 mg Oral Q12H [address]    famotidine (PEPCID) tablet 10 mg  10 mg Oral Daily Before Breakfast    heparin (porcine) subcutaneous injection 5,000 Units  5,000 Units Subcutaneous Q8H [address]    HYDROmorphone (DILAUDID) injection 0 5 mg  0 5 mg Intravenous Q6H PRN    loratadine (CLARITIN) tablet 10 mg  10 mg Oral Daily    LORazepam (ATIVAN) tablet 0 5 mg  0 5 mg Oral Q12H PRN    ondansetron (ZOFRAN) injection 4 mg  4 mg Intravenous Q6H PRN    oxyCODONE-acetaminophen (PERCOCET) 5-325 mg per tablet 1 tablet  1 tablet Oral Q4H PRN    oxyCODONE-acetaminophen (PERCOCET) 5-325 mg per tablet 2 tablet  2 tablet Oral Q6H PRN    potassium chloride (K-DUR,KLOR-CON) CR tablet 30 mEq  30 mEq Oral HS    predniSONE tablet 10 mg  10 mg Oral Daily    traMADol (ULTRAM) tablet 50 mg  50 mg Oral BID PRN    varenicline (CHANTIX) tablet 1 mg  1 mg Oral BID       Imagin19 CT Left femur  FINDINGS:     OSSEOUS STRUCTURES:  Advanced osteoarthritis across the left hip joint with extensive subcortical cystic change and cortical irregularity at the femoral head including a large region of avascular necrosis measuring up to 4 1 x 2 6 cm  Slight flattening   of the femoral head consistent with posttraumatic collapse        Healed left inferior pubic ramus fracture      VISUALIZED MUSCULATURE:  Unremarkable      SOFT TISSUES:  Postsurgical change noted with pessary after pelvic floor reconstruction      Post intervention changes noted after left common femoral artery endarterectomy  There is extensive inflammatory change noted along the left common and superficial femoral arteries with somewhat ill-defined fluid collection noted ventrally measuring up   to 2 2 x 2 4 cm  There is no enhancing wall and phlegmonous fluid collection is of concern    Small focus of subcutaneous edema 3/105 is likely postsurgical      OTHER PERTINENT FINDINGS:  Mildly prominent left inguinal adenopathy      Mild left thigh skin thickening noted medially with some subcutaneous edema consistent with cellulitis but no discrete collection      Left hip joint effusion      IMPRESSION:     1  Post intervention change noted after left CFA endarterectomy with ill-defined fluid collection noted in the region of the artery without peripherally enhancing wall  Findings are most consistent with phlegmon formation without discrete abscess at   this time  Likely reactive left inguinal lymph nodes are noted  The artery remains patent  2   Large region of AVN of the left femoral head including femoral head flattening consistent with cortical collapse  Secondary advanced osteoarthritis also noted across the left hip        Physical Exam:    General appearance: alert and oriented, in no acute distress  Skin: Skin color, texture, turgor normal  No rashes or lesions  Neurologic: Grossly normal  Head: Normocephalic, without obvious abnormality, atraumatic  Eyes: sclerae anicteric   Lungs: Respirations even and unlabored  Extremities: Mild erythema to distal aspect of incision with no associated heat, no dehiscence or drainage, mild non-pitting edema BLE    Left foot pink, warm, well perfused    Wound/Incision:  As above      Yvonne Arizmendi  9/17/2019  [facility]  [phone]

## 2019-09-17 NOTE — PROGRESS NOTES
Vancomycin IV Pharmacy-to-Dose Consultation    Minh Mccoy is a 59 y o  female who is currently receiving Vancomycin IV with management by the Pharmacy Consult service  Assessment/Plan:  The patient was reviewed  Renal function is stable and no signs or symptoms of nephrotoxicity and/or infusion reactions were documented in the chart  Based on todays assessment, continue current vancomycin (day # 4) dosing of 750mg q12h, with a plan for trough to be drawn at 1530 on 9/23    We will continue to follow the patients culture results and clinical progress daily      Karen Montes, Pharmacist

## 2019-09-17 NOTE — PLAN OF CARE
Problem: Prexisting or High Potential for Compromised Skin Integrity  Goal: Skin integrity is maintained or improved  Description  INTERVENTIONS:  - Identify patients at risk for skin breakdown  - Assess and monitor skin integrity  - Assess and monitor nutrition and hydration status  - Monitor labs   - Assess for incontinence   - Turn and reposition patient  - Assist with mobility/ambulation  - Relieve pressure over bony prominences  - Avoid friction and shearing  - Provide appropriate hygiene as needed including keeping skin clean and dry  - Evaluate need for skin moisturizer/barrier cream  - Collaborate with interdisciplinary team   - Patient/family teaching  - Consider wound care consult   Outcome: Progressing     Problem: PAIN - ADULT  Goal: Verbalizes/displays adequate comfort level or baseline comfort level  Description  Interventions:  - Encourage patient to monitor pain and request assistance  - Assess pain using appropriate pain scale  - Administer analgesics based on type and severity of pain and evaluate response  - Implement non-pharmacological measures as appropriate and evaluate response  - Consider cultural and social influences on pain and pain management  - Notify physician/advanced practitioner if interventions unsuccessful or patient reports new pain  Outcome: Progressing     Problem: SAFETY ADULT  Goal: Patient will remain free of falls  Description  INTERVENTIONS:  - Assess patient frequently for physical needs  -  Identify cognitive and physical deficits and behaviors that affect risk of falls    -  [place] fall precautions as indicated by assessment   - Educate patient/family on patient safety including physical limitations  - Instruct patient to call for assistance with activity based on assessment  - Modify environment to reduce risk of injury  - Consider OT/PT consult to assist with strengthening/mobility  Outcome: Progressing  Goal: Maintain or return to baseline ADL function  Description  INTERVENTIONS:  -  Assess patient's ability to carry out ADLs; assess patient's baseline for ADL function and identify physical deficits which impact ability to perform ADLs (bathing, care of mouth/teeth, toileting, grooming, dressing, etc )  - Assess/evaluate cause of self-care deficits   - Assess range of motion  - Assess patient's mobility; develop plan if impaired  - Assess patient's need for assistive devices and provide as appropriate  - Encourage maximum independence but intervene and supervise when necessary  - Involve family in performance of ADLs  - Assess for home care needs following discharge   - Consider OT consult to assist with ADL evaluation and planning for discharge  - Provide patient education as appropriate  Outcome: Progressing  Goal: Maintain or return mobility status to optimal level  Description  INTERVENTIONS:  - Assess patient's baseline mobility status (ambulation, transfers, stairs, etc )    - Identify cognitive and physical deficits and behaviors that affect mobility  - Identify mobility aids required to assist with transfers and/or ambulation (gait belt, sit-to-stand, lift, walker, cane, etc )  - [place] fall precautions as indicated by assessment  - Record patient progress and toleration of activity level on Mobility SBAR; progress patient to next Phase/Stage  - Instruct patient to call for assistance with activity based on assessment  - Consider rehabilitation consult to assist with strengthening/weightbearing, etc   Outcome: Progressing     Problem: DISCHARGE PLANNING  Goal: Discharge to home or other facility with appropriate resources  Description  INTERVENTIONS:  - Identify barriers to discharge w/patient and caregiver  - Arrange for needed discharge resources and transportation as appropriate  - Identify discharge learning needs (meds, wound care, etc )  - Arrange for interpretive services to assist at discharge as needed  - Refer to Case Management Department for coordinating discharge planning if the patient needs post-hospital services based on physician/advanced practitioner order or complex needs related to functional status, cognitive ability, or social support system  Outcome: Progressing     Problem: Knowledge Deficit  Goal: Patient/family/caregiver demonstrates understanding of disease process, treatment plan, medications, and discharge instructions  Description  Complete learning assessment and assess knowledge base  Interventions:  - Provide teaching at level of understanding  - Provide teaching via preferred learning methods  Outcome: Progressing     Problem: HEMATOLOGIC - ADULT  Goal: Maintains hematologic stability  Description  INTERVENTIONS  - Assess for signs and symptoms of bleeding or hemorrhage  - Monitor labs  - Administer supportive blood products/factors as ordered and appropriate  Outcome: Progressing     Problem: Potential for Falls  Goal: Patient will remain free of falls  Description  INTERVENTIONS:  - Assess patient frequently for physical needs  -  Identify cognitive and physical deficits and behaviors that affect risk of falls    -  [place] fall precautions as indicated by assessment   - Educate patient/family on patient safety including physical limitations  - Instruct patient to call for assistance with activity based on assessment  - Modify environment to reduce risk of injury  - Consider OT/PT consult to assist with strengthening/mobility  Outcome: Progressing

## 2019-09-17 NOTE — UTILIZATION REVIEW
Elective Surgical Continued Stay Review    Date:    9/17/2019      POD#:     7    Current Patient Class:    INPATIENT  Current Level of Care:    MED SURG    Assessment/Plan: 59 y o  female, initial surgery date 9/10/2019    Pt  Is  Now  POD  #  7   LEFT CFA endarterectomy w/ bovine patch angioplasty and profundoplasty   Cont  To req  IP  Acute  LOC  D/T   Cellulitis   L thigh   Developed post  Op  Need to cont  PACO, monitor  Labs  And  Wound  Care  Need  Cont pain control as pt  C/o increased pain at  Incisional site        Pertinent Labs/Diagnostic Results:    Ref Range & Units 9/17/19 0532   Sodium 136 - 145 mmol/L 140    Potassium 3 5 - 5 3 mmol/L 3 4Low     Chloride 100 - 108 mmol/L 106    CO2 21 - 32 mmol/L 24    ANION GAP 4 - 13 mmol/L 10    BUN 5 - 25 mg/dL 19    Creatinine 0 60 - 1 30 mg/dL 0 71      WBC 4 31 - 10 16 Thousand/uL 9 76    RBC 3 81 - 5 12 Million/uL 4 37    Hemoglobin 11 5 - 15 4 g/dL 13 0    Hematocrit 34 8 - 46 1 % 40 8    MCV 82 - 98 fL 93    MCH 26 8 - 34 3 pg 29 7    MCHC 31 4 - 37 4 g/dL 31 9    RDW 11 6 - 15 1 % 14 8    MPV 8 9 - 12 7 fL 9 1    Platelets 892 - 792 Thousands/uL 232          Vital Signs:   09/17/19 0728  97 9 °F (36 6 °C)  81  18  121/65    95 %  None (Room air)  Lying         Medications:   Scheduled Meds:   Current Facility-Administered Medications:  aspirin 81 mg Oral Daily   atorvastatin 10 mg Oral Daily With Dinner   bisacodyl 5 mg Oral Daily PRN   calcium carbonate 500 mg Oral TID PRN   clopidogrel 75 mg Oral Daily   docusate sodium 100 mg Oral BID   famotidine 10 mg Oral Daily Before Breakfast   heparin (porcine) 5,000 Units Subcutaneous Q8H EFREM   HYDROmorphone 0 5 mg Intravenous Q6H PRN   loratadine 10 mg Oral Daily   LORazepam 0 5 mg Oral Q12H PRN   ondansetron 4 mg Intravenous Q6H PRN   oxyCODONE-acetaminophen 1 tablet Oral Q4H PRN   oxyCODONE-acetaminophen 2 tablet Oral Q6H PRN   potassium chloride 30 mEq Oral HS   predniSONE 10 mg Oral Daily   traMADol 50 mg Oral BID PRN   vancomycin 12 5 mg/kg Intravenous Q12H   varenicline 1 mg Oral BID       Discharge Plan:    home    Network Utilization Review Department  Phone: [phone]; Fax [phone]  [email or web address]  ATTENTION: Please call with any questions or concerns to [phone]  and carefully listen to the prompts so that you are directed to the right person  Send all requests for admission clinical reviews, approved or denied determinations and any other requests to fax [phone]   All voicemails are confidential

## 2019-09-17 NOTE — PROGRESS NOTES
Progress Note - Rick Schultz 1955, 59 y o  female MRN: [id number]    Unit/Bed#: E5 -01 Encounter: [id number]    Primary Care Provider: Amna Marks DO   Date and time admitted to hospital: 9/10/2019  9:18 AM        * Occlusion of common femoral artery [facility])  Assessment & Plan  Left common femoral artery occlusion status post endarterectomy  On aspirin, clopidogrel and atorvastatin  Possible surrounding cellulitis; IV antibiotics changed from cefazolin to vancomycin per ID  Today the patient had complained of worsened pain and edema in her left thigh, CT of thigh with IV contrast obtained, will f/u results  Acute sore throat  Assessment & Plan  Improved   Patient initially reported sore throat radiating to right ear  No pharyngeal exudates noted  Patient has been receiving antiobitic treatment, currently on IV vancomycin  Trial of Claritin  Supportive management, serial exams      Constipation  Assessment & Plan  Patient again c/o constipation, will proceed with bowel regimen    Acute blood loss as cause of postoperative anemia  Assessment & Plan  Acute blood loss anemia  Prior to admission hemoglobin 16 6  Current hemoglobin stable    Lab Results   Component Value Date    HGB 13 0 09/17/2019    HGB 13 5 09/16/2019    HGB 14 2 09/14/2019    HGB 13 5 09/13/2019       Chronic hypokalemia  Assessment & Plan  Chronic hypokalemia on potassium 30 mEq daily  Monitor BMP  Results from last 7 days   Lab Units 09/17/19  0532 09/16/19  0508 09/14/19  0500 09/13/19  1439 09/13/19  0528 09/12/19  1111 09/11/19  0501   POTASSIUM mmol/L 3 4* 3 7 3 7 3 6 3 8 3 5 4 2       History of lung cancer  Assessment & Plan  History of lung cancer status post left lower lobectomy    SLE (systemic lupus erythematosus) (HCC)  Assessment & Plan  Systemic lupus on prednisone 10 mg daily    No evidence of adrenal insufficiency requiring stress dosing of steroids    GERD (gastroesophageal reflux disease)  Assessment & Plan  Continue famotidine    Fibromyalgia  Assessment & Plan  Reported history of fibromyalgia on tramadol prior to admission as needed for pain    VTE Pharmacologic Prophylaxis:   Pharmacologic: Heparin  Mechanical VTE Prophylaxis in Place: Yes    Patient Centered Rounds: I have performed bedside rounds with nursing staff today  Discussions with Specialists or Other Care Team Provider: nursing    Education and Discussions with Family / Patient: patient    Time Spent for Care: 30 minutes  More than 50% of total time spent on counseling and coordination of care as described above  Current Length of Stay: 7 day(s)    Current Patient Status: Inpatient   Certification Statement: The patient will continue to require additional inpatient hospital stay due to need IV Abx and close monitoring    Discharge Plan: per primary team    Code Status: No Order      Subjective:   Patient seen and examined  Objective:     Vitals:   Temp (24hrs), Av 9 °F (36 6 °C), Min:97 7 °F (36 5 °C), Max:98 2 °F (36 8 °C)    Temp:  [97 7 °F (36 5 °C)-98 2 °F (36 8 °C)] 97 9 °F (36 6 °C)  HR:  [72-81] 81  Resp:  [18] 18  BP: (121-127)/(54-65) 121/65  SpO2:  [94 %-95 %] 95 %  Body mass index is 25 06 kg/m²  Input and Output Summary (last 24 hours): Intake/Output Summary (Last 24 hours) at 2019 1337  Last data filed at 2019 1313  Gross per 24 hour   Intake 660 ml   Output    Net 660 ml       Physical Exam:     Physical Exam   Constitutional: She is oriented to person, place, and time  No distress  HENT:   Head: Normocephalic and atraumatic  Eyes: Conjunctivae are normal    Neck: No JVD present  Cardiovascular: Normal rate and regular rhythm  No murmur heard  Pulmonary/Chest: Effort normal  No respiratory distress  She has no wheezes  She has no rales  Abdominal: Soft  She exhibits no distension  There is no guarding  Musculoskeletal: She exhibits no edema     Neurological: She is alert and oriented to person, place, and time  Skin: Skin is warm and dry  There is erythema (left medial thigh)  Psychiatric: She has a normal mood and affect  Additional Data:     Labs:    Results from last 7 days   Lab Units 09/17/19  0532   WBC Thousand/uL 9 76   HEMOGLOBIN g/dL 13 0   HEMATOCRIT % 40 8   PLATELETS Thousands/uL 232   NEUTROS PCT % 61   LYMPHS PCT % 26   MONOS PCT % 8   EOS PCT % 3     Results from last 7 days   Lab Units 09/17/19  0532 09/16/19  0508   SODIUM mmol/L 140 139   POTASSIUM mmol/L 3 4* 3 7   CHLORIDE mmol/L 106 105   CO2 mmol/L 24 26   BUN mg/dL 19 18   CREATININE mg/dL 0 71 0 74   ANION GAP mmol/L 10 8   CALCIUM mg/dL 8 4 8 6   ALBUMIN g/dL  --  2 4*   TOTAL BILIRUBIN mg/dL  --  0 43   ALK PHOS U/L  --  80   ALT U/L  --  48   AST U/L  --  39   GLUCOSE RANDOM mg/dL 132 125                         * I Have Reviewed All Lab Data Listed Above  * Additional Pertinent Lab Tests Reviewed:  Declan 66 Admission Reviewed    Imaging:    Imaging Reports Reviewed Today Include: will review CT right femoral with IV contrast      Recent Cultures (last 7 days):           Last 24 Hours Medication List:     Current Facility-Administered Medications:  aspirin 81 mg Oral Daily Juan Payton, DO    atorvastatin 10 mg Oral Daily With [employer], DO    bisacodyl 5 mg Oral Daily PRN Sherif Webber MD    calcium carbonate 500 mg Oral TID PRN [place] Read, DO    clopidogrel 75 mg Oral Daily Juan Payton, DO    docusate sodium 100 mg Oral BID Sherif Webber MD    famotidine 10 mg Oral Daily Before Breakfast Juan Payton DO    heparin (porcine) 5,000 Units Subcutaneous [facility] Juan Payton, DO    HYDROmorphone 0 5 mg Intravenous Q6H PRN Juan Payton, DO    loratadine 10 mg Oral Daily Shireen Drew MD    LORazepam 0 5 mg Oral Q12H PRN Juan Payton, DO    ondansetron 4 mg Intravenous Q6H PRN [place] Read,     oxyCODONE-acetaminophen 1 tablet Oral Q4H PRN [place] Read, DO oxyCODONE-acetaminophen 2 tablet Oral Q6H PRN Pio Ridges, DO    potassium chloride 30 mEq Oral HS Juan Payton,     predniSONE 10 mg Oral Daily Juan Payton DO    traMADol 50 mg Oral BID PRN Pio Ridges, DO    vancomycin 12 5 mg/kg Intravenous Q12H Rachel Rainey MD Last Rate: 750 mg (09/17/19 0405)   varenicline 1 mg Oral BID Pio Joanne, DO         Today, Patient Was Seen By: Jenny Barger MD    ** Please Note: Dictation voice to text software may have been used in the creation of this document   **

## 2019-09-17 NOTE — ASSESSMENT & PLAN NOTE
Left common femoral artery occlusion status post endarterectomy  On aspirin, clopidogrel and atorvastatin  Possible surrounding cellulitis; IV antibiotics changed from cefazolin to vancomycin per ID  Today the patient had complained of worsened pain and edema in her left thigh, CT of thigh with IV contrast obtained, will f/u results

## 2019-09-17 NOTE — ASSESSMENT & PLAN NOTE
Improved   Patient initially reported sore throat radiating to right ear  No pharyngeal exudates noted  Patient has been receiving antiobitic treatment, currently on IV vancomycin  Trial of Claritin  Supportive management, serial exams

## 2019-09-17 NOTE — PLAN OF CARE
Problem: PHYSICAL THERAPY ADULT  Goal: Performs mobility at highest level of function for planned discharge setting  See evaluation for individualized goals  Description  Treatment/Interventions: Functional transfer training, LE strengthening/ROM, Elevations, Therapeutic exercise, Endurance training, Patient/family training, Bed mobility, Gait training, Spoke to nursing  Equipment Recommended: Walker(RW)       See flowsheet documentation for full assessment, interventions and recommendations  Note:   Prognosis: Good  Problem List: Decreased strength, Decreased endurance, Impaired balance, Decreased mobility, Decreased safety awareness, Decreased skin integrity, Pain  Assessment: pt currenlty in bed  willing to perform ther ex and gait training  pr initiated ther ex while in bed  session interrupted due to MD visit that was going to be prolonged  pt did clarify with pt  about pushing down with high force vs gentle isometrics  pt 's session terminated  pt will need continued PT   Barriers to Discharge: None     Recommendation: Home with family support     PT - OK to Discharge: No    See flowsheet documentation for full assessment

## 2019-09-17 NOTE — CONSULTS
Vancomycin therapy has been discontinued per the ID consult service  Pharmacy will sign off  Thank you for this consult  Please do not hesitate to call us with questions or re-consult us if the need arises      Rafa Rodgers, PharmD, [facility] Clinical Pharmacist

## 2019-09-17 NOTE — ASSESSMENT & PLAN NOTE
58 y/o F with aortoiliac and infrainguinal occlusive disease with limiting claudication, systemic lupus erythematosus with polyarthralgias, fibromyalgia, Raynaud's, sciatica, neuropathy, and hx lung cancer, electively admitted for left femoral artery endarterectomy on 9/10/19  Plan:  POD #7 LEFT CFA endarterectomy w/ bovine patch angioplasty and profundoplasty ([facility])  -Left thigh cellulitis with improved erythema, decreased pain, leukocytosis normalized  -ID following, appreciate input  ABX changed to Vanco and erythema and pain improved  Continue IV ABX; will plan for D/C home on Doxycycline per ID  Anticipating possible discharge tomorrow  -CT left femur reviewed, small fluid collection, not consistent with abscess  Clinically improved, so will continue to monitor  -Continue aspirin, Plavix and atorvastatin  -Smoking cessation encouraged  Continue Chantix  -Encouraged patient to advance activities  No need for home PT per PT  -Pain medication PRN  Pain much improved    Taking oxycodone with good relief  -Patient seen/examined with Dr Katlyn Cox
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
## 2019-09-18 VITALS
TEMPERATURE: 97.9 F | HEART RATE: 71 BPM | RESPIRATION RATE: 18 BRPM | HEIGHT: 64 IN | SYSTOLIC BLOOD PRESSURE: 129 MMHG | OXYGEN SATURATION: 97 % | BODY MASS INDEX: 24.92 KG/M2 | WEIGHT: 146 LBS | DIASTOLIC BLOOD PRESSURE: 73 MMHG

## 2019-09-18 LAB
ANION GAP SERPL CALCULATED.3IONS-SCNC: 9 MMOL/L (ref 4–13)
BASOPHILS # BLD AUTO: 0.08 THOUSANDS/ΜL (ref 0–0.1)
BASOPHILS NFR BLD AUTO: 1 % (ref 0–1)
BUN SERPL-MCNC: 16 MG/DL (ref 5–25)
CALCIUM SERPL-MCNC: 8.6 MG/DL (ref 8.3–10.1)
CHLORIDE SERPL-SCNC: 106 MMOL/L (ref 100–108)
CO2 SERPL-SCNC: 25 MMOL/L (ref 21–32)
CREAT SERPL-MCNC: 0.76 MG/DL (ref 0.6–1.3)
EOSINOPHIL # BLD AUTO: 0.34 THOUSAND/ΜL (ref 0–0.61)
EOSINOPHIL NFR BLD AUTO: 3 % (ref 0–6)
ERYTHROCYTE [DISTWIDTH] IN BLOOD BY AUTOMATED COUNT: 15 % (ref 11.6–15.1)
GFR SERPL CREATININE-BSD FRML MDRD: 83 ML/MIN/1.73SQ M
GLUCOSE SERPL-MCNC: 83 MG/DL (ref 65–140)
HCT VFR BLD AUTO: 42 % (ref 34.8–46.1)
HGB BLD-MCNC: 13.7 G/DL (ref 11.5–15.4)
IMM GRANULOCYTES # BLD AUTO: 0.25 THOUSAND/UL (ref 0–0.2)
IMM GRANULOCYTES NFR BLD AUTO: 2 % (ref 0–2)
LYMPHOCYTES # BLD AUTO: 2.93 THOUSANDS/ΜL (ref 0.6–4.47)
LYMPHOCYTES NFR BLD AUTO: 25 % (ref 14–44)
MCH RBC QN AUTO: 30.6 PG (ref 26.8–34.3)
MCHC RBC AUTO-ENTMCNC: 32.6 G/DL (ref 31.4–37.4)
MCV RBC AUTO: 94 FL (ref 82–98)
MONOCYTES # BLD AUTO: 1.15 THOUSAND/ΜL (ref 0.17–1.22)
MONOCYTES NFR BLD AUTO: 10 % (ref 4–12)
NEUTROPHILS # BLD AUTO: 6.85 THOUSANDS/ΜL (ref 1.85–7.62)
NEUTS SEG NFR BLD AUTO: 59 % (ref 43–75)
NRBC BLD AUTO-RTO: 0 /100 WBCS
PLATELET # BLD AUTO: 248 THOUSANDS/UL (ref 149–390)
PMV BLD AUTO: 8.8 FL (ref 8.9–12.7)
POTASSIUM SERPL-SCNC: 4.1 MMOL/L (ref 3.5–5.3)
RBC # BLD AUTO: 4.48 MILLION/UL (ref 3.81–5.12)
SODIUM SERPL-SCNC: 140 MMOL/L (ref 136–145)
WBC # BLD AUTO: 11.6 THOUSAND/UL (ref 4.31–10.16)

## 2019-09-18 PROCEDURE — 80048 BASIC METABOLIC PNL TOTAL CA: CPT | Performed by: INTERNAL MEDICINE

## 2019-09-18 PROCEDURE — 99024 POSTOP FOLLOW-UP VISIT: CPT | Performed by: PHYSICIAN ASSISTANT

## 2019-09-18 PROCEDURE — NC001 PR NO CHARGE: Performed by: PHYSICIAN ASSISTANT

## 2019-09-18 PROCEDURE — 99232 SBSQ HOSP IP/OBS MODERATE 35: CPT | Performed by: INTERNAL MEDICINE

## 2019-09-18 PROCEDURE — 85025 COMPLETE CBC W/AUTO DIFF WBC: CPT | Performed by: INTERNAL MEDICINE

## 2019-09-18 RX ORDER — CLOPIDOGREL BISULFATE 75 MG/1
75 TABLET ORAL DAILY
Qty: 30 TABLET | Refills: 3 | Status: SHIPPED | OUTPATIENT
Start: 2019-09-19 | End: 2019-09-27 | Stop reason: SDUPTHER

## 2019-09-18 RX ORDER — DOXYCYCLINE HYCLATE 100 MG/1
100 CAPSULE ORAL EVERY 12 HOURS SCHEDULED
Qty: 11 CAPSULE | Refills: 0 | Status: SHIPPED | OUTPATIENT
Start: 2019-09-18 | End: 2019-09-23

## 2019-09-18 RX ORDER — ATORVASTATIN CALCIUM 10 MG/1
10 TABLET, FILM COATED ORAL
Qty: 30 TABLET | Refills: 3 | Status: SHIPPED | OUTPATIENT
Start: 2019-09-18 | End: 2019-09-27 | Stop reason: SDUPTHER

## 2019-09-18 RX ORDER — ASPIRIN 81 MG/1
81 TABLET, CHEWABLE ORAL DAILY
Qty: 30 TABLET | Refills: 3 | Status: SHIPPED | OUTPATIENT
Start: 2019-09-19 | End: 2019-09-27 | Stop reason: SDUPTHER

## 2019-09-18 RX ADMIN — OXYCODONE HYDROCHLORIDE AND ACETAMINOPHEN 2 TABLET: 5; 325 TABLET ORAL at 10:56

## 2019-09-18 RX ADMIN — VARENICLINE TARTRATE 1 MG: 1 TABLET, FILM COATED ORAL at 10:56

## 2019-09-18 RX ADMIN — CLOPIDOGREL BISULFATE 75 MG: 75 TABLET ORAL at 08:42

## 2019-09-18 RX ADMIN — DOCUSATE SODIUM 100 MG: 100 CAPSULE, LIQUID FILLED ORAL at 18:15

## 2019-09-18 RX ADMIN — DOXYCYCLINE HYCLATE 100 MG: 100 CAPSULE ORAL at 08:42

## 2019-09-18 RX ADMIN — ATORVASTATIN CALCIUM 10 MG: 10 TABLET, FILM COATED ORAL at 18:15

## 2019-09-18 RX ADMIN — HEPARIN SODIUM 5000 UNITS: 5000 INJECTION INTRAVENOUS; SUBCUTANEOUS at 05:15

## 2019-09-18 RX ADMIN — PREDNISONE 10 MG: 10 TABLET ORAL at 08:42

## 2019-09-18 RX ADMIN — DOCUSATE SODIUM 100 MG: 100 CAPSULE, LIQUID FILLED ORAL at 08:42

## 2019-09-18 RX ADMIN — ASPIRIN 81 MG 81 MG: 81 TABLET ORAL at 08:42

## 2019-09-18 RX ADMIN — FAMOTIDINE 10 MG: 20 TABLET ORAL at 05:14

## 2019-09-18 NOTE — PROGRESS NOTES
Progress Note - Creed Area 1955, 59 y o  female MRN: [id number]    Unit/Bed#: E5 -01 Encounter: [id number]    Primary Care Provider: Alton Alfaro DO   Date and time admitted to hospital: 9/10/2019  9:18 AM      * Occlusion of common femoral artery [facility])  Assessment & Plan  58 y/o F with aortoiliac and infrainguinal occlusive disease with limiting claudication, systemic lupus erythematosus with polyarthralgias, fibromyalgia, Raynaud's, sciatica, neuropathy, and hx lung cancer, electively admitted for left femoral artery endarterectomy on 9/10/19  Mild incisional pain  Pain much better controlled  She required 1 Percocet yesterday  Decreased thigh pain and redness  Using walker to bathroom  She does not feel that she requires home nursing or PT  No fevers, chills  L thigh incision well approx with scab on incision line; no drainage  Mild, flat erythema around the incision and thigh  CT left femur reviewed, small fluid collection, not consistent with abscess  Plan:  POD #8 LEFT CFA endarterectomy w/ bovine patch angioplasty and profundoplasty ([facility])  -Left thigh cellulitis with improved erythema, decreased pain   -ABX changed from IV Vanco or oral doxycycline 100 q 12 through 9/23/19 per ID   -Continue aspirin, Plavix and atorvastatin  -Smoking cessation encouraged  Continue Chantix which she has at home    -Encouraged patient to advance activities  No need for home PT per therapy  -Pain medication on Percocet PRN  Pain much improved  -Discharge instructions were reviewed with patient    -Anticipating possible discharge today or tomorrow pending ID evaluation/input today   -Will review with Dr Yarelis Finch      Tobacco user  Assessment & Plan  -smoking cessation was encouraged  -patient on Chantix inpatient, management per [facility]    SLE (systemic lupus erythematosus) (HCC)  Assessment & Plan  -chronic prednisone; patient being followed by Internal Medicine    Subjective:    RE:  Post op L CFA endart; post op thigh cellulitis    Doing well  No new complaints today  She reports mild pain at incision  She used one Percocet yesterday and some ice to thigh for pain  She tells me that the thigh swelling and redness looks improved  She has been up to the bathroom with walker  No fevers, chills  No chest pain or SOB  Vitals:  /73 (BP Location: Left arm)   Pulse 71   Temp 97 9 °F (36 6 °C) (Temporal)   Resp 18   Ht 5' 4" (1 626 m)   Wt 66 2 kg (146 lb)   LMP  (LMP Unknown)   SpO2 97%   Breastfeeding? No   BMI 25 06 kg/m²     I/Os:  I/O last 3 completed shifts: In: 660 [P O :660]  Out: -   No intake/output data recorded      Lab Results and Cultures:   Lab Results   Component Value Date    WBC 11 60 (H) 09/18/2019    HGB 13 7 09/18/2019    HCT 42 0 09/18/2019    MCV 94 09/18/2019     09/18/2019     Lab Results   Component Value Date    CALCIUM 8 6 09/18/2019     03/20/2017    K 4 1 09/18/2019    CO2 25 09/18/2019     09/18/2019    BUN 16 09/18/2019    CREATININE 0 76 09/18/2019     Lab Results   Component Value Date    INR 1 04 09/10/2019    PROTIME 13 7 09/10/2019          Medications:  Current Facility-Administered Medications   Medication Dose Route Frequency    aspirin chewable tablet 81 mg  81 mg Oral Daily    atorvastatin (LIPITOR) tablet 10 mg  10 mg Oral Daily With Dinner    bisacodyl (DULCOLAX) EC tablet 5 mg  5 mg Oral Daily PRN    calcium carbonate (TUMS) chewable tablet 500 mg  500 mg Oral TID PRN    clopidogrel (PLAVIX) tablet 75 mg  75 mg Oral Daily    docusate sodium (COLACE) capsule 100 mg  100 mg Oral BID    doxycycline hyclate (VIBRAMYCIN) capsule 100 mg  100 mg Oral Q12H [address]    famotidine (PEPCID) tablet 10 mg  10 mg Oral Daily Before Breakfast    heparin (porcine) subcutaneous injection 5,000 Units  5,000 Units Subcutaneous Q8H [address]    HYDROmorphone (DILAUDID) injection 0 5 mg  0 5 mg Intravenous Q6H PRN    loratadine (CLARITIN) tablet 10 mg 10 mg Oral Daily    LORazepam (ATIVAN) tablet 0 5 mg  0 5 mg Oral Q12H PRN    ondansetron (ZOFRAN) injection 4 mg  4 mg Intravenous Q6H PRN    oxyCODONE-acetaminophen (PERCOCET) 5-325 mg per tablet 1 tablet  1 tablet Oral Q4H PRN    oxyCODONE-acetaminophen (PERCOCET) 5-325 mg per tablet 2 tablet  2 tablet Oral Q6H PRN    potassium chloride (K-DUR,KLOR-CON) CR tablet 30 mEq  30 mEq Oral HS    predniSONE tablet 10 mg  10 mg Oral Daily    traMADol (ULTRAM) tablet 50 mg  50 mg Oral BID PRN    varenicline (CHANTIX) tablet 1 mg  1 mg Oral BID       Imaging:    CT Femur L w contrast 9/17/19  1  Post intervention change noted after left CFA endarterectomy with ill-defined fluid collection noted in the region of the artery without peripherally enhancing wall  Findings are most consistent with phlegmon formation without discrete abscess at   this time  Likely reactive left inguinal lymph nodes are noted  The artery remains patent  2   Large region of AVN of the left femoral head including femoral head flattening consistent with cortical collapse  Secondary advanced osteoarthritis also noted across the left hip  VAS LEV RIGHT 9/13/19    RIGHT LOWER LIMB LIMITED:  Evaluation shows no evidence of thrombus in the common femoral vein  Doppler evaluation shows a normal response to augmentation maneuvers  LEFT LOWER LIMB:  No evidence of acute or chronic deep vein thrombosis  No evidence of superficial thrombophlebitis noted  Doppler evaluation shows a normal response to augmentation maneuvers  Popliteal, posterior tibial and anterior tibial arterial Doppler waveforms are  triphasic/monophasic  Tech Note: Examination of the proximal leg was technically difficult/limited  due to meriplex dressing in place in the groin/proximal thigh  Patient unable to  bend her knee for positioning        Physical Exam:    General appearance: alert and oriented, in no acute distress  Skin: Skin color, texture, turgor normal  No rashes or lesions  Neurologic: Grossly normal  Head: Normocephalic, without obvious abnormality, atraumatic  Eyes: PERRLE, EOMI, sclerae nonicteric    Neck: no carotid bruit, no JVD and supple, symmetrical, trachea midline  Back: symmetric, no curvature  ROM normal  No CVA tenderness  Lungs:  Absent breath sounds left base; otherwise, overall clear stage bilaterally; respirations nonlabored  Chest wall: no tenderness  Heart: regular rate and rhythm, S1, S2 normal, no murmur, click, rub or gallop  Abdomen: soft, non-tender; bowel sounds normal; no masses,  no organomegaly  Extremities:  Mild non-pitting edema L > R; chronic venous changes        LEFT foot warm and well-perfused  + DP pulse    LEFT AT/DP/PT signals present    Wound  L thigh incision well approx with scab on incision line  No incisional drainage  There is surrounding mild (flat) erythema around the incision and thigh           Bipin Crowley PA-C  9/18/2019  [facility]

## 2019-09-18 NOTE — PROGRESS NOTES
Progress Note - Infectious Disease   Quin Bartlett 59 y o  female MRN: [id number]  Unit/Bed#: E5 -01 Encounter: [id number]      Impression/Plan:  1  Left thigh cellulitis   Developing since patient's left CFA endarterectomy with bovine graft placement   Distal end of surgical incision was weeping serous fluid but this appears to be resolved and the incision is now dry and well approximated  Dopplers were negative for DVT    Patient has remained clinically stable without sepsis  She is currently receiving PO Doxycycline and is tolerating without difficulty  -continue PO doxycycline through 09/23/2019 for a total of seven days of antibiotic treatment  -I educated patient on drinking a full glass of water with each dose of doxycycline  -monitor CBC and BMP  -monitor vitals  -serial left thigh exams  -continue close follow-up with vascular surgery     2  Leukocytosis  Katerina Lilly secondary to chronic prednisone use   Also consider left thigh cellulitis above   Fortunately she has remained afebrile     -antibiotic as above  -monitor CBC  -monitor vitals     3  Status post left CFA endarterectomy, bovine graft, and profundoplasty   On 09/10/2019   She is following closely with vascular surgery   -continue close follow-up with vascular surgery     4  PAD   Patient with history of bilateral calf pain, swelling, right foot wound feet   Now status post femoral artery endarterectomy with bovine graft and profundoplasty on 09/10/2019    -serial bilateral lower extremity exams  -surgical site care/wound care per vascular surgery  -continue close follow-up with vascular surgery     5  Nicotine dependence   Despite extensive documented efforts from multiple providers the patient continues to choose to smoke   Will continue to recommend patient committed full smoking cessation immediately    -NRT per primary service     6  SLE   Patient on chronic daily prednisone   She follows up as an outpatient with [facility] rheumatologist, Dr Owen Hernandez factor for wound nonhealing      7  History of antibiotic allergies   Patient reports reactions to penicillins, Neosporin, nitrofurantoin, and Bactrim in the past   She does not recall having issues with cephalosporins  She has tolerated doxycycline, cefazolin, and vancomycin during this hospitalization without difficulty      8  Neck/throat pain   Symptoms are improving  [facility] is following   -monitor symptoms  -symptomatic treatment per [facility]IM    Patient is stable for discharge from ID standpoint  Above plan was discussed in detail with patient at the bedside  Above plan was discussed in detail with [facility] provider, Dr Vivian Mendoza  Above plan was discussed in detail with vascular surgery Monica SCHWARTZ  Antibiotics:  Doxycycline 2  Antibiotics 6    Subjective:  Patient reports she is having a little increase in the pain in her left thigh but believes it is because she was up walking with PT  States when she woke up this morning she actually felt pretty good  States the redness has reduced a lot in her incision seems dry  Patient also reports that her scratchy throat seems better and she has no pain in her right ear today  She has no fever, chills, sweats, shakes; no nausea, vomiting, abdominal pain, diarrhea, or dysuria; no cough, shortness of breath, or chest pain  No new symptoms  Objective:  Vitals:  Temp:  [97 1 °F (36 2 °C)-97 9 °F (36 6 °C)] 97 9 °F (36 6 °C)  HR:  [60-75] 71  Resp:  [18] 18  BP: (123-129)/(50-73) 129/73  SpO2:  [95 %-98 %] 97 %  Temp (24hrs), Av 4 °F (36 3 °C), Min:97 1 °F (36 2 °C), Max:97 9 °F (36 6 °C)  Current: Temperature: 97 9 °F (36 6 °C)    Physical Exam:   General Appearance:  Alert, interactive, nontoxic, no acute distress  Throat: Oropharynx moist without lesions      Lungs:   Clear to auscultation bilaterally; no wheezes, rhonchi or rales; respirations unlabored   Heart:  RRR; no murmur, rub or gallop   Abdomen:   Soft, obese, non-tender, non-distended, positive bowel sounds  Extremities: No clubbing or cyanosis; trace left upper medial thigh edema is reduced with faint overlying erythema, is mildly warm to touch, mild tenderness with palpation  Skin: No new rashes, lesions, or draining wounds noted on exposed skin  Surgical incision in left groin extends to the upper anterior thigh, as well approximately with no active drainage, patchy surrounding ecchymosis is fading     Labs, Imaging, & Other studies:   All pertinent labs and imaging studies were personally reviewed  Results from last 7 days   Lab Units 09/18/19  0523 09/17/19  0532 09/16/19  0508   WBC Thousand/uL 11 60* 9 76 11 58*   HEMOGLOBIN g/dL 13 7 13 0 13 5   PLATELETS Thousands/uL 248 232 219     Results from last 7 days   Lab Units 09/18/19  0523  09/16/19  0508  09/13/19  0528 09/12/19  1111   POTASSIUM mmol/L 4 1   < > 3 7   < > 3 8 3 5   CHLORIDE mmol/L 106   < > 105   < > 107 108   CO2 mmol/L 25   < > 26   < > 26 24   BUN mg/dL 16   < > 18   < > 17 14   CREATININE mg/dL 0 76   < > 0 74   < > 0 61 0 73   EGFR ml/min/1 73sq m 83   < > 86   < > 96 87   CALCIUM mg/dL 8 6   < > 8 6   < > 8 3 8 3   AST U/L  --   --  39  --  17 16   ALT U/L  --   --  48  --  18 17   ALK PHOS U/L  --   --  80  --  74 73    < > = values in this interval not displayed       Results from last 7 days   Lab Units 09/13/19  1647   MRSA CULTURE ONLY  No Methicillin Resistant Staphlyococcus aureus (MRSA) isolated

## 2019-09-18 NOTE — PLAN OF CARE
Problem: Prexisting or High Potential for Compromised Skin Integrity  Goal: Skin integrity is maintained or improved  Description  INTERVENTIONS:  - Identify patients at risk for skin breakdown  - Assess and monitor skin integrity  - Assess and monitor nutrition and hydration status  - Monitor labs   - Assess for incontinence   - Turn and reposition patient  - Assist with mobility/ambulation  - Relieve pressure over bony prominences  - Avoid friction and shearing  - Provide appropriate hygiene as needed including keeping skin clean and dry  - Evaluate need for skin moisturizer/barrier cream  - Collaborate with interdisciplinary team   - Patient/family teaching  - Consider wound care consult   Outcome: Progressing     Problem: PAIN - ADULT  Goal: Verbalizes/displays adequate comfort level or baseline comfort level  Description  Interventions:  - Encourage patient to monitor pain and request assistance  - Assess pain using appropriate pain scale  - Administer analgesics based on type and severity of pain and evaluate response  - Implement non-pharmacological measures as appropriate and evaluate response  - Consider cultural and social influences on pain and pain management  - Notify physician/advanced practitioner if interventions unsuccessful or patient reports new pain  Outcome: Progressing     Problem: SAFETY ADULT  Goal: Patient will remain free of falls  Description  INTERVENTIONS:  - Assess patient frequently for physical needs  -  Identify cognitive and physical deficits and behaviors that affect risk of falls    -  [place] fall precautions as indicated by assessment   - Educate patient/family on patient safety including physical limitations  - Instruct patient to call for assistance with activity based on assessment  - Modify environment to reduce risk of injury  - Consider OT/PT consult to assist with strengthening/mobility  Outcome: Progressing  Goal: Maintain or return to baseline ADL function  Description  INTERVENTIONS:  -  Assess patient's ability to carry out ADLs; assess patient's baseline for ADL function and identify physical deficits which impact ability to perform ADLs (bathing, care of mouth/teeth, toileting, grooming, dressing, etc )  - Assess/evaluate cause of self-care deficits   - Assess range of motion  - Assess patient's mobility; develop plan if impaired  - Assess patient's need for assistive devices and provide as appropriate  - Encourage maximum independence but intervene and supervise when necessary  - Involve family in performance of ADLs  - Assess for home care needs following discharge   - Consider OT consult to assist with ADL evaluation and planning for discharge  - Provide patient education as appropriate  Outcome: Progressing  Goal: Maintain or return mobility status to optimal level  Description  INTERVENTIONS:  - Assess patient's baseline mobility status (ambulation, transfers, stairs, etc )    - Identify cognitive and physical deficits and behaviors that affect mobility  - Identify mobility aids required to assist with transfers and/or ambulation (gait belt, sit-to-stand, lift, walker, cane, etc )  - [place] fall precautions as indicated by assessment  - Record patient progress and toleration of activity level on Mobility SBAR; progress patient to next Phase/Stage  - Instruct patient to call for assistance with activity based on assessment  - Consider rehabilitation consult to assist with strengthening/weightbearing, etc   Outcome: Progressing     Problem: DISCHARGE PLANNING  Goal: Discharge to home or other facility with appropriate resources  Description  INTERVENTIONS:  - Identify barriers to discharge w/patient and caregiver  - Arrange for needed discharge resources and transportation as appropriate  - Identify discharge learning needs (meds, wound care, etc )  - Arrange for interpretive services to assist at discharge as needed  - Refer to Case Management Department for coordinating discharge planning if the patient needs post-hospital services based on physician/advanced practitioner order or complex needs related to functional status, cognitive ability, or social support system  Outcome: Progressing     Problem: Knowledge Deficit  Goal: Patient/family/caregiver demonstrates understanding of disease process, treatment plan, medications, and discharge instructions  Description  Complete learning assessment and assess knowledge base  Interventions:  - Provide teaching at level of understanding  - Provide teaching via preferred learning methods  Outcome: Progressing     Problem: HEMATOLOGIC - ADULT  Goal: Maintains hematologic stability  Description  INTERVENTIONS  - Assess for signs and symptoms of bleeding or hemorrhage  - Monitor labs  - Administer supportive blood products/factors as ordered and appropriate  Outcome: Progressing     Problem: Potential for Falls  Goal: Patient will remain free of falls  Description  INTERVENTIONS:  - Assess patient frequently for physical needs  -  Identify cognitive and physical deficits and behaviors that affect risk of falls    -  [place] fall precautions as indicated by assessment   - Educate patient/family on patient safety including physical limitations  - Instruct patient to call for assistance with activity based on assessment  - Modify environment to reduce risk of injury  - Consider OT/PT consult to assist with strengthening/mobility  Outcome: Progressing

## 2019-09-18 NOTE — ASSESSMENT & PLAN NOTE
58 y/o F with aortoiliac and infrainguinal occlusive disease with limiting claudication, systemic lupus erythematosus with polyarthralgias, fibromyalgia, Raynaud's, sciatica, neuropathy, and hx lung cancer, electively admitted for left femoral artery endarterectomy on 9/10/19  Mild incisional pain  Pain much better controlled  She required 1 Percocet yesterday  Decreased thigh pain and redness  Using walker to bathroom  She does not feel that she requires home nursing or PT  No fevers, chills  L thigh incision well approx with scab on incision line; no drainage  Mild, flat erythema around the incision and thigh  CT left femur reviewed, small fluid collection, not consistent with abscess  Plan:  POD #8 LEFT CFA endarterectomy w/ bovine patch angioplasty and profundoplasty ([facility])  -Left thigh cellulitis with improved erythema, decreased pain   -ABX changed from IV Vanco or oral doxycycline 100 q 12 through 9/23/19 per ID   -Continue aspirin, Plavix and atorvastatin  -Smoking cessation encouraged  Continue Chantix which she has at home    -Encouraged patient to advance activities  No need for home PT per therapy  -Pain medication on Percocet PRN  Pain much improved  -Discharge instructions were reviewed with patient    -Anticipating possible discharge today or tomorrow pending ID evaluation/input today   -Will review with Dr Georgina Villeda

## 2019-09-18 NOTE — ASSESSMENT & PLAN NOTE
60 y/o F with aortoiliac and infrainguinal occlusive disease with limiting claudication, systemic lupus erythematosus with polyarthralgias, fibromyalgia, Raynaud's, sciatica, neuropathy, and hx lung cancer, electively admitted for left femoral artery endarterectomy on 9/10/19  Mild incisional pain  Pain much better controlled  She required 1 Percocet yesterday  Decreased thigh pain and redness  Using walker to bathroom  She does not feel that she requires home nursing or PT  No fevers, chills  L thigh incision well approx with scab on incision line; no drainage  Mild, flat erythema around the incision and thigh  CT left femur reviewed, small fluid collection, not consistent with abscess  Plan:  POD #8 LEFT CFA endarterectomy w/ bovine patch angioplasty and profundoplasty ([facility])  -Left thigh cellulitis with improved erythema, decreased pain   -ABX changed from IV Vanco or oral doxycycline 100 q 12 through 9/23/19 per ID   -Continue aspirin, Plavix and atorvastatin  -Smoking cessation encouraged  Continue Chantix which she has at home    -Encouraged patient to advance activities  No need for home PT per therapy  -Pain medication on Percocet PRN  Pain much improved  -Discharge instructions were reviewed with patient    -Anticipating possible discharge today or tomorrow pending ID evaluation/input today   -Will review with Dr Lilia Mcclelland

## 2019-09-18 NOTE — DISCHARGE SUMMARY
Discharge Note - Perfecto Byrne 1955, 59 y o  female MRN: [id number]    Unit/Bed#: E5 -01 Encounter: [id number]    Primary Care Provider: Sandi Valle DO   Date and time admitted to hospital: 9/10/2019  9:18 AM        * Occlusion of common femoral artery [facility])  Assessment & Plan  58 y/o F with aortoiliac and infrainguinal occlusive disease with limiting claudication, systemic lupus erythematosus with polyarthralgias, fibromyalgia, Raynaud's, sciatica, neuropathy, and hx lung cancer, electively admitted for left femoral artery endarterectomy on 9/10/19  Mild incisional pain  Pain much better controlled  She required 1 Percocet yesterday  Decreased thigh pain and redness  Using walker to bathroom  She does not feel that she requires home nursing or PT  No fevers, chills  L thigh incision well approx with scab on incision line; no drainage  Mild, flat erythema around the incision and thigh  CT left femur reviewed, small fluid collection, not consistent with abscess  Plan:  POD #8 LEFT CFA endarterectomy w/ bovine patch angioplasty and profundoplasty ([facility]anne)  -Left thigh cellulitis with improved erythema, decreased pain   -ABX changed from IV Vanco or oral doxycycline 100 q 12 through 9/23/19 per ID   -Continue aspirin, Plavix and atorvastatin  -Smoking cessation encouraged  Continue Chantix which she has at home    -Encouraged patient to advance activities  No need for home PT per therapy  -Pain medication on Percocet PRN  Pain much improved  -Discharge instructions were reviewed with patient    -Anticipating possible discharge today or tomorrow pending ID evaluation/input today   -Will review with Dr Arielle Alvarez          Discharge Summary    Perfecto Byrne 59 y o  female MRN: [id number]    Unit/Bed#: E5 -01 Encounter: [id number]    Admission Date: 9/10/2019     Discharge Date: 9/18/19    Attending: Dr Freedom Curry    Consultants:  Dr Mulu Sparks, Internal Medicine for co-care of multiple medical problems     Dr Marta Arias, Infectious Disease for acute, postop thigh cellulitis    Admitting Diagnosis: 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HCC) [I70 209]    Principle Procedure:   LEFT CFA endarterectomy w/ bovine patch angioplasty and profundoplasty (9/10/19, [facility])    Secondary Diagnosis:  Past Medical History:   Diagnosis Date    Anxiety     Arthritis 12/20/2014    Cataract     eulalia    Disease of thyroid gland     nodules    Fibromyalgia, primary     Frequent UTI     History of lung cancer     lower left lobe removed - 2004    Irritable bowel syndrome     Lactose intolerance     Lupus (HCC)     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    PAD (peripheral artery disease) (HCC)     Presence of pessary     Raynaud disease     Sciatica     Sinus headache     Sjogren's syndrome ([facility] [address] )     Smoker      Past Surgical History:   Procedure Laterality Date    LUNG REMOVAL, PARTIAL  05/01/2004    Prior Surgical/Procedural History - 5/4 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 - lung resection - subtotal    MOUTH SURGERY      Tooth extraction    IL THROMBOENDARTECTMY FEMORAL COMMON Left 9/10/2019    Procedure: ENDARTERECTOMY ARTERIAL FEMORAL,  WITH BOVINE GRAFT AND PROFUNDOPLASTY;  Surgeon: Erika Licea DO;  Location: [facility];  Service: Vascular    SINUS SURGERY          Discharge Medications:  See after visit summary for reconciled discharge medications provided to patient and family  Hospital Course:   Patient is a 60 y/o F with aortoiliac and infrainguinal occlusive disease with limiting claudication, systemic lupus erythematosus with polyarthralgias on chronic steriods, fibromyalgia, Raynaud's, sciatica, neuropathy, and hx lung cancer, electively admitted for left femoral artery endarterectomy on 9/10/19  Patient under went left femoral artery endarterectomy and patch angioplasty without surgical complication    For complete details regarding surgery see separate operative report  Post-operatively, she was transfer to the ICU for close monitoring  Internal medicine was consulted to manage multiple medical problems  She ultimately required a prolonged hospitalization as she post-operatively developed LEFT thigh cellulitis  Post-surgically she was monitored the ICU where she remained hemodynamically stable and did not require blood pressure support or antihypertensives  She was started on aspirin, clopidogrel and statin therapy  On POD #1, she was able to be transferred to a regular floor bed with advanced activity  On postop day 2 her surgical dressing, silver Mepilex, was taken down to examine the incision site which was noted to be mildly erythematous extending into the left inner thigh  On postoperative day 3, she had increased erythema the to the left medial thigh appearing cellulitic with serous drainage  She was placed on IV cephazolin with consultation to Infectious Disease  On POD 4, she continued to have erythema without improvement for which ID changed antibiotics to IV vancomycin  After initiation of IV vancomycin, the cellulitis improved each day  She was switched to oral vancomycin on POD 7  When she was seen on POD 8, there was mild, flat erythema which was improved and not hot  The surgical wound was dry and without drainage  The incision site was well approximated with a scab down the incision  She was seen by infectious disease and found to be stable for discharge  ID recommended doxycycline 100 mg q 12 hours through 09/23/2019  During the course of the hospitalization, overall hemodynamics remained stable and she otherwise appeared well  There was a slightly elevated WBC ct which was 12 17 on 9/12 and 11 50 on discharge  WBC elevation which may have been due to infection v chronic steroid usage  There was mild post operative blood loss from 16 6 to 12 8  She required oral narcotic pain medication as needed for surgical pain   One day prior to discharge, she had only used 1 Percocet, in addition to prn ice  The patient was seen by Physical Therapy for assessment and activity who found her as having acute decreased stability and balance but safe and stable for discharge to home with family support  She was ambulating with walker to the bathroom  On 9/18/19, the patient felt well  She was ambulating with walker  She had no complaints  She found the L thigh cellulitis to be resolving and she had mild, intermittent surgical pain  She was found to be stable for discharge from medical, surgical and ID standpoints  Discharge instructions were reviewed with patient thoroughly  Her aspirin, clopidogrel, statin and doxycycline were sent to her pharmacy  Smoking cessation was provided  She recently started Chantix  As per patient request, she is to call our office in 1 day to schedule follow-up appointment to to be seen in the next (approx) 7 to 10 days by Dr Chantell Reynolds postoperatively  The patient requested to make her own follow up appointment  Condition at Discharge: good     Provisions for Follow-Up Care:  See after visit summary for information related to follow-up care and any pertinent home health orders  Disposition: See After Visit Summary for discharge disposition information  Discharge instructions/Information to patient and family:   See after visit summary for information provided to patient and family  Planned Readmission: No    Discharge Statement   I spent 35 minutes discharging the patient  This time was spent on the day of discharge  I had direct contact with the patient on the day of discharge  Additional documentation is required if more than 30 minutes were spent on discharge

## 2019-09-18 NOTE — CASE MANAGEMENT
CM noted PT recommendations remain home with family support  Patient to be discharged on po antibiotics and has no transportation needs per nursing  CM department will continue to follow through discharge

## 2019-09-19 ENCOUNTER — TELEPHONE (OUTPATIENT)
Dept: VASCULAR SURGERY | Facility: CLINIC | Age: 64
End: 2019-09-19

## 2019-09-19 ENCOUNTER — TRANSITIONAL CARE MANAGEMENT (OUTPATIENT)
Dept: FAMILY MEDICINE CLINIC | Facility: CLINIC | Age: 64
End: 2019-09-19

## 2019-09-19 DIAGNOSIS — I70.209 OCCLUSION OF COMMON FEMORAL ARTERY (HCC): Primary | ICD-10-CM

## 2019-09-19 RX ORDER — OXYCODONE HYDROCHLORIDE AND ACETAMINOPHEN 5; 325 MG/1; MG/1
1 TABLET ORAL EVERY 6 HOURS PRN
Qty: 40 TABLET | Refills: 0 | Status: SHIPPED | OUTPATIENT
Start: 2019-09-19 | End: 2020-06-09 | Stop reason: SDUPTHER

## 2019-09-19 NOTE — PROGRESS NOTES
Patient contacted office  She is s/p L CFA endarterectomy by Dr Marion Dorado 9/10/2019 with prolonged hospital course secondary to left thigh cellulitis  Discharge from [address] yesterday without pain medication prescription  Percocet prescribed with review of PDMP review  D/w Dr Marion Dorado

## 2019-09-19 NOTE — TELEPHONE ENCOUNTER
Patient called, she was discharged yesterday wtout pain med  She was taking percocet in hospital, and while her pain is improved, she needs it for night     Please advise

## 2019-09-27 ENCOUNTER — OFFICE VISIT (OUTPATIENT)
Dept: FAMILY MEDICINE CLINIC | Facility: CLINIC | Age: 64
End: 2019-09-27
Payer: COMMERCIAL

## 2019-09-27 VITALS
BODY MASS INDEX: 25.27 KG/M2 | WEIGHT: 148 LBS | SYSTOLIC BLOOD PRESSURE: 122 MMHG | TEMPERATURE: 98.3 F | DIASTOLIC BLOOD PRESSURE: 62 MMHG | HEIGHT: 64 IN

## 2019-09-27 DIAGNOSIS — I73.9 PAD (PERIPHERAL ARTERY DISEASE) (HCC): ICD-10-CM

## 2019-09-27 DIAGNOSIS — N30.00 ACUTE CYSTITIS WITHOUT HEMATURIA: Primary | ICD-10-CM

## 2019-09-27 DIAGNOSIS — I70.209 OCCLUSION OF COMMON FEMORAL ARTERY (HCC): ICD-10-CM

## 2019-09-27 DIAGNOSIS — F17.200 TOBACCO DEPENDENCE: ICD-10-CM

## 2019-09-27 DIAGNOSIS — Z72.0 TOBACCO USER: ICD-10-CM

## 2019-09-27 DIAGNOSIS — I74.09 AORTOILIAC OCCLUSIVE DISEASE (HCC): ICD-10-CM

## 2019-09-27 LAB
SL AMB  POCT GLUCOSE, UA: NORMAL
SL AMB LEUKOCYTE ESTERASE,UA: ABNORMAL
SL AMB POCT BILIRUBIN,UA: NORMAL
SL AMB POCT BLOOD,UA: ABNORMAL
SL AMB POCT CLARITY,UA: ABNORMAL
SL AMB POCT COLOR,UA: ABNORMAL
SL AMB POCT KETONES,UA: ABNORMAL
SL AMB POCT NITRITE,UA: ABNORMAL
SL AMB POCT PH,UA: 5
SL AMB POCT SPECIFIC GRAVITY,UA: 1.02
SL AMB POCT URINE PROTEIN: 30
SL AMB POCT UROBILINOGEN: NORMAL

## 2019-09-27 PROCEDURE — 87086 URINE CULTURE/COLONY COUNT: CPT | Performed by: FAMILY MEDICINE

## 2019-09-27 PROCEDURE — 87186 SC STD MICRODIL/AGAR DIL: CPT | Performed by: FAMILY MEDICINE

## 2019-09-27 PROCEDURE — 3008F BODY MASS INDEX DOCD: CPT | Performed by: FAMILY MEDICINE

## 2019-09-27 PROCEDURE — 99213 OFFICE O/P EST LOW 20 MIN: CPT | Performed by: FAMILY MEDICINE

## 2019-09-27 PROCEDURE — 87077 CULTURE AEROBIC IDENTIFY: CPT | Performed by: FAMILY MEDICINE

## 2019-09-27 PROCEDURE — 81002 URINALYSIS NONAUTO W/O SCOPE: CPT | Performed by: FAMILY MEDICINE

## 2019-09-27 RX ORDER — VARENICLINE TARTRATE 1 MG/1
1 TABLET, FILM COATED ORAL 2 TIMES DAILY
Qty: 60 TABLET | Refills: 3 | Status: SHIPPED | OUTPATIENT
Start: 2019-09-27 | End: 2020-06-09

## 2019-09-27 RX ORDER — CIPROFLOXACIN 500 MG/1
500 TABLET, FILM COATED ORAL EVERY 12 HOURS SCHEDULED
Qty: 14 TABLET | Refills: 0 | Status: SHIPPED | OUTPATIENT
Start: 2019-09-27 | End: 2019-10-04

## 2019-09-27 RX ORDER — ASPIRIN 81 MG/1
81 TABLET, CHEWABLE ORAL DAILY
Qty: 90 TABLET | Refills: 3 | Status: SHIPPED | OUTPATIENT
Start: 2019-09-27 | End: 2020-01-09

## 2019-09-27 RX ORDER — CLOPIDOGREL BISULFATE 75 MG/1
75 TABLET ORAL DAILY
Qty: 90 TABLET | Refills: 3 | Status: SHIPPED | OUTPATIENT
Start: 2019-09-27 | End: 2020-01-09 | Stop reason: SDUPTHER

## 2019-09-27 RX ORDER — ATORVASTATIN CALCIUM 10 MG/1
10 TABLET, FILM COATED ORAL
Qty: 90 TABLET | Refills: 3 | Status: SHIPPED | OUTPATIENT
Start: 2019-09-27 | End: 2020-01-09 | Stop reason: SDUPTHER

## 2019-09-27 NOTE — PROGRESS NOTES
Assessment/Plan:  Patient have urine culture done  Patient will increase fluids  Patient use Cipro as directed  The patient will continue with Chantix for tobacco use       Diagnoses and all orders for this visit:    Acute cystitis without hematuria  -     POCT urine dip  -     Urine culture; Future  -     ciprofloxacin (CIPRO) 500 mg tablet; Take 1 tablet (500 mg total) by mouth every 12 (twelve) hours for 7 days    Tobacco dependence  -     varenicline (CHANTIX) 1 mg tablet; Take 1 tablet (1 mg total) by mouth 2 (two) times a day    PAD (peripheral artery disease) (HCC)  -     atorvastatin (LIPITOR) 10 mg tablet; Take 1 tablet (10 mg total) by mouth daily with dinner  -     aspirin 81 mg chewable tablet; Chew 1 tablet (81 mg total) daily  -     clopidogrel (PLAVIX) 75 mg tablet; Take 1 tablet (75 mg total) by mouth daily    Aortoiliac occlusive disease (HCC)  -     atorvastatin (LIPITOR) 10 mg tablet; Take 1 tablet (10 mg total) by mouth daily with dinner  -     aspirin 81 mg chewable tablet; Chew 1 tablet (81 mg total) daily  -     clopidogrel (PLAVIX) 75 mg tablet; Take 1 tablet (75 mg total) by mouth daily    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HCC)  -     aspirin 81 mg chewable tablet; Chew 1 tablet (81 mg total) daily    Tobacco user  -     aspirin 81 mg chewable tablet; Chew 1 tablet (81 mg total) daily            Subjective:        Patient ID: Minh Mccoy is a 59 y o  female  Patient is here with the malodorous urine the, urinary frequency the beginning today  The patient with urinary urgency  Patient dysuria  Urine dip shows +2 leukocytes trace protein and blood  The following portions of the patient's history were reviewed and updated as appropriate: allergies, current medications, past family history, past medical history, past social history, past surgical history and problem list       Review of Systems   Constitutional: Negative  HENT: Negative  Eyes: Negative  Respiratory: Negative  Cardiovascular: Negative  Gastrointestinal: Negative  Endocrine: Negative  Genitourinary: Positive for difficulty urinating, dysuria, frequency and urgency  Musculoskeletal: Negative  Skin: Negative  Allergic/Immunologic: Negative  Neurological: Negative  Hematological: Negative  Psychiatric/Behavioral: Negative  Objective:      BMI Counseling: Body mass index is 25 4 kg/m²  Discussed the patient's BMI with her  The BMI is above normal  Nutrition recommendations include reducing portion sizes  /62 (BP Location: Right arm)   Temp 98 3 °F (36 8 °C)   Ht 5' 4" (1 626 m)   Wt 67 1 kg (148 lb)   LMP  (LMP Unknown)   BMI 25 40 kg/m²          Physical Exam   Constitutional: She appears well-nourished  Cardiovascular: Normal rate, regular rhythm and normal heart sounds  Pulmonary/Chest: Effort normal and breath sounds normal    Musculoskeletal:   No CVA tenderness   Nursing note and vitals reviewed

## 2019-09-29 LAB — BACTERIA UR CULT: ABNORMAL

## 2019-10-01 ENCOUNTER — TELEPHONE (OUTPATIENT)
Dept: OBGYN CLINIC | Facility: CLINIC | Age: 64
End: 2019-10-01

## 2019-10-01 NOTE — TELEPHONE ENCOUNTER
Patient called with questions about restarting her Estrogen suppositories  She had vascular surgery of left leg on 9/10/19  She discontinued the Estrogen suppositories 1 week prior to surgery and resumed use on 9/24/19  She had vaginal spotting on 9/26/19 only, none since  She started taking Plavix, Low dose aspirin, and Lipitor since her surgery  She questions if she should continue using the Estrogen suppositories  States her vascular surgeon is aware she is using them and did not comment on it

## 2019-10-02 ENCOUNTER — DOCUMENTATION (OUTPATIENT)
Dept: ADMINISTRATIVE | Facility: HOSPITAL | Age: 64
End: 2019-10-02

## 2019-10-02 ENCOUNTER — OFFICE VISIT (OUTPATIENT)
Dept: VASCULAR SURGERY | Facility: CLINIC | Age: 64
End: 2019-10-02

## 2019-10-02 VITALS
HEIGHT: 64 IN | DIASTOLIC BLOOD PRESSURE: 78 MMHG | SYSTOLIC BLOOD PRESSURE: 118 MMHG | BODY MASS INDEX: 25.1 KG/M2 | HEART RATE: 71 BPM | WEIGHT: 147 LBS

## 2019-10-02 DIAGNOSIS — I70.209 OCCLUSION OF COMMON FEMORAL ARTERY (HCC): ICD-10-CM

## 2019-10-02 DIAGNOSIS — I73.9 PAD (PERIPHERAL ARTERY DISEASE) (HCC): Primary | ICD-10-CM

## 2019-10-02 PROCEDURE — 99024 POSTOP FOLLOW-UP VISIT: CPT | Performed by: SURGERY

## 2019-10-02 NOTE — PROGRESS NOTES
Assessment/Plan:    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HCC)  Status post left common femoral endarterectomy  Postop course complicated by left anteromedial thigh erythema  She was treated for cellulitis  Cheryl Vázquez returns to the office for routine postoperative visit  She continues to complain of the left thigh feeling swollen  Left groin incision appears to be healing with some dry scaly skin  No obvious signs of infection  Proximal thigh measurements today were taken in the office  Right thigh:  21 inches  Left thigh:  21-1/2 inches    -Continue Plavix x2 months from day of surgery  Then recommend baby aspirin indefinitely  Diagnoses and all orders for this visit:    PAD (peripheral artery disease) (HCC)  -     VAS lower limb arterial duplex, complete bilateral; Future    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HCC)          Subjective:      Patient ID: Julián Nance is a 59 y o  female  Pt is here for Left Common Femoral endarterectomy 09/10/19  Pt  C/o of left groin and inner thigh pain,hotness, numbness, and a lump on the front of her thigh since she was discharged from the hospital on 09/10/19  Pt is currently taking ASA 81mg, Atorvastatin, Plavix  Pt no longer smoking  Cheryl Vázquez returns to the office for routine postoperative visit following left common femoral endarterectomy  Patient reports she is not feeling any better"  She continues to complain of left leg swelling  She complains left thigh is swelling  She also complains of feeling completely wiped out  She reports she is only able to do a lower of laundry and then needs to take a nap  She denies any fevers and or chills  The following portions of the patient's history were reviewed and updated as appropriate: allergies, current medications, past family history, past medical history, past social history, past surgical history and problem list     Review of Systems   Constitutional: Negative  HENT: Negative      Eyes: Negative  Respiratory: Negative  Cardiovascular: Positive for leg swelling  Endocrine: Negative  Genitourinary: Negative  Musculoskeletal: Negative  Skin: Positive for color change  Hotness, lump    Allergic/Immunologic: Negative  Neurological: Positive for numbness  Hematological: Negative  Psychiatric/Behavioral: Negative  I have personally reviewed the ROS entered by MA and agree as documented  Objective:      /78 (BP Location: Left arm, Patient Position: Sitting)   Pulse 71   Ht 5' 4" (1 626 m)   Wt 66 7 kg (147 lb)   LMP  (LMP Unknown)   BMI 25 23 kg/m²          Physical Exam   Constitutional: She is oriented to person, place, and time  She appears well-developed and well-nourished  No distress  HENT:   Head: Normocephalic and atraumatic  Eyes: EOM are normal    Neck: No tracheal deviation present  Cardiovascular: Normal rate and regular rhythm  Pulmonary/Chest: Effort normal  No respiratory distress  Musculoskeletal: She exhibits edema (Left lower leg perhaps related to reperfusion edema  )  Neurological: She is alert and oriented to person, place, and time  She exhibits normal muscle tone  Skin: Skin is warm  Capillary refill takes less than 2 seconds  There is erythema ( left anteromedial thigh overall improved formal when in-hospital )  Psychiatric: She has a normal mood and affect   Her behavior is normal  Judgment and thought content normal

## 2019-10-02 NOTE — ASSESSMENT & PLAN NOTE
Status post left common femoral endarterectomy  Postop course complicated by left anteromedial thigh erythema  She was treated for cellulitis  [place] returns to the office for routine postoperative visit  She continues to complain of the left thigh feeling swollen  Left groin incision appears to be healing with some dry scaly skin  No obvious signs of infection  Proximal thigh measurements today were taken in the office    Right thigh:  21 inches  Left thigh:  21-1/2 inches

## 2019-10-02 NOTE — PATIENT INSTRUCTIONS
1  As discussed in office today if need dental work up recommend holding plavix x 7days  2  Numbness/"tingling" (neuralgia) of left anteromedial thigh not uncommon after the surgery you had  Likely related to division of sensory nerves  Overtime (months) symptoms should improve  3  Elevate legs whenever feasible and wear compression stockings to aid with post op edema ("reperfusion edema)  Reperfusion edema can take up to a year to resolve and rarely can be permanent  4  Recommend holding off on driving for additional 10-14 days until you "energy" level is back to baseline  5  Ok to apply vaseline to scar and do light "massage"  6  Please call with any questions/concerns  7  Will obtain 3month ultrasound with return office visit to discuss results

## 2019-10-02 NOTE — TELEPHONE ENCOUNTER
Most likely, the spotting is from the plavix and ASA  To be certain, can do US and if her endometrium is thin and there is no further bleeding, she can continue the vaginal estrogen

## 2019-11-04 ENCOUNTER — CLINICAL SUPPORT (OUTPATIENT)
Dept: FAMILY MEDICINE CLINIC | Facility: CLINIC | Age: 64
End: 2019-11-04
Payer: COMMERCIAL

## 2019-11-04 DIAGNOSIS — J11.1 FLU: Primary | ICD-10-CM

## 2019-11-04 PROCEDURE — 90682 RIV4 VACC RECOMBINANT DNA IM: CPT

## 2019-11-04 PROCEDURE — 90471 IMMUNIZATION ADMIN: CPT

## 2019-12-31 ENCOUNTER — HOSPITAL ENCOUNTER (OUTPATIENT)
Dept: NON INVASIVE DIAGNOSTICS | Facility: CLINIC | Age: 64
Discharge: HOME | End: 2019-12-31
Payer: COMMERCIAL

## 2019-12-31 DIAGNOSIS — I73.9 PAD (PERIPHERAL ARTERY DISEASE) (HCC): ICD-10-CM

## 2019-12-31 PROCEDURE — 93925 LOWER EXTREMITY STUDY: CPT

## 2019-12-31 PROCEDURE — 93923 UPR/LXTR ART STDY 3+ LVLS: CPT

## 2020-01-02 PROCEDURE — 93925 LOWER EXTREMITY STUDY: CPT | Performed by: SURGERY

## 2020-01-02 PROCEDURE — 93922 UPR/L XTREMITY ART 2 LEVELS: CPT | Performed by: SURGERY

## 2020-01-09 ENCOUNTER — ANNUAL EXAM (OUTPATIENT)
Dept: OBGYN CLINIC | Facility: CLINIC | Age: 65
End: 2020-01-09
Payer: COMMERCIAL

## 2020-01-09 VITALS — BODY MASS INDEX: 25.58 KG/M2 | SYSTOLIC BLOOD PRESSURE: 120 MMHG | WEIGHT: 149 LBS | DIASTOLIC BLOOD PRESSURE: 78 MMHG

## 2020-01-09 DIAGNOSIS — I74.09 AORTOILIAC OCCLUSIVE DISEASE (HCC): ICD-10-CM

## 2020-01-09 DIAGNOSIS — I73.9 PAD (PERIPHERAL ARTERY DISEASE) (HCC): ICD-10-CM

## 2020-01-09 DIAGNOSIS — Z12.31 ENCOUNTER FOR SCREENING MAMMOGRAM FOR BREAST CANCER: ICD-10-CM

## 2020-01-09 DIAGNOSIS — Z12.4 CERVICAL CANCER SCREENING: Primary | ICD-10-CM

## 2020-01-09 DIAGNOSIS — Z11.51 SCREENING FOR HPV (HUMAN PAPILLOMAVIRUS): ICD-10-CM

## 2020-01-09 DIAGNOSIS — Z72.0 TOBACCO USER: ICD-10-CM

## 2020-01-09 DIAGNOSIS — I70.209 OCCLUSION OF COMMON FEMORAL ARTERY (HCC): ICD-10-CM

## 2020-01-09 DIAGNOSIS — Z01.419 ENCOUNTER FOR ANNUAL ROUTINE GYNECOLOGICAL EXAMINATION: ICD-10-CM

## 2020-01-09 PROCEDURE — S0612 ANNUAL GYNECOLOGICAL EXAMINA: HCPCS | Performed by: OBSTETRICS & GYNECOLOGY

## 2020-01-09 PROCEDURE — 87624 HPV HI-RISK TYP POOLED RSLT: CPT | Performed by: OBSTETRICS & GYNECOLOGY

## 2020-01-09 PROCEDURE — G0145 SCR C/V CYTO,THINLAYER,RESCR: HCPCS | Performed by: OBSTETRICS & GYNECOLOGY

## 2020-01-09 RX ORDER — ASPIRIN 81 MG/1
81 TABLET ORAL DAILY
Qty: 90 TABLET | Refills: 3 | Status: SHIPPED | OUTPATIENT
Start: 2020-01-09

## 2020-01-09 RX ORDER — CLOPIDOGREL BISULFATE 75 MG/1
75 TABLET ORAL DAILY
Qty: 90 TABLET | Refills: 3 | Status: SHIPPED | OUTPATIENT
Start: 2020-01-09 | End: 2020-01-29 | Stop reason: ALTCHOICE

## 2020-01-09 RX ORDER — ATORVASTATIN CALCIUM 10 MG/1
10 TABLET, FILM COATED ORAL
Qty: 90 TABLET | Refills: 0 | Status: SHIPPED | OUTPATIENT
Start: 2020-01-09 | End: 2020-06-09 | Stop reason: SDUPTHER

## 2020-01-09 NOTE — TELEPHONE ENCOUNTER
Pt called, she has f/u ov 1/29/20 w/ Dr Leslie Arauz but will run out of plavix, asa 81mg and atorvastatin prior to ov  Insurance requires she have 90 day supply  Advised to contact pcp for stating refill  Also pt was not aware her asa was chewable tablet until now when she read the bottle  She does not want chewable, would prefer to swallow whole

## 2020-01-09 NOTE — TELEPHONE ENCOUNTER
Patient is requesting refill of atorvastatin 10 mg to go to [facility] at [facility]  Patient was seen on 9/27  Have placed order for approval  Thank you

## 2020-01-09 NOTE — PROGRESS NOTES
Assessment/Plan:    Pap with HPV done    mammogram reviewed with her including breast density  RX given for 3D mammoram for May  Discussed self breast exams    colon cancer screening - she does hemoccult testing  She will restart the vagifem  If she bleeds again, she will call and we will order an US  She will call if she needs a new RX    Uterine prolapse - she will return in 3 months for a pessary check    discussed preventive care, regular exercise and a healthy diet      No problem-specific Assessment & Plan notes found for this encounter  Diagnoses and all orders for this visit:    Encounter for annual routine gynecological examination    Encounter for screening mammogram for breast cancer  -     Mammo screening bilateral w 3d & cad; Future          Subjective:      Patient ID: Carline Baer is a 59 y o  female  Patient here for yearly and pessary check  No gyn complaints  She has been using replens but not the vagifem because she had bleeding after she restarted the vagifem after her surgery  She is attributed this to being on aspirin and Plavix  She has not had any bleeding since then  She feels that the Vagifem works better than just the Replens  She has had a slow recovery from her vascular surgery in September  Normal 3D mammogram in May showed scattered fibroglandular densities and average risk  Normal pap in 2017  The following portions of the patient's history were reviewed and updated as appropriate: allergies, current medications, past family history, past medical history, past social history, past surgical history and problem list     Review of Systems   Constitutional: Negative  Gastrointestinal: Negative  Genitourinary: Negative            Objective:      /78 (BP Location: Left arm, Patient Position: Sitting, Cuff Size: Standard)   Wt 67 6 kg (149 lb)   LMP  (LMP Unknown)   BMI 25 58 kg/m²          Physical Exam   Constitutional: She appears well-developed  Neck: No tracheal deviation present  No thyromegaly present  Cardiovascular: Normal rate and regular rhythm  Pulmonary/Chest: Effort normal and breath sounds normal  Right breast exhibits no inverted nipple, no mass, no nipple discharge, no skin change and no tenderness  Left breast exhibits no inverted nipple, no mass, no nipple discharge, no skin change and no tenderness  Breasts are symmetrical    Examined seated and supine   Abdominal: Soft  She exhibits no distension and no mass  There is no tenderness  Genitourinary: Rectum normal, vagina normal and uterus normal  No labial fusion  There is no rash, tenderness, lesion or injury on the right labia  There is no rash, tenderness, lesion or injury on the left labia  Cervix exhibits no motion tenderness, no discharge and no friability  Right adnexum displays no mass, no tenderness and no fullness  Left adnexum displays no mass, no tenderness and no fullness  Genitourinary Comments: shaatz removed, cleaned and reinserted, no excoriation   Vitals reviewed

## 2020-01-10 LAB
HPV HR 12 DNA CVX QL NAA+PROBE: NEGATIVE
HPV16 DNA CVX QL NAA+PROBE: NEGATIVE
HPV18 DNA CVX QL NAA+PROBE: NEGATIVE

## 2020-01-15 LAB
LAB AP GYN PRIMARY INTERPRETATION: NORMAL
Lab: NORMAL

## 2020-01-29 ENCOUNTER — OFFICE VISIT (OUTPATIENT)
Dept: VASCULAR SURGERY | Facility: CLINIC | Age: 65
End: 2020-01-29
Payer: COMMERCIAL

## 2020-01-29 VITALS
HEIGHT: 64 IN | BODY MASS INDEX: 25.78 KG/M2 | HEART RATE: 89 BPM | SYSTOLIC BLOOD PRESSURE: 116 MMHG | WEIGHT: 151 LBS | DIASTOLIC BLOOD PRESSURE: 62 MMHG

## 2020-01-29 DIAGNOSIS — I70.209 OCCLUSION OF COMMON FEMORAL ARTERY (HCC): Primary | ICD-10-CM

## 2020-01-29 DIAGNOSIS — I74.09 AORTOILIAC OCCLUSIVE DISEASE (HCC): ICD-10-CM

## 2020-01-29 DIAGNOSIS — I73.9 PAD (PERIPHERAL ARTERY DISEASE) (HCC): ICD-10-CM

## 2020-01-29 PROCEDURE — 99214 OFFICE O/P EST MOD 30 MIN: CPT | Performed by: SURGERY

## 2020-01-29 RX ORDER — OXYCODONE HYDROCHLORIDE 5 MG/1
5 TABLET ORAL EVERY 6 HOURS PRN
Qty: 30 TABLET | Refills: 0 | Status: SHIPPED | OUTPATIENT
Start: 2020-01-29 | End: 2021-02-03 | Stop reason: SDUPTHER

## 2020-01-29 NOTE — ASSESSMENT & PLAN NOTE
Status post left common femoral endarterectomy  Most recent lower extremity arterial duplex demonstrates patent endarterectomy site  Left ankle brachial index is 0 9

## 2020-01-29 NOTE — ASSESSMENT & PLAN NOTE
Aortoiliac duplex with bilateral iliac stenoses  Patient denies any lifestyle limiting claudication  There is no tissue loss/rest pain  Continue surveillance with 6 month aortoiliac duplex

## 2020-01-29 NOTE — PROGRESS NOTES
Assessment/Plan:    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HCC)  Status post left common femoral endarterectomy  Most recent lower extremity arterial duplex demonstrates patent endarterectomy site  Left ankle brachial index is 0 9  Aortoiliac occlusive disease (HCC)  Aortoiliac duplex with bilateral iliac stenoses  Patient denies any lifestyle limiting claudication  There is no tissue loss/rest pain  Continue surveillance with 6 month aortoiliac duplex  Diagnoses and all orders for this visit:    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HCC)  -     VAS lower limb arterial duplex, complete bilateral; Future  -     VAS abdominal aorta/iliacs; complete study; Future  -     oxyCODONE (ROXICODONE) 5 mg immediate release tablet; Take 1 tablet (5 mg total) by mouth every 6 (six) hours as needed for moderate painMax Daily Amount: 20 mg    PAD (peripheral artery disease) (HCC)  -     VAS lower limb arterial duplex, complete bilateral; Future  -     VAS abdominal aorta/iliacs; complete study; Future  -     oxyCODONE (ROXICODONE) 5 mg immediate release tablet; Take 1 tablet (5 mg total) by mouth every 6 (six) hours as needed for moderate painMax Daily Amount: 20 mg    Aortoiliac occlusive disease (HCC)          Subjective:      Patient ID: Julienne Vale is a 59 y o  female  Pt is here today to review results of VISH done 12/31/2019  She is s/p left common femoral endarterectomy done 9/10/2019  Pt c/o throbbing incisional pain in the left leg and leg swelling  Pt denies any open wounds  Pt is taking Plavix, ASA 81 mg and Atorvastatin 10 mg  She is a current smoker  Lorena Morining returns to the office for routine office visit to review results of her noninvasive arterial studies  She is status post left common femoral endarterectomy for common femoral artery occlusion back in September    Her incision is well-healed however she continues to complain of constant throbbing sensation overlying the incision as well as swelling along the medial aspect of the thigh  She reports throbbing pain disrupts her sleep  She takes 2 5 mg of oxycodone for the pain at night  The following portions of the patient's history were reviewed and updated as appropriate: allergies, current medications, past family history, past medical history, past social history, past surgical history and problem list     Review of Systems   Constitutional: Positive for chills  HENT: Positive for rhinorrhea and sneezing  Eyes: Negative  Respiratory: Negative  Cardiovascular: Positive for leg swelling  Gastrointestinal: Negative  Endocrine: Positive for cold intolerance  Genitourinary: Positive for frequency  Musculoskeletal: Positive for arthralgias  Skin: Negative  Allergic/Immunologic: Positive for environmental allergies  Neurological: Negative  Hematological: Bruises/bleeds easily  Psychiatric/Behavioral: Negative  I have personally reviewed the ROS entered by MA and agree as documented  Objective:      /62 (BP Location: Right arm, Patient Position: Sitting, Cuff Size: Standard)   Pulse 89   Ht 5' 4" (1 626 m)   Wt 68 5 kg (151 lb)   LMP  (LMP Unknown)   BMI 25 92 kg/m²          Physical Exam   Constitutional: She is oriented to person, place, and time  She appears well-developed and well-nourished  No distress  HENT:   Head: Normocephalic and atraumatic  Eyes: Conjunctivae and EOM are normal  No scleral icterus  Neck: Normal range of motion  Neck supple  No tracheal deviation present  Cardiovascular: Normal rate, regular rhythm and normal heart sounds  Pulses:       Femoral pulses are 1+ on the right side, and 1+ on the left side  Bilateral pedal Doppler signals   Pulmonary/Chest: Effort normal and breath sounds normal    Abdominal: Soft  She exhibits no distension and no mass (no appreciable aortic pulsation/aneurysm)  There is no tenderness  There is no rebound and no guarding     Musculoskeletal: Normal range of motion  Neurological: She is alert and oriented to person, place, and time  Skin: Skin is warm and dry  Left groin incisions well healed  Psychiatric: She has a normal mood and affect  Her behavior is normal  Judgment and thought content normal          Lower extremity arterial duplex:    CONCLUSION:  Impression:  RIGHT LOWER LIMB:  Known high-grade stenosis of the right mid common iliac artery  There is no other significant lower extremity arterial occlusive disease in the  lower extremity  Ankle/Brachial index:  0 68, moderate claudication range, Prior 0 79  The PVR  tracing at the ankle has movement artifact  PVR  tracing at the metatarsal is moderately dampened  The PPG tracing is markedly dampened  Metatarsal pressure of 53 mmHg  Great toe pressure of 39 mmHg, within the healing range     LEFT LOWER LIMB:  The common femoral and proximal profunda femoris arteries are widely patent s/p  surgery  There is a known high-grade stenosis of the origin of the left external iliac  artery  There is no other significant arterial occlusive disease in the lower  extremity  Ankle/Brachial index:   NILSA: 0 94    , Prior 0 64  The PVR  tracing at the ankle has movement artifact  PVR  tracing at the metatarsal is mildly dampened  The PPG tracing is markedly dampened  Metatarsal pressure of 68mmHg  Great toe pressure of 49mmHg, within the healing range  Incidental finding: Non vascular cystic type structure noted in the groin

## 2020-01-29 NOTE — PATIENT INSTRUCTIONS
Peripheral Artery Disease   AMBULATORY CARE:   Peripheral artery disease (PAD)  is narrow, weak, or blocked arteries  It may affect any arteries outside of your heart and brain  PAD is usually the result of a buildup of fat and cholesterol, also called plaque, along your artery walls  Inflammation, a blood clot, or abnormal cell growth could also block your arteries  PAD prevents normal blood flow to your legs and arms  You are at risk of an amputation if poor blood flow keeps wounds from healing or causes gangrene (tissue death)  Without treatment, PAD can also cause a heart attack or stroke  Common symptoms include:  Mild PAD usually does not cause symptoms  As the disease worsens over time, you may have the following:  · Pain or cramps in your leg or hip while you walk     · A numb, weak, or heavy feeling in your legs     · Dry, scaly, red, or pale skin on your legs     · Thick or brittle nails, or hair loss on your arms and legs     · Foot sores that will not heal     · Burning or aching in your feet and toes while resting (this may be worse when you lie down)  Call 911 for the following:   · You have any of the following signs of a heart attack:      ¨ Squeezing, pressure, or pain in your chest that lasts longer than 5 minutes or returns    ¨ Discomfort or pain in your back, neck, jaw, stomach, or arm     ¨ Trouble breathing    ¨ Nausea or vomiting    ¨ Lightheadedness or a sudden cold sweat, especially with chest pain or trouble breathing    · You have any of the following signs of a stroke:      ¨ Numbness or drooping on one side of your face     ¨ Weakness in an arm or leg    ¨ Confusion or difficulty speaking    ¨ Dizziness, a severe headache, or vision loss  Seek care immediately if:   · You have sores or wounds that will not heal      · You notice black or discolored skin on your arm or leg  · Your skin is cool to the touch    Contact your healthcare provider if:   · You have leg pain when you walk 1/8 mile (200 meters) or less, even with treatment  · Your legs are red, dry, or pale, even with treatment  · You have questions or concerns about your condition or care  Treatment for PAD  can help reduce your risk of a heart attack, stroke, or amputation  You may need more than one of the following:  · Medicines  may be given to prevent blood clots and reduce the risk of a heart attack or stroke  You may be given medicine to help prevent your PAD from getting worse  · A supervised exercise program  helps you stay active in normal daily activities and may prevent disability  Healthcare providers will help you safely walk or do strength training exercises 3 times a week for 30 to 60 minutes  You will do this for several months, then transition to walking on your own  · Angioplasty  is a procedure to open your artery so blood can flow through normally  A thin tube called a catheter is used to insert a small balloon into your artery  The balloon is inflated to open your blocked artery, and then removed  A tube called a stent may be placed in your artery to hold it open  · Bypass surgery  is used to make a new connection to your artery with a vein from another part of your body, or an artificial graft  The vein or graft is attached to your artery above and below your blockage  This allows blood to flow around the blocked portion of your artery  Manage and prevent PAD:   · Walk for 30 to 60 minutes at least 4 times a week  Your healthcare provider may also refer you to an supervised exercise program  The program helps increase how far you can walk without pain  It also helps you stay active in normal daily activities and may prevent disability caused by PAD  · Do not smoke  Nicotine and other chemicals in cigarettes and cigars can worsen PAD  They can also increase your risk for a heart attack or stroke  Ask your healthcare provider for information if you currently smoke and need help to quit  E-cigarettes or smokeless tobacco still contain nicotine  Talk to your healthcare provider before you use these products  · Manage other health conditions  Take your medicines as directed and follow your healthcare provider's instructions if you have high blood pressure or high cholesterol  Perform foot care and check your blood sugar levels as directed if you have diabetes  · Eat heart healthy foods  Eat whole grains, fruits, and vegetables every day  Limit salt and high-fat foods  Ask your healthcare provider for more information on a heart healthy diet  Ask if you need to lose weight  Your healthcare provider can help you create a healthy weight-loss plan  Follow up with your healthcare provider as directed:  Write down your questions so you remember to ask them during your visits  © 2017 [facility] Information is for End User's use only and may not be sold, redistributed or otherwise used for commercial purposes  All illustrations and images included in CareNotes® are the copyrighted property of A D A M [employer] Inc  or Anjum Acosta  The above information is an  only  It is not intended as medical advice for individual conditions or treatments  Talk to your doctor, nurse or pharmacist before following any medical regimen to see if it is safe and effective for you

## 2020-03-19 DIAGNOSIS — F41.9 ANXIETY: ICD-10-CM

## 2020-03-19 RX ORDER — LORAZEPAM 0.5 MG/1
0.5 TABLET ORAL EVERY 12 HOURS PRN
Qty: 30 TABLET | Refills: 0 | Status: SHIPPED | OUTPATIENT
Start: 2020-03-19 | End: 2020-11-16 | Stop reason: SDUPTHER

## 2020-05-06 ENCOUNTER — TELEPHONE (OUTPATIENT)
Dept: OBGYN CLINIC | Facility: CLINIC | Age: 65
End: 2020-05-06

## 2020-05-12 ENCOUNTER — OFFICE VISIT (OUTPATIENT)
Dept: OBGYN CLINIC | Facility: CLINIC | Age: 65
End: 2020-05-12
Payer: MEDICARE

## 2020-05-12 VITALS — DIASTOLIC BLOOD PRESSURE: 86 MMHG | BODY MASS INDEX: 25.95 KG/M2 | SYSTOLIC BLOOD PRESSURE: 126 MMHG | WEIGHT: 151.2 LBS

## 2020-05-12 DIAGNOSIS — N95.2 VAGINAL ATROPHY: ICD-10-CM

## 2020-05-12 DIAGNOSIS — N81.4 UTERINE PROLAPSE: Primary | ICD-10-CM

## 2020-05-12 PROCEDURE — 99213 OFFICE O/P EST LOW 20 MIN: CPT | Performed by: OBSTETRICS & GYNECOLOGY

## 2020-05-12 PROCEDURE — 4040F PNEUMOC VAC/ADMIN/RCVD: CPT | Performed by: OBSTETRICS & GYNECOLOGY

## 2020-06-09 ENCOUNTER — OFFICE VISIT (OUTPATIENT)
Dept: FAMILY MEDICINE CLINIC | Facility: CLINIC | Age: 65
End: 2020-06-09
Payer: MEDICARE

## 2020-06-09 VITALS
DIASTOLIC BLOOD PRESSURE: 88 MMHG | HEART RATE: 88 BPM | HEIGHT: 64 IN | WEIGHT: 151 LBS | BODY MASS INDEX: 25.78 KG/M2 | SYSTOLIC BLOOD PRESSURE: 136 MMHG

## 2020-06-09 DIAGNOSIS — M54.41 CHRONIC BILATERAL LOW BACK PAIN WITH BILATERAL SCIATICA: Primary | ICD-10-CM

## 2020-06-09 DIAGNOSIS — M32.0 DRUG-INDUCED SYSTEMIC LUPUS ERYTHEMATOSUS, UNSPECIFIED ORGAN INVOLVEMENT STATUS (HCC): ICD-10-CM

## 2020-06-09 DIAGNOSIS — I73.9 PAD (PERIPHERAL ARTERY DISEASE) (HCC): ICD-10-CM

## 2020-06-09 DIAGNOSIS — G89.29 CHRONIC BILATERAL LOW BACK PAIN WITH BILATERAL SCIATICA: Primary | ICD-10-CM

## 2020-06-09 DIAGNOSIS — N95.2 VAGINAL ATROPHY: ICD-10-CM

## 2020-06-09 DIAGNOSIS — R31.29 MICROSCOPIC HEMATURIA: ICD-10-CM

## 2020-06-09 DIAGNOSIS — M54.50 LUMBAR PAIN: ICD-10-CM

## 2020-06-09 DIAGNOSIS — I70.209 OCCLUSION OF COMMON FEMORAL ARTERY (HCC): ICD-10-CM

## 2020-06-09 DIAGNOSIS — I74.09 AORTOILIAC OCCLUSIVE DISEASE (HCC): ICD-10-CM

## 2020-06-09 DIAGNOSIS — N30.00 ACUTE CYSTITIS WITHOUT HEMATURIA: ICD-10-CM

## 2020-06-09 DIAGNOSIS — M54.42 CHRONIC BILATERAL LOW BACK PAIN WITH BILATERAL SCIATICA: Primary | ICD-10-CM

## 2020-06-09 LAB
BILIRUB UR QL STRIP: NEGATIVE
CLARITY UR: CLEAR
COLOR UR: YELLOW
GLUCOSE UR STRIP-MCNC: NEGATIVE MG/DL
HGB UR QL STRIP.AUTO: NEGATIVE
KETONES UR STRIP-MCNC: NEGATIVE MG/DL
LEUKOCYTE ESTERASE UR QL STRIP: NEGATIVE
NITRITE UR QL STRIP: NEGATIVE
PH UR STRIP.AUTO: 6.5 [PH]
PROT UR STRIP-MCNC: NEGATIVE MG/DL
SP GR UR STRIP.AUTO: 1.03 (ref 1–1.03)
UROBILINOGEN UR QL STRIP.AUTO: 0.2 E.U./DL

## 2020-06-09 PROCEDURE — 87086 URINE CULTURE/COLONY COUNT: CPT | Performed by: FAMILY MEDICINE

## 2020-06-09 PROCEDURE — 99214 OFFICE O/P EST MOD 30 MIN: CPT | Performed by: FAMILY MEDICINE

## 2020-06-09 PROCEDURE — 4040F PNEUMOC VAC/ADMIN/RCVD: CPT | Performed by: FAMILY MEDICINE

## 2020-06-09 PROCEDURE — 81003 URINALYSIS AUTO W/O SCOPE: CPT | Performed by: FAMILY MEDICINE

## 2020-06-09 PROCEDURE — 3008F BODY MASS INDEX DOCD: CPT | Performed by: FAMILY MEDICINE

## 2020-06-09 RX ORDER — ATORVASTATIN CALCIUM 10 MG/1
10 TABLET, FILM COATED ORAL
Qty: 90 TABLET | Refills: 1 | Status: SHIPPED | OUTPATIENT
Start: 2020-06-09 | End: 2020-11-16 | Stop reason: SDUPTHER

## 2020-06-09 RX ORDER — PREDNISONE 10 MG/1
10 TABLET ORAL DAILY
Qty: 90 TABLET | Refills: 1 | Status: SHIPPED | OUTPATIENT
Start: 2020-06-09 | End: 2020-11-16 | Stop reason: SDUPTHER

## 2020-06-09 RX ORDER — ESTRADIOL 10 UG/1
1 INSERT VAGINAL 2 TIMES WEEKLY
Qty: 24 TABLET | Refills: 3 | Status: SHIPPED | OUTPATIENT
Start: 2020-06-11 | End: 2020-09-15 | Stop reason: SDUPTHER

## 2020-06-09 RX ORDER — CIPROFLOXACIN 500 MG/1
500 TABLET, FILM COATED ORAL EVERY 12 HOURS SCHEDULED
Qty: 14 TABLET | Refills: 0 | Status: SHIPPED | OUTPATIENT
Start: 2020-06-09 | End: 2020-06-16

## 2020-06-09 RX ORDER — OXYCODONE HYDROCHLORIDE AND ACETAMINOPHEN 5; 325 MG/1; MG/1
1 TABLET ORAL EVERY 6 HOURS PRN
Qty: 28 TABLET | Refills: 0 | Status: SHIPPED | OUTPATIENT
Start: 2020-06-09 | End: 2020-06-16

## 2020-06-10 LAB — BACTERIA UR CULT: NORMAL

## 2020-06-22 DIAGNOSIS — E04.2 MULTIPLE THYROID NODULES: Primary | ICD-10-CM

## 2020-07-07 ENCOUNTER — OFFICE VISIT (OUTPATIENT)
Dept: ENDOCRINOLOGY | Facility: CLINIC | Age: 65
End: 2020-07-07
Payer: MEDICARE

## 2020-07-07 VITALS
SYSTOLIC BLOOD PRESSURE: 124 MMHG | HEIGHT: 64 IN | WEIGHT: 154.6 LBS | DIASTOLIC BLOOD PRESSURE: 80 MMHG | BODY MASS INDEX: 26.4 KG/M2 | TEMPERATURE: 98 F | HEART RATE: 88 BPM

## 2020-07-07 DIAGNOSIS — E04.2 MULTIPLE THYROID NODULES: Primary | ICD-10-CM

## 2020-07-07 DIAGNOSIS — M81.0 AGE-RELATED OSTEOPOROSIS WITHOUT CURRENT PATHOLOGICAL FRACTURE: ICD-10-CM

## 2020-07-07 PROCEDURE — 99213 OFFICE O/P EST LOW 20 MIN: CPT | Performed by: INTERNAL MEDICINE

## 2020-07-07 PROCEDURE — 3008F BODY MASS INDEX DOCD: CPT | Performed by: INTERNAL MEDICINE

## 2020-07-07 PROCEDURE — 4040F PNEUMOC VAC/ADMIN/RCVD: CPT | Performed by: INTERNAL MEDICINE

## 2020-07-07 PROCEDURE — 3008F BODY MASS INDEX DOCD: CPT | Performed by: SURGERY

## 2020-07-07 NOTE — PROGRESS NOTES
Chon Chaitanya 72 y o  female MRN: [id number]    Encounter: [id number]      Assessment/Plan     Assessment: This is a 72y o -year-old female with multiple thyroid nodules and osteoporosis  Plan:  1  Multiple thyroid nodules are stable on repeat ultrasound  Recommend checking another ultrasound in one year  Check TSH and free T4     2  Osteoporosis-this is being managed by her primary care physician who will order her DEXA scan when it is due  CC:   Thyroid nodule    History of Present Illness     HPI:  70-year-old woman with multiple thyroid nodules for few years who presents for follow-up  She denies any difficulty swallowing or breathing  She denies any symptoms of hypo or hyperthyroidism  For osteoporosis, she denies any falls or fractures  She does take calcium and vitamin-D supplementation according to her  Review of Systems   Constitutional: Negative for chills and fever  Respiratory: Negative for shortness of breath  Cardiovascular: Negative for chest pain  Gastrointestinal: Negative for constipation, diarrhea, nausea and vomiting  All other systems reviewed and are negative        Historical Information   Past Medical History:   Diagnosis Date    Anxiety     Arthritis 12/20/2014    Cataract     eulalia    Disease of thyroid gland     nodules    Fibromyalgia, primary     Frequent UTI     History of lung cancer     lower left lobe removed - 2004    Irritable bowel syndrome     Lactose intolerance     Lupus (HCC)     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    PAD (peripheral artery disease) (HCC)     Presence of pessary     Raynaud disease     Sciatica     Sinus headache     Sjogren's syndrome ([facility] [address] )     Smoker      Past Surgical History:   Procedure Laterality Date    LUNG REMOVAL, PARTIAL  05/01/2004    Prior Surgical/Procedural History - 5/4 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 - lung resection - subtotal    MOUTH SURGERY      Tooth extraction    LA THROMBOENDARTECTMY FEMORAL COMMON Left 9/10/2019    Procedure: ENDARTERECTOMY ARTERIAL FEMORAL,  WITH BOVINE GRAFT AND PROFUNDOPLASTY;  Surgeon: Rinku Mosley DO;  Location: [facility];  Service: Vascular    SINUS SURGERY       Social History   Social History     Substance and Sexual Activity   Alcohol Use Yes    Frequency: 2-4 times a month    Drinks per session: 1 or 2    Binge frequency: Never    Comment: liquor     Social History     Substance and Sexual Activity   Drug Use No     Social History     Tobacco Use   Smoking Status Current Every Day Smoker    Packs/day: 0 50    Years: 40 00    Pack years: 20 00    Types: Cigarettes   Smokeless Tobacco Never Used     Family History:   Family History   Problem Relation Age of Onset    Arthritis Mother     Asthma Mother     Dementia Mother     Heart disease Mother     Hypothyroidism Mother     Irritable bowel syndrome Mother     Uterine cancer Mother 72    Heart attack Father    Nino King Stroke Father     Hyperlipidemia Sister     Uterine cancer Sister         age unknown    Heart attack Maternal Grandfather     Asthma Family     Cataracts Family     Thyroid disease Family     No Known Problems Paternal Aunt     No Known Problems Maternal Aunt        Meds/Allergies   Current Outpatient Medications   Medication Sig Dispense Refill    aspirin (ECOTRIN LOW STRENGTH) 81 mg EC tablet Take 1 tablet (81 mg total) by mouth daily 90 tablet 3    cyanocobalamin (VITAMIN B-12) 100 mcg tablet Take 1 tablet by mouth daily      estradiol (VAGIFEM, YUVAFEM) 10 MCG TABS vaginal tablet Insert 1 tablet (10 mcg total) into the vagina 2 (two) times a week 24 tablet 3    famotidine (PEPCID AC) 10 mg tablet Take by mouth as needed        LORazepam (ATIVAN) 0 5 mg tablet Take 1 tablet (0 5 mg total) by mouth every 12 (twelve) hours as needed for anxiety 30 tablet 0    Magnesium 500 MG CAPS Take by mouth daily        oxyCODONE (ROXICODONE) 5 mg immediate release tablet Take 1 tablet (5 mg total) by mouth every 6 (six) hours as needed for moderate painMax Daily Amount: 20 mg 30 tablet 0    potassium chloride (KLOR-CON 10) 10 mEq tablet Take 1 tablet by mouth 3 (three) times a day      predniSONE 10 mg tablet Take 1 tablet (10 mg total) by mouth daily 90 tablet 1    SODIUM FLUORIDE, DENTAL GEL, (DENTA 5000 PLUS) 1 1 % CREA 1-2 x wk      traMADol (ULTRAM) 50 mg tablet Take 1 tablet (50 mg total) by mouth 2 (two) times a day as needed for moderate pain 60 tablet 0    Vitamin Mixture (JOSE RAMON-C PO) Take 1 tablet by mouth daily      atorvastatin (LIPITOR) 10 mg tablet Take 1 tablet (10 mg total) by mouth daily with dinner (Patient not taking: Reported on 7/7/2020) 90 tablet 1    Multiple Vitamin (MULTIVITAMIN) tablet Take 1 tablet by mouth daily       No current facility-administered medications for this visit  Allergies   Allergen Reactions    Penicillins Shortness Of Breath and Edema    Cymbalta [Duloxetine Hcl] Diarrhea    Duloxetine Diarrhea    Dust Mite Extract     Lactose GI Intolerance    Neosporin [Neomycin-Bacitracin Zn-Polymyx]      Other reaction(s): REDNESS    Nitrofurantoin Rash    Omeprazole Rash    Sulfamethoxazole-Trimethoprim Rash       Objective   Vitals: Blood pressure 124/80, pulse 88, temperature 98 °F (36 7 °C), temperature source Temporal, height 5' 4" (1 626 m), weight 70 1 kg (154 lb 9 6 oz), not currently breastfeeding  Physical Exam   Constitutional: She is oriented to person, place, and time  She appears well-developed and well-nourished  No distress  HENT:   Head: Normocephalic and atraumatic  Eyes: Conjunctivae, EOM and lids are normal  Right eye exhibits no discharge  Left eye exhibits no discharge  No scleral icterus  Neck: Neck supple  No thyromegaly present  Right mid pole thyroid nodule is present  Cardiovascular: Normal rate, regular rhythm and normal heart sounds   Exam reveals no gallop and no friction rub  No murmur heard  Pulmonary/Chest: Effort normal and breath sounds normal  No respiratory distress  She has no wheezes  Abdominal: Soft  Bowel sounds are normal  She exhibits no distension  There is no tenderness  Musculoskeletal: Normal range of motion  She exhibits no edema, tenderness or deformity  Lymphadenopathy:        Head (right side): No occipital adenopathy present  Head (left side): No occipital adenopathy present  Right: No supraclavicular adenopathy present  Left: No supraclavicular adenopathy present  Neurological: She is alert and oriented to person, place, and time  No cranial nerve deficit  Skin: Skin is warm and intact  No rash noted  She is not diaphoretic  No erythema  Psychiatric: She has a normal mood and affect  Her behavior is normal    Vitals reviewed  The history was obtained from the review of the chart, patient  Lab Results:        Imaging Studies:   Results for orders placed in visit on 06/22/20   US thyroid       I have personally reviewed pertinent reports  Portions of the record may have been created with voice recognition software  Occasional wrong word or "sound a like" substitutions may have occurred due to the inherent limitations of voice recognition software  Read the chart carefully and recognize, using context, where substitutions have occurred

## 2020-07-29 ENCOUNTER — HOSPITAL ENCOUNTER (OUTPATIENT)
Dept: NON INVASIVE DIAGNOSTICS | Facility: CLINIC | Age: 65
Discharge: HOME | End: 2020-07-29
Payer: MEDICARE

## 2020-07-29 DIAGNOSIS — I73.9 PAD (PERIPHERAL ARTERY DISEASE) (HCC): ICD-10-CM

## 2020-07-29 DIAGNOSIS — I70.209 OCCLUSION OF COMMON FEMORAL ARTERY (HCC): ICD-10-CM

## 2020-07-29 PROCEDURE — 93922 UPR/L XTREMITY ART 2 LEVELS: CPT | Performed by: SURGERY

## 2020-07-29 PROCEDURE — 93978 VASCULAR STUDY: CPT

## 2020-07-29 PROCEDURE — 93925 LOWER EXTREMITY STUDY: CPT | Performed by: SURGERY

## 2020-07-29 PROCEDURE — 93923 UPR/LXTR ART STDY 3+ LVLS: CPT

## 2020-07-29 PROCEDURE — 93925 LOWER EXTREMITY STUDY: CPT

## 2020-07-29 PROCEDURE — 93978 VASCULAR STUDY: CPT | Performed by: SURGERY

## 2020-08-21 ENCOUNTER — LAB (OUTPATIENT)
Dept: LAB | Facility: HOSPITAL | Age: 65
End: 2020-08-21
Attending: INTERNAL MEDICINE
Payer: MEDICARE

## 2020-08-21 DIAGNOSIS — E04.2 MULTIPLE THYROID NODULES: ICD-10-CM

## 2020-08-21 LAB
T4 FREE SERPL-MCNC: 0.88 NG/DL (ref 0.76–1.46)
TSH SERPL DL<=0.05 MIU/L-ACNC: 0.95 UIU/ML (ref 0.36–3.74)

## 2020-08-21 PROCEDURE — 84439 ASSAY OF FREE THYROXINE: CPT

## 2020-08-21 PROCEDURE — 84443 ASSAY THYROID STIM HORMONE: CPT

## 2020-08-21 PROCEDURE — 36415 COLL VENOUS BLD VENIPUNCTURE: CPT

## 2020-08-24 ENCOUNTER — TELEPHONE (OUTPATIENT)
Dept: ENDOCRINOLOGY | Facility: CLINIC | Age: 65
End: 2020-08-24

## 2020-08-24 NOTE — TELEPHONE ENCOUNTER
----- Message from Kaushik Robretson MD sent at 8/24/2020  1:25 PM EDT -----  The laboratory testing results are normal

## 2020-09-01 ENCOUNTER — TELEPHONE (OUTPATIENT)
Dept: VASCULAR SURGERY | Facility: CLINIC | Age: 65
End: 2020-09-01

## 2020-09-02 ENCOUNTER — OFFICE VISIT (OUTPATIENT)
Dept: VASCULAR SURGERY | Facility: CLINIC | Age: 65
End: 2020-09-02
Payer: MEDICARE

## 2020-09-02 VITALS
HEART RATE: 86 BPM | HEIGHT: 64 IN | DIASTOLIC BLOOD PRESSURE: 72 MMHG | BODY MASS INDEX: 26.12 KG/M2 | SYSTOLIC BLOOD PRESSURE: 126 MMHG | WEIGHT: 153 LBS | TEMPERATURE: 98.2 F

## 2020-09-02 DIAGNOSIS — G89.29 CHRONIC BILATERAL LOW BACK PAIN WITH BILATERAL SCIATICA: ICD-10-CM

## 2020-09-02 DIAGNOSIS — M54.41 CHRONIC BILATERAL LOW BACK PAIN WITH BILATERAL SCIATICA: ICD-10-CM

## 2020-09-02 DIAGNOSIS — M54.42 CHRONIC BILATERAL LOW BACK PAIN WITH BILATERAL SCIATICA: ICD-10-CM

## 2020-09-02 DIAGNOSIS — F17.200 TOBACCO DEPENDENCE: ICD-10-CM

## 2020-09-02 DIAGNOSIS — I74.09 AORTOILIAC OCCLUSIVE DISEASE (HCC): Primary | ICD-10-CM

## 2020-09-02 DIAGNOSIS — I70.209 OCCLUSION OF COMMON FEMORAL ARTERY (HCC): ICD-10-CM

## 2020-09-02 PROCEDURE — 99214 OFFICE O/P EST MOD 30 MIN: CPT | Performed by: SURGERY

## 2020-09-02 NOTE — PROGRESS NOTES
Assessment/Plan:    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[address] )  Status post left common femoral endarterectomy/profundoplasty in September of 2019  Post op ankle brachial indices dramatically improved  Most recent surveillance study demonstrates NILSA 0 98, metatarsal pressure 75, great toe pressure 55  Aortoiliac duplex did suggest left distal common iliac artery stenosis of greater than 75% per duplex velocity criteria  Carlos Gonzales continues to complain of this swelling/fullness and code of her left proximal medial thigh  This does not appear to be fluctuant to suggest an underlying fluid collection  She does occasionally report episodic warmth associated with this area  Her left groin incision appears well healed  In typical fashion Carlos Gonzales has many questions/comments  She feels that although she knows the surgery was necessary" she feels that her overall quality life has been severely impacted  When questioned further regarding this she complains of pain radiating down bilateral posterior legs which is more consistent with her known history of sciatica  She denies any rest pain  She denies any tissue loss  We did review the results of her noninvasive arterial studies including in the reported left common iliac artery stenosis  I have offered her a CTA with runoff to further delineate severity of stenosis as well as assess at the perceived swelling along the left medial proximal thigh however lower defers at this time and wishes to proceed with surveillance duplex studies  She understands that should she experience any acute changes including increased pain, discoloration and or change in temperature of her extremities she is to call the office immediately and or seek immediate medical attention at the nearest emergency department           Diagnoses and all orders for this visit:    Aortoiliac occlusive disease ([facility] [address] )  -     VAS lower limb arterial duplex, complete bilateral; Future  -     VAS abdominal aorta/iliacs; complete study; Future    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HCC)    Chronic bilateral low back pain with bilateral sciatica    Tobacco dependence          Subjective:      Patient ID: Raffy Jaramillo is a 72 y o  female  Patient is here to [address] done on 7/29/20  Patient is S/P Left CFA endarterectomy and profundoplasty  Patient C/o of constant tenderness in the BL shins and calves  Patient can barley walk without getting pain  Patient can rest for few minutes and the pain will subside somewhat  Patient can ambulate with a cane but does better with a walker  Patient C/o of numbness and tingling and burning due to Raynaud disease  Patient Currently takes ASA 81mg, Atorvastatin  Patient currently smokes 0 5 PPD  HPI    The following portions of the patient's history were reviewed and updated as appropriate: allergies, current medications, past family history, past medical history, past social history, past surgical history and problem list     Review of Systems   Constitutional: Negative  Negative for chills and fever  HENT: Negative  Eyes: Negative  Respiratory: Negative  Negative for chest tightness and shortness of breath  Cardiovascular: Negative  Negative for chest pain  Gastrointestinal: Negative  Negative for abdominal distention and abdominal pain  Endocrine: Negative  Genitourinary: Negative  Negative for flank pain  Musculoskeletal: Positive for back pain (Chronic issue ) and gait problem (Uses cane to ambulate )  Tenderness in the BL shins    Allergic/Immunologic: Negative  Neurological: Positive for numbness (Raynaud disease )  Hematological: Negative  Psychiatric/Behavioral: Negative  I have personally reviewed the ROS entered by MA and agree as documented      Objective:      /72 (BP Location: Left arm, Patient Position: Sitting, Cuff Size: Standard)   Pulse 86   Temp 98 2 °F (36 8 °C) (Tympanic)   Ht 5' 4" (1 626 m)   Wt 69 4 kg (153 lb)   LMP  (LMP Unknown)   BMI 26 26 kg/m²          Physical Exam  Constitutional:       General: She is not in acute distress  Appearance: She is well-developed  HENT:      Head: Normocephalic and atraumatic  Nose: Nose normal    Eyes:      General: No scleral icterus  Conjunctiva/sclera: Conjunctivae normal    Neck:      Musculoskeletal: Normal range of motion and neck supple  Trachea: No tracheal deviation  Cardiovascular:      Rate and Rhythm: Normal rate  Pulmonary:      Effort: Pulmonary effort is normal    Abdominal:      General: Abdomen is flat  There is no distension  Palpations: There is no mass (no appreciable aortic pulsation/aneurysm)  Tenderness: There is no abdominal tenderness  There is no guarding or rebound  Musculoskeletal: Normal range of motion  Skin:     General: Skin is warm and dry  Neurological:      Mental Status: She is alert and oriented to person, place, and time  Psychiatric:         Mood and Affect: Mood normal          Behavior: Behavior normal          Thought Content: Thought content normal          Judgment: Judgment normal        lower extremity arterial duplex:CONCLUSION:     Impression:  RIGHT LOWER LIMB:  Diffuse disease throughout the femoral and popliteal arteries without evidence  of a focal stenosis  Ankle/Brachial index: 0 70 (Moderate Range)  Prior 0 68  PVR/ PPG tracings are dampened  Metatarsal pressure of 52mmHg  Prior 53mmHg  Great toe pressure of 26mmHg, within the healing range  Prior 39mmHg  In comparison to the study of 12/31/2019, there is no significant change  LEFT LOWER LIMB:  Patent Endarterectomized CFA and Patent Profunda Femoris Artery post  profundoplasty  Ankle/Brachial index:  0 98 (Normal )  Prior 0 94  PVR/ PPG tracings are dampened  Metatarsal pressure of 75mmHg  Prior 68mmHg  Great toe pressure of 59mmHg, within the healing range  Prior 49mmHg    Compared to previous study on 12/31/2019, there is no significant change  Aortoiliac duplex:CONCLUSION:     Impression  The abdominal aorta is patent and Ectatic measuring 2 7cm at its greatest  diameter  Right common and external iliac are patent  >75% stenosis identified in the distal common iliac artery     Celiac & superior mesenteric arteries are patent

## 2020-09-02 NOTE — ASSESSMENT & PLAN NOTE
Status post left common femoral endarterectomy/profundoplasty in September of 2019  Post op ankle brachial indices dramatically improved  Most recent surveillance study demonstrates NILSA 0 98, metatarsal pressure 75, great toe pressure 55  Aortoiliac duplex did suggest left distal common iliac artery stenosis of greater than 75% per duplex velocity criteria  Lower continues to complain of this swelling/fullness and code of her left proximal medial thigh  This does not appear to be fluctuant to suggest an underlying fluid collection  She does occasionally report episodic warmth associated with this area  Her left groin incision appears well healed  In typical fashion Pranav Brown has many questions/comments  She feels that although she knows the surgery was necessary and coat she feels that her overall quality life has been severely impacted  When questioned further regarding this she complains of pain radiating down bilateral posterior legs which is more consistent with her known history of sciatica  She denies any rest pain  She denies any tissue loss  We did review the results of her noninvasive arterial studies including in the reported left common iliac artery stenosis  I have offered her a CTA with runoff to further delineate severity of stenosis as well as assess at the perceived swelling along the left medial proximal thigh however lower defers at this time and wishes to proceed with surveillance duplex studies  She understands that should she experience any acute changes including increased pain, discoloration and or change in temperature of her extremities she is to call the office immediately and or seek immediate medical attention at the nearest emergency department

## 2020-09-02 NOTE — PATIENT INSTRUCTIONS
Peripheral Artery Disease   AMBULATORY CARE:   Peripheral artery disease (PAD)  is narrow, weak, or blocked arteries  It may affect any arteries outside of your heart and brain  PAD is usually the result of a buildup of fat and cholesterol, also called plaque, along your artery walls  Inflammation, a blood clot, or abnormal cell growth could also block your arteries  PAD prevents normal blood flow to your legs and arms  You are at risk of an amputation if poor blood flow keeps wounds from healing or causes gangrene (tissue death)  Without treatment, PAD can also cause a heart attack or stroke  Common symptoms include:  Mild PAD usually does not cause symptoms  As the disease worsens over time, you may have the following:  · Pain or cramps in your leg or hip while you walk     · A numb, weak, or heavy feeling in your legs     · Dry, scaly, red, or pale skin on your legs     · Thick or brittle nails, or hair loss on your arms and legs     · Foot sores that will not heal     · Burning or aching in your feet and toes while resting (this may be worse when you lie down)  Call 911 for the following:   · You have any of the following signs of a heart attack:      ¨ Squeezing, pressure, or pain in your chest that lasts longer than 5 minutes or returns    ¨ Discomfort or pain in your back, neck, jaw, stomach, or arm     ¨ Trouble breathing    ¨ Nausea or vomiting    ¨ Lightheadedness or a sudden cold sweat, especially with chest pain or trouble breathing    · You have any of the following signs of a stroke:      ¨ Numbness or drooping on one side of your face     ¨ Weakness in an arm or leg    ¨ Confusion or difficulty speaking    ¨ Dizziness, a severe headache, or vision loss  Seek care immediately if:   · You have sores or wounds that will not heal      · You notice black or discolored skin on your arm or leg  · Your skin is cool to the touch    Contact your healthcare provider if:   · You have leg pain when you walk 1/8 mile (200 meters) or less, even with treatment  · Your legs are red, dry, or pale, even with treatment  · You have questions or concerns about your condition or care  Treatment for PAD  can help reduce your risk of a heart attack, stroke, or amputation  You may need more than one of the following:  · Medicines  may be given to prevent blood clots and reduce the risk of a heart attack or stroke  You may be given medicine to help prevent your PAD from getting worse  · A supervised exercise program  helps you stay active in normal daily activities and may prevent disability  Healthcare providers will help you safely walk or do strength training exercises 3 times a week for 30 to 60 minutes  You will do this for several months, then transition to walking on your own  · Angioplasty  is a procedure to open your artery so blood can flow through normally  A thin tube called a catheter is used to insert a small balloon into your artery  The balloon is inflated to open your blocked artery, and then removed  A tube called a stent may be placed in your artery to hold it open  · Bypass surgery  is used to make a new connection to your artery with a vein from another part of your body, or an artificial graft  The vein or graft is attached to your artery above and below your blockage  This allows blood to flow around the blocked portion of your artery  Manage and prevent PAD:   · Walk for 30 to 60 minutes at least 4 times a week  Your healthcare provider may also refer you to an supervised exercise program  The program helps increase how far you can walk without pain  It also helps you stay active in normal daily activities and may prevent disability caused by PAD  · Do not smoke  Nicotine and other chemicals in cigarettes and cigars can worsen PAD  They can also increase your risk for a heart attack or stroke  Ask your healthcare provider for information if you currently smoke and need help to quit  E-cigarettes or smokeless tobacco still contain nicotine  Talk to your healthcare provider before you use these products  · Manage other health conditions  Take your medicines as directed and follow your healthcare provider's instructions if you have high blood pressure or high cholesterol  Perform foot care and check your blood sugar levels as directed if you have diabetes  · Eat heart healthy foods  Eat whole grains, fruits, and vegetables every day  Limit salt and high-fat foods  Ask your healthcare provider for more information on a heart healthy diet  Ask if you need to lose weight  Your healthcare provider can help you create a healthy weight-loss plan  Follow up with your healthcare provider as directed:  Write down your questions so you remember to ask them during your visits  © 2017 [facility] Information is for End User's use only and may not be sold, redistributed or otherwise used for commercial purposes  All illustrations and images included in CareNotes® are the copyrighted property of A D A M [employer]  or Anjum Acosta  The above information is an  only  It is not intended as medical advice for individual conditions or treatments  Talk to your doctor, nurse or pharmacist before following any medical regimen to see if it is safe and effective for you

## 2020-09-15 DIAGNOSIS — N95.2 VAGINAL ATROPHY: ICD-10-CM

## 2020-09-15 NOTE — TELEPHONE ENCOUNTER
Pharmacist from [facility]  Called  Patient is transferring all her prescriptions there  Request Rx for Yuvafem  Escript sent

## 2020-09-17 ENCOUNTER — OFFICE VISIT (OUTPATIENT)
Dept: OBGYN CLINIC | Facility: CLINIC | Age: 65
End: 2020-09-17
Payer: MEDICARE

## 2020-09-17 VITALS
DIASTOLIC BLOOD PRESSURE: 82 MMHG | WEIGHT: 152 LBS | SYSTOLIC BLOOD PRESSURE: 124 MMHG | BODY MASS INDEX: 25.95 KG/M2 | HEIGHT: 64 IN | TEMPERATURE: 98 F

## 2020-09-17 DIAGNOSIS — N95.2 VAGINAL ATROPHY: ICD-10-CM

## 2020-09-17 DIAGNOSIS — N81.4 UTERINE PROLAPSE: Primary | ICD-10-CM

## 2020-09-17 PROCEDURE — 99213 OFFICE O/P EST LOW 20 MIN: CPT | Performed by: OBSTETRICS & GYNECOLOGY

## 2020-09-17 RX ORDER — ESTRADIOL 10 UG/1
1 INSERT VAGINAL 2 TIMES WEEKLY
Qty: 24 TABLET | Refills: 3 | Status: SHIPPED | OUTPATIENT
Start: 2020-09-17 | End: 2021-09-29 | Stop reason: SDUPTHER

## 2020-09-17 NOTE — PROGRESS NOTES
Assessment/Plan:     Uterine prolapse - she will continue with Yuvafem  Return in 3 months for a pessary check  She is aware that she is due for a mammogram but is concerned about Covid as she is at high risk  She will eventually schedule this  There are no diagnoses linked to this encounter  Subjective:     Patient ID: Abhinav Flower is a 72 y o  female  Pt here for pessary check  She has no complaints  She is using Yuvafem once a week with good results  She occasionally uses replens  Review of Systems   Constitutional: Negative  Gastrointestinal: Negative  Genitourinary: Negative  Objective:     Physical Exam  Genitourinary:     General: Normal vulva  Vagina: Normal       Cervix: Normal       Uterus: Normal        Adnexa: Right adnexa normal and left adnexa normal       Comments: Vagina is atrophic  Pessary removed, cleaned and reinserted without difficulty  No excoriation

## 2020-10-15 ENCOUNTER — IMMUNIZATIONS (OUTPATIENT)
Dept: FAMILY MEDICINE CLINIC | Facility: CLINIC | Age: 65
End: 2020-10-15
Payer: MEDICARE

## 2020-10-15 DIAGNOSIS — Z23 ENCOUNTER FOR IMMUNIZATION: ICD-10-CM

## 2020-10-15 PROCEDURE — G0008 ADMIN INFLUENZA VIRUS VAC: HCPCS

## 2020-10-15 PROCEDURE — 90686 IIV4 VACC NO PRSV 0.5 ML IM: CPT

## 2020-11-16 ENCOUNTER — CONSULT (OUTPATIENT)
Dept: FAMILY MEDICINE CLINIC | Facility: CLINIC | Age: 65
End: 2020-11-16
Payer: MEDICARE

## 2020-11-16 VITALS
WEIGHT: 156 LBS | TEMPERATURE: 97.6 F | SYSTOLIC BLOOD PRESSURE: 122 MMHG | HEIGHT: 64 IN | DIASTOLIC BLOOD PRESSURE: 70 MMHG | BODY MASS INDEX: 26.63 KG/M2

## 2020-11-16 DIAGNOSIS — I25.10 CORONARY ARTERY DISEASE INVOLVING NATIVE CORONARY ARTERY OF NATIVE HEART WITHOUT ANGINA PECTORIS: ICD-10-CM

## 2020-11-16 DIAGNOSIS — M54.50 LUMBAR PAIN: ICD-10-CM

## 2020-11-16 DIAGNOSIS — M32.19 OTHER SYSTEMIC LUPUS ERYTHEMATOSUS WITH OTHER ORGAN INVOLVEMENT (HCC): ICD-10-CM

## 2020-11-16 DIAGNOSIS — Z11.59 NEED FOR HEPATITIS C SCREENING TEST: ICD-10-CM

## 2020-11-16 DIAGNOSIS — Z23 ENCOUNTER FOR IMMUNIZATION: ICD-10-CM

## 2020-11-16 DIAGNOSIS — Z13.1 SCREENING FOR DIABETES MELLITUS: ICD-10-CM

## 2020-11-16 DIAGNOSIS — Z01.810 PREOP CARDIOVASCULAR EXAM: Primary | ICD-10-CM

## 2020-11-16 DIAGNOSIS — Z12.12 SCREENING FOR COLORECTAL CANCER: ICD-10-CM

## 2020-11-16 DIAGNOSIS — H25.013 CORTICAL AGE-RELATED CATARACT OF BOTH EYES: ICD-10-CM

## 2020-11-16 DIAGNOSIS — I73.9 PAD (PERIPHERAL ARTERY DISEASE) (HCC): ICD-10-CM

## 2020-11-16 DIAGNOSIS — Z12.11 SCREENING FOR COLORECTAL CANCER: ICD-10-CM

## 2020-11-16 DIAGNOSIS — F17.200 TOBACCO DEPENDENCE: ICD-10-CM

## 2020-11-16 DIAGNOSIS — F41.9 ANXIETY: ICD-10-CM

## 2020-11-16 DIAGNOSIS — Z13.6 SCREENING FOR CARDIOVASCULAR CONDITION: ICD-10-CM

## 2020-11-16 DIAGNOSIS — I74.09 AORTOILIAC OCCLUSIVE DISEASE (HCC): ICD-10-CM

## 2020-11-16 DIAGNOSIS — Z00.00 WELCOME TO MEDICARE PREVENTIVE VISIT: ICD-10-CM

## 2020-11-16 PROCEDURE — 99214 OFFICE O/P EST MOD 30 MIN: CPT | Performed by: FAMILY MEDICINE

## 2020-11-16 PROCEDURE — G0403 EKG FOR INITIAL PREVENT EXAM: HCPCS | Performed by: FAMILY MEDICINE

## 2020-11-16 PROCEDURE — G0402 INITIAL PREVENTIVE EXAM: HCPCS | Performed by: FAMILY MEDICINE

## 2020-11-16 RX ORDER — POTASSIUM CHLORIDE 750 MG/1
10 TABLET, FILM COATED, EXTENDED RELEASE ORAL 3 TIMES DAILY
Qty: 270 TABLET | Refills: 1 | Status: SHIPPED | OUTPATIENT
Start: 2020-11-16 | End: 2021-05-18 | Stop reason: SDUPTHER

## 2020-11-16 RX ORDER — PREDNISONE 10 MG/1
10 TABLET ORAL DAILY
Qty: 90 TABLET | Refills: 1 | Status: SHIPPED | OUTPATIENT
Start: 2020-11-16 | End: 2021-07-09

## 2020-11-16 RX ORDER — LORAZEPAM 0.5 MG/1
0.5 TABLET ORAL EVERY 12 HOURS PRN
Qty: 30 TABLET | Refills: 0 | Status: SHIPPED | OUTPATIENT
Start: 2020-11-16 | End: 2021-02-03 | Stop reason: SDUPTHER

## 2020-11-16 RX ORDER — ATORVASTATIN CALCIUM 10 MG/1
10 TABLET, FILM COATED ORAL
Qty: 90 TABLET | Refills: 1 | Status: SHIPPED | OUTPATIENT
Start: 2020-11-16 | End: 2021-05-18 | Stop reason: SDUPTHER

## 2020-11-17 ENCOUNTER — CONSULT (OUTPATIENT)
Dept: CARDIOLOGY CLINIC | Facility: CLINIC | Age: 65
End: 2020-11-17
Payer: MEDICARE

## 2020-11-17 VITALS
HEART RATE: 95 BPM | WEIGHT: 158 LBS | DIASTOLIC BLOOD PRESSURE: 77 MMHG | BODY MASS INDEX: 26.98 KG/M2 | TEMPERATURE: 97.8 F | SYSTOLIC BLOOD PRESSURE: 112 MMHG | HEIGHT: 64 IN

## 2020-11-17 DIAGNOSIS — Z01.810 PREOP CARDIOVASCULAR EXAM: ICD-10-CM

## 2020-11-17 DIAGNOSIS — I73.9 PAD (PERIPHERAL ARTERY DISEASE) (HCC): ICD-10-CM

## 2020-11-17 DIAGNOSIS — I25.10 CORONARY ARTERY DISEASE INVOLVING NATIVE CORONARY ARTERY OF NATIVE HEART WITHOUT ANGINA PECTORIS: ICD-10-CM

## 2020-11-17 PROCEDURE — 99204 OFFICE O/P NEW MOD 45 MIN: CPT | Performed by: INTERNAL MEDICINE

## 2020-12-21 ENCOUNTER — TELEPHONE (OUTPATIENT)
Dept: FAMILY MEDICINE CLINIC | Facility: CLINIC | Age: 65
End: 2020-12-21

## 2021-01-07 ENCOUNTER — OFFICE VISIT (OUTPATIENT)
Dept: OBGYN CLINIC | Facility: CLINIC | Age: 66
End: 2021-01-07
Payer: MEDICARE

## 2021-01-07 VITALS
DIASTOLIC BLOOD PRESSURE: 80 MMHG | SYSTOLIC BLOOD PRESSURE: 120 MMHG | HEIGHT: 64 IN | BODY MASS INDEX: 27.31 KG/M2 | WEIGHT: 160 LBS

## 2021-01-07 DIAGNOSIS — N81.4 UTERINE PROLAPSE: Primary | ICD-10-CM

## 2021-01-07 PROCEDURE — 99213 OFFICE O/P EST LOW 20 MIN: CPT | Performed by: OBSTETRICS & GYNECOLOGY

## 2021-01-07 NOTE — PROGRESS NOTES
Assessment/Plan:     uterine prolapse- she will return in 3-4 months for pessary check  She will call with any concerns  She will continue using the vaginal estrogen once a week  No problem-specific Assessment & Plan notes found for this encounter  Diagnoses and all orders for this visit:    Uterine prolapse          Subjective:      Patient ID: Fletcher Weaver is a 72 y o  female  Patient here for pessary check  She has been using generic Vagifem for vaginal atrophy once a week  She is happy with her pessary and has no gyn complaints  normal 3D mammogram in May 2019 that showed scattered fibroglandular densities and average risk  She has been postponing her repeat mammogram due to [place]  The order is in the system  The following portions of the patient's history were reviewed and updated as appropriate: allergies, current medications, past family history, past medical history, past social history, past surgical history and problem list     Review of Systems   Constitutional: Negative  Gastrointestinal: Negative  Genitourinary: Negative  Objective:      LMP  (LMP Unknown)          Physical Exam  Genitourinary:     Vagina: Normal       Cervix: Normal       Uterus: Normal        Adnexa: Right adnexa normal and left adnexa normal       Comments: shaatz   Pessary removed without difficulty, cleaned and replaced without difficulty    No vaginal excoriation noted

## 2021-01-19 ENCOUNTER — TELEPHONE (OUTPATIENT)
Dept: FAMILY MEDICINE CLINIC | Facility: CLINIC | Age: 66
End: 2021-01-19

## 2021-01-19 DIAGNOSIS — R19.5 POSITIVE COLORECTAL CANCER SCREENING USING COLOGUARD TEST: Primary | ICD-10-CM

## 2021-01-20 NOTE — TELEPHONE ENCOUNTER
Left VM this evening for patient to return call in reference to her Cologuard result  Patient tested positive and a referral to Colonoscopy was done and referral specialist of Don Lowe MD  her card is stapled to the referral  So that the patient can contact her  Referral and other information was place in cabinet behind

## 2021-02-03 ENCOUNTER — OFFICE VISIT (OUTPATIENT)
Dept: FAMILY MEDICINE CLINIC | Facility: CLINIC | Age: 66
End: 2021-02-03
Payer: MEDICARE

## 2021-02-03 VITALS
TEMPERATURE: 98.5 F | WEIGHT: 160 LBS | DIASTOLIC BLOOD PRESSURE: 84 MMHG | BODY MASS INDEX: 27.31 KG/M2 | HEIGHT: 64 IN | SYSTOLIC BLOOD PRESSURE: 132 MMHG

## 2021-02-03 DIAGNOSIS — F41.9 ANXIETY: ICD-10-CM

## 2021-02-03 DIAGNOSIS — R19.5 POSITIVE COLORECTAL CANCER SCREENING USING COLOGUARD TEST: ICD-10-CM

## 2021-02-03 DIAGNOSIS — I73.9 PAD (PERIPHERAL ARTERY DISEASE) (HCC): ICD-10-CM

## 2021-02-03 DIAGNOSIS — M32.19 OTHER SYSTEMIC LUPUS ERYTHEMATOSUS WITH OTHER ORGAN INVOLVEMENT (HCC): ICD-10-CM

## 2021-02-03 DIAGNOSIS — R35.0 URINARY FREQUENCY: Primary | ICD-10-CM

## 2021-02-03 DIAGNOSIS — I70.209 OCCLUSION OF COMMON FEMORAL ARTERY (HCC): ICD-10-CM

## 2021-02-03 DIAGNOSIS — I74.09 AORTOILIAC OCCLUSIVE DISEASE (HCC): ICD-10-CM

## 2021-02-03 DIAGNOSIS — K21.00 GASTROESOPHAGEAL REFLUX DISEASE WITH ESOPHAGITIS WITHOUT HEMORRHAGE: ICD-10-CM

## 2021-02-03 LAB
SL AMB  POCT GLUCOSE, UA: NORMAL
SL AMB LEUKOCYTE ESTERASE,UA: ABNORMAL
SL AMB POCT BILIRUBIN,UA: NEGATIVE
SL AMB POCT BLOOD,UA: ABNORMAL
SL AMB POCT CLARITY,UA: ABNORMAL
SL AMB POCT COLOR,UA: ABNORMAL
SL AMB POCT KETONES,UA: ABNORMAL
SL AMB POCT NITRITE,UA: NEGATIVE
SL AMB POCT PH,UA: 6
SL AMB POCT SPECIFIC GRAVITY,UA: 1.02
SL AMB POCT URINE PROTEIN: ABNORMAL
SL AMB POCT UROBILINOGEN: NORMAL

## 2021-02-03 PROCEDURE — 81002 URINALYSIS NONAUTO W/O SCOPE: CPT | Performed by: FAMILY MEDICINE

## 2021-02-03 PROCEDURE — 87086 URINE CULTURE/COLONY COUNT: CPT | Performed by: FAMILY MEDICINE

## 2021-02-03 PROCEDURE — 99214 OFFICE O/P EST MOD 30 MIN: CPT | Performed by: FAMILY MEDICINE

## 2021-02-03 RX ORDER — NALOXONE HYDROCHLORIDE 4 MG/.1ML
SPRAY NASAL
Qty: 1 EACH | Refills: 1 | Status: SHIPPED | OUTPATIENT
Start: 2021-02-03

## 2021-02-03 RX ORDER — LORAZEPAM 0.5 MG/1
0.5 TABLET ORAL EVERY 12 HOURS PRN
Qty: 30 TABLET | Refills: 0 | Status: SHIPPED | OUTPATIENT
Start: 2021-02-03 | End: 2021-05-18 | Stop reason: SDUPTHER

## 2021-02-03 RX ORDER — OXYCODONE HYDROCHLORIDE 5 MG/1
5 TABLET ORAL EVERY 6 HOURS PRN
Qty: 30 TABLET | Refills: 0 | Status: SHIPPED | OUTPATIENT
Start: 2021-02-03

## 2021-02-03 RX ORDER — CIPROFLOXACIN 500 MG/1
500 TABLET, FILM COATED ORAL EVERY 12 HOURS SCHEDULED
Qty: 14 TABLET | Refills: 0 | Status: SHIPPED | OUTPATIENT
Start: 2021-02-03 | End: 2021-02-10

## 2021-02-03 NOTE — PROGRESS NOTES
Assessment/Plan:  Urine will be sent for culture  The patient to consider seeing GI for GERD and positive cologuard test   Patient will continue meds for anxiety and PAD  Patient use Pepcid AC for GERD  Patient use Cipro for UTI  Guidance given  Follow-up as needed       Diagnoses and all orders for this visit:    Urinary frequency  -     POCT urine dip  -     Urine culture  -     ciprofloxacin (CIPRO) 500 mg tablet; Take 1 tablet (500 mg total) by mouth every 12 (twelve) hours for 7 days    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HCC)  -     oxyCODONE (ROXICODONE) 5 mg immediate release tablet; Take 1 tablet (5 mg total) by mouth every 6 (six) hours as needed for moderate painMax Daily Amount: 20 mg  -     naloxone (NARCAN) 4 mg/0 1 mL nasal spray; Administer 1 spray into a nostril  If no response after 2-3 minutes, give another dose in the other nostril using a new spray  PAD (peripheral artery disease) (HCC)  -     oxyCODONE (ROXICODONE) 5 mg immediate release tablet; Take 1 tablet (5 mg total) by mouth every 6 (six) hours as needed for moderate painMax Daily Amount: 20 mg    Anxiety  -     LORazepam (ATIVAN) 0 5 mg tablet; Take 1 tablet (0 5 mg total) by mouth every 12 (twelve) hours as needed for anxiety    Gastroesophageal reflux disease with esophagitis without hemorrhage  -     Ambulatory referral to Gastroenterology; Future    Positive colorectal cancer screening using Cologuard test  -     Ambulatory referral to Gastroenterology; Future    Other systemic lupus erythematosus with other organ involvement ([facility] [address] )    Aortoiliac occlusive disease (HCC)            Subjective:        Patient ID: Julián Nance is a 72 y o  female  Patient is here with UTI symptoms over the past 3-4 days and will need medications refilled the for pain anxiety  The history for vascular disease  Patient with positive coli guard  Patient with urgency  Patient also with some low back pain associated with this  No fever  Patient also worsening GERD symptoms  Patient with ongoing pain left lower extremity status post vascular surgery  The following portions of the patient's history were reviewed and updated as appropriate: allergies, current medications, past family history, past medical history, past social history, past surgical history and problem list       Review of Systems   Constitutional: Negative  Negative for fever  HENT: Negative  Eyes: Negative  Respiratory: Negative  Cardiovascular: Negative  Gastrointestinal:        GERD symptoms  Positive cologuard   Endocrine: Negative  Genitourinary: Positive for frequency and urgency  Negative for dysuria and hematuria  Musculoskeletal: Positive for arthralgias, back pain and gait problem  Skin: Negative  Allergic/Immunologic: Negative  Hematological: Negative  Psychiatric/Behavioral: Negative  Objective:      BMI Counseling: Body mass index is 27 46 kg/m²  The BMI is above normal  Nutrition recommendations include decreasing portion sizes  Exercise recommendations include moderate physical activity 150 minutes/week  Depression Screening and Follow-up Plan: Clincally patient does not have depression  No treatment is required  Tobacco Cessation Counseling: Tobacco cessation counseling was provided  The patient is sincerely urged to quit consumption of tobacco  She is not ready to quit tobacco  Medication options discussed  /84 (BP Location: Right arm, Patient Position: Sitting, Cuff Size: Adult)   Temp 98 5 °F (36 9 °C) (Tympanic)   Ht 5' 4" (1 626 m)   Wt 72 6 kg (160 lb)   LMP  (LMP Unknown)   BMI 27 46 kg/m²          Physical Exam  Vitals signs and nursing note reviewed  Constitutional:       General: She is not in acute distress  Appearance: Normal appearance  She is not ill-appearing, toxic-appearing or diaphoretic  HENT:      Head: Normocephalic and atraumatic        Right Ear: Tympanic membrane, ear canal and external ear normal  There is no impacted cerumen  Left Ear: Tympanic membrane, ear canal and external ear normal  There is no impacted cerumen  Nose: Nose normal  No congestion or rhinorrhea  Mouth/Throat:      Mouth: Mucous membranes are moist       Pharynx: No oropharyngeal exudate or posterior oropharyngeal erythema  Eyes:      General: No scleral icterus  Right eye: No discharge  Left eye: No discharge  Extraocular Movements: Extraocular movements intact  Conjunctiva/sclera: Conjunctivae normal       Pupils: Pupils are equal, round, and reactive to light  Neck:      Musculoskeletal: Normal range of motion and neck supple  No neck rigidity or muscular tenderness  Vascular: No carotid bruit  Cardiovascular:      Rate and Rhythm: Normal rate and regular rhythm  Pulses: Normal pulses  Heart sounds: Normal heart sounds  No murmur  No friction rub  No gallop  Pulmonary:      Effort: Pulmonary effort is normal  No respiratory distress  Breath sounds: Normal breath sounds  No stridor  No wheezing, rhonchi or rales  Chest:      Chest wall: No tenderness  Abdominal:      General: Abdomen is flat  Bowel sounds are normal  There is no distension  Palpations: Abdomen is soft  Tenderness: There is no abdominal tenderness  There is right CVA tenderness  There is no left CVA tenderness, guarding or rebound  Musculoskeletal:         General: Tenderness present  No swelling, deformity or signs of injury  Right lower leg: No edema  Left lower leg: No edema  Lymphadenopathy:      Cervical: No cervical adenopathy  Skin:     General: Skin is warm and dry  Capillary Refill: Capillary refill takes less than 2 seconds  Coloration: Skin is not jaundiced  Findings: No bruising, erythema, lesion or rash  Neurological:      General: No focal deficit present        Mental Status: She is alert and oriented to person, place, and time  Mental status is at baseline  Cranial Nerves: No cranial nerve deficit  Sensory: No sensory deficit  Motor: No weakness  Coordination: Coordination normal       Gait: Gait normal    Psychiatric:         Mood and Affect: Mood normal          Behavior: Behavior normal          Thought Content:  Thought content normal          Judgment: Judgment normal

## 2021-02-04 LAB — BACTERIA UR CULT: NORMAL

## 2021-03-15 ENCOUNTER — TELEPHONE (OUTPATIENT)
Dept: ENDOCRINOLOGY | Facility: CLINIC | Age: 66
End: 2021-03-15

## 2021-03-19 ENCOUNTER — OFFICE VISIT (OUTPATIENT)
Dept: GASTROENTEROLOGY | Facility: MEDICAL CENTER | Age: 66
End: 2021-03-19
Payer: MEDICARE

## 2021-03-19 VITALS
TEMPERATURE: 97.9 F | HEART RATE: 102 BPM | DIASTOLIC BLOOD PRESSURE: 87 MMHG | BODY MASS INDEX: 26.12 KG/M2 | HEIGHT: 64 IN | WEIGHT: 153 LBS | SYSTOLIC BLOOD PRESSURE: 149 MMHG

## 2021-03-19 DIAGNOSIS — R19.5 POSITIVE COLORECTAL CANCER SCREENING USING COLOGUARD TEST: Primary | ICD-10-CM

## 2021-03-19 DIAGNOSIS — K21.00 GASTROESOPHAGEAL REFLUX DISEASE WITH ESOPHAGITIS WITHOUT HEMORRHAGE: ICD-10-CM

## 2021-03-19 PROCEDURE — 99204 OFFICE O/P NEW MOD 45 MIN: CPT | Performed by: INTERNAL MEDICINE

## 2021-03-19 NOTE — PATIENT INSTRUCTIONS
Gastroesophageal Reflux Disease   AMBULATORY CARE:   Gastroesophageal reflux disease (GERD)  is reflux that occurs more than twice a week for a few weeks  Reflux means acid and food in the stomach back up into the esophagus  It usually causes heartburn and other symptoms  GERD can cause other health problems over time if it is not treated  Signs and symptoms:   · Heartburn (burning pain in your chest)    · Pain after meals that spreads to your neck, jaw, or shoulder    · Pain that gets better when you change positions    · Bitter or acid taste in your mouth    · A dry cough    · Trouble swallowing or pain with swallowing    · Hoarseness or a sore throat    · Burping or hiccups    · Feeling full soon after you start eating    Call your local emergency number (911 in the [address] [facility]) if:   · You have severe chest pain and sudden trouble breathing  Seek care immediately if:   · You have trouble breathing after you vomit  · You have trouble swallowing, or pain with swallowing  · Your bowel movements are black, bloody, or tarry-looking  · Your vomit looks like coffee grounds or has blood in it  Call your doctor or gastroenterologist if:   · You feel full and cannot burp or vomit  · You vomit large amounts, or you vomit often  · You are losing weight without trying  · Your symptoms get worse or do not improve with treatment  · You have questions or concerns about your condition or care  Treatment for GERD:   · Medicines  are used to decrease stomach acid  Medicine may also be used to help your lower esophageal sphincter and stomach contract (tighten) more  · Surgery  is done to wrap the upper part of the stomach around the esophageal sphincter  This will strengthen the sphincter and prevent reflux  Manage GERD:   · Do not have foods or drinks that may increase heartburn  These include chocolate, peppermint, fried or fatty foods, drinks that contain caffeine, or carbonated drinks (soda)  Other foods include spicy foods, onions, tomatoes, and tomato-based foods  Do not have foods or drinks that can irritate your esophagus, such as citrus fruits, juices, and alcohol  · Do not eat large meals  When you eat a lot of food at one time, your stomach needs more acid to digest it  Eat 6 small meals each day instead of 3 large meals, and eat slowly  Do not eat meals 2 to 3 hours before bedtime  · Elevate the head of your bed  Place 6-inch blocks under the head of your bed frame  You may also use more than one pillow under your head and shoulders while you sleep  · Maintain a healthy weight  If you are overweight, weight loss may help relieve symptoms of GERD  · Do not smoke  Smoking weakens the lower esophageal sphincter and increases the risk of GERD  Ask your healthcare provider for information if you currently smoke and need help to quit  E-cigarettes or smokeless tobacco still contain nicotine  Talk to your healthcare provider before you use these products  · Do not wear clothing that is tight around your waist   Tight clothing can put pressure on your stomach and cause or worsen GERD symptoms  Follow up with your doctor or gastroenterologist as directed:  Write down your questions so you remember to ask them during your visits  © Copyright [address] Information is for End User's use only and may not be sold, redistributed or otherwise used for commercial purposes  All illustrations and images included in CareNotes® are the copyrighted property of [employer]  or [facility] [employer]   The above information is an  only  It is not intended as medical advice for individual conditions or treatments  Talk to your doctor, nurse or pharmacist before following any medical regimen to see if it is safe and effective for you  The patient is scheduled at [facility] for a colon/egd with Dr Charisma Mack on 5/24/2021   Miralax/dulcolax prep instructions have been gone over in the office, with the patient, by the MA  The patient is aware that they will receive a call with the arrival time the day prior to procedure and that they will need a  the day of the procedure   I have asked the patient to call with any questions that they might have prior to procedure

## 2021-03-19 NOTE — PROGRESS NOTES
[facility] - Outpatient Consultation  Aida Lancaster 72 y o  female MRN: [id number]  Encounter: [id number]          ASSESSMENT AND PLAN:  66-year-old female with history of hyperlipidemia, peripheral arterial disease, lung cancer status post resection, lupus who presents for evaluation  1  Positive colorectal cancer screening using Cologuard test  She underwent Cologuard testing in January 2021 was of  She reports a colonoscopy in her 25s which was normal but has had none since  She has no family history of gastrointestinal disease including colorectal cancer  I discussed with the patient today the indications for colonoscopy  given her positive colo guard test  We discussed the colo guard test and how a  positive result could indicate advanced adenomas (20%), non advanced adenomas (31%), colorectal cancer (4%) or nocolorectal neoplasia (45%)  Given her positive result, I would recommend proceeding with  colonoscopy  - Colonoscopy; Future    2  Gastroesophageal reflux disease with esophagitis without hemorrhage  She notes history of gastroesophageal reflux for which she was intermittently using Pepcid  However due to ongoing symptoms, life stressors she has been using Pepcid nightly with moderate relief of her symptoms  I provided her with information today regarding dietary/lifestyle anti-reflux measures  She will be scheduled for EGD given her chronic reflux to assess for Gil's esophagus, hyperemia  If her symptoms are uncontrolled on 10 mg Pepcid she can be increased to the 20 or 40 mg dose in the future  - EGD; Future    ______________________________________________________________________    HPI:  66-year-old female with history of hyperlipidemia, peripheral arterial disease, lung cancer status post resection, lupus who presents for evaluation  She underwent Cologuard testing in July 21 which was positive    She reports prior fit testing yearly in the past needed  She reports usually normal, formed stool  She denies melena or hematochezia  She will intermittently have loose bowel movements her diarrhea which can be associated with stressful episodes  She notes history of chronic reflux substernal chest burning which can occur at night  There are certain foods that can make it worse  She has been using Pepcid 10 mg nightly with overall good control of her symptoms  She previously used as needed but has been using it scheduled due to ongoing symptoms  She was previously on omeprazole but however reported a rash that developed and since discontinue medication  She notes intermittent narrowing of her throat but denies dysphagia or odynophagia  Her appetite is good her weight is stable  She has no prior EGD   She reports undergoing a colonoscopy in her 25s which was normal  She has no family history of colorectal cancer        REVIEW OF SYSTEMS:    CONSTITUTIONAL: Denies any fever, chills, rigors, and weight loss  HEENT: No earache or tinnitus  Denies hearing loss or visual disturbances  CARDIOVASCULAR: No chest pain or palpitations  RESPIRATORY: Denies any cough, hemoptysis, shortness of breath or dyspnea on exertion  GASTROINTESTINAL: As noted in the History of Present Illness  GENITOURINARY: No problems with urination  Denies any hematuria or dysuria  NEUROLOGIC: No dizziness or vertigo, denies headaches  MUSCULOSKELETAL: Denies any muscle or joint pain  SKIN: Denies skin rashes or itching  ENDOCRINE: Denies excessive thirst  Denies intolerance to heat or cold  PSYCHOSOCIAL: Denies depression or anxiety  Denies any recent memory loss         Historical Information   Past Medical History:   Diagnosis Date    Anxiety     Arthritis 12/20/2014    Cataract     eulalia    Disease of thyroid gland     nodules    Fibromyalgia, primary     Frequent UTI     History of lung cancer     lower left lobe removed - 2004    Irritable bowel syndrome  Lactose intolerance     Lupus (HCC)     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    PAD (peripheral artery disease) (HCC)     Presence of pessary     Raynaud disease     Sciatica     Sinus headache     Sjogren's syndrome ([facility] [address] )     Smoker      Past Surgical History:   Procedure Laterality Date    LUNG REMOVAL, PARTIAL  05/01/2004    Prior Surgical/Procedural History - 5/4 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 - lung resection - subtotal    MOUTH SURGERY      Tooth extraction    NH THROMBOENDARTECTMY FEMORAL COMMON Left 9/10/2019    Procedure: ENDARTERECTOMY ARTERIAL FEMORAL,  WITH BOVINE GRAFT AND PROFUNDOPLASTY;  Surgeon: Cheryl Gallo DO;  Location: [facility];  Service: Vascular    SINUS SURGERY       Social History   Social History     Substance and Sexual Activity   Alcohol Use Yes    Frequency: 2-4 times a month    Drinks per session: 1 or 2    Binge frequency: Never    Comment: liquor     Social History     Substance and Sexual Activity   Drug Use No     Social History     Tobacco Use   Smoking Status Current Every Day Smoker    Packs/day: 0 50    Years: 40 00    Pack years: 20 00    Types: Cigarettes   Smokeless Tobacco Never Used     Family History   Problem Relation Age of Onset    Arthritis Mother     Asthma Mother     Dementia Mother     Heart disease Mother     Hypothyroidism Mother     Irritable bowel syndrome Mother     Uterine cancer Mother 72    Heart attack Father    Emanuel Stroke Father     Hyperlipidemia Sister     Uterine cancer Sister         age unknown    Heart attack Maternal Grandfather     Asthma Family     Cataracts Family     Thyroid disease Family     No Known Problems Paternal Aunt     No Known Problems Maternal Aunt        Meds/Allergies       Current Outpatient Medications:     aspirin (ECOTRIN LOW STRENGTH) 81 mg EC tablet    atorvastatin (LIPITOR) 10 mg tablet    cyanocobalamin (VITAMIN B-12) 100 mcg tablet   estradiol (VAGIFEM, YUVAFEM) 10 MCG TABS vaginal tablet    famotidine (PEPCID AC) 10 mg tablet    LORazepam (ATIVAN) 0 5 mg tablet    Magnesium 500 MG CAPS    naloxone (NARCAN) 4 mg/0 1 mL nasal spray    oxyCODONE (ROXICODONE) 5 mg immediate release tablet    potassium chloride (Klor-Con 10) 10 mEq tablet    predniSONE 10 mg tablet    Vitamin Mixture (JOSE RAMON-C PO)    Allergies   Allergen Reactions    Penicillins Shortness Of Breath and Edema    Cymbalta [Duloxetine Hcl] Diarrhea    Duloxetine Diarrhea    Dust Mite Extract     Lactose GI Intolerance    Neosporin [Neomycin-Bacitracin Zn-Polymyx]      Other reaction(s): REDNESS    Nitrofurantoin Rash    Omeprazole Rash    Sulfamethoxazole-Trimethoprim Rash           Objective     Blood pressure 149/87, pulse 102, temperature 97 9 °F (36 6 °C), temperature source Tympanic, height 5' 4" (1 626 m), weight 69 4 kg (153 lb), not currently breastfeeding  There is no height or weight on file to calculate BMI  PHYSICAL EXAM:      General Appearance:   Alert, cooperative, no distress   HEENT:   Normocephalic, atraumatic, anicteric  Neck:  Supple, symmetrical, trachea midline   Lungs:   Clear to auscultation bilaterally; no rales, rhonchi or wheezing; respirations unlabored    Heart[de-identified]   Regular rate and rhythm; no murmur, rub, or gallop  Abdomen:   Soft, non-tender, non-distended; normal bowel sounds; no masses, no organomegaly    Genitalia:   Deferred    Rectal:   Deferred    Extremities:  No cyanosis, clubbing or edema    Pulses:  2+ and symmetric    Skin:  No jaundice, rashes, or lesions    Lymph nodes:  No palpable cervical lymphadenopathy        Lab Results:   No visits with results within 1 Day(s) from this visit     Latest known visit with results is:   Office Visit on 02/03/2021   Component Date Value    LEUKOCYTE ESTERASE,UA 02/03/2021 Trace     NITRITE,UA 02/03/2021 Negative     [facility] AMB POCT UROBILINOGEN 02/03/2021 Normal     POCT URINE PROTEIN 02/03/2021 Trace      PH,UA 02/03/2021 6     BLOOD,UA 02/03/2021 Trace     SPECIFIC GRAVITY,UA 02/03/2021 1 020     KETONES,UA 02/03/2021 +small     BILIRUBIN,UA 02/03/2021 Negative     GLUCOSE, UA 02/03/2021 Normal      COLOR,UA 02/03/2021 Juanita     CLARITY,UA 02/03/2021 cloudy     Urine Culture 02/03/2021 20,000-29,000 cfu/ml           Radiology Results:   No results found

## 2021-04-05 ENCOUNTER — IMMUNIZATIONS (OUTPATIENT)
Dept: FAMILY MEDICINE CLINIC | Facility: HOSPITAL | Age: 66
End: 2021-04-05

## 2021-04-05 DIAGNOSIS — Z23 ENCOUNTER FOR IMMUNIZATION: Primary | ICD-10-CM

## 2021-04-05 PROCEDURE — 91301 SARS-COV-2 / COVID-19 MRNA VACCINE (MODERNA) 100 MCG: CPT

## 2021-04-05 PROCEDURE — 0011A SARS-COV-2 / COVID-19 MRNA VACCINE (MODERNA) 100 MCG: CPT

## 2021-04-09 ENCOUNTER — TELEPHONE (OUTPATIENT)
Dept: VASCULAR SURGERY | Facility: CLINIC | Age: 66
End: 2021-04-09

## 2021-04-09 NOTE — TELEPHONE ENCOUNTER
Attempted to contact patient to schedule appointment(s) listed below  Requested patient call [phone] option 3 to schedule appointment(s)  Patient's appointment(s) are past due, expected ASAP      Dopplers  [] Carotid (CV)   [x] Abdominal Aorta Iliac (AOIL)  [x] Lower Limb Arterial (VISH)  [] Renal Artery  [] Upper Limb (UEA)  [] EVAR Duplex    Advanced Imaging   [] CT abdomen with/ without contrast  [] CT abdomen with contrast  [] CT abdomen without contrast    [] CT abdomen & pelvis with/ without contrast  [] CT abdomen & pelvis with contrast  [] CT abdomen & pelvis without contrast    [] CTA abdomen    [] CTA abdomen & pelvis    Office Visit   [] New patient, patient last seen over 3 years ago  [] Risk factor modification

## 2021-05-03 ENCOUNTER — ANESTHESIA EVENT (OUTPATIENT)
Dept: GASTROENTEROLOGY | Facility: MEDICAL CENTER | Age: 66
End: 2021-05-03

## 2021-05-05 ENCOUNTER — IMMUNIZATIONS (OUTPATIENT)
Dept: FAMILY MEDICINE CLINIC | Facility: HOSPITAL | Age: 66
End: 2021-05-05

## 2021-05-05 DIAGNOSIS — Z23 ENCOUNTER FOR IMMUNIZATION: Primary | ICD-10-CM

## 2021-05-05 PROCEDURE — 91301 SARS-COV-2 / COVID-19 MRNA VACCINE (MODERNA) 100 MCG: CPT

## 2021-05-05 PROCEDURE — 0012A SARS-COV-2 / COVID-19 MRNA VACCINE (MODERNA) 100 MCG: CPT

## 2021-05-12 ENCOUNTER — TELEPHONE (OUTPATIENT)
Dept: GASTROENTEROLOGY | Facility: CLINIC | Age: 66
End: 2021-05-12

## 2021-05-12 NOTE — TELEPHONE ENCOUNTER
Colon/ egd rescheduled for 7/27/21 @ [facility] with Dr Rachelle Mcclain  Pt stated she still has prep/ prep instructions

## 2021-05-13 ENCOUNTER — HOSPITAL ENCOUNTER (OUTPATIENT)
Dept: NON INVASIVE DIAGNOSTICS | Facility: CLINIC | Age: 66
Discharge: HOME | End: 2021-05-13
Payer: MEDICARE

## 2021-05-13 DIAGNOSIS — I74.09 AORTOILIAC OCCLUSIVE DISEASE (HCC): ICD-10-CM

## 2021-05-13 PROCEDURE — 93978 VASCULAR STUDY: CPT | Performed by: SURGERY

## 2021-05-13 PROCEDURE — 93978 VASCULAR STUDY: CPT

## 2021-05-13 PROCEDURE — 93923 UPR/LXTR ART STDY 3+ LVLS: CPT

## 2021-05-14 ENCOUNTER — OFFICE VISIT (OUTPATIENT)
Dept: OBGYN CLINIC | Facility: CLINIC | Age: 66
End: 2021-05-14
Payer: MEDICARE

## 2021-05-14 VITALS
DIASTOLIC BLOOD PRESSURE: 76 MMHG | HEIGHT: 64 IN | WEIGHT: 153.6 LBS | SYSTOLIC BLOOD PRESSURE: 138 MMHG | BODY MASS INDEX: 26.22 KG/M2

## 2021-05-14 DIAGNOSIS — N95.2 VAGINAL ATROPHY: ICD-10-CM

## 2021-05-14 DIAGNOSIS — N81.4 UTERINE PROLAPSE: Primary | ICD-10-CM

## 2021-05-14 PROCEDURE — 99213 OFFICE O/P EST LOW 20 MIN: CPT | Performed by: OBSTETRICS & GYNECOLOGY

## 2021-05-14 NOTE — PROGRESS NOTES
Assessment/Plan:       Uterine prolapse- she is doing well with her pessary  She will continue using the Vagifem  Reviewed the risks and benefits  I explained that the risks in the insert also refer to oral estrogen and the risk with vaginal estrogen is much lower  She understands and is agreeable  She is planning to continue using it as it is working well for her  Return in 3-4 months for a pessary check and a breast exam      There are no diagnoses linked to this encounter  Subjective:     Patient ID: Crystal Terrazas is a 77 y o  female  Patient presents for pessary check  She has to reschedule her mammogram due to her Covid vaccine  She has no gyn complaints  She is using Vagifem every 8 days and feels that is working well for her  She had some questions about the warning in the package insert  Review of Systems   Constitutional: Negative  Gastrointestinal: Negative  Genitourinary: Negative  Objective:     Physical Exam  Genitourinary:     General: Normal vulva  Vagina: Normal       Cervix: Normal       Uterus: Normal        Adnexa: Right adnexa normal and left adnexa normal       Comments:   Shaatz pessary removed, cleaned and reinserted without difficulty  No vaginal excoriation

## 2021-05-18 ENCOUNTER — OFFICE VISIT (OUTPATIENT)
Dept: FAMILY MEDICINE CLINIC | Facility: CLINIC | Age: 66
End: 2021-05-18
Payer: MEDICARE

## 2021-05-18 VITALS
TEMPERATURE: 98.1 F | BODY MASS INDEX: 26.36 KG/M2 | WEIGHT: 154.4 LBS | SYSTOLIC BLOOD PRESSURE: 124 MMHG | DIASTOLIC BLOOD PRESSURE: 70 MMHG | HEIGHT: 64 IN

## 2021-05-18 DIAGNOSIS — M54.50 ACUTE BILATERAL LOW BACK PAIN, UNSPECIFIED WHETHER SCIATICA PRESENT: ICD-10-CM

## 2021-05-18 DIAGNOSIS — F41.9 ANXIETY: ICD-10-CM

## 2021-05-18 DIAGNOSIS — I74.09 AORTOILIAC OCCLUSIVE DISEASE (HCC): ICD-10-CM

## 2021-05-18 DIAGNOSIS — M32.19 OTHER SYSTEMIC LUPUS ERYTHEMATOSUS WITH OTHER ORGAN INVOLVEMENT (HCC): ICD-10-CM

## 2021-05-18 DIAGNOSIS — N30.00 ACUTE CYSTITIS WITHOUT HEMATURIA: Primary | ICD-10-CM

## 2021-05-18 DIAGNOSIS — I73.9 PAD (PERIPHERAL ARTERY DISEASE) (HCC): ICD-10-CM

## 2021-05-18 LAB
SL AMB  POCT GLUCOSE, UA: NORMAL
SL AMB LEUKOCYTE ESTERASE,UA: NORMAL
SL AMB POCT BILIRUBIN,UA: NORMAL
SL AMB POCT BLOOD,UA: NORMAL
SL AMB POCT CLARITY,UA: NORMAL
SL AMB POCT COLOR,UA: YELLOW
SL AMB POCT KETONES,UA: NORMAL
SL AMB POCT NITRITE,UA: NORMAL
SL AMB POCT PH,UA: 7
SL AMB POCT SPECIFIC GRAVITY,UA: 1.01
SL AMB POCT URINE PROTEIN: NORMAL
SL AMB POCT UROBILINOGEN: NORMAL

## 2021-05-18 PROCEDURE — 81002 URINALYSIS NONAUTO W/O SCOPE: CPT | Performed by: FAMILY MEDICINE

## 2021-05-18 PROCEDURE — 99213 OFFICE O/P EST LOW 20 MIN: CPT | Performed by: FAMILY MEDICINE

## 2021-05-18 PROCEDURE — 87086 URINE CULTURE/COLONY COUNT: CPT | Performed by: FAMILY MEDICINE

## 2021-05-18 RX ORDER — POTASSIUM CHLORIDE 750 MG/1
10 TABLET, FILM COATED, EXTENDED RELEASE ORAL 3 TIMES DAILY
Qty: 270 TABLET | Refills: 1 | Status: SHIPPED | OUTPATIENT
Start: 2021-05-18 | End: 2021-12-09 | Stop reason: SDUPTHER

## 2021-05-18 RX ORDER — ATORVASTATIN CALCIUM 10 MG/1
10 TABLET, FILM COATED ORAL
Qty: 90 TABLET | Refills: 1 | Status: SHIPPED | OUTPATIENT
Start: 2021-05-18 | End: 2021-12-09 | Stop reason: SDUPTHER

## 2021-05-18 RX ORDER — CIPROFLOXACIN 500 MG/1
500 TABLET, FILM COATED ORAL EVERY 12 HOURS SCHEDULED
Qty: 14 TABLET | Refills: 0 | Status: SHIPPED | OUTPATIENT
Start: 2021-05-18 | End: 2021-05-25

## 2021-05-18 RX ORDER — LORAZEPAM 0.5 MG/1
0.5 TABLET ORAL EVERY 12 HOURS PRN
Qty: 30 TABLET | Refills: 0 | Status: SHIPPED | OUTPATIENT
Start: 2021-05-18

## 2021-05-18 NOTE — PROGRESS NOTES
Assessment/Plan:       Diagnoses and all orders for this visit:    Acute cystitis without hematuria  -     ciprofloxacin (CIPRO) 500 mg tablet; Take 1 tablet (500 mg total) by mouth every 12 (twelve) hours for 7 days    PAD (peripheral artery disease) (HCC)  -     atorvastatin (LIPITOR) 10 mg tablet; Take 1 tablet (10 mg total) by mouth daily with dinner    Aortoiliac occlusive disease (HCC)  -     atorvastatin (LIPITOR) 10 mg tablet; Take 1 tablet (10 mg total) by mouth daily with dinner    Anxiety  -     LORazepam (ATIVAN) 0 5 mg tablet; Take 1 tablet (0 5 mg total) by mouth every 12 (twelve) hours as needed for anxiety    Other systemic lupus erythematosus with other organ involvement (HCC)  -     potassium chloride (Klor-Con 10) 10 mEq tablet; Take 1 tablet (10 mEq total) by mouth 3 (three) times a day    Acute bilateral low back pain, unspecified whether sciatica present  -     POCT urine dip    Other orders  -     Misc Natural Products (CURCUMAX PRO PO); Take by mouth            Subjective:        Patient ID: Eugene Welch is a 77 y o  female  Patient is here with urinary symptoms  Patient's follow-up on chronic conditions  Patient status post COVID vaccine x2  Patient with some pain and right shoulder status post vaccine patient with some back pain the the thoracic region due to this  The patient with some dysuria  The following portions of the patient's history were reviewed and updated as appropriate: allergies, current medications, past family history, past medical history, past social history, past surgical history and problem list       Review of Systems   Constitutional: Negative  HENT: Negative  Eyes: Negative  Respiratory: Negative  Cardiovascular: Negative  Gastrointestinal: Negative  Endocrine: Negative  Genitourinary: Positive for difficulty urinating and dysuria  Musculoskeletal: Negative  Skin: Negative  Allergic/Immunologic: Negative      Neurological: Negative  Hematological: Negative  Psychiatric/Behavioral: Negative  Objective:      BMI Counseling: Body mass index is 26 5 kg/m²  The BMI is above normal  Nutrition recommendations include decreasing portion sizes  Exercise recommendations include moderate physical activity 150 minutes/week  Depression Screening and Follow-up Plan: Clincally patient does not have depression  No treatment is required  /70 (BP Location: Right arm, Patient Position: Sitting, Cuff Size: Large)   Temp 98 1 °F (36 7 °C) (Tympanic)   Ht 5' 4" (1 626 m)   Wt 70 kg (154 lb 6 4 oz)   LMP  (LMP Unknown)   BMI 26 50 kg/m²          Physical Exam  Constitutional:       General: She is not in acute distress  Appearance: Normal appearance  She is not ill-appearing, toxic-appearing or diaphoretic  HENT:      Head: Normocephalic and atraumatic  Right Ear: External ear normal       Left Ear: External ear normal    Cardiovascular:      Rate and Rhythm: Normal rate and regular rhythm  Pulses: Normal pulses  Heart sounds: Normal heart sounds  Pulmonary:      Effort: Pulmonary effort is normal       Breath sounds: Normal breath sounds  Skin:     Capillary Refill: Capillary refill takes less than 2 seconds  Neurological:      Mental Status: She is alert

## 2021-05-19 ENCOUNTER — HOSPITAL ENCOUNTER (OUTPATIENT)
Dept: NON INVASIVE DIAGNOSTICS | Facility: CLINIC | Age: 66
Discharge: HOME | End: 2021-05-19
Payer: MEDICARE

## 2021-05-19 DIAGNOSIS — I74.09 AORTOILIAC OCCLUSIVE DISEASE (HCC): ICD-10-CM

## 2021-05-19 PROCEDURE — 93925 LOWER EXTREMITY STUDY: CPT

## 2021-05-19 PROCEDURE — 93923 UPR/LXTR ART STDY 3+ LVLS: CPT

## 2021-05-19 PROCEDURE — 93925 LOWER EXTREMITY STUDY: CPT | Performed by: SURGERY

## 2021-05-19 PROCEDURE — 93922 UPR/L XTREMITY ART 2 LEVELS: CPT | Performed by: SURGERY

## 2021-05-20 LAB — BACTERIA UR CULT: NORMAL

## 2021-05-24 ENCOUNTER — ANESTHESIA (OUTPATIENT)
Dept: GASTROENTEROLOGY | Facility: MEDICAL CENTER | Age: 66
End: 2021-05-24

## 2021-06-02 ENCOUNTER — OFFICE VISIT (OUTPATIENT)
Dept: VASCULAR SURGERY | Facility: CLINIC | Age: 66
End: 2021-06-02
Payer: MEDICARE

## 2021-06-02 VITALS
TEMPERATURE: 99.7 F | HEIGHT: 64 IN | RESPIRATION RATE: 17 BRPM | WEIGHT: 152.4 LBS | DIASTOLIC BLOOD PRESSURE: 68 MMHG | SYSTOLIC BLOOD PRESSURE: 106 MMHG | BODY MASS INDEX: 26.02 KG/M2 | HEART RATE: 90 BPM

## 2021-06-02 DIAGNOSIS — I74.09 AORTOILIAC OCCLUSIVE DISEASE (HCC): ICD-10-CM

## 2021-06-02 DIAGNOSIS — F17.200 TOBACCO DEPENDENCE: ICD-10-CM

## 2021-06-02 DIAGNOSIS — I73.9 PAD (PERIPHERAL ARTERY DISEASE) (HCC): Primary | ICD-10-CM

## 2021-06-02 DIAGNOSIS — M32.19 OTHER SYSTEMIC LUPUS ERYTHEMATOSUS WITH OTHER ORGAN INVOLVEMENT (HCC): ICD-10-CM

## 2021-06-02 PROCEDURE — 99214 OFFICE O/P EST MOD 30 MIN: CPT | Performed by: PHYSICIAN ASSISTANT

## 2021-06-02 NOTE — PATIENT INSTRUCTIONS
Peripheral Artery Disease   WHAT YOU NEED TO KNOW:   What is peripheral artery disease? Peripheral artery disease (PAD) is narrow, weak, or blocked arteries  It may affect any arteries outside of your heart and brain  PAD is usually the result of a buildup of fat and cholesterol, also called plaque, along your artery walls  Inflammation, a blood clot, or abnormal cell growth could also block your arteries  PAD prevents normal blood flow to your legs and arms  You are at risk of an amputation if poor blood flow keeps wounds from healing or causes gangrene (tissue death)  Without treatment, PAD can also cause a heart attack or stroke  What increases my risk for PAD? · Smoking cigarettes     · Diabetes     · High blood pressure     · High cholesterol     · Age older than 40 years    · Heart disease or a family history of heart disease    What are the signs and symptoms of PAD? Mild PAD usually does not cause symptoms  As the disease worsens over time, you may have the following:  · Pain or cramps in your leg or hip while you walk     · A numb, weak, or heavy feeling in your legs     · Dry, scaly, red, or pale skin on your legs     · Thick or brittle nails, or hair loss on your arms and legs     · Foot sores that will not heal     · Burning or aching in your feet and toes while resting (this may be worse when you lie down)    How is PAD diagnosed? · Angiography  is a test that shows pictures of the arteries in your arms and legs  You will be given contrast liquid to help the arteries show up better on the pictures  The pictures will be taken with an MRI or CT scan  Tell the healthcare provider if you have ever had an allergic reaction to contrast liquid  Do not enter the MRI room with anything metal  Metal can cause serious injury  Tell a healthcare provider if you have any metal in or on your body      · Doppler ankle brachial index (NILSA)  is a test that compares blood pressure in your ankles to blood pressure in your arms  This tells your healthcare provider how well blood is flowing through the arteries in your legs  How is PAD treated? Treatment can help reduce your risk of a heart attack, stroke, or amputation  You may need more than one of the following:  · Medicines  may be given  Your healthcare provider may give you any of the following:     ? Antiplatelet medicine,  such as aspirin, helps prevent blood clots and reduces the risk of a heart attack or stroke  ? Statin medicine  helps lower your cholesterol and prevents your PAD from getting worse  · A supervised exercise program  helps you stay active in normal daily activities and may prevent disability  Healthcare providers will help you safely walk or do strength training exercises 3 times a week for 30 to 60 minutes  You will do this for several months, then transition to walking on your own  · Angioplasty  is a procedure to open your artery so blood can flow through normally  A thin tube called a catheter is used to insert a small balloon into your artery  The balloon is inflated to open your blocked artery, and then removed  A tube called a stent may be placed in your artery to hold it open  · Bypass surgery  is used to make a new connection to your artery with a vein from another part of your body, or an artificial graft  The vein or graft is attached to your artery above and below your blockage  This allows blood to flow around the blocked portion of your artery  How can I manage PAD? · Do not smoke  Nicotine and other chemicals in cigarettes and cigars can worsen PAD  They can also increase your risk for a heart attack or stroke  Ask your healthcare provider for information if you currently smoke and need help to quit  E-cigarettes or smokeless tobacco still contain nicotine  Talk to your healthcare provider before you use these products  · Manage other health conditions  Take your medicines as directed   Follow your healthcare provider's instructions if you have high blood pressure or high cholesterol  Perform foot care and check your blood sugar levels as directed if you have diabetes  · Eat heart healthy foods  Eat whole grains, fruits, and vegetables every day  Limit salt and high-fat foods  Ask your healthcare provider for more information on a heart healthy diet  Ask if you need to lose weight  Your healthcare provider can help you create a healthy weight-loss plan  Call 911 for the following:   · You have any of the following signs of a heart attack:      ? Squeezing, pressure, or pain in your chest    ? You may  also have any of the following:     ? Discomfort or pain in your back, neck, jaw, stomach, or arm    ? Shortness of breath    ? Nausea or vomiting    ? Lightheadedness or a sudden cold sweat    · You have any of the following signs of a stroke:      ? Numbness or drooping on one side of your face     ? Weakness in an arm or leg    ? Confusion or difficulty speaking    ? Dizziness, a severe headache, or vision loss    When should I seek immediate care? · You have sores or wounds that will not heal      · You notice black or discolored skin on your arm or leg  · Your skin is cool to the touch  When should I contact my healthcare provider? · You have leg pain when you walk 1/8 mile (200 meters) or less, even with treatment  · Your legs are red, dry, or pale, even with treatment  · You have questions or concerns about your condition or care  CARE AGREEMENT:   You have the right to help plan your care  Learn about your health condition and how it may be treated  Discuss treatment options with your healthcare providers to decide what care you want to receive  You always have the right to refuse treatment  The above information is an  only  It is not intended as medical advice for individual conditions or treatments   Talk to your doctor, nurse or pharmacist before following any medical regimen to see if it is safe and effective for you  © Copyright [address] Information is for End User's use only and may not be sold, redistributed or otherwise used for commercial purposes  All illustrations and images included in CareNotes® are the copyrighted property of [employer]  or [address] [place] [address]      - recent abdominal ultrasound shows 50-75% narrowing of the right iliac artery, not previously visualized  There is associated decrease in blood flow to the right foot  Previously operated left femoral artery site is widely open  However, your not having any associated symptoms of the right leg  Given that you are not having new symptoms, recommend follow-up in 1 year with repeat abdominal and lower extremity arterial ultrasound  However, please contact the office immediately if you would develop new symptoms or nonhealing foot wounds  - the discoloration of your lower legs is likely related to venous stasis  Recommend some form of compression to help with chronic swelling  You been provided with 2 layer Tubigrip for use  Apply Tubigrip in the morning and remove prior to bed   -continue aspirin and atorvastatin   -There is direct relationship between smoking and arterial occlusive disease    It is imperative that you quit smoking to prevent worsening of your known arterial disease   -return to office in 1 year for follow-up  -Please contact the office with new symptoms, concerns, rest pain or nonhealing foot wounds

## 2021-06-02 NOTE — ASSESSMENT & PLAN NOTE
68-year-old female smoker with SLE on chronic steroids, Raynauds disease, Sjogren's syndrome, fibromyalgia, IBS, NSCLC, s/p L lower lobectomy '04 and aortoiliac and infrainguinal arterial occlusive disease, s/p L CFA endarterectomy/profundoplasty by Dr Edith Dougherty 9/10/19 presents for review of surveillance imaging  -VISH 5/19/21 and AOIL 5/13/21 reviewed w/patent L endarterectomy site, diffuse R femoropopliteal disease and new 50-75% R KIMBERLY stenosis w/interval decrease in R NILSA, now 0 50(prev 0 70)/60/30  L NILSA 0 89/84/66  Previously identified >75% distal L KIMBERLY stenosis not reproduced   -asymptomatic without claudication, rest pain or tissue loss  -Continues to c/o chronic left medial thigh fullness, sensitivity to touch and paresthesias since original surgery  No abnormal physical findings overlying area  Symptoms appear neuropathic in nature with associated regional pain syndrome in setting of fibromyalgia and other autoimmune diseases  -continue ASA and statin   -discussed the pathophysiology of arterial occlusive disease and direct relationship with smoking  Continued to encourage smoking cessation   -continue surveillance with VISH/AOIL in 1 year  Pt expresses desire to limit frequency of imaging questioning futility  Stressed the importance to contact the office immediately with new symptoms, rest pain or tissue loss  -return to office in 1 year with Dr Edith Dougherty for review of imaging  -Instructed to contact the office with new symptoms, concerns, rest pain or tissue loss  -lengthy discussion in office today (approx 30 minutes) regarding her persistent chronic L proximal inner thigh symptoms, LE edema and R iliac disease  All questions answered and concerns addressed  She expresses satisfaction with discussion and agrees with treatment plan    -Tubigrip applied to BLE for edema control related to chronic venous stasis

## 2021-06-02 NOTE — ASSESSMENT & PLAN NOTE
-hx of >75% L distal KIMBERLY stenosis on prior imaging, not reproduced on recent AOIL  -new 50-75% proximal R KIMBERLY on AOIL 5/13/21 w/interval decrease in R NILSA  -however, patient asymptomatic without claudication, tissue loss or rest pain  -continue ASA and statin  -smoking cessation  -see plan as outlined above  -patient prefers to decrease frequency surveillance imaging    Has agreed to compromise to repeat imaging in 1 year or sooner if symptoms with develop

## 2021-06-02 NOTE — PROGRESS NOTES
Assessment/Plan:    PAD (peripheral artery disease) [facility])  14-year-old female smoker with SLE on chronic steroids, Raynauds disease, Sjogren's syndrome, fibromyalgia, IBS, NSCLC, s/p L lower lobectomy '04 and aortoiliac and infrainguinal arterial occlusive disease, s/p L CFA endarterectomy/profundoplasty by Dr Benji Richardson 9/10/19 presents for review of surveillance imaging  -VISH 5/19/21 and AOIL 5/13/21 reviewed w/patent L endarterectomy site, diffuse R femoropopliteal disease and new 50-75% R KIMBERLY stenosis w/interval decrease in R NILSA, now 0 50(prev 0 70)/60/30  L NILSA 0 89/84/66  Previously identified >75% distal L KIMBERLY stenosis not reproduced   -asymptomatic without claudication, rest pain or tissue loss  -Continues to c/o chronic left medial thigh fullness, sensitivity to touch and paresthesias since original surgery  No abnormal physical findings overlying area  Symptoms appear neuropathic in nature with associated regional pain syndrome in setting of fibromyalgia and other autoimmune diseases  -continue ASA and statin   -discussed the pathophysiology of arterial occlusive disease and direct relationship with smoking  Continued to encourage smoking cessation   -continue surveillance with VISH/AOIL in 1 year  Pt expresses desire to limit frequency of imaging questioning futility  Stressed the importance to contact the office immediately with new symptoms, rest pain or tissue loss  -return to office in 1 year with Dr Benji Richardson for review of imaging  -Instructed to contact the office with new symptoms, concerns, rest pain or tissue loss  -lengthy discussion in office today (approx 30 minutes) regarding her persistent chronic L proximal inner thigh symptoms, LE edema and R iliac disease  All questions answered and concerns addressed  She expresses satisfaction with discussion and agrees with treatment plan    -Tubigrip applied to BLE for edema control related to chronic venous stasis    Aortoiliac occlusive disease ([address] )  -hx of >75% L distal KIMBERLY stenosis on prior imaging, not reproduced on recent AOIL  -new 50-75% proximal R KIMBERLY on AOIL 5/13/21 w/interval decrease in R NILSA  -however, patient asymptomatic without claudication, tissue loss or rest pain  -continue ASA and statin  -smoking cessation  -see plan as outlined above  -patient prefers to decrease frequency surveillance imaging  Has agreed to compromise to repeat imaging in 1 year or sooner if symptoms with develop    Tobacco dependence  -discussed the pathophysiology and relationship of smoking and arterial occlusive disease  -encourage smoking cessation    SLE (systemic lupus erythematosus) (HCC)  -stable  -on chronic steriod therapy  -continue management per primary medical service       Diagnoses and all orders for this visit:    PAD (peripheral artery disease) (HCC)  -     VAS lower limb arterial duplex, complete bilateral; Future    Aortoiliac occlusive disease ([facility] [address] )  -     VAS abdominal aorta/iliacs; complete study; Future    Tobacco dependence    Other systemic lupus erythematosus with other organ involvement (HCC)          Subjective:      Patient ID: Emiliano Maldonado is a 77 y o  female  Patient presents to office to review the AOIL w/ NILSA's done on 5/13/21 and the VISH done on 5/19/21  Patient has L>R throbbing pain and swelling  Patient reports swelling and tenderness near the left groin  Patient reports numbness, tingling, and warmth in the LE  Patient denies claudication and rest pain  Patient is taking ASA 81 and Atorvastatin  Patient is currently smoking about 1/2 PPD  30-year-old female smoker with SLE on chronic steroids, Raynauds disease, Sjogren's syndrome, fibromyalgia, IBS, NSCLC, s/p L lower lobectomy '04 and aortoiliac and infrainguinal arterial occlusive disease, s/p L CFA endarterectomy/profundoplasty by Dr Darlene Knapp 9/10/19 presents for review of surveillance imaging  Patient last seen in the office by Dr Darlene Knapp on 9/2/2020  Recent VISH 5/19/21 and AOIL 5/13/21 reviewed w/patent L endarterectomy site, diffuse R femoropopliteal disease and new 50-75% R KIMBERLY stenosis w/interval decrease in R NILSA, now 0 50(prev 0 70)/60/30  L NILSA 0 89/84/66  Previously identified >75% distal L KIMBERLY stenosis not reproduced  However, patient remains asymptomatic from her vascular disease without claudication, rest pain or tissue loss  Her primary complaint remains chronic left medial thigh fullness, sensitivity to touch and paresthesias since original surgery  No abnormal physical findings overlying area  Symptoms appear neuropathic in nature with associated regional pain syndrome in setting of fibromyalgia and other autoimmune diseases  Pt also c/o of chronic LLE edema since femoral endarterectomy but demonstrates bilat lower leg chronic venous stasis changes w/ hemosiderin staining  Patient remains on aspirin statin therapy  Patient also on chronic steroid therapy related to her multiple autoimmune disorders  The following portions of the patient's history were reviewed and updated as appropriate: allergies, current medications, past family history, past medical history, past social history, past surgical history and problem list     Review of Systems   Constitutional: Negative  HENT: Negative  Eyes: Negative  Respiratory: Positive for shortness of breath  Cardiovascular: Positive for leg swelling  Gastrointestinal: Positive for diarrhea  Endocrine: Negative  Genitourinary: Negative  Musculoskeletal: Positive for gait problem (uses cane)  Skin: Negative  Allergic/Immunologic: Negative  Hematological: Bruises/bleeds easily  Psychiatric/Behavioral: The patient is nervous/anxious  I have reviewed and made appropriate changes to the review of systems input by the medical assistant      Vitals:    06/02/21 1314   BP: 106/68   BP Location: Left arm   Patient Position: Sitting   Cuff Size: Adult   Pulse: 90 Resp: 17   Temp: 99 7 °F (37 6 °C)   TempSrc: Tympanic   Weight: 69 1 kg (152 lb 6 4 oz)   Height: 5' 4" (1 626 m)       Patient Active Problem List   Diagnosis    Anxiety    Chronic bilateral low back pain with bilateral sciatica    Fibromyalgia    GERD (gastroesophageal reflux disease)    Lung cancer (HCC)    Osteoporosis    Primary osteoarthritis involving multiple joints    SLE [facility] lupus [facility]matosus) [facility])    Localized edema    Chronic pain of left ankle    Acute cystitis without hematuria    Open toe wound, initial encounter    Neuropathy    Raynaud's disease without gangrene    Tobacco dependence    Aortoiliac occlusive disease (HCC)    PAD (peripheral artery disease) ([facility])    Lumbar pain    Thrush    Microscopic hematuria    Right flank pain    Benign essential microscopic hematuria    Cellulitis    Right foot pain    History of lung cancer    Chronic hypokalemia    Tobacco user    Acute blood loss as cause of postoperative anemia    Constipation    Acute sore throat    Multiple thyroid nodules    Preop cardiovascular exam    Cortical age-related cataract of both eyes       Past Surgical History:   Procedure Laterality Date    LUNG REMOVAL, PARTIAL  05/01/2004    Prior Surgical/Procedural History - 5/4 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 - lung resection - subtotal    MOUTH SURGERY      Tooth extraction    MT THROMBOENDARTECTMY FEMORAL COMMON Left 9/10/2019    Procedure: ENDARTERECTOMY ARTERIAL FEMORAL,  WITH BOVINE GRAFT AND PROFUNDOPLASTY;  Surgeon: Henrique Kumar DO;  Location: [facility];  Service: Vascular    SINUS SURGERY         Family History   Problem Relation Age of Onset    Arthritis Mother     Asthma Mother     Dementia Mother     Heart disease Mother     Hypothyroidism Mother     Irritable bowel syndrome Mother     Uterine cancer Mother 72    Heart attack Father     Stroke Father     Hyperlipidemia Sister     Uterine cancer Sister         age unknown    Heart attack Maternal Grandfather     Asthma Family     Cataracts Family     Thyroid disease Family     No Known Problems Paternal Aunt     No Known Problems Maternal Aunt        Social History     Socioeconomic History    Marital status: /Civil Union     Spouse name: Not on file    Number of children: 1    Years of education: Not on file    Highest education level: Not on file   Occupational History    Occupation: office work retired   Social Needs    Financial resource strain: Not on file    Food insecurity     Worry: Not on file     Inability: Not on file    Transportation needs     Medical: Not on file     Non-medical: Not on file   Tobacco Use    Smoking status: Current Every Day Smoker     Packs/day: 0 50     Years: 40 00     Pack years: 20 00     Types: Cigarettes    Smokeless tobacco: Never Used   Substance and Sexual Activity    Alcohol use: Yes     Frequency: 2-4 times a month     Drinks per session: 1 or 2     Binge frequency: Never     Comment: liquor    Drug use: No    Sexual activity: Not Currently     Partners: Male   Lifestyle    Physical activity     Days per week: Not on file     Minutes per session: Not on file    Stress: Not on file   Relationships    Social connections     Talks on phone: Not on file     Gets together: Not on file     Attends Faith service: Not on file     Active member of club or organization: Not on file     Attends meetings of clubs or organizations: Not on file     Relationship status: Not on file    Intimate partner violence     Fear of current or ex partner: Not on file     Emotionally abused: Not on file     Physically abused: Not on file     Forced sexual activity: Not on file   Other Topics Concern    Not on file   Social History Narrative    Always uses seat belt    Caffeine use    Primary spoken language English    Mandaeism    Racial background:      Drinks Tea       Allergies Allergen Reactions    Penicillins Shortness Of Breath and Edema    Cymbalta [Duloxetine Hcl] Diarrhea    Duloxetine Diarrhea    Dust Mite Extract     Lactose - Food Allergy GI Intolerance    Neosporin [Neomycin-Bacitracin Zn-Polymyx]      Other reaction(s): REDNESS    Nitrofurantoin Rash    Omeprazole Rash    Sulfamethoxazole-Trimethoprim Rash         Current Outpatient Medications:     aspirin (ECOTRIN LOW STRENGTH) 81 mg EC tablet, Take 1 tablet (81 mg total) by mouth daily, Disp: 90 tablet, Rfl: 3    atorvastatin (LIPITOR) 10 mg tablet, Take 1 tablet (10 mg total) by mouth daily with dinner, Disp: 90 tablet, Rfl: 1    cyanocobalamin (VITAMIN B-12) 100 mcg tablet, Take 1 tablet by mouth daily, Disp: , Rfl:     estradiol (VAGIFEM, YUVAFEM) 10 MCG TABS vaginal tablet, Insert 1 tablet (10 mcg total) into the vagina 2 (two) times a week, Disp: 24 tablet, Rfl: 3    famotidine (PEPCID AC) 10 mg tablet, Take by mouth as needed  , Disp: , Rfl:     LORazepam (ATIVAN) 0 5 mg tablet, Take 1 tablet (0 5 mg total) by mouth every 12 (twelve) hours as needed for anxiety, Disp: 30 tablet, Rfl: 0    Magnesium 500 MG CAPS, Take by mouth daily  , Disp: , Rfl:     Misc Natural Products (CURCUMAX PRO PO), Take by mouth, Disp: , Rfl:     oxyCODONE (ROXICODONE) 5 mg immediate release tablet, Take 1 tablet (5 mg total) by mouth every 6 (six) hours as needed for moderate painMax Daily Amount: 20 mg, Disp: 30 tablet, Rfl: 0    potassium chloride (Klor-Con 10) 10 mEq tablet, Take 1 tablet (10 mEq total) by mouth 3 (three) times a day, Disp: 270 tablet, Rfl: 1    predniSONE 10 mg tablet, Take 1 tablet (10 mg total) by mouth daily, Disp: 90 tablet, Rfl: 1    Vitamin Mixture (JOSE RAMON-C PO), Take 1 tablet by mouth daily, Disp: , Rfl:     naloxone (NARCAN) 4 mg/0 1 mL nasal spray, Administer 1 spray into a nostril  If no response after 2-3 minutes, give another dose in the other nostril using a new spray   (Patient not taking: Reported on 5/14/2021), Disp: 1 each, Rfl: 1    Objective:   imaging study:  VISH 5/19/2021:   Imaging study reviewed and as described above  See full report below:  Segment                Right            Left                                            PSV (cm/s)  EDV  PSV (cm/s)  EDV    Common Femoral Artery          75   28         194   22    Prox Profunda                  44   16          58    0    Prox SFA                       52   15          76    8    Mid SFA                        39   11         118    8    Dist SFA                       34    9          74    4    Proximal Pop                   31    9          59    1    Distal Pop                     38    7          67    0    Dist Post Tibial               20    6          36    1    Dist  Ant  Tibial              21    5          30    0             CONCLUSION:     Impression:  RIGHT LOWER LIMB:  Diffuse disease throughout the femoral and popliteal arteries without evidence  of a focal stenosis  Ankle/Brachial index: 0 50 (Severe Range)  Prior 0 70  PVR/ PPG tracings are dampened  Metatarsal pressure of 60mmHg  Prior 52mmHg  Great toe pressure of 30mmHg, within the healing range  Prior 26mmHg  LEFT LOWER LIMB:  Patent Endarterectomized CFA and Patent Profunda Femoris Artery post  profundoplasty  Ankle/Brachial index:  0 89 (Mild Range )  Prior 0 98  PVR/ PPG tracings are dampened  Metatarsal pressure of 84mmHg  Prior 75mmHg  Great toe pressure of 66mmHg, within the healing range  Prior 59mmHg  AOIL 5/13/2021:   Imaging study reviewed and as described above  See full report below:   Unilateral     PSV (cm/s)  EDV (cm/s)  AP (cm)    Sup-Francisca Ao             78          19      2 9    Px Inf-Ulices Ao          50          14      2 6    Ds Inf-Ulices Ao          35          10      2 8    Celiac                132          25             Prox   SMA             232          24                Segment    Right Left                    Impression  PSV (cm/s)  EDV (cm/s)  PSV (cm/s)  EDV (cm/s)    Prox KIMBERLY   50-75%             238          44          64           0    Dist KIMBERLY                      202           2         169           3    Prox  EIA                      45          14         161          16    Dist EIA                       61          20         128          17             CONCLUSION:     Impression  The abdominal aorta is patent and Ectatic measuring 2 9cm at its greatest  diameter  50-75% stenosis identified in the right KIMBERLY  Celiac & superior mesenteric arteries are patent  Unable to identify the renal arteries due to overlaying bowel gas  /68 (BP Location: Left arm, Patient Position: Sitting, Cuff Size: Adult)   Pulse 90   Temp 99 7 °F (37 6 °C) (Tympanic)   Resp 17   Ht 5' 4" (1 626 m)   Wt 69 1 kg (152 lb 6 4 oz)   LMP  (LMP Unknown)   BMI 26 16 kg/m²          Physical Exam  Vitals signs and nursing note reviewed  Constitutional:       General: She is not in acute distress  Appearance: She is well-developed  HENT:      Head: Normocephalic and atraumatic  Eyes:      General: No scleral icterus  Conjunctiva/sclera: Conjunctivae normal       Pupils: Pupils are equal, round, and reactive to light  Neck:      Musculoskeletal: Normal range of motion and neck supple  Thyroid: No thyromegaly  Vascular: No carotid bruit or JVD  Trachea: No tracheal deviation  Cardiovascular:      Rate and Rhythm: Normal rate and regular rhythm  Pulses:           Radial pulses are 2+ on the right side and 2+ on the left side  Femoral pulses are 2+ on the right side and 2+ on the left side  Popliteal pulses are 0 on the right side and 1+ on the left side  Posterior tibial pulses are detected w/ Doppler on the right side and detected w/ Doppler on the left side  Heart sounds: S1 normal and S2 normal  No murmur  No friction rub   No gallop  No S3 sounds  Comments: Bilateral lower extremities warm, pink, motor and sensory intact and well perfused without cyanosis, pallor, rubor or tissue loss  Well-healed left groin surgical scar  No appreciable fluid collection or solid tissue mass left thigh  Chronic venous stasis changes in hemosiderin staining bilateral anterior tibial area without ulceration  2+ edema left ankle, 1+ edema right ankle  Patient reports sensitivity to light palpation left proximal inner thigh  Doppler signals:  Biphasic bilateral PT signals  *pt refused to change into surgical shorts*  Pulmonary:      Effort: No respiratory distress  Breath sounds: Normal breath sounds  No stridor  No wheezing, rhonchi or rales  Abdominal:      General: Bowel sounds are normal  There is no distension or abdominal bruit  Palpations: Abdomen is soft  There is no mass or pulsatile mass  Tenderness: There is no abdominal tenderness  There is no rebound  Musculoskeletal: Normal range of motion  General: No deformity  Skin:     General: Skin is warm and dry  Coloration: Skin is not pale  Findings: No erythema or lesion  Neurological:      Mental Status: She is alert and oriented to person, place, and time  Psychiatric:         Mood and Affect: Mood normal          Thought Content:  Thought content normal

## 2021-07-07 ENCOUNTER — TELEPHONE (OUTPATIENT)
Dept: ENDOCRINOLOGY | Facility: CLINIC | Age: 66
End: 2021-07-07

## 2021-07-07 NOTE — TELEPHONE ENCOUNTER
----- Message from Fara Mendez MD sent at 7/7/2021  3:04 PM EDT -----  Nodules are stable  Repeat ultrasound in a year    Sent to my chart

## 2021-07-13 ENCOUNTER — TELEPHONE (OUTPATIENT)
Dept: FAMILY MEDICINE CLINIC | Facility: CLINIC | Age: 66
End: 2021-07-13

## 2021-07-13 DIAGNOSIS — E87.6 CHRONIC HYPOKALEMIA: ICD-10-CM

## 2021-07-13 DIAGNOSIS — G62.9 NEUROPATHY: Primary | ICD-10-CM

## 2021-07-13 NOTE — TELEPHONE ENCOUNTER
Patient called in stating she was going to go to get her blood work done and she seen there was no CBC ordered and she wanted to know if it was just missed or not needed  Please review and advise

## 2021-07-19 ENCOUNTER — APPOINTMENT (OUTPATIENT)
Dept: LAB | Facility: HOSPITAL | Age: 66
End: 2021-07-19
Payer: MEDICARE

## 2021-07-19 DIAGNOSIS — G62.9 NEUROPATHY: ICD-10-CM

## 2021-07-19 DIAGNOSIS — E87.6 CHRONIC HYPOKALEMIA: ICD-10-CM

## 2021-07-19 LAB
ALBUMIN SERPL BCP-MCNC: 3.3 G/DL (ref 3.5–5)
ALP SERPL-CCNC: 84 U/L (ref 46–116)
ALT SERPL W P-5'-P-CCNC: 27 U/L (ref 12–78)
ANION GAP SERPL CALCULATED.3IONS-SCNC: 7 MMOL/L (ref 4–13)
AST SERPL W P-5'-P-CCNC: 20 U/L (ref 5–45)
BASOPHILS # BLD AUTO: 0.09 THOUSANDS/ΜL (ref 0–0.1)
BASOPHILS NFR BLD AUTO: 1 % (ref 0–1)
BILIRUB SERPL-MCNC: 0.54 MG/DL (ref 0.2–1)
BUN SERPL-MCNC: 17 MG/DL (ref 5–25)
CALCIUM ALBUM COR SERPL-MCNC: 8.9 MG/DL (ref 8.3–10.1)
CALCIUM SERPL-MCNC: 8.3 MG/DL (ref 8.3–10.1)
CHLORIDE SERPL-SCNC: 105 MMOL/L (ref 100–108)
CO2 SERPL-SCNC: 31 MMOL/L (ref 21–32)
CREAT SERPL-MCNC: 0.91 MG/DL (ref 0.6–1.3)
EOSINOPHIL # BLD AUTO: 0.32 THOUSAND/ΜL (ref 0–0.61)
EOSINOPHIL NFR BLD AUTO: 3 % (ref 0–6)
ERYTHROCYTE [DISTWIDTH] IN BLOOD BY AUTOMATED COUNT: 14.2 % (ref 11.6–15.1)
GFR SERPL CREATININE-BSD FRML MDRD: 66 ML/MIN/1.73SQ M
GLUCOSE SERPL-MCNC: 81 MG/DL (ref 65–140)
HCT VFR BLD AUTO: 50.8 % (ref 34.8–46.1)
HGB BLD-MCNC: 16.5 G/DL (ref 11.5–15.4)
IMM GRANULOCYTES # BLD AUTO: 0.04 THOUSAND/UL (ref 0–0.2)
IMM GRANULOCYTES NFR BLD AUTO: 0 % (ref 0–2)
LYMPHOCYTES # BLD AUTO: 3.22 THOUSANDS/ΜL (ref 0.6–4.47)
LYMPHOCYTES NFR BLD AUTO: 27 % (ref 14–44)
MCH RBC QN AUTO: 29.5 PG (ref 26.8–34.3)
MCHC RBC AUTO-ENTMCNC: 32.5 G/DL (ref 31.4–37.4)
MCV RBC AUTO: 91 FL (ref 82–98)
MONOCYTES # BLD AUTO: 0.85 THOUSAND/ΜL (ref 0.17–1.22)
MONOCYTES NFR BLD AUTO: 7 % (ref 4–12)
NEUTROPHILS # BLD AUTO: 7.23 THOUSANDS/ΜL (ref 1.85–7.62)
NEUTS SEG NFR BLD AUTO: 62 % (ref 43–75)
NRBC BLD AUTO-RTO: 0 /100 WBCS
PLATELET # BLD AUTO: 220 THOUSANDS/UL (ref 149–390)
PMV BLD AUTO: 8.9 FL (ref 8.9–12.7)
POTASSIUM SERPL-SCNC: 4.2 MMOL/L (ref 3.5–5.3)
PROT SERPL-MCNC: 6.8 G/DL (ref 6.4–8.2)
RBC # BLD AUTO: 5.6 MILLION/UL (ref 3.81–5.12)
SODIUM SERPL-SCNC: 143 MMOL/L (ref 136–145)
WBC # BLD AUTO: 11.75 THOUSAND/UL (ref 4.31–10.16)

## 2021-07-19 PROCEDURE — 80053 COMPREHEN METABOLIC PANEL: CPT

## 2021-07-19 PROCEDURE — 36415 COLL VENOUS BLD VENIPUNCTURE: CPT

## 2021-07-19 PROCEDURE — 85025 COMPLETE CBC W/AUTO DIFF WBC: CPT

## 2021-07-20 DIAGNOSIS — Z00.00 WELL ADULT EXAM: Primary | ICD-10-CM

## 2021-09-09 ENCOUNTER — TELEPHONE (OUTPATIENT)
Dept: GASTROENTEROLOGY | Facility: MEDICAL CENTER | Age: 66
End: 2021-09-09

## 2021-09-09 ENCOUNTER — APPOINTMENT (OUTPATIENT)
Dept: LAB | Facility: HOSPITAL | Age: 66
End: 2021-09-09
Payer: MEDICARE

## 2021-09-09 DIAGNOSIS — Z00.00 WELL ADULT EXAM: ICD-10-CM

## 2021-09-09 LAB
BASOPHILS # BLD AUTO: 0.09 THOUSANDS/ΜL (ref 0–0.1)
BASOPHILS NFR BLD AUTO: 1 % (ref 0–1)
EOSINOPHIL # BLD AUTO: 0.36 THOUSAND/ΜL (ref 0–0.61)
EOSINOPHIL NFR BLD AUTO: 3 % (ref 0–6)
ERYTHROCYTE [DISTWIDTH] IN BLOOD BY AUTOMATED COUNT: 14.4 % (ref 11.6–15.1)
HCT VFR BLD AUTO: 48.4 % (ref 34.8–46.1)
HGB BLD-MCNC: 15.5 G/DL (ref 11.5–15.4)
IMM GRANULOCYTES # BLD AUTO: 0.06 THOUSAND/UL (ref 0–0.2)
IMM GRANULOCYTES NFR BLD AUTO: 1 % (ref 0–2)
LYMPHOCYTES # BLD AUTO: 2.13 THOUSANDS/ΜL (ref 0.6–4.47)
LYMPHOCYTES NFR BLD AUTO: 16 % (ref 14–44)
MCH RBC QN AUTO: 29.9 PG (ref 26.8–34.3)
MCHC RBC AUTO-ENTMCNC: 32 G/DL (ref 31.4–37.4)
MCV RBC AUTO: 93 FL (ref 82–98)
MONOCYTES # BLD AUTO: 0.85 THOUSAND/ΜL (ref 0.17–1.22)
MONOCYTES NFR BLD AUTO: 7 % (ref 4–12)
NEUTROPHILS # BLD AUTO: 9.48 THOUSANDS/ΜL (ref 1.85–7.62)
NEUTS SEG NFR BLD AUTO: 72 % (ref 43–75)
NRBC BLD AUTO-RTO: 0 /100 WBCS
PLATELET # BLD AUTO: 227 THOUSANDS/UL (ref 149–390)
PMV BLD AUTO: 9.1 FL (ref 8.9–12.7)
RBC # BLD AUTO: 5.18 MILLION/UL (ref 3.81–5.12)
WBC # BLD AUTO: 12.97 THOUSAND/UL (ref 4.31–10.16)

## 2021-09-09 PROCEDURE — 36415 COLL VENOUS BLD VENIPUNCTURE: CPT

## 2021-09-09 PROCEDURE — 85025 COMPLETE CBC W/AUTO DIFF WBC: CPT

## 2021-09-10 NOTE — TELEPHONE ENCOUNTER
1 - She wants to know if she can still take her Pepcid OTC maximum strength 20 mg tablet  2- She has to get this Miralax powder and dilute it with Gatorade but she can not tolerate it she wants to know if she can give dilute with the electrolyte water clear  Please leave on voicemail if she does not answer she has another appointment today

## 2021-09-13 ENCOUNTER — TELEPHONE (OUTPATIENT)
Dept: FAMILY MEDICINE CLINIC | Facility: CLINIC | Age: 66
End: 2021-09-13

## 2021-09-13 ENCOUNTER — TELEPHONE (OUTPATIENT)
Dept: GASTROENTEROLOGY | Facility: CLINIC | Age: 66
End: 2021-09-13

## 2021-09-13 NOTE — TELEPHONE ENCOUNTER
Patients GI provider:  Dr Angeles [place]    Number to return call: [phone]    Reason for call: Pt calling stating her pcp said her white blood cell count is elevated and she would like to know if it is safe to have procedure? Pt also has prep questions      Scheduled procedure/appointment date if applicable: Procedure 7/51/56

## 2021-09-13 NOTE — TELEPHONE ENCOUNTER
Patient of Dr Bridgette Schreiber, last seen 3/19/21    History of GERD, positive cologuard    Called and spoke with patient, she states that she recently had CBC checked and her WBC count is elevated so her PCP instructed her to check with us if it was okay to proceed with colonoscopy  WBC count was 12 97 but patient denies any signs/symptoms of infection  Patient also states that she hates gatorade and would like to substitute the gatorade in her prep for a clear electrolyte beverage she found  She states that it is advertised as being comparable to gatorade in terms of electrolytes and wants to ensure it is okay to substitute  I advised as far as I am aware should be okay but I will check with provider   Please advise

## 2021-09-14 ENCOUNTER — HOSPITAL ENCOUNTER (OUTPATIENT)
Dept: MAMMOGRAPHY | Facility: MEDICAL CENTER | Age: 66
Discharge: HOME | End: 2021-09-14
Payer: MEDICARE

## 2021-09-14 VITALS — WEIGHT: 152 LBS | BODY MASS INDEX: 25.95 KG/M2 | HEIGHT: 64 IN

## 2021-09-14 DIAGNOSIS — Z12.31 ENCOUNTER FOR SCREENING MAMMOGRAM FOR BREAST CANCER: ICD-10-CM

## 2021-09-14 PROCEDURE — 77063 BREAST TOMOSYNTHESIS BI: CPT

## 2021-09-14 PROCEDURE — 77067 SCR MAMMO BI INCL CAD: CPT

## 2021-09-14 NOTE — TELEPHONE ENCOUNTER
Called patient and left a detailed voicemail stating that she should avoid Pepcid if she could until the procedure unless she can't take the GERD or discomfort   I also let her know that she can have electrolyte water instead of gatorade as long as it is a total of 64 oz

## 2021-09-14 NOTE — TELEPHONE ENCOUNTER
Called patient, reached voicemail, left message (as requested by patient) explaining that per Dr Pedro Krause, okay to proceed as long as no infectious symptoms

## 2021-09-16 ENCOUNTER — TELEPHONE (OUTPATIENT)
Dept: GASTROENTEROLOGY | Facility: MEDICAL CENTER | Age: 66
End: 2021-09-16

## 2021-09-17 ENCOUNTER — HOSPITAL ENCOUNTER (OUTPATIENT)
Dept: GASTROENTEROLOGY | Facility: MEDICAL CENTER | Age: 66
Setting detail: OUTPATIENT SURGERY
Discharge: HOME | End: 2021-09-17
Payer: MEDICARE

## 2021-09-17 VITALS
RESPIRATION RATE: 18 BRPM | BODY MASS INDEX: 25.95 KG/M2 | DIASTOLIC BLOOD PRESSURE: 56 MMHG | HEIGHT: 64 IN | OXYGEN SATURATION: 100 % | TEMPERATURE: 98.1 F | HEART RATE: 67 BPM | WEIGHT: 152 LBS | SYSTOLIC BLOOD PRESSURE: 122 MMHG

## 2021-09-17 DIAGNOSIS — K21.00 GASTROESOPHAGEAL REFLUX DISEASE WITH ESOPHAGITIS WITHOUT HEMORRHAGE: ICD-10-CM

## 2021-09-17 DIAGNOSIS — R19.5 POSITIVE COLORECTAL CANCER SCREENING USING COLOGUARD TEST: ICD-10-CM

## 2021-09-17 PROCEDURE — 88342 IMHCHEM/IMCYTCHM 1ST ANTB: CPT | Performed by: PATHOLOGY

## 2021-09-17 PROCEDURE — 88305 TISSUE EXAM BY PATHOLOGIST: CPT | Performed by: PATHOLOGY

## 2021-09-17 PROCEDURE — 45385 COLONOSCOPY W/LESION REMOVAL: CPT | Performed by: INTERNAL MEDICINE

## 2021-09-17 PROCEDURE — 43239 EGD BIOPSY SINGLE/MULTIPLE: CPT | Performed by: INTERNAL MEDICINE

## 2021-09-17 RX ORDER — PROPOFOL 10 MG/ML
INJECTION, EMULSION INTRAVENOUS AS NEEDED
Status: DISCONTINUED | OUTPATIENT
Start: 2021-09-17 | End: 2021-09-17

## 2021-09-17 RX ORDER — LIDOCAINE HYDROCHLORIDE 20 MG/ML
INJECTION, SOLUTION EPIDURAL; INFILTRATION; INTRACAUDAL; PERINEURAL AS NEEDED
Status: DISCONTINUED | OUTPATIENT
Start: 2021-09-17 | End: 2021-09-17

## 2021-09-17 RX ORDER — SODIUM CHLORIDE 9 MG/ML
125 INJECTION, SOLUTION INTRAVENOUS CONTINUOUS
Status: DISCONTINUED | OUTPATIENT
Start: 2021-09-17 | End: 2021-09-21 | Stop reason: HOSPADM

## 2021-09-17 RX ADMIN — PROPOFOL 20 MG: 10 INJECTION, EMULSION INTRAVENOUS at 08:30

## 2021-09-17 RX ADMIN — PROPOFOL 25 MG: 10 INJECTION, EMULSION INTRAVENOUS at 08:39

## 2021-09-17 RX ADMIN — PROPOFOL 25 MG: 10 INJECTION, EMULSION INTRAVENOUS at 08:37

## 2021-09-17 RX ADMIN — PROPOFOL 25 MG: 10 INJECTION, EMULSION INTRAVENOUS at 08:34

## 2021-09-17 RX ADMIN — PROPOFOL 20 MG: 10 INJECTION, EMULSION INTRAVENOUS at 08:45

## 2021-09-17 RX ADMIN — SODIUM CHLORIDE 125 ML/HR: 0.9 INJECTION, SOLUTION INTRAVENOUS at 08:00

## 2021-09-17 RX ADMIN — PROPOFOL 20 MG: 10 INJECTION, EMULSION INTRAVENOUS at 08:32

## 2021-09-17 RX ADMIN — PROPOFOL 120 MG: 10 INJECTION, EMULSION INTRAVENOUS at 08:22

## 2021-09-17 RX ADMIN — PROPOFOL 20 MG: 10 INJECTION, EMULSION INTRAVENOUS at 08:25

## 2021-09-17 RX ADMIN — PROPOFOL 25 MG: 10 INJECTION, EMULSION INTRAVENOUS at 08:42

## 2021-09-17 RX ADMIN — PROPOFOL 30 MG: 10 INJECTION, EMULSION INTRAVENOUS at 08:27

## 2021-09-17 RX ADMIN — LIDOCAINE HYDROCHLORIDE 80 MG: 20 INJECTION, SOLUTION EPIDURAL; INFILTRATION; INTRACAUDAL; PERINEURAL at 08:22

## 2021-09-17 NOTE — DISCHARGE INSTRUCTIONS
Upper Endoscopy   WHAT YOU NEED TO KNOW:   An upper endoscopy is also called an upper gastrointestinal (GI) endoscopy, or an esophagogastroduodenoscopy (EGD)  It is a procedure to examine the inside of your esophagus, stomach, and duodenum (first part of the small intestine) with a scope  You may feel bloated, gassy, or have some abdominal discomfort after your procedure  Your throat may be sore for 24 to 36 hours  You may burp or pass gas from air that is still inside your body  DISCHARGE INSTRUCTIONS:   Seek care immediately if:    You have sudden, severe abdominal pain   You have problems swallowing   You have a large amount of black, sticky bowel movements or blood in your bowel movements   You have sudden trouble breathing   You feel weak, lightheaded, or faint or your heart beats faster than normal for you  Contact your healthcare provider if:    You have a fever and chills   You have nausea or are vomiting   Your abdomen is bloated or feels full and hard   You have abdominal pain   You have black, sticky bowel movements or blood in your bowel movements   You have not had a bowel movement for 3 days after your procedure   You have rash or hives   You have questions or concerns about your procedure  Activity:    Do not lift, strain, or run for 24 hours after your procedure   Rest after your procedure  You have been given medicine to relax you  Do not drive or make important decisions until the day after your procedure  Return to your normal activity as directed   Relieve gas and discomfort from bloating by lying on your right side with a heating pad on your abdomen  You may need to take short walks to help the gas move out  Eat small meals until bloating is relieved  Follow up with your healthcare provider as directed: Write down your questions so you remember to ask them during your visits       If you take a blood thinner, please review the specific instructions from your endoscopist about when you should resume it  These can be found in the Recommendation and Your Medication list sections of this After Visit Summary  Colonoscopy   WHAT YOU NEED TO KNOW:   A colonoscopy is a procedure to examine the inside of your colon (intestine) with a scope  Polyps or tissue growths may have been removed during your colonoscopy  It is normal to feel bloated and to have some abdominal discomfort  You should be passing gas  If you have hemorrhoids or you had polyps removed, you may have a small amount of bleeding  DISCHARGE INSTRUCTIONS:   Seek care immediately if:    You have sudden, severe abdominal pain   You have problems swallowing   You have a large amount of black, sticky bowel movements or blood in your bowel movements   You have sudden trouble breathing   You feel weak, lightheaded, or faint or your heart beats faster than normal for you  Contact your healthcare provider if:    You have a fever and chills   You have nausea or are vomiting   Your abdomen is bloated or feels full and hard   You have abdominal pain   You have black, sticky bowel movements or blood in your bowel movements   You have not had a bowel movement for 3 days after your procedure   You have rash or hives   You have questions or concerns about your procedure  Activity:    Do not lift, strain, or run for 24 hours after your procedure   Rest after your procedure  You have been given medicine to relax you  Do not drive or make important decisions until the day after your procedure  Return to your normal activity as directed   Relieve gas and discomfort from bloating by lying on your right side with a heating pad on your abdomen  You may need to take short walks to help the gas move out  Eat small meals until bloating is relieved    Follow up with your healthcare provider as directed: Write down your questions so you remember to ask them during your visits  If you take a blood thinner, please review the specific instructions from your endoscopist about when you should resume it  These can be found in the Recommendation and Your Medication list sections of this After Visit Summary  Hiatal Hernia   WHAT YOU NEED TO KNOW:   What is a hiatal hernia? A hiatal hernia is a condition that causes part of your stomach to bulge through the hiatus (small opening) in your diaphragm  The part of the stomach may move up and down, or it may get trapped above the diaphragm  What increases my risk for a hiatal hernia? The exact cause of a hiatal hernia is not known  You may have been born with a large hiatus  The following may increase your risk of a hiatal hernia:  · Obesity    · Older age    · Medical conditions such as diverticulosis or esophagitis    · Previous surgery of the esophagus or stomach or trauma such as from a motor vehicle accident    What are the types of hiatal hernia? · Type I (sliding hiatal hernia): A portion of the stomach slides in and out of the hiatus  This type is the most common and usually causes gastroesophageal reflux disease (GERD)  GERD occurs when the esophageal sphincter does not close properly and causes acid reflux  The esophageal sphincter is the lower muscle of the esophagus  · Type II (paraesophageal hiatal hernia):  Type II hiatal hernia forms when a part of the stomach squeezes through the hiatus and lies next to the esophagus  · Type III (combined):  Type III hiatal hernia is a combination of a sliding and a paraesophageal hiatal hernia  · Type IV (complex paraesophageal hiatal hernia): The whole stomach, the small and large bowels, spleen, pancreas, or liver is pushed up into the chest     What are the signs and symptoms of a hiatal hernia? The most common symptom is heartburn   This usually occurs after meals and spreads to your neck, jaw, or shoulder  You may have no signs or symptoms, or you may have any of the following:  · Abdominal pain, especially in the area just above your navel    · Bitter or acid taste in your mouth    · Trouble swallowing    · Coughing or hoarseness    · Chest pain or shortness of breath that occurs after eating    · Frequent burping or hiccups    · Uncomfortable feeling of fullness after eating    How is a hiatal hernia diagnosed? · An upper GI series test  includes x-rays of your esophagus, stomach, and your small intestines  It is also called a barium swallow test  You will be given barium (a chalky liquid) to drink before the pictures are taken  This liquid helps your stomach and intestines show up better on the x-rays  An upper GI series can show if you have an ulcer, a blocked intestine, or other problems  · An endoscopy  uses a scope to see the inside of your digestive tract  A scope is a long, bendable tube with a light on the end of it  A camera may be hooked to the scope to take pictures  How is a hiatal hernia treated? Treatment depends on the type of hiatal hernia you have and on your symptoms  You may not need any treatment  You may need any of the following:  · Medicines  may be given to relieve heartburn symptoms  These medicines help to decrease or block stomach acid  You may also be given medicines that help to tighten the esophageal sphincter  · Surgery  may be done when medicines cannot control your symptoms, or other problems are present  Your healthcare provider may also suggest surgery depending on the type of hernia you have  Your healthcare provider can put your stomach back into its normal location  He may make the hiatus (hole) smaller and anchor your stomach in your abdomen  Fundoplication is a surgery that wraps the upper part of the stomach around the esophageal sphincter to strengthen it  How can I manage symptoms?   The following nutrition and lifestyle changes may be recommended to relieve symptoms of heartburn  · Avoid foods that make your symptoms worse  These may include spicy foods, fruit juices, alcohol, caffeine, chocolate, and mint  · Eat several small meals during the day  Small meals give your stomach less food to digest     · Avoid lying down and bending forward after you eat  Do not eat meals 2 to 3 hours before bedtime  This decreases your risk for reflux  · Maintain a healthy weight  If you are overweight, weight loss may help relieve your symptoms  · Sleep with your head elevated  at least 6 inches  · Do not smoke  Smoking can increase your symptoms of heartburn  When should I seek immediate care? · You have severe abdominal pain  · You try to vomit but nothing comes out (retching)  · You have severe chest pain and sudden trouble breathing  · Your bowel movements are black or bloody  · Your vomit looks like coffee grounds or has blood in it  When should I contact my healthcare provider? · Your symptoms are getting worse  · You have nausea, and you are vomiting  · You are losing weight without trying  · You have questions or concerns about your condition or care  CARE AGREEMENT:   You have the right to help plan your care  Learn about your health condition and how it may be treated  Discuss treatment options with your healthcare providers to decide what care you want to receive  You always have the right to refuse treatment  The above information is an  only  It is not intended as medical advice for individual conditions or treatments  Talk to your doctor, nurse or pharmacist before following any medical regimen to see if it is safe and effective for you  © Copyright [employer] 2021 Information is for End User's use only and may not be sold, redistributed or otherwise used for commercial purposes   All illustrations and images included in CareNotes® are the copyrighted property of [employer]  or [employer] [address]      Colorectal Polyps   WHAT YOU NEED TO KNOW:   What are colorectal polyps? Colorectal polyps are small growths of tissue in the lining of the colon and rectum  Most polyps are usually benign (not cancer)  Certain types of polyps, called adenomatous polyps, may turn into cancer  What increases my risk for colorectal polyps? The exact cause of colorectal polyps is unknown  The following may increase your risk:  · Older age    · Foods high in fat and low in fiber    · Family history of polyps    · Intestinal diseases, such as Crohn disease or ulcerative colitis    · Smoking cigarettes or drinking alcohol    · Lack of physical activity, such as exercise    · Obesity    What are the signs and symptoms of colorectal polyps? · Blood in your bowel movement or bleeding from the rectum    · Change in bowel movement habits, such as diarrhea or constipation    · Abdominal pain    How are colorectal polyps diagnosed? You should have fecal blood screening 1 time each year for colorectal disease if you are older than 50 years  You should be screened earlier if you have an intestinal disease or a family history of polyps or colorectal cancer  During this screening, a sample of your bowel movement is checked for blood, which may be an early sign of colorectal polyps or cancer  You may also need any of the following tests:  · A digital rectal exam  means your healthcare provider will use a finger to check for polyps  · A barium enema  is an x-ray of the colon  A tube is put into your anus, and a liquid called barium is put through the tube  Barium is used so that healthcare providers can see your colon better on the x-ray film  · A virtual colonoscopy  is a CT scan that takes pictures of the inside of your colon and rectum  A small, flexible tube is put into your rectum and air or carbon dioxide (gas) is used to expand your colon   This lets healthcare providers clearly see your colon and any polyps on a monitor  · Colonoscopy or sigmoidoscopy  are procedures to help your healthcare provider see the inside of your colon  He or she will use a flexible tube with a small light and camera on the end  During a sigmoidoscopy, your healthcare provider will only look at rectum and lower colon  During a colonoscopy, he or she will look at the full length of your colon  A small amount of tissue may be removed from your colon for tests  How are colorectal polyps treated? Small, benign polyps may not need treatment  Your healthcare provider will check the polyp over time to make sure it is not changing  Polyps that are cancer may be removed with one of the following:  · A polypectomy  is a minimally invasive procedure to remove a polyp during a colonoscopy or sigmoidoscopy  Your healthcare provider may need to remove the polyp with a laparoscope  Laparoscopy is done by inserting a small, flexible scope into incisions made on your abdomen  · Surgery  may be needed to remove large or deep polyps that cannot be removed in a polypectomy  Tissues or lymph nodes near a polyp may also be removed  This helps stop cancer from spreading  What can I do to lower my risk for colorectal polyps? · Eat a variety of healthy foods  Healthy foods include fruit, vegetables, whole-grain breads, low-fat dairy products, beans, lean meat, and fish  Ask if you need to be on a special diet  · Maintain a healthy weight  Ask your healthcare provider what a healthy weight is for you  Ask him or her to help with a weight loss plan if you are overweight  · Exercise as directed  Begin to exercise slowly and do more as you get stronger  Talk with your healthcare provider before you start an exercise program          · Limit alcohol  Your risk for polyps increases the more you drink  · Do not smoke  Nicotine and other chemicals in cigarettes and cigars increase your risk for polyps   Ask your healthcare provider for information if you currently smoke and need help to quit  E-cigarettes or smokeless tobacco still contain nicotine  Talk to your healthcare provider before you use these products  Where can I find more information? · [address] ([facility])  [address] [place] , [place] [address]  Phone: [phone]  Web Address: Vila Sacks  [facility] nih gov    Call your local emergency number (911 in the [address]) if:   · You have sudden shortness of breath  · You have a fast heart rate, fast breathing, or are too dizzy to stand up  When should I seek immediate care? · You have severe abdominal pain  · You see blood in your bowel movement  When should I call my doctor? · You have a fever  · You have chills, a cough, or feel weak and achy  · You have abdominal pain that does not go away or gets worse after you take medicine  · Your abdomen is swollen  · You are losing weight without trying  · You have questions or concerns about your condition or care  CARE AGREEMENT:   You have the right to help plan your care  Learn about your health condition and how it may be treated  Discuss treatment options with your healthcare providers to decide what care you want to receive  You always have the right to refuse treatment  The above information is an  only  It is not intended as medical advice for individual conditions or treatments  Talk to your doctor, nurse or pharmacist before following any medical regimen to see if it is safe and effective for you  © Copyright [employer] 2021 Information is for End User's use only and may not be sold, redistributed or otherwise used for commercial purposes   All illustrations and images included in CareNotes® are the copyrighted property of [employer]  or [facility] [employer]

## 2021-09-17 NOTE — ANESTHESIA PREPROCEDURE EVALUATION
Procedure:  COLONOSCOPY  EGD    Relevant Problems   CARDIO   (+) Aortoiliac occlusive disease (HCC)      GI/HEPATIC   (+) GERD (gastroesophageal reflux disease)      MUSCULOSKELETAL   (+) Chronic bilateral low back pain with bilateral sciatica   (+) Fibromyalgia   (+) Lumbar pain   (+) Primary osteoarthritis involving multiple joints   (+) SLE (systemic lupus erythematosus) (HCC)      NEURO/PSYCH   (+) Fibromyalgia   (+) History of lung cancer   (+) Sjogrens  (+) Raynauds     Physical Exam    Airway    Mallampati score: II  TM Distance: >3 FB  Neck ROM: full     Dental       Cardiovascular  Rhythm: regular, Rate: normal,     Pulmonary  Breath sounds clear to auscultation,     Other Findings        Anesthesia Plan  ASA Score- 3     Anesthesia Type- IV sedation with anesthesia with ASA Monitors  Additional Monitors:   Airway Plan:           Plan Factors-Exercise tolerance (METS): >4 METS  Chart reviewed  Existing labs reviewed  Patient is a current smoker  Patient smoked on day of surgery  Induction- intravenous  Postoperative Plan-     Informed Consent- Anesthetic plan and risks discussed with patient

## 2021-09-17 NOTE — H&P
H&P EXAM - Outpatient Endoscopy   Paula William 77 y o  female MRN: [id number]    [address] [facility] GI LAB PRE/PST   Encounter: [id number]        History and Physical -  [facility]  Paula William 77 y o  female MRN: [id number]                  HPI: Paula William is a 77y o  year old female who presents for positive cologuard, chronic gerd      REVIEW OF SYSTEMS: Per the HPI, and otherwise unremarkable      Historical Information   Past Medical History:   Diagnosis Date    Anxiety     Arthritis 12/20/2014    Cataract     eulalia    Disease of thyroid gland     nodules    Fibromyalgia, primary     Frequent UTI     History of lung cancer     lower left lobe removed - 2004    Irritable bowel syndrome     Lactose intolerance     Lupus (HCC)     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    PAD (peripheral artery disease) (HCC)     Presence of pessary     Raynaud disease     Sciatica     Sinus headache     Sjogren's syndrome ([facility] [address] )     Smoker      Past Surgical History:   Procedure Laterality Date    LUNG REMOVAL, PARTIAL  05/01/2004    Prior Surgical/Procedural History - 5/4 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 - lung resection - subtotal    MOUTH SURGERY      Tooth extraction    WA THROMBOENDARTECTMY FEMORAL COMMON Left 9/10/2019    Procedure: ENDARTERECTOMY ARTERIAL FEMORAL,  WITH BOVINE GRAFT AND PROFUNDOPLASTY;  Surgeon: Olivia Enriquez DO;  Location: [facility];  Service: Vascular    SINUS SURGERY       Social History   Social History     Substance and Sexual Activity   Alcohol Use Yes    Comment: liquor rarely     Social History     Substance and Sexual Activity   Drug Use No     Social History     Tobacco Use   Smoking Status Current Every Day Smoker    Packs/day: 0 50    Years: 40 00    Pack years: 20 00    Types: Cigarettes   Smokeless Tobacco Never Used     Family History   Problem Relation Age of Onset    Arthritis Mother    Zack Laws Asthma Mother     Dementia Mother     Heart disease Mother     Hypothyroidism Mother     Irritable bowel syndrome Mother     Uterine cancer Mother 72    Heart attack Father     Stroke Father     Hyperlipidemia Sister     Uterine cancer Sister         age unknown    Heart attack Maternal Grandfather     Asthma Family     Cataracts Family     Thyroid disease Family     No Known Problems Paternal Aunt     No Known Problems Maternal Aunt     No Known Problems Maternal Grandmother     No Known Problems Paternal Grandmother     No Known Problems Paternal Grandfather        Meds/Allergies     (Not in a hospital admission)      Allergies   Allergen Reactions    Penicillins Shortness Of Breath and Edema    Cymbalta [Duloxetine Hcl] Diarrhea    Duloxetine Diarrhea    Dust Mite Extract     Lactose - Food Allergy GI Intolerance    Neosporin [Neomycin-Bacitracin Zn-Polymyx]      Other reaction(s): REDNESS    Nitrofurantoin Rash    Omeprazole Rash    Sulfamethoxazole-Trimethoprim Rash       Objective     /65   Pulse 83   Temp 98 1 °F (36 7 °C) (Temporal)   Resp 20   Ht 5' 4" (1 626 m)   Wt 68 9 kg (152 lb)   LMP  (LMP Unknown)   SpO2 95%   BMI 26 09 kg/m²       PHYSICAL EXAM    Gen: NAD  CV: RRR  CHEST: Clear  ABD: soft, NT/ND  EXT: no edema      ASSESSMENT/PLAN:  This is a 77y o  year old female here for egd/colonoscopy, and she is stable and optimized for her procedure

## 2021-09-22 ENCOUNTER — TELEPHONE (OUTPATIENT)
Dept: GASTROENTEROLOGY | Facility: MEDICAL CENTER | Age: 66
End: 2021-09-22

## 2021-09-22 NOTE — TELEPHONE ENCOUNTER
Pt of Dr Bridgette Schreiber  Pt called stating she had a colon & EGD on 09/17 -- she did not have a bowel movement for several days after this, and took ducolax on Sunday  After taking this she was having diarrhea and began vomiting that night  She has been unable to eat anything other then dry rice krispies due to her severe acid reflux  Since Sunday her diarrhea has subsided, but now she is not going at all  She requests a call back to discuss further, and any recommendations      Her call back # [phone]

## 2021-09-23 ENCOUNTER — TELEPHONE (OUTPATIENT)
Dept: GASTROENTEROLOGY | Facility: CLINIC | Age: 66
End: 2021-09-23

## 2021-09-23 NOTE — TELEPHONE ENCOUNTER
Pt of Dr Dalphine Peabody last seen for colonoscopy/EGD 9/17/2021  HX:    Pt called back personal line with numerous complaints  Mostly complained about prep taken prior to colonoscopy/EGD  Pt stated "I would rather die before repeating the procedure"    Pt expresses ever since she took prep, she has been experiencing fluctuating diarrhea/constipation, dizziness, fatigue  I recommended ED evaluation and pt then stated she feels fine and does not have concern for dehydration  I recommended oral hydration and rest  She then detailed her BM habits since colonoscopy  Currently she is complaining of constipation for 2-3 days then loose BMs  I recommended miralax daily and to titrate as needed  Advised f/u office appt  Pt declined and stated she would meet with PCP instead

## 2021-09-29 DIAGNOSIS — N95.2 VAGINAL ATROPHY: ICD-10-CM

## 2021-09-30 RX ORDER — ESTRADIOL 10 UG/1
1 INSERT VAGINAL 2 TIMES WEEKLY
Qty: 24 TABLET | Refills: 0 | Status: SHIPPED | OUTPATIENT
Start: 2021-09-30 | End: 2022-01-27 | Stop reason: SDUPTHER

## 2021-10-07 ENCOUNTER — CLINICAL SUPPORT (OUTPATIENT)
Dept: FAMILY MEDICINE CLINIC | Facility: CLINIC | Age: 66
End: 2021-10-07
Payer: MEDICARE

## 2021-10-07 DIAGNOSIS — Z23 NEED FOR IMMUNIZATION AGAINST INFLUENZA: Primary | ICD-10-CM

## 2021-10-07 PROCEDURE — G0008 ADMIN INFLUENZA VIRUS VAC: HCPCS

## 2021-10-07 PROCEDURE — 90662 IIV NO PRSV INCREASED AG IM: CPT

## 2021-10-14 ENCOUNTER — OFFICE VISIT (OUTPATIENT)
Dept: OBGYN CLINIC | Facility: CLINIC | Age: 66
End: 2021-10-14
Payer: MEDICARE

## 2021-10-14 VITALS
WEIGHT: 154.4 LBS | BODY MASS INDEX: 26.36 KG/M2 | SYSTOLIC BLOOD PRESSURE: 110 MMHG | HEIGHT: 64 IN | DIASTOLIC BLOOD PRESSURE: 66 MMHG

## 2021-10-14 DIAGNOSIS — N81.4 UTERINE PROLAPSE: Primary | ICD-10-CM

## 2021-10-14 PROCEDURE — 99213 OFFICE O/P EST LOW 20 MIN: CPT | Performed by: OBSTETRICS & GYNECOLOGY

## 2021-10-25 ENCOUNTER — TELEPHONE (OUTPATIENT)
Dept: FAMILY MEDICINE CLINIC | Facility: CLINIC | Age: 66
End: 2021-10-25

## 2021-10-26 PROCEDURE — 87077 CULTURE AEROBIC IDENTIFY: CPT | Performed by: FAMILY MEDICINE

## 2021-10-26 PROCEDURE — 87186 SC STD MICRODIL/AGAR DIL: CPT | Performed by: FAMILY MEDICINE

## 2021-10-26 PROCEDURE — 87086 URINE CULTURE/COLONY COUNT: CPT | Performed by: FAMILY MEDICINE

## 2021-10-27 DIAGNOSIS — R31.29 MICROSCOPIC HEMATURIA: Primary | ICD-10-CM

## 2021-10-27 RX ORDER — CIPROFLOXACIN 500 MG/1
TABLET, FILM COATED ORAL
Qty: 10 TABLET | Refills: 0 | Status: SHIPPED | OUTPATIENT
Start: 2021-10-27 | End: 2021-10-27

## 2021-10-27 RX ORDER — CIPROFLOXACIN 500 MG/1
500 TABLET, FILM COATED ORAL EVERY 12 HOURS SCHEDULED
COMMUNITY
End: 2021-10-27 | Stop reason: SDUPTHER

## 2021-12-09 ENCOUNTER — OFFICE VISIT (OUTPATIENT)
Dept: FAMILY MEDICINE CLINIC | Facility: CLINIC | Age: 66
End: 2021-12-09
Payer: MEDICARE

## 2021-12-09 VITALS
DIASTOLIC BLOOD PRESSURE: 76 MMHG | HEIGHT: 64 IN | WEIGHT: 156.4 LBS | BODY MASS INDEX: 26.7 KG/M2 | TEMPERATURE: 96.9 F | SYSTOLIC BLOOD PRESSURE: 126 MMHG

## 2021-12-09 DIAGNOSIS — M15.9 PRIMARY OSTEOARTHRITIS INVOLVING MULTIPLE JOINTS: Primary | ICD-10-CM

## 2021-12-09 DIAGNOSIS — F11.20 CONTINUOUS OPIOID DEPENDENCE (HCC): ICD-10-CM

## 2021-12-09 DIAGNOSIS — E78.2 MIXED HYPERLIPIDEMIA: ICD-10-CM

## 2021-12-09 DIAGNOSIS — I74.09 AORTOILIAC OCCLUSIVE DISEASE (HCC): ICD-10-CM

## 2021-12-09 DIAGNOSIS — C34.00 MALIGNANT NEOPLASM OF HILUS OF LUNG, UNSPECIFIED LATERALITY (HCC): ICD-10-CM

## 2021-12-09 DIAGNOSIS — M32.19 OTHER SYSTEMIC LUPUS ERYTHEMATOSUS WITH OTHER ORGAN INVOLVEMENT (HCC): ICD-10-CM

## 2021-12-09 DIAGNOSIS — I73.9 PAD (PERIPHERAL ARTERY DISEASE) (HCC): ICD-10-CM

## 2021-12-09 DIAGNOSIS — G62.9 NEUROPATHY: ICD-10-CM

## 2021-12-09 DIAGNOSIS — M54.50 LUMBAR PAIN: ICD-10-CM

## 2021-12-09 PROCEDURE — 99214 OFFICE O/P EST MOD 30 MIN: CPT | Performed by: FAMILY MEDICINE

## 2021-12-09 RX ORDER — PREDNISONE 10 MG/1
10 TABLET ORAL DAILY
Qty: 90 TABLET | Refills: 1 | Status: SHIPPED | OUTPATIENT
Start: 2021-12-09 | End: 2022-06-24

## 2021-12-09 RX ORDER — ATORVASTATIN CALCIUM 10 MG/1
10 TABLET, FILM COATED ORAL
Qty: 90 TABLET | Refills: 1 | Status: SHIPPED | OUTPATIENT
Start: 2021-12-09 | End: 2022-07-01 | Stop reason: SDUPTHER

## 2021-12-09 RX ORDER — POTASSIUM CHLORIDE 750 MG/1
10 TABLET, FILM COATED, EXTENDED RELEASE ORAL 3 TIMES DAILY
Qty: 270 TABLET | Refills: 1 | Status: SHIPPED | OUTPATIENT
Start: 2021-12-09 | End: 2022-06-24

## 2021-12-20 ENCOUNTER — TELEPHONE (OUTPATIENT)
Dept: FAMILY MEDICINE CLINIC | Facility: CLINIC | Age: 66
End: 2021-12-20

## 2021-12-21 DIAGNOSIS — B37.0 ORAL YEAST INFECTION: Primary | ICD-10-CM

## 2021-12-22 RX ORDER — CLOTRIMAZOLE 10 MG/1
10 LOZENGE ORAL; TOPICAL
Qty: 35 TROCHE | Refills: 1 | Status: SHIPPED | OUTPATIENT
Start: 2021-12-22

## 2022-01-01 NOTE — TELEPHONE ENCOUNTER
Pt is going to skip the U/S for now because of all of the vascular issues    She said she will use the replens and see what happens with everything 0

## 2022-01-13 DIAGNOSIS — M81.0 AGE-RELATED OSTEOPOROSIS WITHOUT CURRENT PATHOLOGICAL FRACTURE: Primary | ICD-10-CM

## 2022-01-27 ENCOUNTER — OFFICE VISIT (OUTPATIENT)
Dept: OBGYN CLINIC | Facility: CLINIC | Age: 67
End: 2022-01-27
Payer: MEDICARE

## 2022-01-27 VITALS
SYSTOLIC BLOOD PRESSURE: 120 MMHG | HEIGHT: 64 IN | WEIGHT: 155.8 LBS | DIASTOLIC BLOOD PRESSURE: 68 MMHG | BODY MASS INDEX: 26.6 KG/M2

## 2022-01-27 DIAGNOSIS — F11.20 CONTINUOUS OPIOID DEPENDENCE (HCC): ICD-10-CM

## 2022-01-27 DIAGNOSIS — N95.2 VAGINAL ATROPHY: ICD-10-CM

## 2022-01-27 DIAGNOSIS — M32.19 OTHER SYSTEMIC LUPUS ERYTHEMATOSUS WITH OTHER ORGAN INVOLVEMENT (HCC): ICD-10-CM

## 2022-01-27 PROCEDURE — 99213 OFFICE O/P EST LOW 20 MIN: CPT | Performed by: OBSTETRICS & GYNECOLOGY

## 2022-01-27 RX ORDER — ESTRADIOL 10 UG/1
1 INSERT VAGINAL 2 TIMES WEEKLY
Qty: 24 TABLET | Refills: 3 | Status: SHIPPED | OUTPATIENT
Start: 2022-01-27 | End: 2022-04-21

## 2022-01-27 NOTE — PROGRESS NOTES
Assessment/Plan:     Uterine prolapse - RTO 3 months for pessary check    Given paper RX for Yuvafem as she is not quite ready for it but does not have refills  There are no diagnoses linked to this encounter  Subjective:     Patient ID: Cydney Desir is a 77 y o  female  Pt here for pessary check  She is also using vagifem  She has no complaints regarding the pessary  It is working well for her  Review of Systems   Constitutional: Negative  Gastrointestinal: Negative  Genitourinary: Negative  Objective:     Physical Exam  Genitourinary:     General: Normal vulva  Vagina: Normal       Cervix: Normal       Uterus: Normal        Adnexa: Right adnexa normal and left adnexa normal       Comments: shaatz pessary moved removed without difficulty, cleaned and reinserted    No vaginal excoriation noted

## 2022-03-14 ENCOUNTER — TELEPHONE (OUTPATIENT)
Dept: NEUROLOGY | Facility: CLINIC | Age: 67
End: 2022-03-14

## 2022-03-22 ENCOUNTER — CLINICAL SUPPORT (OUTPATIENT)
Dept: FAMILY MEDICINE CLINIC | Facility: CLINIC | Age: 67
End: 2022-03-22
Payer: MEDICARE

## 2022-03-22 DIAGNOSIS — Z23 ENCOUNTER FOR IMMUNIZATION: Primary | ICD-10-CM

## 2022-03-22 DIAGNOSIS — C34.90 MALIGNANT NEOPLASM OF LUNG, UNSPECIFIED LATERALITY, UNSPECIFIED PART OF LUNG (HCC): ICD-10-CM

## 2022-03-22 DIAGNOSIS — Z23 IMMUNIZATION DUE: ICD-10-CM

## 2022-03-22 DIAGNOSIS — M32.19 OTHER SYSTEMIC LUPUS ERYTHEMATOSUS WITH OTHER ORGAN INVOLVEMENT (HCC): ICD-10-CM

## 2022-03-22 DIAGNOSIS — I50.9 CONGESTIVE HEART FAILURE, UNSPECIFIED HF CHRONICITY, UNSPECIFIED HEART FAILURE TYPE (HCC): ICD-10-CM

## 2022-03-22 PROCEDURE — 90714 TD VACC NO PRESV 7 YRS+ IM: CPT

## 2022-03-22 PROCEDURE — 90471 IMMUNIZATION ADMIN: CPT

## 2022-04-21 ENCOUNTER — ANNUAL EXAM (OUTPATIENT)
Dept: OBGYN CLINIC | Facility: CLINIC | Age: 67
End: 2022-04-21
Payer: MEDICARE

## 2022-04-21 VITALS
BODY MASS INDEX: 26.73 KG/M2 | HEIGHT: 64 IN | WEIGHT: 156.6 LBS | DIASTOLIC BLOOD PRESSURE: 60 MMHG | SYSTOLIC BLOOD PRESSURE: 112 MMHG

## 2022-04-21 DIAGNOSIS — Z78.0 ASYMPTOMATIC MENOPAUSE: ICD-10-CM

## 2022-04-21 DIAGNOSIS — Z12.31 ENCOUNTER FOR SCREENING MAMMOGRAM FOR BREAST CANCER: ICD-10-CM

## 2022-04-21 DIAGNOSIS — Z01.419 ENCOUNTER FOR ANNUAL ROUTINE GYNECOLOGICAL EXAMINATION: ICD-10-CM

## 2022-04-21 DIAGNOSIS — Z12.4 CERVICAL CANCER SCREENING: Primary | ICD-10-CM

## 2022-04-21 PROCEDURE — G0101 CA SCREEN;PELVIC/BREAST EXAM: HCPCS | Performed by: OBSTETRICS & GYNECOLOGY

## 2022-04-21 PROCEDURE — G0145 SCR C/V CYTO,THINLAYER,RESCR: HCPCS | Performed by: OBSTETRICS & GYNECOLOGY

## 2022-04-21 NOTE — PROGRESS NOTES
Assessment/Plan:    Pap with reflex done    mammogram reviewed with her including breast density  RX given for September     Discussed self breast exams    colon cancer screening-colonoscopy done in March 2021, recall in 3 years    DEXA ordered    discussed preventive care, regular exercise and a healthy diet      No problem-specific Assessment & Plan notes found for this encounter  Diagnoses and all orders for this visit:    Encounter for annual routine gynecological examination    Encounter for screening mammogram for breast cancer  -     Mammo screening bilateral w 3d & cad; Future    Asymptomatic menopause  -     DXA bone density spine hip and pelvis; Future          Subjective:      Patient ID: Mel Walker is a 79 y o  female  Patient here for yearly and pessary check  She is needing dental work which is very stressful to her  She would like to have Pap  Normal Pap and negative HPV in 2020  Normal 3D mammogram in September with scattered fibroglandular densities and average risk  The following portions of the patient's history were reviewed and updated as appropriate: allergies, current medications, past family history, past medical history, past social history, past surgical history and problem list     Review of Systems   Constitutional: Negative  Gastrointestinal: Negative  Genitourinary: Negative  Objective:      LMP  (LMP Unknown)          Physical Exam  Vitals reviewed  Constitutional:       Appearance: She is well-developed  Neck:      Thyroid: No thyromegaly  Trachea: No tracheal deviation  Cardiovascular:      Rate and Rhythm: Normal rate and regular rhythm  Pulmonary:      Effort: Pulmonary effort is normal       Breath sounds: Normal breath sounds  Chest:   Breasts: Breasts are symmetrical       Right: No inverted nipple, mass, nipple discharge, skin change or tenderness        Left: No inverted nipple, mass, nipple discharge, skin change or tenderness  Abdominal:      General: There is no distension  Palpations: Abdomen is soft  There is no mass  Tenderness: There is no abdominal tenderness  Genitourinary:     Labia:         Right: No rash, tenderness, lesion or injury  Left: No rash, tenderness, lesion or injury  Vagina: Normal       Cervix: No cervical motion tenderness, discharge or friability  Adnexa:         Right: No mass, tenderness or fullness  Left: No mass, tenderness or fullness          Rectum: Normal       Comments: shaatz pessary removed, cleaned and reinserted without difficulty  No excoriation

## 2022-04-27 LAB
LAB AP GYN PRIMARY INTERPRETATION: NORMAL
Lab: NORMAL

## 2022-05-18 ENCOUNTER — OFFICE VISIT (OUTPATIENT)
Dept: FAMILY MEDICINE CLINIC | Facility: CLINIC | Age: 67
End: 2022-05-18
Payer: MEDICARE

## 2022-05-18 VITALS
WEIGHT: 156.6 LBS | DIASTOLIC BLOOD PRESSURE: 88 MMHG | HEIGHT: 64 IN | OXYGEN SATURATION: 97 % | HEART RATE: 93 BPM | SYSTOLIC BLOOD PRESSURE: 160 MMHG | TEMPERATURE: 97.1 F | BODY MASS INDEX: 26.73 KG/M2

## 2022-05-18 DIAGNOSIS — I73.9 PAD (PERIPHERAL ARTERY DISEASE) (HCC): ICD-10-CM

## 2022-05-18 DIAGNOSIS — I74.09 AORTOILIAC OCCLUSIVE DISEASE (HCC): ICD-10-CM

## 2022-05-18 DIAGNOSIS — N30.00 ACUTE CYSTITIS WITHOUT HEMATURIA: ICD-10-CM

## 2022-05-18 DIAGNOSIS — C34.00 MALIGNANT NEOPLASM OF HILUS OF LUNG, UNSPECIFIED LATERALITY (HCC): ICD-10-CM

## 2022-05-18 DIAGNOSIS — E78.2 MIXED HYPERLIPIDEMIA: ICD-10-CM

## 2022-05-18 DIAGNOSIS — R39.9 UTI SYMPTOMS: Primary | ICD-10-CM

## 2022-05-18 LAB
SL AMB  POCT GLUCOSE, UA: NORMAL
SL AMB LEUKOCYTE ESTERASE,UA: ABNORMAL
SL AMB POCT BILIRUBIN,UA: ABNORMAL
SL AMB POCT BLOOD,UA: 250
SL AMB POCT CLARITY,UA: ABNORMAL
SL AMB POCT COLOR,UA: ABNORMAL
SL AMB POCT KETONES,UA: ABNORMAL
SL AMB POCT NITRITE,UA: ABNORMAL
SL AMB POCT PH,UA: 5
SL AMB POCT SPECIFIC GRAVITY,UA: 1.02
SL AMB POCT URINE PROTEIN: ABNORMAL
SL AMB POCT UROBILINOGEN: NORMAL

## 2022-05-18 PROCEDURE — 99213 OFFICE O/P EST LOW 20 MIN: CPT | Performed by: FAMILY MEDICINE

## 2022-05-18 PROCEDURE — 81002 URINALYSIS NONAUTO W/O SCOPE: CPT | Performed by: FAMILY MEDICINE

## 2022-05-18 PROCEDURE — 87086 URINE CULTURE/COLONY COUNT: CPT | Performed by: FAMILY MEDICINE

## 2022-05-18 PROCEDURE — 87186 SC STD MICRODIL/AGAR DIL: CPT | Performed by: FAMILY MEDICINE

## 2022-05-18 PROCEDURE — 87077 CULTURE AEROBIC IDENTIFY: CPT | Performed by: FAMILY MEDICINE

## 2022-05-18 RX ORDER — CIPROFLOXACIN 500 MG/1
500 TABLET, FILM COATED ORAL EVERY 12 HOURS SCHEDULED
Qty: 14 TABLET | Refills: 0 | Status: SHIPPED | OUTPATIENT
Start: 2022-05-18 | End: 2022-05-25

## 2022-05-18 NOTE — PROGRESS NOTES
Assessment/Plan:  Patient will increase fluids  Urine culture done at this time  Urine dip with positive nitrates as well as trace blood and protein  Patient will start Cipro as directed  Patient go for laboratory studies  Diagnoses and all orders for this visit:    Acute cystitis without hematuria  -     ciprofloxacin (CIPRO) 500 mg tablet; Take 1 tablet (500 mg total) by mouth every 12 (twelve) hours for 7 days    Malignant neoplasm of hilus of lung, unspecified laterality (HCC)    Aortoiliac occlusive disease ([facility] [address] )  -     Comprehensive metabolic panel; Future  -     CBC and differential; Future  -     Lipid panel; Future  -     TSH, 3rd generation with Free T4 reflex; Future    PAD (peripheral artery disease) (HCC)  -     Comprehensive metabolic panel; Future  -     CBC and differential; Future  -     Lipid panel; Future  -     TSH, 3rd generation with Free T4 reflex; Future    Mixed hyperlipidemia  -     Comprehensive metabolic panel; Future  -     CBC and differential; Future  -     Lipid panel; Future  -     TSH, 3rd generation with Free T4 reflex; Future            Subjective:        Patient ID: Karoline Silva is a 79 y o  female  Patient is here with urinary frequency past week  Patient with some low back pain associated with this  Patient with urgency  Patient nocturia  Patient with  Dysuria  No gross hematuria  Mild suprapubic pain  The following portions of the patient's history were reviewed and updated as appropriate: allergies, current medications, past family history, past medical history, past social history, past surgical history and problem list       Review of Systems   Constitutional: Negative  HENT: Negative  Eyes: Negative  Respiratory: Negative  Cardiovascular: Negative  Gastrointestinal: Negative  Endocrine: Negative  Genitourinary: Positive for difficulty urinating, dysuria, frequency and urgency  Negative for hematuria     Musculoskeletal: Negative  Skin: Negative  Allergic/Immunologic: Negative  Neurological: Negative  Hematological: Negative  Psychiatric/Behavioral: Negative  Objective:      BMI Counseling: Body mass index is 26 88 kg/m²  The BMI is above normal  Nutrition recommendations include decreasing portion sizes  Exercise recommendations include exercising 3-5 times per week  Rationale for BMI follow-up plan is due to patient being overweight or obese  /88 (BP Location: Right arm, Patient Position: Sitting, Cuff Size: Standard)   Pulse 93   Temp (!) 97 1 °F (36 2 °C) (Tympanic)   Ht 5' 4" (1 626 m)   Wt 71 kg (156 lb 9 6 oz)   LMP  (LMP Unknown)   SpO2 97%   BMI 26 88 kg/m²          Physical Exam  Nursing note reviewed  Constitutional:       General: She is not in acute distress  Appearance: Normal appearance  She is not ill-appearing, toxic-appearing or diaphoretic  Abdominal:      Tenderness: There is no abdominal tenderness  There is right CVA tenderness and left CVA tenderness  Musculoskeletal:         General: No tenderness  Neurological:      Mental Status: She is alert

## 2022-05-20 LAB
BACTERIA UR CULT: ABNORMAL
BACTERIA UR CULT: ABNORMAL

## 2022-05-23 ENCOUNTER — TELEPHONE (OUTPATIENT)
Dept: FAMILY MEDICINE CLINIC | Facility: CLINIC | Age: 67
End: 2022-05-23

## 2022-05-25 ENCOUNTER — TELEPHONE (OUTPATIENT)
Dept: CARDIOLOGY CLINIC | Facility: CLINIC | Age: 67
End: 2022-05-25

## 2022-05-25 NOTE — TELEPHONE ENCOUNTER
Pt is calling was seen 11/2020 at which time tesing was ordered patient states never had testing completed because of pandemic  Would like to have orders placed for testing again so she can schedule  Please advise  Seen by Dr Mari Lópze and cosigned by Fredrick Torres

## 2022-05-26 DIAGNOSIS — R06.00 DOE (DYSPNEA ON EXERTION): Primary | ICD-10-CM

## 2022-05-26 DIAGNOSIS — R06.02 SHORTNESS OF BREATH: ICD-10-CM

## 2022-05-26 NOTE — TELEPHONE ENCOUNTER
Pt called LM told new orders placed for testing as requested and told to call central scheduling to schedule and once scheduled call for f u appt with Dr Bill Morillo or Lizet Post

## 2022-05-26 NOTE — TELEPHONE ENCOUNTER
Orders placed for nuclear stress test and echocardiogram  Clinical team, can you please schedule and let patient know?   Thank you

## 2022-07-01 ENCOUNTER — TELEPHONE (OUTPATIENT)
Dept: FAMILY MEDICINE CLINIC | Facility: CLINIC | Age: 67
End: 2022-07-01

## 2022-07-01 DIAGNOSIS — E78.2 MIXED HYPERLIPIDEMIA: ICD-10-CM

## 2022-07-01 DIAGNOSIS — I74.09 AORTOILIAC OCCLUSIVE DISEASE (HCC): ICD-10-CM

## 2022-07-01 DIAGNOSIS — I73.9 PAD (PERIPHERAL ARTERY DISEASE) (HCC): ICD-10-CM

## 2022-07-01 RX ORDER — ATORVASTATIN CALCIUM 10 MG/1
10 TABLET, FILM COATED ORAL
Qty: 90 TABLET | Refills: 1 | Status: SHIPPED | OUTPATIENT
Start: 2022-07-01 | End: 2022-09-28 | Stop reason: SDUPTHER

## 2022-07-01 NOTE — TELEPHONE ENCOUNTER
Pt called stating she has been off Lipitor x 6 wks and does not feel any different with the pain in her legs  Do you want her to start Lipitor again?

## 2022-07-08 ENCOUNTER — TELEPHONE (OUTPATIENT)
Dept: ENDOCRINOLOGY | Facility: CLINIC | Age: 67
End: 2022-07-08

## 2022-07-08 DIAGNOSIS — E04.2 MULTIPLE THYROID NODULES: Primary | ICD-10-CM

## 2022-07-08 NOTE — TELEPHONE ENCOUNTER
Pt left message requesting US Thyroid order faxed to [facility] at [phone]  FAXED

## 2022-07-13 ENCOUNTER — TELEPHONE (OUTPATIENT)
Dept: FAMILY MEDICINE CLINIC | Facility: CLINIC | Age: 67
End: 2022-07-13

## 2022-07-13 NOTE — TELEPHONE ENCOUNTER
Patient called in stating that she started taking the Lipitor medication again and wants to know how long she should wait to take her labs  26.6

## 2022-07-15 NOTE — TELEPHONE ENCOUNTER
Spoke with patient advised to check cmp 2-3 months after initiating lipitor   Patient understands and agrreable

## 2022-07-19 NOTE — PROGRESS NOTES
Assessment/Plan:       Uterine prolapse- she will continue with her pessary, she will return in 3 months for a pessary check  Vaginal atrophy - the estradiol is working well for her, she does not need a new prescription  She has an order for her mammogram which is due in September  There are no diagnoses linked to this encounter  Subjective:     Patient ID: Symone Harrell is a 79 y o  female  Patient presents for pessary check  She uses estradiol vaginal tablets for atrophy  She does not need a new prescription  She is happy with this, it has worked very well for her  She has no gyn complaints    Of note, she will be due for mammogram in September  Last year, she had a normal 3D mammogram with scattered fibroglandular densities and average risk      Review of Systems   Constitutional: Negative  Gastrointestinal: Negative  Genitourinary: Negative  Objective:     Physical Exam  Genitourinary:     General: Normal vulva        Comments: shaatz pessary removed, cleaned and reinserted without difficulty    No excoriation noted

## 2022-07-21 ENCOUNTER — OFFICE VISIT (OUTPATIENT)
Dept: OBGYN CLINIC | Facility: CLINIC | Age: 67
End: 2022-07-21
Payer: MEDICARE

## 2022-07-21 VITALS
WEIGHT: 155.2 LBS | SYSTOLIC BLOOD PRESSURE: 106 MMHG | HEIGHT: 64 IN | BODY MASS INDEX: 26.5 KG/M2 | DIASTOLIC BLOOD PRESSURE: 64 MMHG

## 2022-07-21 DIAGNOSIS — N81.4 UTERINE PROLAPSE: Primary | ICD-10-CM

## 2022-07-21 PROCEDURE — 99213 OFFICE O/P EST LOW 20 MIN: CPT | Performed by: OBSTETRICS & GYNECOLOGY

## 2022-08-03 ENCOUNTER — HOSPITAL ENCOUNTER (OUTPATIENT)
Dept: NON INVASIVE DIAGNOSTICS | Facility: CLINIC | Age: 67
Discharge: HOME | End: 2022-08-03
Payer: MEDICARE

## 2022-08-03 DIAGNOSIS — I73.9 PAD (PERIPHERAL ARTERY DISEASE) (HCC): ICD-10-CM

## 2022-08-03 PROCEDURE — 93925 LOWER EXTREMITY STUDY: CPT

## 2022-08-03 PROCEDURE — 93923 UPR/LXTR ART STDY 3+ LVLS: CPT

## 2022-08-04 PROCEDURE — 93925 LOWER EXTREMITY STUDY: CPT | Performed by: SURGERY

## 2022-08-04 PROCEDURE — 93922 UPR/L XTREMITY ART 2 LEVELS: CPT | Performed by: SURGERY

## 2022-08-05 ENCOUNTER — HOSPITAL ENCOUNTER (OUTPATIENT)
Dept: NON INVASIVE DIAGNOSTICS | Facility: CLINIC | Age: 67
Discharge: HOME | End: 2022-08-05
Payer: MEDICARE

## 2022-08-05 DIAGNOSIS — I74.09 AORTOILIAC OCCLUSIVE DISEASE (HCC): ICD-10-CM

## 2022-08-05 PROCEDURE — 93923 UPR/LXTR ART STDY 3+ LVLS: CPT

## 2022-08-05 PROCEDURE — 93978 VASCULAR STUDY: CPT

## 2022-08-05 PROCEDURE — 93978 VASCULAR STUDY: CPT | Performed by: SURGERY

## 2022-08-26 ENCOUNTER — TELEPHONE (OUTPATIENT)
Dept: NEUROLOGY | Facility: CLINIC | Age: 67
End: 2022-08-26

## 2022-08-26 NOTE — TELEPHONE ENCOUNTER
Left message for pt to call back to confirm upcoming appointment with Dr Basilia Alanis on 09/01   Told pt to bring any previous records or imaging from other neurologist

## 2022-08-30 ENCOUNTER — TELEPHONE (OUTPATIENT)
Dept: NEUROLOGY | Facility: CLINIC | Age: 67
End: 2022-08-30

## 2022-08-30 NOTE — TELEPHONE ENCOUNTER
Left message for pt that her appointment had to be moved to 1:30pm due to error and to call back if that does not work

## 2022-09-01 ENCOUNTER — CONSULT (OUTPATIENT)
Dept: NEUROLOGY | Facility: CLINIC | Age: 67
End: 2022-09-01
Payer: MEDICARE

## 2022-09-01 DIAGNOSIS — R27.0 ATAXIA, UNSPECIFIED: ICD-10-CM

## 2022-09-01 DIAGNOSIS — G60.9 HEREDITARY AND IDIOPATHIC NEUROPATHY, UNSPECIFIED: ICD-10-CM

## 2022-09-01 DIAGNOSIS — G62.9 NEUROPATHY: ICD-10-CM

## 2022-09-01 PROCEDURE — 99204 OFFICE O/P NEW MOD 45 MIN: CPT | Performed by: PSYCHIATRY & NEUROLOGY

## 2022-09-01 NOTE — PROGRESS NOTES
Patient ID: Kayla Young is a 79 y o  female  Assessment/Plan:    Neuropathy  This patient has longstanding history of fibromyalgia, continues to suffer with chronic pain, in addition has neuropathic pain in both her feet  Symptoms are complicated by underlying peripheral vascular disease, reports she has had more pain in her left thigh since her last vascular procedure  Currently, she is using oxycodone on a very sparing basis, maybe half a tablet to 1 tablet 2 to 3 times a week  Continues to be on prednisone for underlying lupus  In the past, she has tried Lyrica and amitriptyline for neuropathic pain medications, feels may have tried Cymbalta as well  At this time she is not interested in any other neuropathic pain medications which we discussed today  She would continue to use the oxycodone as being prescribed by primary care physician, as well as may consider topical ointments such as Aspercreme/Biofreeze/lidocaine p r n  in her left thigh as well as feet at nighttime  In regards to etiology of neuropathy, could be related to underlying autoimmune disease, there may be some component from peripheral vascular disease as well, I did recommend some additional routine labs for other reversible pathologies  Patient decided to keep her follow-up with us on an as-needed basis, she is pleased with her current pain management, will contact us if she feels the need for re-evaluation  Thank you for allowing me to participate in her care  Diagnoses and all orders for this visit:    Neuropathy  -     Ambulatory Referral to Neurology  -     Vitamin B12; Future  -     Methylmalonic acid, serum; Future  -     Vitamin B6; Future  -     Protein electrophoresis, serum;  Future    Ataxia, unspecified   -     Vitamin B12; Future    Hereditary and idiopathic neuropathy, unspecified   -     Vitamin B6; Future           Subjective:    HPI    I had the pleasure of seeing your patient in Neurology Clinic for neuromuscular consultation  As you know, patient is a 70-year-old woman who was referred for evaluation for neuropathy  Please allow me to summarize her history for the record  She has fibromyalgia for the last 20 years, also has lupus, raynaud's, all these things have been bothering her for more than 2 decades  Feels lupus has been better since the menopause  Has neuropathic pain all cody body, feels pain is neck down  Has pain in both shoulders, arms, legs, has had more pain in the left thigh for the last year since her surgery for PAD  Has pain in the feet, which keeps her up at nighttime  Occasionally would get numbness in the feet  Has constant burning, tingling in her feet  Uses a cane for ambulation, that's mostly for the left knee pain  Has lower back pain, that radiates to both lower extremities  No weakness in upper extremities, legs feel weak in general      Has tried lyrica, could not stay on it for any period of time, due to mood changes, felt really depressed and suicidal    May have tried amitriptyline a long time ago, had side effects even at that time  Currently takes oxycodone, may be once a day, only takes it 2-3 times a week  On prednisone for the last 5-6 years for lupus  Besides this, she has HTN, Dyslipidemia, GERD, thyroid nodules (euthyoid)  Patient does have history of lung cancer s/p left lower lobectomy 20 years ago, was not treated with chemo or radiation  Also has peripheral arterial disease  Sister and mother had uterine cancer  No DM  The following portions of the patient's history were reviewed and updated as appropriate: allergies, current medications, past family history, past medical history, past social history, past surgical history and problem list          Objective:    not currently breastfeeding  Physical Exam   General exam: Pt was awake, alert and oriented  HEENT: atraumatic, normocephalic    Normal oral mucosa, neck was supple, no lymphadenopathy  Normal peripheral pulses  Extremities showed mild edema, with varicose veins and signs of venous stasis  Neurological Exam  Neurologically, pt was awake and alert  Speech was normal, no dysarthria or aphasia  Cranial nerve exam showed normal extraocular movements, no nystagmus or diplopia  There was no ptosis at baseline or with sustained upward gaze  Strength of eye closure muscles was normal   Facial sensations were normal bilaterally  No facial weakness, able to blow out the cheeks and push the tongue in the cheeks well  No tongue atrophy or fasciculations  Motor exam revealed normal tone and muscle bulk  There was no atrophy, scapular winging, high arches, hammertoes, shortening of achilles tendons or any other features of neuromuscular disease  Muscle strength was normal in neck flexors and extensors, and all muscle groups in both upper and lower extremities  Reflexes were graded as 2+ in the UE's  2 at knees and absent at the ankles  Toes were downgoing  There was no exaggerated jaw jerk or cintron's sign  No ankle clonus  Sensory exam revealed length dependent, decreased sensation to pin and temp up to  lower 1/3 to mid legs  Vibration was absent at toes, 5-6 sec at ankles,  Proprioception was relatively intact  Gait was antalgic, ambulated with a cane  ROS:  I reviewed the below ROS and what is mentioned in HPI, the remainder of ROS was negative  Review of Systems   Constitutional: Positive for activity change, chills (chills) and fatigue  HENT: Positive for congestion, postnasal drip and sneezing  Eyes: Positive for itching  Respiratory: Negative  Cardiovascular: Positive for leg swelling  Gastrointestinal:        GERD   Endocrine: Positive for cold intolerance  Genitourinary: Negative  Musculoskeletal: Positive for arthralgias, back pain, gait problem and joint swelling  Skin: Negative      Neurological: Positive for weakness and numbness  Hematological: Negative  Psychiatric/Behavioral: Positive for sleep disturbance  The patient is nervous/anxious

## 2022-09-02 NOTE — ASSESSMENT & PLAN NOTE
This patient has longstanding history of fibromyalgia, continues to suffer with chronic pain, in addition has neuropathic pain in both her feet  Symptoms are complicated by underlying peripheral vascular disease, reports she has had more pain in her left thigh since her last vascular procedure  Currently, she is using oxycodone on a very sparing basis, maybe half a tablet to 1 tablet 2 to 3 times a week  Continues to be on prednisone for underlying lupus  In the past, she has tried Lyrica and amitriptyline for neuropathic pain medications, feels may have tried Cymbalta as well  At this time she is not interested in any other neuropathic pain medications which we discussed today  She would continue to use the oxycodone as being prescribed by primary care physician, as well as may consider topical ointments such as Aspercreme/Biofreeze/lidocaine p r n  in her left thigh as well as feet at nighttime  In regards to etiology of neuropathy, could be related to underlying autoimmune disease, there may be some component from peripheral vascular disease as well, I did recommend some additional routine labs for other reversible pathologies  Patient decided to keep her follow-up with us on an as-needed basis, she is pleased with her current pain management, will contact us if she feels the need for re-evaluation  Thank you for allowing me to participate in her care

## 2022-09-08 ENCOUNTER — OFFICE VISIT (OUTPATIENT)
Dept: VASCULAR SURGERY | Facility: CLINIC | Age: 67
End: 2022-09-08
Payer: MEDICARE

## 2022-09-08 VITALS
OXYGEN SATURATION: 94 % | HEIGHT: 64 IN | BODY MASS INDEX: 26.29 KG/M2 | DIASTOLIC BLOOD PRESSURE: 76 MMHG | SYSTOLIC BLOOD PRESSURE: 112 MMHG | WEIGHT: 154 LBS | HEART RATE: 90 BPM

## 2022-09-08 DIAGNOSIS — I74.09 AORTOILIAC OCCLUSIVE DISEASE (HCC): Primary | ICD-10-CM

## 2022-09-08 PROCEDURE — 99215 OFFICE O/P EST HI 40 MIN: CPT | Performed by: PHYSICIAN ASSISTANT

## 2022-09-08 NOTE — PATIENT INSTRUCTIONS
Peripheral Artery Disease   WHAT YOU NEED TO KNOW:   What is peripheral artery disease (PAD)? PAD is narrow, weak, or blocked arteries  It may affect any arteries outside of your heart and brain  PAD is usually the result of a buildup of fat and cholesterol, also called plaque, along your artery walls  Inflammation, a blood clot, or abnormal cell growth could also block your arteries  PAD prevents normal blood flow to your legs and arms  You are at risk of an amputation if poor blood flow keeps wounds from healing or causes gangrene (tissue death)  Without treatment, PAD can also cause a heart attack or stroke  What increases my risk for PAD? Smoking cigarettes    Diabetes    High blood pressure    High cholesterol    Age older than 40 years    Heart disease or a family history of heart disease    What are the signs and symptoms of PAD? Mild PAD usually does not cause symptoms  As the disease worsens over time, you may have the following:  Pain or cramps in your leg or hip while you walk    A numb, weak, or heavy feeling in your legs    Dry, scaly, red, or pale skin on your legs    Thick or brittle nails, or hair loss on your arms and legs    Foot sores that will not heal    Burning or aching in your feet and toes while resting (this may be worse when you lie down)    How is PAD diagnosed? Angiography  is a test that shows pictures of the arteries in your arms and legs  You will be given contrast liquid to help the arteries show up better on the pictures  The pictures will be taken with an MRI or CT scan  Tell the healthcare provider if you have ever had an allergic reaction to contrast liquid  Do not enter the MRI room with anything metal  Metal can cause serious injury  Tell a healthcare provider if you have any metal in or on your body  Doppler ankle brachial index (NILSA)  is a test that compares blood pressure in your ankles to blood pressure in your arms   This tells your healthcare provider how well blood is flowing through the arteries in your legs  How is PAD treated? Treatment can help reduce your risk of a heart attack, stroke, or amputation  You may need more than one of the following:  Medicines  may be given:    Antiplatelets , such as aspirin, help prevent blood clots  Take your antiplatelet medicine exactly as directed  These medicines make it more likely for you to bleed or bruise  If you are told to take aspirin, do not take acetaminophen or ibuprofen instead  Statin medicine  helps lower your cholesterol and prevents PAD from getting worse  A supervised exercise program  helps you stay active in normal daily activities  Healthcare providers will help you safely walk or do strength training exercises 3 times a week for 30 to 60 minutes  You will do this for several months, then transition to walking on your own  Angioplasty  is a procedure to open your artery so blood can flow through normally  A thin tube called a catheter is used to insert a small balloon into your artery  The balloon is inflated to open your blocked artery, and then removed  A tube called a stent may be placed in your artery to hold it open  Bypass surgery  is used to make a new connection to your artery with a vein from another part of your body, or an artificial graft  The vein or graft is attached to your artery above and below your blockage  This allows blood to flow around the blocked portion of your artery  How can I manage PAD? Do not smoke  Nicotine and other chemicals in cigarettes and cigars can worsen PAD  They can also increase your risk for a heart attack or stroke  Ask your healthcare provider for information if you currently smoke and need help to quit  E-cigarettes or smokeless tobacco still contain nicotine  Talk to your healthcare provider before you use these products  Manage other health conditions  Take your medicines as directed   Follow your healthcare provider's instructions if you have high blood pressure or high cholesterol  Perform foot care and check your blood sugar levels as directed if you have diabetes  Eat heart-healthy foods  Eat whole grains, fruits, and vegetables every day  Limit salt and high-fat foods  Ask your healthcare provider for more information on a heart healthy diet  Ask what a healthy weight is for you  Your healthcare provider can help you create a healthy weight-loss plan, if needed  Call your local emergency number (911 in the [address] [facility]) if:   You have any of the following signs of a heart attack:      Squeezing, pressure, or pain in your chest    You may  also have any of the following:     Discomfort or pain in your back, neck, jaw, stomach, or arm    Shortness of breath    Nausea or vomiting    Lightheadedness or a sudden cold sweat    You have any of the following signs of a stroke:      Numbness or drooping on one side of your face     Weakness in an arm or leg    Confusion or difficulty speaking    Dizziness, a severe headache, or vision loss    When should I seek immediate care? You have sores or wounds that will not heal     You notice black or discolored skin on your arm or leg  Your skin is cool to the touch  When should I call my doctor? You have leg pain when you walk 1/8 mile (200 meters) or less, even with treatment  Your legs are red, dry, or pale, even with treatment  You have questions or concerns about your condition or care  CARE AGREEMENT:   You have the right to help plan your care  Learn about your health condition and how it may be treated  Discuss treatment options with your healthcare providers to decide what care you want to receive  You always have the right to refuse treatment  The above information is an  only  It is not intended as medical advice for individual conditions or treatments   Talk to your doctor, nurse or pharmacist before following any medical regimen to see if it is safe and effective for you  © Copyright [employer] 2022 Information is for End User's use only and may not be sold, redistributed or otherwise used for commercial purposes   All illustrations and images included in CareNotes® are the copyrighted property of [employer]  or [employer]

## 2022-09-08 NOTE — PROGRESS NOTES
Assessment/Plan: Aortoiliac occlusive disease [facility])  80-year-old female smoker with SLE on chronic steroids, Raynauds disease, Sjogren's syndrome, fibromyalgia, IBS, NSCLC, s/p L lower lobectomy '04 and aortoiliac and infrainguinal arterial occlusive disease, s/p L CFA endarterectomy/profundoplasty by Dr Eugene Kaye 9/10/19 presents for review of surveillance imaging  -VISH 8/3/22 and AOIL 8/5/22 reviewed w/patent L endarterectomy site, however monophasic waveforms secondary to proximal L EIA disease, NILSA 0 87/92/80  RLE w/ monophasic CFA waveforms secondary to progression of R KIMBERLY stenosis, now occluded  NILSA 0 62/64/40  -asymptomatic without claudication, rest pain or tissue loss  Does continue to have bilateral leg numbness/paresthesias that are "ruining her life", however do not appear to be secondary to arterial insufficiency  Likely secondary to superficial nerve injury from prior CFA endarterectomy incision   -given that patient appears to remain asymptomatic from an aortoiliac standpoint, no intervention indicated at this time  -Continues to c/o chronic left medial thigh fullness, sensitivity to touch and paresthesias since original surgery  No abnormal physical findings overlying area other than fatty tissue along medial thigh  Symptoms appear neuropathic in nature with associated regional pain syndrome in setting of fibromyalgia and other autoimmune diseases  However, given patient's continued concern will obtain ultrasound of the area   -continue ASA and statin   -discussed the pathophysiology of arterial occlusive disease and direct relationship with smoking  Continued to encourage smoking cessation  -patient overall expressed frustration with her care at [place] [address] and disappointment in something "not being done" for her incisional "swelling" and chronic pain     -will have ultrasound performed and have patient follow up in the office after with surgeon for review and further discussion  -Instructed to contact the office with new symptoms, concerns, rest pain or tissue loss  -lengthy discussion in office today (approx 60 minutes) regarding her persistent chronic L proximal inner thigh symptoms and R iliac disease  All questions answered and concerns addressed       Diagnoses and all orders for this visit:    Aortoiliac occlusive disease ([address] )  -     US inguinal area; Future          Subjective:      Patient ID: Wilfredo Fernandez is a 79 y o  female  Patient presents to office to review the AOIL w/ NILSA's done on 8/5/22 and the VISH done on 8/3/22  Last seen in office in 6/2/212 with BV for PAD  Patient has throbbing pain and swelling in both legs and feet  Patient reports swelling and tenderness near the left groin where the incision is  Patient is having claudication and rest pain  Patient is taking ASA 81 and Atorvastatin  Patient is currently smoking about 1/2 PPD  Patient primarily complains of swelling along the medial left thigh with discomfort  Denies claudication or rest pain  No nonhealing or chronic wounds  The following portions of the patient's history were reviewed and updated as appropriate: allergies, current medications, past family history, past medical history, past social history, past surgical history and problem list     Review of Systems   Constitutional: Negative  HENT: Negative  Eyes: Negative  Respiratory: Positive for shortness of breath  Cardiovascular: Positive for leg swelling  Gastrointestinal: Positive for abdominal pain, diarrhea and nausea  Endocrine: Negative  Genitourinary: Negative  Musculoskeletal: Positive for arthralgias and joint swelling  Skin: Negative  Allergic/Immunologic: Negative  Hematological: Bruises/bleeds easily  Psychiatric/Behavioral: Negative            Objective:      /76 (BP Location: Left arm, Patient Position: Sitting, Cuff Size: Standard)   Pulse 90   Ht 5' 4" (1 626 m)   Wt 69 9 kg (154 lb)   LMP  (LMP Unknown)   SpO2 94%   BMI 26 43 kg/m²          Physical Exam  Constitutional:       General: She is not in acute distress  Appearance: Normal appearance  She is not ill-appearing, toxic-appearing or diaphoretic  HENT:      Head: Normocephalic and atraumatic  Right Ear: External ear normal       Left Ear: External ear normal       Nose: Nose normal       Mouth/Throat:      Mouth: Mucous membranes are moist       Pharynx: Oropharynx is clear  Eyes:      General: No scleral icterus  Extraocular Movements: Extraocular movements intact  Conjunctiva/sclera: Conjunctivae normal    Cardiovascular:      Rate and Rhythm: Normal rate and regular rhythm  Heart sounds: Normal heart sounds  Pulmonary:      Effort: Pulmonary effort is normal  No respiratory distress  Breath sounds: Normal breath sounds  Abdominal:      General: Abdomen is flat  Palpations: Abdomen is soft  Tenderness: There is no abdominal tenderness  Musculoskeletal:         General: Normal range of motion  Cervical back: Normal range of motion and neck supple  Skin:     General: Skin is warm and dry  Comments: Area of swelling/fatty tissue of the medial left thigh  Bilateral lower extremities are warm/well perfused    Neurological:      General: No focal deficit present  Mental Status: She is alert and oriented to person, place, and time  Mental status is at baseline  Cranial Nerves: No cranial nerve deficit  Sensory: No sensory deficit  Motor: No weakness     Psychiatric:         Mood and Affect: Mood normal          Behavior: Behavior normal

## 2022-09-09 ENCOUNTER — HOSPITAL ENCOUNTER (OUTPATIENT)
Dept: NON INVASIVE DIAGNOSTICS | Facility: HOSPITAL | Age: 67
Discharge: HOME | End: 2022-09-09
Attending: INTERNAL MEDICINE
Payer: MEDICARE

## 2022-09-09 VITALS
WEIGHT: 154 LBS | HEIGHT: 64 IN | HEART RATE: 82 BPM | SYSTOLIC BLOOD PRESSURE: 117 MMHG | DIASTOLIC BLOOD PRESSURE: 58 MMHG | BODY MASS INDEX: 26.29 KG/M2

## 2022-09-09 DIAGNOSIS — R06.09 DOE (DYSPNEA ON EXERTION): ICD-10-CM

## 2022-09-09 LAB
AORTIC ROOT: 3.1 CM
AORTIC VALVE MEAN VELOCITY: 6.5 M/S
APICAL FOUR CHAMBER EJECTION FRACTION: 64 %
ASCENDING AORTA: 3.6 CM
AV LVOT MEAN GRADIENT: 2 MMHG
AV LVOT PEAK GRADIENT: 3 MMHG
AV MEAN GRADIENT: 2 MMHG
AV PEAK GRADIENT: 5 MMHG
AV VELOCITY RATIO: 0.87
DOP CALC AO PEAK VEL: 1.06 M/S
DOP CALC AO VTI: 23.37 CM
DOP CALC LVOT PEAK VEL VTI: 19.58 CM
DOP CALC LVOT PEAK VEL: 0.92 M/S
E WAVE DECELERATION TIME: 329 MS
FRACTIONAL SHORTENING: 41 % (ref 28–44)
INTERVENTRICULAR SEPTUM IN DIASTOLE (PARASTERNAL SHORT AXIS VIEW): 1.5 CM
INTERVENTRICULAR SEPTUM: 1.5 CM (ref 0.6–1.1)
LAAS-AP2: 15.1 CM2
LAAS-AP4: 14.3 CM2
LEFT ATRIUM SIZE: 3.4 CM
LEFT INTERNAL DIMENSION IN SYSTOLE: 2 CM (ref 2.1–4)
LEFT VENTRICULAR INTERNAL DIMENSION IN DIASTOLE: 3.4 CM (ref 3.5–6)
LEFT VENTRICULAR POSTERIOR WALL IN END DIASTOLE: 1.2 CM
LEFT VENTRICULAR STROKE VOLUME: 33 ML
LVSV (TEICH): 33 ML
MV E'TISSUE VEL-LAT: 7 CM/S
MV E'TISSUE VEL-SEP: 7 CM/S
MV PEAK A VEL: 1 M/S
MV PEAK E VEL: 42 CM/S
MV STENOSIS PRESSURE HALF TIME: 95 MS
MV VALVE AREA P 1/2 METHOD: 2.32 CM2
RA PRESSURE ESTIMATED: 5 MMHG
RIGHT ATRIAL 2D VOLUME: 65 ML
RIGHT ATRIUM AREA SYSTOLE A4C: 20.8 CM2
RIGHT VENTRICLE ID DIMENSION: 4.5 CM
RV PSP: 27 MMHG
SL CV LEFT ATRIUM LENGTH A2C: 4.4 CM
SL CV LV EF: 65
SL CV PED ECHO LEFT VENTRICLE DIASTOLIC VOLUME (MOD BIPLANE) 2D: 46 ML
SL CV PED ECHO LEFT VENTRICLE SYSTOLIC VOLUME (MOD BIPLANE) 2D: 13 ML
TR MAX PG: 22 MMHG
TR PEAK VELOCITY: 2.3 M/S
TRICUSPID VALVE PEAK REGURGITATION VELOCITY: 2.34 M/S

## 2022-09-09 PROCEDURE — 93306 TTE W/DOPPLER COMPLETE: CPT

## 2022-09-09 PROCEDURE — 93306 TTE W/DOPPLER COMPLETE: CPT | Performed by: STUDENT IN AN ORGANIZED HEALTH CARE EDUCATION/TRAINING PROGRAM

## 2022-09-09 NOTE — ASSESSMENT & PLAN NOTE
79-year-old female smoker with SLE on chronic steroids, Raynauds disease, Sjogren's syndrome, fibromyalgia, IBS, NSCLC, s/p L lower lobectomy '04 and aortoiliac and infrainguinal arterial occlusive disease, s/p L CFA endarterectomy/profundoplasty by Dr Alec Cole 9/10/19 presents for review of surveillance imaging  -VISH 8/3/22 and AOIL 8/5/22 reviewed w/patent L endarterectomy site, however monophasic waveforms secondary to proximal L EIA disease, NILSA 0 87/92/80  RLE w/ monophasic CFA waveforms secondary to progression of R KIMBERLY stenosis, now occluded  NILSA 0 62/64/40  -asymptomatic without claudication, rest pain or tissue loss  Does continue to have bilateral leg numbness/paresthesias that are "ruining her life", however do not appear to be secondary to arterial insufficiency  Likely secondary to superficial nerve injury from prior CFA endarterectomy incision   -given that patient appears to remain asymptomatic from an aortoiliac standpoint, no intervention indicated at this time  -Continues to c/o chronic left medial thigh fullness, sensitivity to touch and paresthesias since original surgery  No abnormal physical findings overlying area other than fatty tissue along medial thigh  Symptoms appear neuropathic in nature with associated regional pain syndrome in setting of fibromyalgia and other autoimmune diseases  However, given patient's continued concern will obtain ultrasound of the area   -continue ASA and statin   -discussed the pathophysiology of arterial occlusive disease and direct relationship with smoking  Continued to encourage smoking cessation  -patient overall expressed frustration with her care at [place] [address] and disappointment in something "not being done" for her incisional "swelling" and chronic pain     -will have ultrasound performed and have patient follow up in the office after with surgeon for review and further discussion  -Instructed to contact the office with new symptoms, concerns, rest pain or tissue loss  -lengthy discussion in office today (approx 60 minutes) regarding her persistent chronic L proximal inner thigh symptoms and R iliac disease    All questions answered and concerns addressed

## 2022-09-20 ENCOUNTER — TELEPHONE (OUTPATIENT)
Dept: ENDOCRINOLOGY | Facility: CLINIC | Age: 67
End: 2022-09-20

## 2022-09-20 NOTE — TELEPHONE ENCOUNTER
----- Message from Jj Vallejo MD sent at 9/20/2022  1:01 PM EDT -----  Plan to discuss at the next visit

## 2022-09-27 ENCOUNTER — HOSPITAL ENCOUNTER (OUTPATIENT)
Dept: ULTRASOUND IMAGING | Facility: HOSPITAL | Age: 67
Discharge: HOME | End: 2022-09-27
Payer: MEDICARE

## 2022-09-27 DIAGNOSIS — I74.09 AORTOILIAC OCCLUSIVE DISEASE (HCC): ICD-10-CM

## 2022-09-27 PROCEDURE — 76705 ECHO EXAM OF ABDOMEN: CPT

## 2022-09-28 DIAGNOSIS — I74.09 AORTOILIAC OCCLUSIVE DISEASE (HCC): ICD-10-CM

## 2022-09-28 DIAGNOSIS — I73.9 PAD (PERIPHERAL ARTERY DISEASE) (HCC): ICD-10-CM

## 2022-09-28 DIAGNOSIS — E78.2 MIXED HYPERLIPIDEMIA: ICD-10-CM

## 2022-09-28 DIAGNOSIS — I70.209 OCCLUSION OF COMMON FEMORAL ARTERY (HCC): ICD-10-CM

## 2022-09-28 RX ORDER — ATORVASTATIN CALCIUM 10 MG/1
10 TABLET, FILM COATED ORAL
Qty: 90 TABLET | Refills: 1 | Status: SHIPPED | OUTPATIENT
Start: 2022-09-28

## 2022-09-28 RX ORDER — OXYCODONE HYDROCHLORIDE 5 MG/1
5 TABLET ORAL EVERY 6 HOURS PRN
Qty: 30 TABLET | Refills: 0 | OUTPATIENT
Start: 2022-09-28

## 2022-10-03 ENCOUNTER — CLINICAL SUPPORT (OUTPATIENT)
Dept: FAMILY MEDICINE CLINIC | Facility: CLINIC | Age: 67
End: 2022-10-03
Payer: MEDICARE

## 2022-10-03 DIAGNOSIS — I70.209 OCCLUSION OF COMMON FEMORAL ARTERY (HCC): ICD-10-CM

## 2022-10-03 DIAGNOSIS — Z23 NEED FOR IMMUNIZATION AGAINST INFLUENZA: Primary | ICD-10-CM

## 2022-10-03 DIAGNOSIS — I73.9 PAD (PERIPHERAL ARTERY DISEASE) (HCC): ICD-10-CM

## 2022-10-03 PROCEDURE — G0008 ADMIN INFLUENZA VIRUS VAC: HCPCS

## 2022-10-03 PROCEDURE — 90662 IIV NO PRSV INCREASED AG IM: CPT

## 2022-10-04 ENCOUNTER — OFFICE VISIT (OUTPATIENT)
Dept: ENDOCRINOLOGY | Facility: CLINIC | Age: 67
End: 2022-10-04
Payer: MEDICARE

## 2022-10-04 VITALS — DIASTOLIC BLOOD PRESSURE: 88 MMHG | SYSTOLIC BLOOD PRESSURE: 138 MMHG | HEART RATE: 92 BPM

## 2022-10-04 DIAGNOSIS — E04.2 MULTIPLE THYROID NODULES: Primary | ICD-10-CM

## 2022-10-04 DIAGNOSIS — M81.0 AGE-RELATED OSTEOPOROSIS WITHOUT CURRENT PATHOLOGICAL FRACTURE: ICD-10-CM

## 2022-10-04 PROCEDURE — 99213 OFFICE O/P EST LOW 20 MIN: CPT

## 2022-10-04 RX ORDER — OXYCODONE HYDROCHLORIDE 5 MG/1
5 TABLET ORAL EVERY 6 HOURS PRN
Qty: 30 TABLET | Refills: 0 | Status: SHIPPED | OUTPATIENT
Start: 2022-10-04

## 2022-10-04 NOTE — ASSESSMENT & PLAN NOTE
Multiple thyroid nodules are stable on repeat ultrasound  Recommend checking another ultrasound in 1 year  Check TSH and T4

## 2022-10-04 NOTE — PROGRESS NOTES
Established Patient Progress Note      Chief Complaint   Patient presents with    Follow-up     Thyroid nodules        History of Present Illness:   Jann Morin is a 79 y o  female with osteoporsis and multiple thyroid nodules seen in follow-up  Last seen in the office 7/7/20  She reports some difficulty swallowing, but she also have acid reflux and hiatal hernia  She denies any difficulty breathing or voice changing  She denies any symptoms of hypo or hyperthyroidism  For osteoporsis, she denies any falls or fractures  She does complain of arthritis  She is not currently taking calcium or vitamin d supplementation  She has repeat DEXA ordered, not yet completed       Patient Active Problem List   Diagnosis    Chronic bilateral low back pain with bilateral sciatica    Fibromyalgia    GERD (gastroesophageal reflux disease)    Lung cancer ([address] )    Osteoporosis    Primary osteoarthritis involving multiple joints    SLE (systemic lupus erythematosus) ([address] )    Localized edema    Acute cystitis without hematuria    Open toe wound, initial encounter    Neuropathy    Raynaud's disease without gangrene    Tobacco dependence    Aortoiliac occlusive disease (HCC)    PAD (peripheral artery disease) (HCC)    Lumbar pain    Thrush    Microscopic hematuria    Right flank pain    Benign essential microscopic hematuria    Cellulitis    Right foot pain    History of lung cancer    Chronic hypokalemia    Tobacco user    Constipation    Acute sore throat    Multiple thyroid nodules    Preop cardiovascular exam    Cortical age-related cataract of both eyes    Mixed hyperlipidemia    Continuous opioid dependence ([address] )      Past Medical History:   Diagnosis Date    Anxiety     Arthritis 12/20/2014    Cataract     eulalia    Disease of thyroid gland     nodules    Fibromyalgia, primary     Frequent UTI     History of lung cancer     lower left lobe removed - 2004    Irritable bowel syndrome     Lactose intolerance     Lupus (HCC)     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    PAD (peripheral artery disease) (HCC)     Presence of pessary     Raynaud disease     Sciatica     Sinus headache     Sjogren's syndrome [facility] )     Smoker       Past Surgical History:   Procedure Laterality Date    LUNG REMOVAL, PARTIAL  05/01/2004    Prior Surgical/Procedural History - 5/4 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 - lung resection - subtotal    MOUTH SURGERY      Tooth extraction    NM THROMBOENDARTECTMY FEMORAL COMMON Left 9/10/2019    Procedure: ENDARTERECTOMY ARTERIAL FEMORAL,  WITH BOVINE GRAFT AND PROFUNDOPLASTY;  Surgeon: Devi Barrios DO;  Location: [facility];  Service: Vascular    SINUS SURGERY        Family History   Problem Relation Age of Onset    Arthritis Mother     Asthma Mother     Dementia Mother     Heart disease Mother     Hypothyroidism Mother     Irritable bowel syndrome Mother     Uterine cancer Mother 72    Heart attack Father     Stroke Father     Hyperlipidemia Sister     Uterine cancer Sister         age unknown    Heart attack Maternal Grandfather     Asthma Family     Cataracts Family     Thyroid disease Family     No Known Problems Paternal Aunt     No Known Problems Maternal Aunt     No Known Problems Maternal Grandmother     No Known Problems Paternal Grandmother     No Known Problems Paternal Grandfather      Social History     Tobacco Use    Smoking status: Current Every Day Smoker     Packs/day: 0 50     Years: 40 00     Pack years: 20 00     Types: Cigarettes    Smokeless tobacco: Never Used   Substance Use Topics    Alcohol use: Not Currently     Comment: liquor rarely     Allergies   Allergen Reactions    Penicillins Shortness Of Breath and Edema    Cymbalta [Duloxetine Hcl] Diarrhea    Duloxetine Diarrhea    Dust Mite Extract     Lactose - Food Allergy GI Intolerance    Neosporin [Neomycin-Bacitracin Zn-Polymyx] Other reaction(s): REDNESS    Nitrofurantoin Rash    Omeprazole Rash    Sulfamethoxazole-Trimethoprim Rash         Current Outpatient Medications:     aspirin (ECOTRIN LOW STRENGTH) 81 mg EC tablet, Take 1 tablet (81 mg total) by mouth daily, Disp: 90 tablet, Rfl: 3    atorvastatin (LIPITOR) 10 mg tablet, Take 1 tablet (10 mg total) by mouth daily with dinner, Disp: 90 tablet, Rfl: 1    cyanocobalamin (VITAMIN B-12) 100 mcg tablet, Take 500 mcg by mouth daily 500 mcg, Disp: , Rfl:     estradiol (VAGIFEM, YUVAFEM) 10 MCG TABS vaginal tablet, Insert 1 tablet (10 mcg total) into the vagina 2 (two) times a week (Patient taking differently: Insert 1 tablet into the vagina 2 (two) times a week Once a week  ), Disp: 24 tablet, Rfl: 3    famotidine (PEPCID) 10 mg tablet, Take 10 mg by mouth as needed Takes as needed   If heartburn is really bad she will take 20 mg , Disp: , Rfl:     Klor-Con 10 10 MEQ tablet, TAKE 1 TABLET (10 MEQ TOTAL) BY MOUTH 3 (THREE) TIMES A DAY, Disp: 270 tablet, Rfl: 1    LORazepam (ATIVAN) 0 5 mg tablet, Take 1 tablet (0 5 mg total) by mouth every 12 (twelve) hours as needed for anxiety, Disp: 30 tablet, Rfl: 0    Multiple Vitamin (MULTI-VITAMIN PO), Take by mouth, Disp: , Rfl:     oxyCODONE (ROXICODONE) 5 immediate release tablet, Take 1 tablet (5 mg total) by mouth every 6 (six) hours as needed for moderate pain Max Daily Amount: 20 mg, Disp: 30 tablet, Rfl: 0    predniSONE 10 mg tablet, TAKE 1 TABLET BY MOUTH EVERY DAY, Disp: 90 tablet, Rfl: 1    Vitamin Mixture (JOSE RAMON-C PO), Take 1 tablet by mouth daily, Disp: , Rfl:     clotrimazole (MYCELEX) 10 mg yu, Take 1 tablet (10 mg total) by mouth 5 (five) times a day (Patient not taking: No sig reported), Disp: 35 Yu, Rfl: 1    Magnesium 500 MG CAPS, Take by mouth daily   (Patient not taking: No sig reported), Disp: , Rfl:     Misc Natural Products (CURCUMAX PRO PO), Take by mouth (Patient not taking: No sig reported), Disp: , Rfl:     naloxone (NARCAN) 4 mg/0 1 mL nasal spray, Administer 1 spray into a nostril  If no response after 2-3 minutes, give another dose in the other nostril using a new spray  (Patient not taking: No sig reported), Disp: 1 each, Rfl: 1    Review of Systems   Constitutional: Negative for activity change, appetite change, chills, diaphoresis, fatigue, fever and unexpected weight change  HENT: Positive for trouble swallowing  Negative for sore throat and voice change  Respiratory: Negative for chest tightness and shortness of breath  Cardiovascular: Negative for chest pain, palpitations and leg swelling  Gastrointestinal:        Acid reflux     Musculoskeletal: Positive for arthralgias and gait problem  All other systems reviewed and are negative  Physical Exam:  There is no height or weight on file to calculate BMI  /88 (BP Location: Left arm, Patient Position: Sitting, Cuff Size: Standard)   Pulse 92   LMP  (LMP Unknown)    Wt Readings from Last 3 Encounters:   09/09/22 69 9 kg (154 lb)   09/08/22 69 9 kg (154 lb)   07/21/22 70 4 kg (155 lb 3 2 oz)       Physical Exam  Vitals reviewed  Constitutional:       Appearance: Normal appearance  She is normal weight  Neck:      Thyroid: No thyroid mass, thyromegaly or thyroid tenderness  Cardiovascular:      Rate and Rhythm: Normal rate and regular rhythm  Pulses: Normal pulses  Heart sounds: Normal heart sounds  No murmur heard  Pulmonary:      Effort: Pulmonary effort is normal  No respiratory distress  Breath sounds: Normal breath sounds  No wheezing, rhonchi or rales  Neurological:      Mental Status: She is alert and oriented to person, place, and time  Gait: Gait abnormal (uses cane)  Psychiatric:         Mood and Affect: Mood normal          Behavior: Behavior normal          Thought Content:  Thought content normal          Judgment: Judgment normal          Labs:   Lab Results   Component Value Date HGBA1C 5 5 08/22/2019    HGBA1C 5 7 (H) 01/16/2019     Lab Results   Component Value Date    CREATININE 0 91 07/19/2021    CREATININE 0 76 09/18/2019    CREATININE 0 71 09/17/2019    BUN 17 07/19/2021     03/20/2017    K 4 2 07/19/2021     07/19/2021    CO2 31 07/19/2021     eGFR   Date Value Ref Range Status   07/19/2021 66 ml/min/1 73sq m Final     Lab Results   Component Value Date    ALT 27 07/19/2021    AST 20 07/19/2021    ALKPHOS 84 07/19/2021    BILITOT 0 5 03/20/2017     Lab Results   Component Value Date    TBU9EKMVJOHB 0 952 08/21/2020    CMU9SELCOPTE 0 52 03/20/2017    ARI1EVNKOUMG 0 66 03/17/2016     Lab Results   Component Value Date    FREET4 0 88 08/21/2020       Impression & Plan:    Problem List Items Addressed This Visit        Endocrine    Multiple thyroid nodules - Primary     Multiple thyroid nodules are stable on repeat ultrasound  Recommend checking another ultrasound in 1 year  Check TSH and T4  Relevant Orders    TSH, 3rd generation    T4, free    US thyroid       Musculoskeletal and Integument    Osteoporosis     She has DEXA scan ordered and not yet completed  Recommend she take total of 1200 mg of calcium through diet and supplements and 2,000 units of vitamin d daily  Orders Placed This Encounter   Procedures    US thyroid     Standing Status:   Future     Standing Expiration Date:   10/4/2026     Scheduling Instructions:      No prep required  Please bring your insurance cards, a form of photo ID and a list of your medications with you  Arrive 15 minutes prior to your appointment time in order to register  To schedule this appointment, please contact Central Scheduling at [phone]   TSH, 3rd generation     This is a patient instruction: This test is non-fasting  Please drink two glasses of water morning of bloodwork          Standing Status:   Future     Standing Expiration Date:   10/4/2023    T4, free     Standing Status: Future     Standing Expiration Date:   10/4/2023       There are no Patient Instructions on file for this visit  Discussed with the patient and all questioned fully answered  She will call me if any problems arise      LISA Magdaleno

## 2022-10-04 NOTE — ASSESSMENT & PLAN NOTE
She has DEXA scan ordered and not yet completed  Recommend she take total of 1200 mg of calcium through diet and supplements and 2,000 units of vitamin d daily

## 2022-10-25 ENCOUNTER — OFFICE VISIT (OUTPATIENT)
Dept: CARDIOLOGY CLINIC | Facility: CLINIC | Age: 67
End: 2022-10-25
Payer: MEDICARE

## 2022-10-25 VITALS
SYSTOLIC BLOOD PRESSURE: 105 MMHG | DIASTOLIC BLOOD PRESSURE: 80 MMHG | HEART RATE: 83 BPM | BODY MASS INDEX: 26.43 KG/M2 | WEIGHT: 154 LBS

## 2022-10-25 DIAGNOSIS — R06.09 DYSPNEA ON EXERTION: Primary | ICD-10-CM

## 2022-10-25 PROCEDURE — 93000 ELECTROCARDIOGRAM COMPLETE: CPT | Performed by: INTERNAL MEDICINE

## 2022-10-25 PROCEDURE — 99214 OFFICE O/P EST MOD 30 MIN: CPT | Performed by: INTERNAL MEDICINE

## 2022-10-25 NOTE — PROGRESS NOTES
Cardiology             Carlos Espinal  1955  [id number]              Assessment/Plan:    Peripheral arterial disease, prior left common femoral endarterectomy 09/2018  SLE  Dyslipidemia  Abnormal ECG with T-wave abnormalities, refused stress test  Ongoing tobacco use with prior partial lung (LLL) resection for non-small cell CA      Blood pressure and lipids well controlled  No symptoms of angina  Patient with chronic but stable exertional dyspnea which may be pulmonary in etiology in light of prior left lower lobe lung resection ongoing tobacco use  She has refused stress test   Echocardiogram did reveal mildly dilated right ventricle  Blood pressure control  Continue atorvastatin for dyslipidemia and PAD        Follow-up with PCP from here on      Interval History: This is a 44-year-old female with past medical history of peripheral arterial disease with stenosis of her bilateral iliac and common femoral arteries (s/p left common femoral endarterectomy in 09/18/2019), SLE, and hyperlipidemia  She has nonspecific T-wave abnormalities on ECG  She does see Gerri for consultation in the [facility] 11/2020 for preoperative clearance prior to cataract surgery and she was cleared  She was noted to be an active smoker, have dyslipidemia  Subsequent echocardiogram 09/09/2020 demonstrated ejection fraction 65% with mild-to-moderate concentric hypertrophy, grade 1 diastolic dysfunction, mild to moderate right ventricular dilatation with mild to moderate tricuspid regurgitation  Nuclear stress test was ordered 05/2022 but the patient refused  She presents today for follow-up with no complaints  She denies chest pain  She has chronic but stable exertional dyspnea after walking up a flight of stairs which has not changed  She denies lower extremity edema, orthopnea, lightheadedness, palpitations             Social History:    Patient smokes half a pack a day          Vitals:  Vitals: 10/25/22 1328   BP: 105/80   BP Location: Left arm   Patient Position: Sitting   Cuff Size: Standard   Pulse: 83   Weight: 69 9 kg (154 lb)         Past Medical History:   Diagnosis Date   • Anxiety    • Arthritis 12/20/2014   • Cataract     eulalia   • Disease of thyroid gland     nodules   • Fibromyalgia, primary    • Frequent UTI    • History of lung cancer     lower left lobe removed - 2004   • Irritable bowel syndrome    • Lactose intolerance    • Lupus (HCC)    • 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   • PAD (peripheral artery disease) (HCC)    • Presence of pessary    • Raynaud disease    • Sciatica    • Sinus headache    • Sjogren's syndrome (HCC)    • Smoker      Social History     Socioeconomic History   • Marital status: /Civil Union     Spouse name: Not on file   • Number of children: 1   • Years of education: Not on file   • Highest education level: Not on file   Occupational History   • Occupation: office work retired   Tobacco Use   • Smoking status: Current Every Day Smoker     Packs/day: 0 50     Years: 40 00     Pack years: 20 00     Types: Cigarettes   • Smokeless tobacco: Never Used   Vaping Use   • Vaping Use: Never used   Substance and Sexual Activity   • Alcohol use: Not Currently     Comment: liquor rarely   • Drug use: No   • Sexual activity: Not Currently     Partners: Male   Other Topics Concern   • Not on file   Social History Narrative    Always uses seat belt    Caffeine use    Primary spoken language English    Caodaism    Racial background:      Drinks Tea     Social Determinants of Health     Financial Resource Strain: Not on file   Food Insecurity: Not on file   Transportation Needs: Not on file   Physical Activity: Not on file   Stress: Not on file   Social Connections: Not on file   Intimate Partner Violence: Not on file   Housing Stability: Not on file      Family History   Problem Relation Age of Onset   • Arthritis Mother    • Asthma Mother    • Dementia Mother    • Heart disease Mother    • Hypothyroidism Mother    • Irritable bowel syndrome Mother    • Uterine cancer Mother 72   • Heart attack Father    • Stroke Father    • Hyperlipidemia Sister    • Uterine cancer Sister         age unknown   • Heart attack Maternal Grandfather    • Asthma Family    • Cataracts Family    • Thyroid disease Family    • No Known Problems Paternal Aunt    • No Known Problems Maternal Aunt    • No Known Problems Maternal Grandmother    • No Known Problems Paternal Grandmother    • No Known Problems Paternal Grandfather      Past Surgical History:   Procedure Laterality Date   • LUNG REMOVAL, PARTIAL  05/01/2004    Prior Surgical/Procedural History - 5/4 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 - lung resection - subtotal   • MOUTH SURGERY      Tooth extraction   • OH THROMBOENDARTECTMY FEMORAL COMMON Left 9/10/2019    Procedure: ENDARTERECTOMY ARTERIAL FEMORAL,  WITH BOVINE GRAFT AND PROFUNDOPLASTY;  Surgeon: Hemanth Mendoza DO;  Location: [facility];  Service: Vascular   • SINUS SURGERY         Current Outpatient Medications:   •  aspirin (ECOTRIN LOW STRENGTH) 81 mg EC tablet, Take 1 tablet (81 mg total) by mouth daily, Disp: 90 tablet, Rfl: 3  •  atorvastatin (LIPITOR) 10 mg tablet, Take 1 tablet (10 mg total) by mouth daily with dinner, Disp: 90 tablet, Rfl: 1  •  cyanocobalamin (VITAMIN B-12) 100 mcg tablet, Take 500 mcg by mouth daily 500 mcg, Disp: , Rfl:   •  estradiol (VAGIFEM, YUVAFEM) 10 MCG TABS vaginal tablet, Insert 1 tablet (10 mcg total) into the vagina 2 (two) times a week (Patient taking differently: Insert 1 tablet into the vagina 2 (two) times a week Once a week  ), Disp: 24 tablet, Rfl: 3  •  famotidine (PEPCID) 10 mg tablet, Take 10 mg by mouth as needed Takes as needed   If heartburn is really bad she will take 20 mg , Disp: , Rfl:   •  Klor-Con 10 10 MEQ tablet, TAKE 1 TABLET (10 MEQ TOTAL) BY MOUTH 3 (THREE) TIMES A DAY, Disp: 270 tablet, Rfl: 1  •  LORazepam (ATIVAN) 0 5 mg tablet, Take 1 tablet (0 5 mg total) by mouth every 12 (twelve) hours as needed for anxiety, Disp: 30 tablet, Rfl: 0  •  Multiple Vitamin (MULTI-VITAMIN PO), Take by mouth, Disp: , Rfl:   •  oxyCODONE (ROXICODONE) 5 immediate release tablet, Take 1 tablet (5 mg total) by mouth every 6 (six) hours as needed for moderate pain Max Daily Amount: 20 mg, Disp: 30 tablet, Rfl: 0  •  predniSONE 10 mg tablet, TAKE 1 TABLET BY MOUTH EVERY DAY, Disp: 90 tablet, Rfl: 1  •  Vitamin Mixture (JOSE RAMON-C PO), Take 1 tablet by mouth daily, Disp: , Rfl:   •  clotrimazole (MYCELEX) 10 mg yu, Take 1 tablet (10 mg total) by mouth 5 (five) times a day (Patient not taking: No sig reported), Disp: 35 Yu, Rfl: 1  •  Magnesium 500 MG CAPS, Take by mouth daily   (Patient not taking: No sig reported), Disp: , Rfl:   •  Misc Natural Products (CURCUMAX PRO PO), Take by mouth (Patient not taking: No sig reported), Disp: , Rfl:   •  naloxone (NARCAN) 4 mg/0 1 mL nasal spray, Administer 1 spray into a nostril  If no response after 2-3 minutes, give another dose in the other nostril using a new spray  (Patient not taking: No sig reported), Disp: 1 each, Rfl: 1        Review of Systems:  Review of Systems   Constitutional: Negative for activity change, fever and unexpected weight change  HENT: Negative for facial swelling, nosebleeds and voice change  Respiratory: Positive for shortness of breath  Negative for chest tightness and wheezing  Cardiovascular: Negative for chest pain, palpitations and leg swelling  Gastrointestinal: Negative for abdominal distention  Genitourinary: Negative for hematuria  Musculoskeletal: Negative for arthralgias  Skin: Negative for color change, pallor, rash and wound  Neurological: Negative for dizziness, seizures and syncope  Psychiatric/Behavioral: Negative for agitation  Physical Exam:  Physical Exam  Vitals reviewed     Constitutional: Appearance: She is well-developed  HENT:      Head: Normocephalic and atraumatic  Cardiovascular:      Rate and Rhythm: Normal rate and regular rhythm  Heart sounds: Normal heart sounds  Pulmonary:      Effort: Pulmonary effort is normal       Breath sounds: Normal breath sounds  Abdominal:      Palpations: Abdomen is soft  Musculoskeletal:         General: Normal range of motion  Cervical back: Normal range of motion and neck supple  Skin:     General: Skin is warm and dry  Neurological:      Mental Status: She is alert and oriented to person, place, and time  Psychiatric:         Behavior: Behavior normal          Thought Content: Thought content normal          Judgment: Judgment normal          This note was completed in part utilizing [employer] Direct Software  Grammatical errors, random word insertions, spelling mistakes, and incomplete sentences can be an occasional consequence of this system secondary to software limitations, ambient noise, and hardware issues  If you have any questions or concerns about the content, text, or information contained within the body of this dictation, please contact the provider for clarification

## 2022-11-02 ENCOUNTER — TELEPHONE (OUTPATIENT)
Dept: ENDOCRINOLOGY | Facility: CLINIC | Age: 67
End: 2022-11-02

## 2022-11-02 ENCOUNTER — APPOINTMENT (OUTPATIENT)
Dept: LAB | Facility: HOSPITAL | Age: 67
End: 2022-11-02

## 2022-11-02 DIAGNOSIS — I74.09 AORTOILIAC OCCLUSIVE DISEASE (HCC): ICD-10-CM

## 2022-11-02 DIAGNOSIS — G62.9 NEUROPATHY: ICD-10-CM

## 2022-11-02 DIAGNOSIS — I73.9 PAD (PERIPHERAL ARTERY DISEASE) (HCC): ICD-10-CM

## 2022-11-02 DIAGNOSIS — E04.2 MULTIPLE THYROID NODULES: ICD-10-CM

## 2022-11-02 DIAGNOSIS — G60.9 HEREDITARY AND IDIOPATHIC NEUROPATHY, UNSPECIFIED: ICD-10-CM

## 2022-11-02 DIAGNOSIS — E78.2 MIXED HYPERLIPIDEMIA: ICD-10-CM

## 2022-11-02 DIAGNOSIS — R27.0 ATAXIA, UNSPECIFIED: ICD-10-CM

## 2022-11-02 LAB
ALBUMIN SERPL BCP-MCNC: 3.1 G/DL (ref 3.5–5)
ALP SERPL-CCNC: 83 U/L (ref 46–116)
ALT SERPL W P-5'-P-CCNC: 28 U/L (ref 12–78)
ANION GAP SERPL CALCULATED.3IONS-SCNC: 4 MMOL/L (ref 4–13)
AST SERPL W P-5'-P-CCNC: 20 U/L (ref 5–45)
BASOPHILS # BLD AUTO: 0.1 THOUSANDS/ÂΜL (ref 0–0.1)
BASOPHILS NFR BLD AUTO: 1 % (ref 0–1)
BILIRUB SERPL-MCNC: 0.7 MG/DL (ref 0.2–1)
BUN SERPL-MCNC: 13 MG/DL (ref 5–25)
CALCIUM ALBUM COR SERPL-MCNC: 9.5 MG/DL (ref 8.3–10.1)
CALCIUM SERPL-MCNC: 8.8 MG/DL (ref 8.3–10.1)
CHLORIDE SERPL-SCNC: 106 MMOL/L (ref 96–108)
CHOLEST SERPL-MCNC: 142 MG/DL
CO2 SERPL-SCNC: 28 MMOL/L (ref 21–32)
CREAT SERPL-MCNC: 0.89 MG/DL (ref 0.6–1.3)
EOSINOPHIL # BLD AUTO: 0.3 THOUSAND/ÂΜL (ref 0–0.61)
EOSINOPHIL NFR BLD AUTO: 3 % (ref 0–6)
ERYTHROCYTE [DISTWIDTH] IN BLOOD BY AUTOMATED COUNT: 14 % (ref 11.6–15.1)
GFR SERPL CREATININE-BSD FRML MDRD: 67 ML/MIN/1.73SQ M
GLUCOSE P FAST SERPL-MCNC: 83 MG/DL (ref 65–99)
HCT VFR BLD AUTO: 50.8 % (ref 34.8–46.1)
HDLC SERPL-MCNC: 55 MG/DL
HGB BLD-MCNC: 16.6 G/DL (ref 11.5–15.4)
IMM GRANULOCYTES # BLD AUTO: 0.07 THOUSAND/UL (ref 0–0.2)
IMM GRANULOCYTES NFR BLD AUTO: 1 % (ref 0–2)
LDLC SERPL CALC-MCNC: 61 MG/DL (ref 0–100)
LYMPHOCYTES # BLD AUTO: 2.96 THOUSANDS/ÂΜL (ref 0.6–4.47)
LYMPHOCYTES NFR BLD AUTO: 25 % (ref 14–44)
MCH RBC QN AUTO: 29.8 PG (ref 26.8–34.3)
MCHC RBC AUTO-ENTMCNC: 32.7 G/DL (ref 31.4–37.4)
MCV RBC AUTO: 91 FL (ref 82–98)
MONOCYTES # BLD AUTO: 0.94 THOUSAND/ÂΜL (ref 0.17–1.22)
MONOCYTES NFR BLD AUTO: 8 % (ref 4–12)
NEUTROPHILS # BLD AUTO: 7.71 THOUSANDS/ÂΜL (ref 1.85–7.62)
NEUTS SEG NFR BLD AUTO: 62 % (ref 43–75)
NONHDLC SERPL-MCNC: 87 MG/DL
NRBC BLD AUTO-RTO: 0 /100 WBCS
PLATELET # BLD AUTO: 233 THOUSANDS/UL (ref 149–390)
PMV BLD AUTO: 8.9 FL (ref 8.9–12.7)
POTASSIUM SERPL-SCNC: 3.5 MMOL/L (ref 3.5–5.3)
PROT SERPL-MCNC: 6.9 G/DL (ref 6.4–8.4)
RBC # BLD AUTO: 5.57 MILLION/UL (ref 3.81–5.12)
SODIUM SERPL-SCNC: 138 MMOL/L (ref 135–147)
T4 FREE SERPL-MCNC: 0.99 NG/DL (ref 0.76–1.46)
TRIGL SERPL-MCNC: 128 MG/DL
TSH SERPL DL<=0.05 MIU/L-ACNC: 0.73 UIU/ML (ref 0.45–4.5)
VIT B12 SERPL-MCNC: 1320 PG/ML (ref 100–900)
WBC # BLD AUTO: 12.08 THOUSAND/UL (ref 4.31–10.16)

## 2022-11-02 NOTE — TELEPHONE ENCOUNTER
----- Message from [address] [place] [address] sent at 11/2/2022 12:45 PM EDT -----  Thyroid labs within normal limits

## 2022-11-03 ENCOUNTER — OFFICE VISIT (OUTPATIENT)
Dept: VASCULAR SURGERY | Facility: CLINIC | Age: 67
End: 2022-11-03

## 2022-11-03 VITALS
OXYGEN SATURATION: 96 % | BODY MASS INDEX: 26.12 KG/M2 | HEART RATE: 94 BPM | HEIGHT: 64 IN | SYSTOLIC BLOOD PRESSURE: 114 MMHG | WEIGHT: 153 LBS | DIASTOLIC BLOOD PRESSURE: 70 MMHG

## 2022-11-03 DIAGNOSIS — G62.9 NEUROPATHY: ICD-10-CM

## 2022-11-03 DIAGNOSIS — I74.09 AORTOILIAC OCCLUSIVE DISEASE (HCC): Primary | ICD-10-CM

## 2022-11-03 DIAGNOSIS — F17.200 TOBACCO DEPENDENCE: ICD-10-CM

## 2022-11-03 DIAGNOSIS — M79.89 LEG SWELLING: Chronic | ICD-10-CM

## 2022-11-03 PROBLEM — G57.90 NEUROPATHY OF LEG: Chronic | Status: ACTIVE | Noted: 2022-11-03

## 2022-11-03 NOTE — ASSESSMENT & PLAN NOTE
Chronic bilateral lower extremity swelling with hyperpigmentation  We did discuss the pathophysiology of leg swelling and potential etiologies along with the treatment options  Especially with her underlying neuropathy I would not advise any form of intervention and would only advise conservative management with compression, elevation and exercise  She states she is intolerant to compressive stockings of a medical grade but does utilize Tubigrip  She will continue to seek a compressive stocking which she is tolerant to

## 2022-11-03 NOTE — ASSESSMENT & PLAN NOTE
Cigarette smoking  We did discuss the relationship between cigarette smoking and atherosclerotic disease along with the importance of smoking cessation to prevent further progression and potential symptoms  I am unclear as to her willingness to attempt to quit

## 2022-11-03 NOTE — ASSESSMENT & PLAN NOTE
Bilateral calf pain and left thigh and groin pain which is constant and consistent with a neuropathic origin  It appears her left groin and medial thigh discomfort is most severe and appears to limit her activities  This does appear to be related to her previous surgery in 2019  She has been evaluated by Neurology without any relief with multiple regimens  I have offered referral to a pain management specialist but she would like to discuss this further with her PCP, Dr Niko Rivas

## 2022-11-03 NOTE — LETTER
November 3, 2022     Cliff[place] Kathleen, [address] [address]    Patient: Mary Ellen Eduardo   YOB: 1955   Date of Visit: 11/3/2022       Dear Dr Sophie Can:    Thank you for referring Ronaldo Aaron to me for evaluation  Below are the relevant portions of my assessment and plan of care  Aortoiliac occlusive disease (HCC)  Aortoiliac occlusive disease with right common iliac artery occlusion and left iliac stenosis  This appears to be minimally symptomatic partially due to her low level of activity  We discussed at length the pathophysiology of arterial occlusive disease and the indications for treatment which are guided by symptoms  Since she remains minimally symptomatic no further intervention is necessary at this time other than continued surveillance and risk factor modification  We discussed at length the importance of smoking cessation and continued medical management with antiplatelet and statin therapy  We will plan annual duplex follow-up  I have also encouraged her to maintain a regular exercise regimen  Neuropathy  Bilateral calf pain and left thigh and groin pain which is constant and consistent with a neuropathic origin  It appears her left groin and medial thigh discomfort is most severe and appears to limit her activities  This does appear to be related to her previous surgery in 2019  She has been evaluated by Neurology without any relief with multiple regimens  I have offered referral to a pain management specialist but she would like to discuss this further with her PCP, Dr Sophie Can  Leg swelling  Chronic bilateral lower extremity swelling with hyperpigmentation  We did discuss the pathophysiology of leg swelling and potential etiologies along with the treatment options  Especially with her underlying neuropathy I would not advise any form of intervention and would only advise conservative management with compression, elevation and exercise    She states she is intolerant to compressive stockings of a medical grade but does utilize Tubigrip  She will continue to seek a compressive stocking which she is tolerant to  Tobacco dependence  Cigarette smoking  We did discuss the relationship between cigarette smoking and atherosclerotic disease along with the importance of smoking cessation to prevent further progression and potential symptoms  I am unclear as to her willingness to attempt to quit  If you have questions, please do not hesitate to call me  I look forward to following Medhat Maharajza along with you           Sincerely,        Dennis Arthur MD        CC: Colletta Odea, PA-C

## 2022-11-03 NOTE — ASSESSMENT & PLAN NOTE
Aortoiliac occlusive disease with right common iliac artery occlusion and left iliac stenosis  This appears to be minimally symptomatic partially due to her low level of activity  We discussed at length the pathophysiology of arterial occlusive disease and the indications for treatment which are guided by symptoms  Since she remains minimally symptomatic no further intervention is necessary at this time other than continued surveillance and risk factor modification  We discussed at length the importance of smoking cessation and continued medical management with antiplatelet and statin therapy  We will plan annual duplex follow-up  I have also encouraged her to maintain a regular exercise regimen

## 2022-11-03 NOTE — PATIENT INSTRUCTIONS
Aortoiliac occlusive disease (HCC)  Aortoiliac occlusive disease with right common iliac artery occlusion and left iliac stenosis  This appears to be minimally symptomatic partially due to her low level of activity  We discussed at length the pathophysiology of arterial occlusive disease and the indications for treatment which are guided by symptoms  Since she remains minimally symptomatic no further intervention is necessary at this time other than continued surveillance and risk factor modification  We discussed at length the importance of smoking cessation and continued medical management with antiplatelet and statin therapy  We will plan annual duplex follow-up  I have also encouraged her to maintain a regular exercise regimen  Neuropathy  Bilateral calf pain and left thigh and groin pain which is constant and consistent with a neuropathic origin  It appears her left groin and medial thigh discomfort is most severe and appears to limit her activities  This does appear to be related to her previous surgery in 2019  She has been evaluated by Neurology without any relief with multiple regimens  I have offered referral to a pain management specialist but she would like to discuss this further with her PCP, Dr Faye Jordan  Leg swelling  Chronic bilateral lower extremity swelling with hyperpigmentation  We did discuss the pathophysiology of leg swelling and potential etiologies along with the treatment options  Especially with her underlying neuropathy I would not advise any form of intervention and would only advise conservative management with compression, elevation and exercise  She states she is intolerant to compressive stockings of a medical grade but does utilize Tubigrip  She will continue to seek a compressive stocking which she is tolerant to  Tobacco dependence  Cigarette smoking    We did discuss the relationship between cigarette smoking and atherosclerotic disease along with the importance of smoking cessation to prevent further progression and potential symptoms  I am unclear as to her willingness to attempt to quit

## 2022-11-03 NOTE — PROGRESS NOTES
Assessment/Plan: Aortoiliac occlusive disease (HCC)  Aortoiliac occlusive disease with right common iliac artery occlusion and left iliac stenosis  This appears to be minimally symptomatic partially due to her low level of activity  We discussed at length the pathophysiology of arterial occlusive disease and the indications for treatment which are guided by symptoms  Since she remains minimally symptomatic no further intervention is necessary at this time other than continued surveillance and risk factor modification  We discussed at length the importance of smoking cessation and continued medical management with antiplatelet and statin therapy  We will plan annual duplex follow-up  I have also encouraged her to maintain a regular exercise regimen  Neuropathy  Bilateral calf pain and left thigh and groin pain which is constant and consistent with a neuropathic origin  It appears her left groin and medial thigh discomfort is most severe and appears to limit her activities  This does appear to be related to her previous surgery in 2019  She has been evaluated by Neurology without any relief with multiple regimens  I have offered referral to a pain management specialist but she would like to discuss this further with her PCP, Dr Gustavo Cabrera  Leg swelling  Chronic bilateral lower extremity swelling with hyperpigmentation  We did discuss the pathophysiology of leg swelling and potential etiologies along with the treatment options  Especially with her underlying neuropathy I would not advise any form of intervention and would only advise conservative management with compression, elevation and exercise  She states she is intolerant to compressive stockings of a medical grade but does utilize Tubigrip  She will continue to seek a compressive stocking which she is tolerant to  Tobacco dependence  Cigarette smoking    We did discuss the relationship between cigarette smoking and atherosclerotic disease along with the importance of smoking cessation to prevent further progression and potential symptoms  I am unclear as to her willingness to attempt to quit  Diagnoses and all orders for this visit:    Aortoiliac occlusive disease ([address] )  -     VAS abdominal aorta/iliacs; complete study; Future  -     VAS NILSA & waveform analysis, multiple levels; Future    Tobacco dependence    Neuropathy    Leg swelling          Subjective:      Patient ID: James Christina is a 79 y o  female  Patient is here today to review results of VISH done 8/3/2022 and a AOIL with NILSA's done 8/5/2022  She also had a Inguinal area US done 9/27/2022  Patient is s/p a left CFA endarterectomy/profundaplasty by Dr Jewel Duque on 9/10/2019  She c/o a area of the left medial thigh that she says formed a "sack" after the surgery in 2019  She says that the area is sensitive to touch  Patient is taking ASA 81 mg and Atorvastatin  She is current smoker  79-year-old presents in follow-up of a known aortoiliac and peripheral arterial occlusive disease  She also has a extensive medical history of multiple autoimmune disorders to include lupus, Raynaud's, Sjogren's syndrome, fibromyalgia and inflammatory bowel syndrome  She is on chronic prednisone therapy  She also has known peripheral neuropathy  Her current complaints are mostly related to constant pain in her left groin and medial thigh  This pain is evidently unrelenting, constant and does appear to interfere with her activities  She states it has been present ever since her surgery, left common femoral endarterectomy  She also has constant pain in both calves which is also unrelieved by any position or activity  She also notes swelling of both lower extremities and has tried compressive stockings but has difficulty due to discomfort and difficulty removing them and thus only utilizes Tubigrip  She also notes a fullness in her left thigh    She reports that she has undergone previous evaluation treatment for neuropathy most recently with neurology  Unfortunately she has utilize multiple medical regimens which have all resulted in severe depression and intolerance to the medications  On detailed questioning she denies any typical symptoms of claudication but does note that she is relatively inactive due to the constant pain in her calves and left groin  She evidently utilizes a cane and periodically utilizes a walker  Arterial duplex 08/05/2022  There is evidence of a right proximal common iliac artery occlusion  There is no evidence of focal disease in the femoral-popliteal segment with ankle-brachial index is 0 62  On the left there is no focal stenosis in the aortoiliac or femoral-popliteal segment  Ankle-brachial index 0 87  Ultrasound of the left thigh 09/27/2022 shows no fluid collection or obvious abnormality to account for the patient's complaint of fullness and swelling of the left thigh  I have spent 50 minutes with Patient  today in which greater than 50% of this time was spent in counseling/coordination of care regarding Diagnostic results, Prognosis, Intructions for management, Patient and family education, Risk factor reductions and Impressions            The following portions of the patient's history were reviewed and updated as appropriate: allergies, current medications, past family history, past medical history, past social history, past surgical history and problem list     Past Medical History:  Past Medical History:   Diagnosis Date   • Anxiety    • Arthritis 12/20/2014   • Cataract     eulalia   • Disease of thyroid gland     nodules   • Fibromyalgia, primary    • Frequent UTI    • History of lung cancer     lower left lobe removed - 2004   • Irritable bowel syndrome    • Lactose intolerance    • Lupus ([facility] )    • 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   • PAD (peripheral artery disease) ([facility] )    • Presence of pessary    • Raynaud disease    • Sciatica    • Sinus headache    • Sjogren's syndrome (HCC)    • Smoker        Past Surgical History:  Past Surgical History:   Procedure Laterality Date   • LUNG REMOVAL, PARTIAL  05/01/2004    Prior Surgical/Procedural History - 5/4 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 - lung resection - subtotal   • MOUTH SURGERY      Tooth extraction   • PA THROMBOENDARTECTMY FEMORAL COMMON Left 9/10/2019    Procedure: ENDARTERECTOMY ARTERIAL FEMORAL,  WITH BOVINE GRAFT AND PROFUNDOPLASTY;  Surgeon: Martin Parekh DO;  Location: [facility];  Service: Vascular   • SINUS SURGERY         Social History:  Social History     Substance and Sexual Activity   Alcohol Use Not Currently    Comment: liquor rarely     Social History     Substance and Sexual Activity   Drug Use No     Social History     Tobacco Use   Smoking Status Current Every Day Smoker   • Packs/day: 0 50   • Years: 40 00   • Pack years: 20 00   • Types: Cigarettes   Smokeless Tobacco Never Used       Family History:  Family History   Problem Relation Age of Onset   • Arthritis Mother    • Asthma Mother    • Dementia Mother    • Heart disease Mother    • Hypothyroidism Mother    • Irritable bowel syndrome Mother    • Uterine cancer Mother 72   • Heart attack Father    • Stroke Father    • Hyperlipidemia Sister    • Uterine cancer Sister         age unknown   • Heart attack Maternal Grandfather    • Asthma Family    • Cataracts Family    • Thyroid disease Family    • No Known Problems Paternal Aunt    • No Known Problems Maternal Aunt    • No Known Problems Maternal Grandmother    • No Known Problems Paternal Grandmother    • No Known Problems Paternal Grandfather        Allergies:   Allergies   Allergen Reactions   • Penicillins Shortness Of Breath and Edema   • Cymbalta [Duloxetine Hcl] Diarrhea   • Duloxetine Diarrhea   • Dust Mite Extract    • Lactose - Food Allergy GI Intolerance   • Neosporin [Neomycin-Bacitracin Zn-Polymyx]      Other reaction(s): REDNESS   • Nitrofurantoin Rash   • Omeprazole Rash   • Sulfamethoxazole-Trimethoprim Rash       Medications:    Current Outpatient Medications:   •  aspirin (ECOTRIN LOW STRENGTH) 81 mg EC tablet, Take 1 tablet (81 mg total) by mouth daily, Disp: 90 tablet, Rfl: 3  •  atorvastatin (LIPITOR) 10 mg tablet, Take 1 tablet (10 mg total) by mouth daily with dinner, Disp: 90 tablet, Rfl: 1  •  cyanocobalamin (VITAMIN B-12) 100 mcg tablet, Take 500 mcg by mouth daily 500 mcg, Disp: , Rfl:   •  estradiol (VAGIFEM, YUVAFEM) 10 MCG TABS vaginal tablet, Insert 1 tablet (10 mcg total) into the vagina 2 (two) times a week (Patient taking differently: Insert 1 tablet into the vagina 2 (two) times a week Once a week  ), Disp: 24 tablet, Rfl: 3  •  famotidine (PEPCID) 10 mg tablet, Take 10 mg by mouth as needed Takes as needed   If heartburn is really bad she will take 20 mg , Disp: , Rfl:   •  Klor-Con 10 10 MEQ tablet, TAKE 1 TABLET (10 MEQ TOTAL) BY MOUTH 3 (THREE) TIMES A DAY, Disp: 270 tablet, Rfl: 1  •  LORazepam (ATIVAN) 0 5 mg tablet, Take 1 tablet (0 5 mg total) by mouth every 12 (twelve) hours as needed for anxiety, Disp: 30 tablet, Rfl: 0  •  oxyCODONE (ROXICODONE) 5 immediate release tablet, Take 1 tablet (5 mg total) by mouth every 6 (six) hours as needed for moderate pain Max Daily Amount: 20 mg, Disp: 30 tablet, Rfl: 0  •  predniSONE 10 mg tablet, TAKE 1 TABLET BY MOUTH EVERY DAY, Disp: 90 tablet, Rfl: 1  •  Vitamin Mixture (JOSE RAMON-C PO), Take 1 tablet by mouth daily, Disp: , Rfl:   •  clotrimazole (MYCELEX) 10 mg yu, Take 1 tablet (10 mg total) by mouth 5 (five) times a day (Patient not taking: No sig reported), Disp: 35 Yu, Rfl: 1  •  Magnesium 500 MG CAPS, Take by mouth daily   (Patient not taking: No sig reported), Disp: , Rfl:   •  Misc Natural Products (CURCUMAX PRO PO), Take by mouth (Patient not taking: No sig reported), Disp: , Rfl:   •  Multiple Vitamin (MULTI-VITAMIN PO), Take by mouth, Disp: , Rfl: •  naloxone (NARCAN) 4 mg/0 1 mL nasal spray, Administer 1 spray into a nostril  If no response after 2-3 minutes, give another dose in the other nostril using a new spray  (Patient not taking: No sig reported), Disp: 1 each, Rfl: 1    Vitals:  /70 (11/03/22 1410)    Temp      Pulse 94 (11/03/22 1410)   Resp      SpO2 96 % (11/03/22 1410)      Lab Results and Cultures:   CBC with diff:   Lab Results   Component Value Date    WBC 12 08 (H) 11/02/2022    HGB 16 6 (H) 11/02/2022    HCT 50 8 (H) 11/02/2022    MCV 91 11/02/2022     11/02/2022    MCH 29 8 11/02/2022    MCHC 32 7 11/02/2022    RDW 14 0 11/02/2022    MPV 8 9 11/02/2022    NRBC 0 11/02/2022   ,   BMP/CMP:  Lab Results   Component Value Date     03/20/2017    K 3 5 11/02/2022    K 4 0 08/15/2018     11/02/2022     08/15/2018    CO2 28 11/02/2022    CO2 31 08/15/2018    BUN 13 11/02/2022    BUN 18 08/15/2018    CREATININE 0 89 11/02/2022    CREATININE 0 87 03/20/2017    CALCIUM 8 8 11/02/2022    CALCIUM 9 3 08/15/2018    AST 20 11/02/2022    AST 20 08/15/2018    ALT 28 11/02/2022    ALT 21 08/15/2018    ALKPHOS 83 11/02/2022    ALKPHOS 79 08/15/2018    PROT 6 1 03/20/2017    BILITOT 0 5 03/20/2017    EGFR 67 11/02/2022   ,   Lipid Panel: No results found for: CHOL,   Coags:   Lab Results   Component Value Date    INR 1 04 09/10/2019   ,       Review of Systems   Constitutional: Negative  HENT: Negative  Eyes: Negative  Respiratory: Negative  Cardiovascular: Positive for leg swelling  Gastrointestinal: Negative  Endocrine: Negative  Genitourinary: Negative  Musculoskeletal:        Left leg pain   Skin: Negative  Allergic/Immunologic: Negative  Neurological: Negative  Hematological: Negative  Psychiatric/Behavioral: Negative            Objective:      /70 (BP Location: Left arm, Patient Position: Sitting, Cuff Size: Standard)   Pulse 94   Ht 5' 4" (1 626 m)   Wt 69 4 kg (153 lb) LMP  (LMP Unknown)   SpO2 96%   BMI 26 26 kg/m²          Physical Exam  Constitutional:       Appearance: Normal appearance  Cardiovascular:      Comments: Spider and reticular varicosities throughout bilateral lower extremities  Tissue both lower extremities is well-perfused and pink  Musculoskeletal:         General: Swelling (Mild bilateral swelling at the ankle and lower calf level) and tenderness ( hypersensitivity of the left medial thigh) present  No deformity  Normal range of motion  Skin:     Comments: Skin both anterior calves is hyperpigmented consistent with chronic swelling and/or venous hypertension  Left groin incision is well healed   Neurological:      General: No focal deficit present  Mental Status: She is alert and oriented to person, place, and time  Sensory: Sensory deficit ( patient notes hypersensitivity mostly in the left thigh and groin but also in both calves) present  Motor: No weakness  Gait: Gait abnormal ( using a cane to walk)     Psychiatric:         Mood and Affect: Mood normal          Behavior: Behavior normal

## 2022-11-04 LAB
ALBUMIN SERPL ELPH-MCNC: 3.65 G/DL (ref 3.5–5)
ALBUMIN SERPL ELPH-MCNC: 56.1 % (ref 52–65)
ALPHA1 GLOB SERPL ELPH-MCNC: 0.37 G/DL (ref 0.1–0.4)
ALPHA1 GLOB SERPL ELPH-MCNC: 5.7 % (ref 2.5–5)
ALPHA2 GLOB SERPL ELPH-MCNC: 0.9 G/DL (ref 0.4–1.2)
ALPHA2 GLOB SERPL ELPH-MCNC: 13.9 % (ref 7–13)
BETA GLOB ABNORMAL SERPL ELPH-MCNC: 0.38 G/DL (ref 0.4–0.8)
BETA1 GLOB SERPL ELPH-MCNC: 5.9 % (ref 5–13)
BETA2 GLOB SERPL ELPH-MCNC: 5.7 % (ref 2–8)
BETA2+GAMMA GLOB SERPL ELPH-MCNC: 0.37 G/DL (ref 0.2–0.5)
GAMMA GLOB ABNORMAL SERPL ELPH-MCNC: 0.83 G/DL (ref 0.5–1.6)
GAMMA GLOB SERPL ELPH-MCNC: 12.7 % (ref 12–22)
IGG/ALB SER: 1.28 {RATIO} (ref 1.1–1.8)
INTERPRETATION UR IFE-IMP: NORMAL
M PEAK ID 2: 1.8 %
M PROTEIN 1 MFR SERPL ELPH: 1.6 %
M PROTEIN 1 SERPL ELPH-MCNC: 0.1 G/DL
M PROTEIN 2 SERPL ELPH-MCNC: 0.12 G/DL
PROT PATTERN SERPL ELPH-IMP: ABNORMAL
PROT SERPL-MCNC: 6.5 G/DL (ref 6.4–8.2)

## 2022-11-07 LAB — METHYLMALONATE SERPL-SCNC: 131 NMOL/L (ref 0–378)

## 2022-11-08 LAB — VIT B6 SERPL-MCNC: 7.8 UG/L (ref 3.4–65.2)

## 2022-11-10 ENCOUNTER — OFFICE VISIT (OUTPATIENT)
Dept: GYNECOLOGY | Facility: CLINIC | Age: 67
End: 2022-11-10

## 2022-11-10 VITALS
BODY MASS INDEX: 26.12 KG/M2 | DIASTOLIC BLOOD PRESSURE: 70 MMHG | WEIGHT: 153 LBS | HEIGHT: 64 IN | SYSTOLIC BLOOD PRESSURE: 118 MMHG

## 2022-11-10 DIAGNOSIS — N81.4 UTERINE PROLAPSE: Primary | ICD-10-CM

## 2022-11-10 NOTE — PROGRESS NOTES
Assessment/Plan:     Uterine prolapse- pessary that we had in the office that was listed as a 3  Was actually the same size that she currently had, a 2 and half  We will order a 2-3/4, 3 and a 3-1/4  We will then bring the patient back and see if any of these work better for her  We also briefly discussed pelvic floor PT  I gave her information on this and if it is something that she is interested in, she will let us know and I will place a referral   Currently, she does Kegel's but feels that these cause the pessary to shift  There are no diagnoses linked to this encounter  Subjective:     Patient ID: Ellis Martell is a 79 y o  female  Patient presents for a pessary check  She feels that her pessary is too small because it has been shifting and sliding down and is sometimes uncomfortable  I have different sizes from our other office to try for her  She had a larger pessary in the past but we switched for a smaller 1 because she had discomfort when it was removed  She has no other complaints  Review of Systems   Constitutional: Negative  Gastrointestinal: Negative  Genitourinary: Negative  Prolapse         Objective:     Physical Exam  Genitourinary:     General: Normal vulva  Vagina: Normal       Cervix: Normal       Uterus: With uterine prolapse         Comments: 2 2/1 lulatz pessary removed, cleaned and reinserted without difficulty, no excoriation noted

## 2022-12-12 ENCOUNTER — APPOINTMENT (EMERGENCY)
Dept: RADIOLOGY | Facility: HOSPITAL | Age: 67
End: 2022-12-12

## 2022-12-12 ENCOUNTER — HOSPITAL ENCOUNTER (EMERGENCY)
Facility: HOSPITAL | Age: 67
Discharge: HOME | End: 2022-12-12
Attending: EMERGENCY MEDICINE

## 2022-12-12 VITALS
DIASTOLIC BLOOD PRESSURE: 82 MMHG | TEMPERATURE: 97.8 F | OXYGEN SATURATION: 95 % | SYSTOLIC BLOOD PRESSURE: 130 MMHG | RESPIRATION RATE: 18 BRPM | HEART RATE: 81 BPM

## 2022-12-12 DIAGNOSIS — S81.811A LACERATION OF RIGHT LOWER LEG, INITIAL ENCOUNTER: Primary | ICD-10-CM

## 2022-12-12 RX ORDER — CEPHALEXIN 500 MG/1
500 CAPSULE ORAL EVERY 6 HOURS SCHEDULED
Qty: 20 CAPSULE | Refills: 0 | Status: SHIPPED | OUTPATIENT
Start: 2022-12-12 | End: 2022-12-17

## 2022-12-12 RX ORDER — OXYCODONE HYDROCHLORIDE 5 MG/1
5 TABLET ORAL ONCE
Status: COMPLETED | OUTPATIENT
Start: 2022-12-12 | End: 2022-12-12

## 2022-12-12 RX ORDER — LIDOCAINE HYDROCHLORIDE AND EPINEPHRINE 10; 10 MG/ML; UG/ML
10 INJECTION, SOLUTION INFILTRATION; PERINEURAL ONCE
Status: COMPLETED | OUTPATIENT
Start: 2022-12-12 | End: 2022-12-12

## 2022-12-12 RX ORDER — LORAZEPAM 0.5 MG/1
0.5 TABLET ORAL ONCE
Status: COMPLETED | OUTPATIENT
Start: 2022-12-12 | End: 2022-12-12

## 2022-12-12 RX ADMIN — LIDOCAINE HYDROCHLORIDE,EPINEPHRINE BITARTRATE 10 ML: 10; .01 INJECTION, SOLUTION INFILTRATION; PERINEURAL at 16:44

## 2022-12-12 RX ADMIN — LORAZEPAM 0.5 MG: 0.5 TABLET ORAL at 16:44

## 2022-12-12 RX ADMIN — OXYCODONE 5 MG: 5 TABLET ORAL at 16:44

## 2022-12-12 NOTE — ED ATTENDING ATTESTATION
12/12/2022  IFeliciano DO, saw and evaluated the patient  I have discussed the patient with the resident/non-physician practitioner and agree with the resident's/non-physician practitioner's findings, Plan of Care, and MDM as documented in the resident's/non-physician practitioner's note, except where noted  All available labs and Radiology studies were reviewed  I was present for key portions of any procedure(s) performed by the resident/non-physician practitioner and I was immediately available to provide assistance  At this point I agree with the current assessment done in the Emergency Department  I have conducted an independent evaluation of this patient a history and physical is as follows:    80 yo F opened car door and struck her L leg and has laceration to shin  On 81 mg ASA    No other areas of injury  Tetanus UTD    MDM: 80 yo F with leg laceration- clean/repair, x-ray    ED Course         Critical Care Time  Procedures

## 2022-12-12 NOTE — ED PROVIDER NOTES
History  Chief Complaint   Patient presents with   • Laceration     Large laceration to RLE  Injured by opening car door onto leg  Takes aspirin daily  80 y/o female presents to the ED for evaluation after sustaining a laceration to her right lower leg  She injured her leg when she accidentally opened a car door onto her leg  She has pain around the site of injury to the right anterior shin, radiating up her right leg  No other injuries or complaints  She takes ASA 81 mg daily  Her tetanus was last updated in March 2022  Prior to Admission Medications   Prescriptions Last Dose Informant Patient Reported? Taking? Klor-Con 10 10 MEQ tablet   No No   Sig: TAKE 1 TABLET (10 MEQ TOTAL) BY MOUTH 3 (THREE) TIMES A DAY   LORazepam (ATIVAN) 0 5 mg tablet   No No   Sig: Take 1 tablet (0 5 mg total) by mouth every 12 (twelve) hours as needed for anxiety   Magnesium 500 MG CAPS   Yes No   Sig: Take by mouth daily   Misc Natural Products (CURCUMAX PRO PO)   Yes No   Sig: Take by mouth   Multiple Vitamin (MULTI-VITAMIN PO)   Yes No   Sig: Take by mouth   Vitamin Mixture (JOSE RAMON-C PO)   Yes No   Sig: Take 1 tablet by mouth daily   aspirin (ECOTRIN LOW STRENGTH) 81 mg EC tablet   No No   Sig: Take 1 tablet (81 mg total) by mouth daily   atorvastatin (LIPITOR) 10 mg tablet   No No   Sig: Take 1 tablet (10 mg total) by mouth daily with dinner   clotrimazole (MYCELEX) 10 mg valencia   No No   Sig: Take 1 tablet (10 mg total) by mouth 5 (five) times a day   cyanocobalamin (VITAMIN B-12) 100 mcg tablet   Yes No   Sig: Take 500 mcg by mouth daily 500 mcg   estradiol (VAGIFEM, YUVAFEM) 10 MCG TABS vaginal tablet   No No   Sig: Insert 1 tablet (10 mcg total) into the vagina 2 (two) times a week   Patient taking differently: Insert 1 tablet into the vagina 2 (two) times a week Once a week   famotidine (PEPCID) 10 mg tablet   Yes No   Sig: Take 10 mg by mouth as needed Takes as needed   If heartburn is really bad she will take 20 mg    naloxone (NARCAN) 4 mg/0 1 mL nasal spray   No No   Sig: Administer 1 spray into a nostril  If no response after 2-3 minutes, give another dose in the other nostril using a new spray     oxyCODONE (ROXICODONE) 5 immediate release tablet   No No   Sig: Take 1 tablet (5 mg total) by mouth every 6 (six) hours as needed for moderate pain Max Daily Amount: 20 mg   predniSONE 10 mg tablet   No No   Sig: TAKE 1 TABLET BY MOUTH EVERY DAY      Facility-Administered Medications: None       Past Medical History:   Diagnosis Date   • Anxiety    • Arthritis 12/20/2014   • Cataract     eulalia   • Disease of thyroid gland     nodules   • Fibromyalgia, primary    • Frequent UTI    • History of lung cancer     lower left lobe removed - 2004   • Irritable bowel syndrome    • Lactose intolerance    • Lupus (HCC)    • 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   • PAD (peripheral artery disease) (HCC)    • Presence of pessary    • Raynaud disease    • Sciatica    • Sinus headache    • Sjogren's syndrome ([facility] [address] )    • Smoker        Past Surgical History:   Procedure Laterality Date   • LUNG REMOVAL, PARTIAL  05/01/2004    Prior Surgical/Procedural History - 5/4 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 - lung resection - subtotal   • MOUTH SURGERY      Tooth extraction   • AZ THROMBOENDARTECTMY FEMORAL COMMON Left 9/10/2019    Procedure: ENDARTERECTOMY ARTERIAL FEMORAL,  WITH BOVINE GRAFT AND PROFUNDOPLASTY;  Surgeon: Meghann Arenas DO;  Location: [facility];  Service: Vascular   • SINUS SURGERY         Family History   Problem Relation Age of Onset   • Arthritis Mother    • Asthma Mother    • Dementia Mother    • Heart disease Mother    • Hypothyroidism Mother    • Irritable bowel syndrome Mother    • Uterine cancer Mother 72   • Heart attack Father    • Stroke Father    • Hyperlipidemia Sister    • Uterine cancer Sister         age unknown   • Heart attack Maternal Grandfather    • Asthma Family    • Cataracts Family    • Thyroid disease Family    • No Known Problems Paternal Aunt    • No Known Problems Maternal Aunt    • No Known Problems Maternal Grandmother    • No Known Problems Paternal Grandmother    • No Known Problems Paternal Grandfather      I have reviewed and agree with the history as documented  E-Cigarette/Vaping   • E-Cigarette Use Never User      E-Cigarette/Vaping Substances   • Nicotine No    • THC No    • CBD No    • Flavoring No    • Other No    • Unknown No      Social History     Tobacco Use   • Smoking status: Every Day     Packs/day: 0 50     Years: 40 00     Pack years: 20 00     Types: Cigarettes   • Smokeless tobacco: Never   Vaping Use   • Vaping Use: Never used   Substance Use Topics   • Alcohol use: Not Currently     Comment: liquor rarely   • Drug use: No        Review of Systems   Constitutional: Negative for chills and fever  HENT: Negative for congestion, rhinorrhea and sore throat  Respiratory: Negative for cough and shortness of breath  Cardiovascular: Negative for chest pain and palpitations  Gastrointestinal: Negative for abdominal pain, diarrhea, nausea and vomiting  Genitourinary: Negative for dysuria and hematuria  Musculoskeletal: Negative for back pain and neck pain  Right lower leg pain, see HPI  Skin: Positive for wound  RLE laceration, see HPI  Neurological: Negative for dizziness, weakness, light-headedness, numbness and headaches  All other systems reviewed and are negative        Physical Exam  ED Triage Vitals   Temperature Pulse Respirations Blood Pressure SpO2   12/12/22 1452 12/12/22 1452 12/12/22 1452 12/12/22 1452 12/12/22 1452   97 8 °F (36 6 °C) 90 18 134/73 98 %      Temp src Heart Rate Source Patient Position - Orthostatic VS BP Location FiO2 (%)   -- 12/12/22 1452 12/12/22 1452 12/12/22 1452 --    Monitor Sitting Right arm       Pain Score       12/12/22 1733       5             Orthostatic Vital Signs  Vitals:    12/12/22 1452 12/12/22 1733   BP: 134/73 130/82   Pulse: 90 81   Patient Position - Orthostatic VS: Sitting Lying       Physical Exam  Vitals and nursing note reviewed  Constitutional:       General: She is not in acute distress  Appearance: Normal appearance  She is normal weight  She is not ill-appearing  HENT:      Head: Normocephalic and atraumatic  Right Ear: External ear normal       Left Ear: External ear normal       Nose: Nose normal  No congestion or rhinorrhea  Mouth/Throat:      Mouth: Mucous membranes are moist       Pharynx: Oropharynx is clear  No oropharyngeal exudate or posterior oropharyngeal erythema  Eyes:      Extraocular Movements: Extraocular movements intact  Conjunctiva/sclera: Conjunctivae normal       Pupils: Pupils are equal, round, and reactive to light  Cardiovascular:      Rate and Rhythm: Normal rate and regular rhythm  Pulses: Normal pulses  Heart sounds: Normal heart sounds  No murmur heard  Pulmonary:      Effort: Pulmonary effort is normal  No respiratory distress  Breath sounds: Normal breath sounds  No wheezing or rales  Abdominal:      General: Abdomen is flat  Bowel sounds are normal  There is no distension  Palpations: Abdomen is soft  Tenderness: There is no abdominal tenderness  There is no right CVA tenderness, left CVA tenderness or guarding  Musculoskeletal:         General: Tenderness present  No swelling  Normal range of motion  Cervical back: Normal range of motion and neck supple  No tenderness  Comments: Approximately 7 cm slightly curved laceration to the right anterolateral lower leg, bleeding controlled, mild surrounding tenderness and bruising, neurovascularly intact  Skin:     General: Skin is warm and dry  Capillary Refill: Capillary refill takes less than 2 seconds  Neurological:      General: No focal deficit present  Mental Status: She is alert and oriented to person, place, and time  Sensory: No sensory deficit  Motor: No weakness  ED Medications  Medications   lidocaine-epinephrine (XYLOCAINE/EPINEPHRINE) 1 %-1:100,000 injection 10 mL (10 mL Infiltration Given by Other 12/12/22 1644)   oxyCODONE (ROXICODONE) IR tablet 5 mg (5 mg Oral Given 12/12/22 1644)   LORazepam (ATIVAN) tablet 0 5 mg (0 5 mg Oral Given 12/12/22 1644)       Diagnostic Studies  Results Reviewed     None                 XR tibia fibula 2 views RIGHT   Final Result by Kayla Puga MD (12/13 [id number])      No acute osseous abnormality  Soft tissue laceration  Workstation performed: [id number]               Procedures  Laceration repair    Date/Time: 12/12/2022 6:41 PM  Performed by: Ramu King MD  Authorized by: Ramu King MD   Consent: Verbal consent obtained  Risks and benefits: risks, benefits and alternatives were discussed  Consent given by: patient  Required items: required blood products, implants, devices, and special equipment available  Patient identity confirmed: verbally with patient  Body area: lower extremity  Location details: right lower leg  Laceration length: 7 cm  Foreign bodies: no foreign bodies  Anesthesia: local infiltration    Anesthesia:  Local Anesthetic: lidocaine 1% with epinephrine  Anesthetic total: 10 mL      Procedure Details:  Preparation: Patient was prepped and draped in the usual sterile fashion  Irrigation solution: saline  Irrigation method: jet lavage  Amount of cleaning: standard  Skin closure: Ethilon (4-0)  Number of sutures: 13 (10 simple interrupted, 3 horizontal matress)  Technique: simple and horizontal mattress  Approximation: close  Approximation difficulty: complex  Dressing: 4x4 sterile gauze  Comments: Patient tolerated procedure well  Poor skin integrity, with suture tension causing mild skin tears, Complex repair    Mitigated tension with 3 horizontal mattress sutures over the central portion of the wound and 10 additional simple interrupted sutures (8 over the primary portion of the wound and to over an adjacent large skin tear)  ED Course  ED Course as of 12/14/22 0308   Mon Dec 12, 2022   1711 XR tibia fibula 2 views RIGHT  No acute fracture or osseous abnormality on my interpretation  SBIRT 22yo+    Flowsheet Row Most Recent Value   SBIRT (25 yo +)    In order to provide better care to our patients, we are screening all of our patients for alcohol and drug use  Would it be okay to ask you these screening questions? No Filed at: 12/12/2022 1650                MDM  Number of Diagnoses or Management Options  Laceration of right lower leg, initial encounter  Diagnosis management comments: This is a 80 y/o female presenting to the ED for evaluation after sustaining a laceration to her right lower leg  She injured her leg when she accidentally opened a car door onto her leg  She has pain around the site of injury to the right anterior shin, radiating up her right leg  No other injuries or complaints  She takes ASA 81 mg daily  Her tetanus was last updated in March 2022  Approximately 7 cm slightly curved laceration to the right anterolateral lower leg, bleeding controlled, mild surrounding tenderness and bruising, neurovascularly intact  Laceration repaired as documented in procedure note  Patient tolerated procedure well  Poor skin integrity, with suture tension causing mild skin tears, Complex repair  Mitigated tension with 3 horizontal mattress sutures over the central portion of the wound and 10 additional simple interrupted sutures (8 over the primary portion of the wound and to over an adjacent large skin tear)  13 nonabsorbable sutures (10 simple interrupted, 3 horizontal mattress)    I discussed local wound care with the patient and returning to the ER or seeing her primary care doctor in 10-14 days for follow-up and suture removal  Patient reports hx of prior wound infections and is concerned about possible wound infection  Wound thoroughly cleaned, however we will also prescribe at the patient's request a short course of Keflex prophylactically  Disposition  Final diagnoses:   Laceration of right lower leg, initial encounter     Time reflects when diagnosis was documented in both MDM as applicable and the Disposition within this note     Time User Action Codes Description Comment    12/12/2022  4:29 PM Dion Barros Add [[id number]] Laceration of right lower leg, initial encounter       ED Disposition     ED Disposition   Discharge    Condition   Stable    Date/Time   Mon Dec 12, 2022  6:43 PM    [address] discharge to home/self care                 Follow-up Information     Follow up With Specialties Details Why Contact Info Additional Information    Gema Ding,  Family Medicine Go to  In 10-14 days for suture removal [id number] [facility] Emergency Department Emergency Medicine Go to  In 10-14 days for suture removal [facility] [id number]  [address] [facility] Emergency Department, [address] [place], [place], [address]          Discharge Medication List as of 12/12/2022  6:47 PM      START taking these medications    Details   cephalexin (KEFLEX) 500 mg capsule Take 1 capsule (500 mg total) by mouth every 6 (six) hours for 5 days, Starting Mon 12/12/2022, Until Sat 12/17/2022, Normal         CONTINUE these medications which have NOT CHANGED    Details   aspirin (ECOTRIN LOW STRENGTH) 81 mg EC tablet Take 1 tablet (81 mg total) by mouth daily, Starting Thu 1/9/2020, Normal      atorvastatin (LIPITOR) 10 mg tablet Take 1 tablet (10 mg total) by mouth daily with dinner, Starting Wed 9/28/2022, Normal      clotrimazole (MYCELEX) 10 mg valencia Take 1 tablet (10 mg total) by mouth 5 (five) times a day, Starting Wed 12/22/2021, Normal      cyanocobalamin (VITAMIN B-12) 100 mcg tablet Take 500 mcg by mouth daily 500 mcg, Starting Wed 1/7/2015, Historical Med      estradiol (VAGIFEM, YUVAFEM) 10 MCG TABS vaginal tablet Insert 1 tablet (10 mcg total) into the vagina 2 (two) times a week, Starting Thu 1/27/2022, Until Thu 11/3/2022, Print      famotidine (PEPCID) 10 mg tablet Take 10 mg by mouth as needed Takes as needed  If heartburn is really bad she will take 20 mg , Historical Med      Klor-Con 10 10 MEQ tablet TAKE 1 TABLET (10 MEQ TOTAL) BY MOUTH 3 (THREE) TIMES A DAY, Normal      LORazepam (ATIVAN) 0 5 mg tablet Take 1 tablet (0 5 mg total) by mouth every 12 (twelve) hours as needed for anxiety, Starting Tue 5/18/2021, Normal      Magnesium 500 MG CAPS Take by mouth daily, Historical Med      Misc Natural Products (CURCUMAX PRO PO) Take by mouth, Historical Med      Multiple Vitamin (MULTI-VITAMIN PO) Take by mouth, Historical Med      naloxone (NARCAN) 4 mg/0 1 mL nasal spray Administer 1 spray into a nostril  If no response after 2-3 minutes, give another dose in the other nostril using a new spray , Normal      oxyCODONE (ROXICODONE) 5 immediate release tablet Take 1 tablet (5 mg total) by mouth every 6 (six) hours as needed for moderate pain Max Daily Amount: 20 mg, Starting Tue 10/4/2022, Normal      predniSONE 10 mg tablet TAKE 1 TABLET BY MOUTH EVERY DAY, Normal      Vitamin Mixture (JOSE RAMON-C PO) Take 1 tablet by mouth daily, Starting Tue 3/5/2013, Historical Med           No discharge procedures on file  PDMP Review       Value Time User    PDMP Reviewed  Yes 10/4/2022 12:09 PM Candis Gunter DO           ED Provider  Attending physically available and evaluated Symone Harrell  SHAYNA managed the patient along with the ED Attending      Electronically Signed by         Magy Jose MD  12/14/22 4721

## 2022-12-12 NOTE — DISCHARGE INSTRUCTIONS
You were seen in the emergency department for a laceration to the right lower leg  This was repaired with 13 non-absorbable sutures, which need to be removed in 10-14 days; this can be done by returning to the emergency room, going to your primary care provider, or going to an urgent care center  For the next 24 hours, please keep the wound dry, after which you can wash with warm water and soap, but avoid scrubbing as this may damage the sutures  Do not use alcohol, iodine, or peroxide on your wound  If needed, you can apply Vaseline or bacitracin ointment to the wound when changing dressings daily  If you develop any new or worsening symptoms, or signs of wound infection, please return to the ER for evaluation

## 2022-12-14 ENCOUNTER — TELEPHONE (OUTPATIENT)
Dept: GYNECOLOGY | Facility: CLINIC | Age: 67
End: 2022-12-14

## 2022-12-16 ENCOUNTER — OFFICE VISIT (OUTPATIENT)
Dept: FAMILY MEDICINE CLINIC | Facility: CLINIC | Age: 67
End: 2022-12-16

## 2022-12-16 VITALS
DIASTOLIC BLOOD PRESSURE: 78 MMHG | BODY MASS INDEX: 26.12 KG/M2 | HEART RATE: 82 BPM | OXYGEN SATURATION: 98 % | TEMPERATURE: 98.3 F | HEIGHT: 64 IN | WEIGHT: 153 LBS | SYSTOLIC BLOOD PRESSURE: 142 MMHG

## 2022-12-16 DIAGNOSIS — I73.9 PAD (PERIPHERAL ARTERY DISEASE) (HCC): Primary | ICD-10-CM

## 2022-12-16 DIAGNOSIS — E78.2 MIXED HYPERLIPIDEMIA: ICD-10-CM

## 2022-12-16 DIAGNOSIS — M32.19 OTHER SYSTEMIC LUPUS ERYTHEMATOSUS WITH OTHER ORGAN INVOLVEMENT (HCC): ICD-10-CM

## 2022-12-16 DIAGNOSIS — M54.50 LUMBAR PAIN: ICD-10-CM

## 2022-12-16 DIAGNOSIS — I74.09 AORTOILIAC OCCLUSIVE DISEASE (HCC): ICD-10-CM

## 2022-12-16 DIAGNOSIS — I70.209 OCCLUSION OF COMMON FEMORAL ARTERY (HCC): ICD-10-CM

## 2022-12-16 DIAGNOSIS — F41.9 ANXIETY: ICD-10-CM

## 2022-12-16 DIAGNOSIS — Z00.00 MEDICARE ANNUAL WELLNESS VISIT, SUBSEQUENT: ICD-10-CM

## 2022-12-16 RX ORDER — POTASSIUM CHLORIDE 750 MG/1
10 TABLET, FILM COATED, EXTENDED RELEASE ORAL 3 TIMES DAILY
Qty: 270 TABLET | Refills: 1 | Status: SHIPPED | OUTPATIENT
Start: 2022-12-16

## 2022-12-16 RX ORDER — PREDNISONE 10 MG/1
10 TABLET ORAL DAILY
Qty: 90 TABLET | Refills: 1 | Status: SHIPPED | OUTPATIENT
Start: 2022-12-16

## 2022-12-16 RX ORDER — LORAZEPAM 0.5 MG/1
0.5 TABLET ORAL EVERY 12 HOURS PRN
Qty: 30 TABLET | Refills: 0 | Status: SHIPPED | OUTPATIENT
Start: 2022-12-16

## 2022-12-16 RX ORDER — OXYCODONE HYDROCHLORIDE 5 MG/1
5 TABLET ORAL EVERY 6 HOURS PRN
Qty: 30 TABLET | Refills: 0 | Status: SHIPPED | OUTPATIENT
Start: 2022-12-16

## 2022-12-16 RX ORDER — ATORVASTATIN CALCIUM 10 MG/1
10 TABLET, FILM COATED ORAL
Qty: 90 TABLET | Refills: 1 | Status: SHIPPED | OUTPATIENT
Start: 2022-12-16

## 2022-12-16 NOTE — PATIENT INSTRUCTIONS
Medicare Preventive Visit Patient Instructions  Thank you for completing your Welcome to Medicare Visit or Medicare Annual Wellness Visit today  Your next wellness visit will be due in one year (12/17/2023)  The screening/preventive services that you may require over the next 5-10 years are detailed below  Some tests may not apply to you based off risk factors and/or age  Screening tests ordered at today's visit but not completed yet may show as past due  Also, please note that scanned in results may not display below  Preventive Screenings:  Service Recommendations Previous Testing/Comments   Colorectal Cancer Screening  * Colonoscopy    * Fecal Occult Blood Test (FOBT)/Fecal Immunochemical Test (FIT)  * Fecal DNA/Cologuard Test  * Flexible Sigmoidoscopy Age: 39-70 years old   Colonoscopy: every 10 years (may be performed more frequently if at higher risk)  OR  FOBT/FIT: every 1 year  OR  Cologuard: every 3 years  OR  Sigmoidoscopy: every 5 years  Screening may be recommended earlier than age 39 if at higher risk for colorectal cancer  Also, an individualized decision between you and your healthcare provider will decide whether screening between the ages of 74-80 would be appropriate  Colonoscopy: 09/17/2021  FOBT/FIT: Not on file  Cologuard: Not on file  Sigmoidoscopy: Not on file    Screening Current     Breast Cancer Screening Age: 36 years old  Frequency: every 1-2 years  Not required if history of left and right mastectomy Mammogram: 09/14/2021    Screening Current   Cervical Cancer Screening Between the ages of 21-29, pap smear recommended once every 3 years  Between the ages of 33-67, can perform pap smear with HPV co-testing every 5 years     Recommendations may differ for women with a history of total hysterectomy, cervical cancer, or abnormal pap smears in past  Pap Smear: 04/21/2022    Screening Not Indicated   Hepatitis C Screening Once for adults born between 1945 and 1965  More frequently in patients at high risk for Hepatitis C Hep C Antibody: Not on file        Diabetes Screening 1-2 times per year if you're at risk for diabetes or have pre-diabetes Fasting glucose: 83 mg/dL (11/2/2022)  A1C: 5 5 % (8/22/2019)  Screening Current   Cholesterol Screening Once every 5 years if you don't have a lipid disorder  May order more often based on risk factors  Lipid panel: 11/02/2022    Screening Not Indicated  History Lipid Disorder     Other Preventive Screenings Covered by Medicare:  1  Abdominal Aortic Aneurysm (AAA) Screening: covered once if your at risk  You're considered to be at risk if you have a family history of AAA  2  Lung Cancer Screening: covers low dose CT scan once per year if you meet all of the following conditions: (1) Age 50-69; (2) No signs or symptoms of lung cancer; (3) Current smoker or have quit smoking within the last 15 years; (4) You have a tobacco smoking history of at least 20 pack years (packs per day multiplied by number of years you smoked); (5) You get a written order from a healthcare provider  3  Glaucoma Screening: covered annually if you're considered high risk: (1) You have diabetes OR (2) Family history of glaucoma OR (3)  aged 48 and older OR (3)  American aged 72 and older  3  Osteoporosis Screening: covered every 2 years if you meet one of the following conditions: (1) You're estrogen deficient and at risk for osteoporosis based off medical history and other findings; (2) Have a vertebral abnormality; (3) On glucocorticoid therapy for more than 3 months; (4) Have primary hyperparathyroidism; (5) On osteoporosis medications and need to assess response to drug therapy  · Last bone density test (DXA Scan): 05/10/2017  5  HIV Screening: covered annually if you're between the age of 12-76  Also covered annually if you are younger than 13 and older than 72 with risk factors for HIV infection   For pregnant patients, it is covered up to 3 times per pregnancy  Immunizations:  Immunization Recommendations   Influenza Vaccine Annual influenza vaccination during flu season is recommended for all persons aged >= 6 months who do not have contraindications   Pneumococcal Vaccine   * Pneumococcal conjugate vaccine = PCV13 (Prevnar 13), PCV15 (Vaxneuvance), PCV20 (Prevnar 20)  * Pneumococcal polysaccharide vaccine = PPSV23 (Pneumovax) Adults 25-60 years old: 1-3 doses may be recommended based on certain risk factors  Adults 72 years old: 1-2 doses may be recommended based off what pneumonia vaccine you previously received   Hepatitis B Vaccine 3 dose series if at intermediate or high risk (ex: diabetes, end stage renal disease, liver disease)   Tetanus (Td) Vaccine - COST NOT COVERED BY MEDICARE PART B Following completion of primary series, a booster dose should be given every 10 years to maintain immunity against tetanus  Td may also be given as tetanus wound prophylaxis  Tdap Vaccine - COST NOT COVERED BY MEDICARE PART B Recommended at least once for all adults  For pregnant patients, recommended with each pregnancy  Shingles Vaccine (Shingrix) - COST NOT COVERED BY MEDICARE PART B  2 shot series recommended in those aged 48 and above     Health Maintenance Due:      Topic Date Due   • Breast Cancer Screening: Mammogram  09/14/2023   • Colorectal Cancer Screening  09/16/2024   • Cervical Cancer Screening  04/21/2025   • Hepatitis C Screening  Discontinued     Immunizations Due:      Topic Date Due   • Hepatitis A Vaccine (1 of 2 - Risk 2-dose series) Never done   • Hepatitis B Vaccine (1 of 3 - Risk 3-dose series) Never done   • Pneumococcal Vaccine: 65+ Years (2 - PCV) 04/06/2019     Advance Directives   What are advance directives? Advance directives are legal documents that state your wishes and plans for medical care  These plans are made ahead of time in case you lose your ability to make decisions for yourself   Advance directives can apply to any medical decision, such as the treatments you want, and if you want to donate organs  What are the types of advance directives? There are many types of advance directives, and each state has rules about how to use them  You may choose a combination of any of the following:  · Living will: This is a written record of the treatment you want  You can also choose which treatments you do not want, which to limit, and which to stop at a certain time  This includes surgery, medicine, IV fluid, and tube feedings  · Durable power of  for healthcare [facility]): This is a written record that states who you want to make healthcare choices for you when you are unable to make them for yourself  This person, called a proxy, is usually a family member or a friend  You may choose more than 1 proxy  · Do not resuscitate (DNR) order:  A DNR order is used in case your heart stops beating or you stop breathing  It is a request not to have certain forms of treatment, such as CPR  A DNR order may be included in other types of advance directives  · Medical directive: This covers the care that you want if you are in a coma, near death, or unable to make decisions for yourself  You can list the treatments you want for each condition  Treatment may include pain medicine, surgery, blood transfusions, dialysis, IV or tube feedings, and a ventilator (breathing machine)  · Values history: This document has questions about your views, beliefs, and how you feel and think about life  This information can help others choose the care that you would choose  Why are advance directives important? An advance directive helps you control your care  Although spoken wishes may be used, it is better to have your wishes written down  Spoken wishes can be misunderstood, or not followed  Treatments may be given even if you do not want them  An advance directive may make it easier for your family to make difficult choices about your care  Cigarette Smoking and Your Health   Risks to your health if you smoke:  Nicotine and other chemicals found in tobacco damage every cell in your body  Even if you are a light smoker, you have an increased risk for cancer, heart disease, and lung disease  If you are pregnant or have diabetes, smoking increases your risk for complications  Benefits to your health if you stop smoking:   · You decrease respiratory symptoms such as coughing, wheezing, and shortness of breath  · You reduce your risk for cancers of the lung, mouth, throat, kidney, bladder, pancreas, stomach, and cervix  If you already have cancer, you increase the benefits of chemotherapy  You also reduce your risk for cancer returning or a second cancer from developing  · You reduce your risk for heart disease, blood clots, heart attack, and stroke  · You reduce your risk for lung infections, and diseases such as pneumonia, asthma, chronic bronchitis, and emphysema  · Your circulation improves  More oxygen can be delivered to your body  If you have diabetes, you lower your risk for complications, such as kidney, artery, and eye diseases  You also lower your risk for nerve damage  Nerve damage can lead to amputations, poor vision, and blindness  · You improve your body's ability to heal and to fight infections  For more information and support to stop smoking:   · As Seen on TV  gov  Phone: 0- 183 - 626-2816  Web Address: www [employer]  Weight Management   Why it is important to manage your weight:  Being overweight increases your risk of health conditions such as heart disease, high blood pressure, type 2 diabetes, and certain types of cancer  It can also increase your risk for osteoarthritis, sleep apnea, and other respiratory problems  Aim for a slow, steady weight loss  Even a small amount of weight loss can lower your risk of health problems    How to lose weight safely:  A safe and healthy way to lose weight is to eat fewer calories and get regular exercise  You can lose up about 1 pound a week by decreasing the number of calories you eat by 500 calories each day  Healthy meal plan for weight management:  A healthy meal plan includes a variety of foods, contains fewer calories, and helps you stay healthy  A healthy meal plan includes the following:  · Eat whole-grain foods more often  A healthy meal plan should contain fiber  Fiber is the part of grains, fruits, and vegetables that is not broken down by your body  Whole-grain foods are healthy and provide extra fiber in your diet  Some examples of whole-grain foods are whole-wheat breads and pastas, oatmeal, brown rice, and bulgur  · Eat a variety of vegetables every day  Include dark, leafy greens such as spinach, kale, bridger greens, and mustard greens  Eat yellow and orange vegetables such as carrots, sweet potatoes, and winter squash  · Eat a variety of fruits every day  Choose fresh or canned fruit (canned in its own juice or light syrup) instead of juice  Fruit juice has very little or no fiber  · Eat low-fat dairy foods  Drink fat-free (skim) milk or 1% milk  Eat fat-free yogurt and low-fat cottage cheese  Try low-fat cheeses such as mozzarella and other reduced-fat cheeses  · Choose meat and other protein foods that are low in fat  Choose beans or other legumes such as split peas or lentils  Choose fish, skinless poultry (chicken or turkey), or lean cuts of red meat (beef or pork)  Before you cook meat or poultry, cut off any visible fat  · Use less fat and oil  Try baking foods instead of frying them  Add less fat, such as margarine, sour cream, regular salad dressing and mayonnaise to foods  Eat fewer high-fat foods  Some examples of high-fat foods include french fries, doughnuts, ice cream, and cakes  · Eat fewer sweets  Limit foods and drinks that are high in sugar  This includes candy, cookies, regular soda, and sweetened drinks    Exercise:  Exercise at least 30 minutes per day on most days of the week  Some examples of exercise include walking, biking, dancing, and swimming  You can also fit in more physical activity by taking the stairs instead of the elevator or parking farther away from stores  Ask your healthcare provider about the best exercise plan for you  Narcotic (Opioid) Safety    Use narcotics safely:  · Take prescribed narcotics exactly as directed  · Do not give narcotics to others or take narcotics that belong to someone else  · Do not mix narcotics without medicines or alcohol  · Do not drive or operate heavy machinery after you take the narcotic  · Monitor for side effects and notify your healthcare provider if you experienced side effects such as nausea, sleepiness, itching, or trouble thinking clearly  Manage constipation:    Constipation is the most common side effect of narcotic medicine  Constipation is when you have hard, dry bowel movements, or you go longer than usual between bowel movements  Tell your healthcare provider about all changes in your bowel movements while you are taking narcotics  He or she may recommend laxative medicine to help you have a bowel movement  He or she may also change the kind of narcotic you are taking, or change when you take it  The following are more ways you can prevent or relieve constipation:    · Drink liquids as directed  You may need to drink extra liquids to help soften and move your bowels  Ask how much liquid to drink each day and which liquids are best for you  · Eat high-fiber foods  This may help decrease constipation by adding bulk to your bowel movements  High-fiber foods include fruits, vegetables, whole-grain breads and cereals, and beans  Your healthcare provider or dietitian can help you create a high-fiber meal plan  Your provider may also recommend a fiber supplement if you cannot get enough fiber from food  · Exercise regularly  Regular physical activity can help stimulate your intestines  Walking is a good exercise to prevent or relieve constipation  Ask which exercises are best for you  · Schedule a time each day to have a bowel movement  This may help train your body to have regular bowel movements  Bend forward while you are on the toilet to help move the bowel movement out  Sit on the toilet for at least 10 minutes, even if you do not have a bowel movement  Store narcotics safely:   · Store narcotics where others cannot easily get them  Keep them in a locked cabinet or secure area  Do not  keep them in a purse or other bag you carry with you  A person may be looking for something else and find the narcotics  · Make sure narcotics are stored out of the reach of children  A child can easily overdose on narcotics  Narcotics may look like candy to a small child  The best way to dispose of narcotics: The laws vary by country and area  In the [place], the best way is to return the narcotics through a take-back program  This program is offered by the [employer])  The following are options for using the program:  · Take the narcotics to a DANIEL collection site  The site is often a law enforcement center  Call your local law enforcement center for scheduled take-back days in your area  You will be given information on where to go if the collection site is in a different location  · Take the narcotics to an approved pharmacy or hospital   A pharmacy or hospital may be set up as a collection site  You will need to ask if it is a DANIEL collection site if you were not directed there  A pharmacy or doctor's office may not be able to take back narcotics unless it is a DANIEL site  · Use a mail-back system  This means you are given containers to put the narcotics into  You will then mail them in the containers  · Use a take-back drop box  This is a place to leave the narcotics at any time  People and animals will not be able to get into the box   Your local law enforcement agency can tell you where to find a drop box in your area  Other ways to manage pain:   · Ask your healthcare provider about non-narcotic medicines to control pain  Nonprescription medicines include NSAIDs (such as ibuprofen) and acetaminophen  Prescription medicines include muscle relaxers, antidepressants, and steroids  · Pain may be managed without any medicines  Some ways to relieve pain include massage, aromatherapy, or meditation  Physical or occupational therapy may also help  For more information:   · Drug Enforcement Administration  [facility] [address]  Phone: 0- 633 - 120-1185  Web Address: [facility]/drug_disposal/    · [address]  and Drug Administration  [facility] , [address] [facility]  Phone: 7- 281 - 391-9619  Web Address: http://[employer]/     © Copyright [employer] 2018 Information is for End User's use only and may not be sold, redistributed or otherwise used for commercial purposes   All illustrations and images included in CareNotes® are the copyrighted property of A D A M , Inc  or [address] [place] [address] [facility]

## 2022-12-16 NOTE — PROGRESS NOTES
Assessment and Plan:     Problem List Items Addressed This Visit        Cardiovascular and Mediastinum    Aortoiliac occlusive disease ([facility] [address] )    Relevant Medications    atorvastatin (LIPITOR) 10 mg tablet    PAD (peripheral artery disease) ([facility]) - Primary    Relevant Medications    oxyCODONE (ROXICODONE) 5 immediate release tablet    atorvastatin (LIPITOR) 10 mg tablet       Other    SLE (systemic lupus erythematosus) ([facility])    Relevant Medications    potassium chloride (Klor-Con 10) 10 mEq tablet    predniSONE 10 mg tablet    Lumbar pain    Relevant Medications    predniSONE 10 mg tablet    Mixed hyperlipidemia    Relevant Medications    atorvastatin (LIPITOR) 10 mg tablet   Other Visit Diagnoses     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[facility])        Relevant Medications    oxyCODONE (ROXICODONE) 5 immediate release tablet    Anxiety        Relevant Medications    LORazepam (ATIVAN) 0 5 mg tablet    Medicare annual wellness visit, subsequent              Depression Screening and Follow-up Plan: Patient was screened for depression during today's encounter  They screened negative with a PHQ-2 score of 0  Tobacco Cessation Counseling: Tobacco cessation counseling was provided  The patient is sincerely urged to quit consumption of tobacco  She is not ready to quit tobacco  Medication options discussed  Preventive health issues were discussed with patient, and age appropriate screening tests were ordered as noted in patient's After Visit Summary  Personalized health advice and appropriate referrals for health education or preventive services given if needed, as noted in patient's After Visit Summary  History of Present Illness:     Patient presents for a Medicare Wellness Visit    Patient is here to follow-up on chronic conditions as well as wellness exam   Patient will need refills for chronic conditions  Patient did have laboratory studies  Labs and vaccines reviewed  Patient still with some fatigue  Patient Care Team:  Pau Purcell DO as PCP - General  Jaqueline Lawrence MD (Obstetrics and Gynecology)  Ab Reeves MD (Obstetrics and Gynecology)  Adair Valencia MD (Endocrinology)  Rodger Shultz DO (Obstetrics and Gynecology)  Jacinto Frances DO (Vascular Surgery)     Review of Systems:     Review of Systems   Constitutional: Positive for fatigue  HENT: Negative  Eyes: Negative  Respiratory: Negative  Cardiovascular: Negative  Gastrointestinal: Negative  Endocrine: Negative  Genitourinary: Negative  Musculoskeletal: Negative  Skin: Negative  Allergic/Immunologic: Negative  Neurological: Negative  Hematological: Negative  Psychiatric/Behavioral: Negative           Problem List:     Patient Active Problem List   Diagnosis   • Chronic bilateral low back pain with bilateral sciatica   • Fibromyalgia   • GERD (gastroesophageal reflux disease)   • Lung cancer ([facility] [address] )   • Osteoporosis   • Primary osteoarthritis involving multiple joints   • SLE (systemic lupus erythematosus) ([facility])   • Localized edema   • Acute cystitis without hematuria   • Open toe wound, initial encounter   • Neuropathy   • Raynaud's disease without gangrene   • Tobacco dependence   • Aortoiliac occlusive disease ([facility])   • PAD (peripheral artery disease) ([facility])   • Lumbar pain   • Thrush   • Microscopic hematuria   • Right flank pain   • Benign essential microscopic hematuria   • Cellulitis   • Right foot pain   • History of lung cancer   • Chronic hypokalemia   • Tobacco user   • Constipation   • Acute sore throat   • Multiple thyroid nodules   • Preop cardiovascular exam   • Cortical age-related cataract of both eyes   • Mixed hyperlipidemia   • Continuous opioid dependence ([facility] [address] )   • Leg swelling      Past Medical and Surgical History:     Past Medical History:   Diagnosis Date   • Anxiety    • Arthritis 12/20/2014   • Cataract     eulalia   • Disease of thyroid gland     nodules   • Fibromyalgia, primary    • Frequent UTI    • History of lung cancer     lower left lobe removed - 2004   • Irritable bowel syndrome    • Lactose intolerance    • Lupus (HCC)    • 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   • PAD (peripheral artery disease) (HCC)    • Presence of pessary    • Raynaud disease    • Sciatica    • Sinus headache    • Sjogren's syndrome ([facility] [address] )    • Smoker      Past Surgical History:   Procedure Laterality Date   • LUNG REMOVAL, PARTIAL  05/01/2004    Prior Surgical/Procedural History - 5/4 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 - lung resection - subtotal   • MOUTH SURGERY      Tooth extraction   • ME THROMBOENDARTECTMY FEMORAL COMMON Left 9/10/2019    Procedure: ENDARTERECTOMY ARTERIAL FEMORAL,  WITH BOVINE GRAFT AND PROFUNDOPLASTY;  Surgeon: Luda De Souza DO;  Location: [facility];  Service: Vascular   • SINUS SURGERY        Family History:     Family History   Problem Relation Age of Onset   • Arthritis Mother    • Asthma Mother    • Dementia Mother    • Heart disease Mother    • Hypothyroidism Mother    • Irritable bowel syndrome Mother    • Uterine cancer Mother 72   • Heart attack Father    • Stroke Father    • Hyperlipidemia Sister    • Uterine cancer Sister         age unknown   • Heart attack Maternal Grandfather    • Asthma Family    • Cataracts Family    • Thyroid disease Family    • No Known Problems Paternal Aunt    • No Known Problems Maternal Aunt    • No Known Problems Maternal Grandmother    • No Known Problems Paternal Grandmother    • No Known Problems Paternal Grandfather       Social History:     Social History     Socioeconomic History   • Marital status: /Civil Union     Spouse name: None   • Number of children: 1   • Years of education: None   • Highest education level: None   Occupational History   • Occupation: office work retired   Tobacco Use   • Smoking status: Every Day     Packs/day: 0 50     Years: 40 00     Pack years: 20 00     Types: Cigarettes   • Smokeless tobacco: Never   Vaping Use   • Vaping Use: Never used   Substance and Sexual Activity   • Alcohol use: Not Currently     Comment: liquor rarely   • Drug use: No   • Sexual activity: Not Currently     Partners: Male   Other Topics Concern   • None   Social History Narrative    Always uses seat belt    Caffeine use    Primary spoken language English    Gnosticist    Racial background:      Drinks Tea     Social Determinants of Health     Financial Resource Strain: Low Risk    • Difficulty of Paying Living Expenses: Not hard at all   Food Insecurity: Not on file   Transportation Needs: No Transportation Needs   • Lack of Transportation (Medical): No   • Lack of Transportation (Non-Medical): No   Physical Activity: Not on file   Stress: Not on file   Social Connections: Not on file   Intimate Partner Violence: Not on file   Housing Stability: Not on file      Medications and Allergies:     Current Outpatient Medications   Medication Sig Dispense Refill   • aspirin (ECOTRIN LOW STRENGTH) 81 mg EC tablet Take 1 tablet (81 mg total) by mouth daily 90 tablet 3   • atorvastatin (LIPITOR) 10 mg tablet Take 1 tablet (10 mg total) by mouth daily with dinner 90 tablet 1   • cephalexin (KEFLEX) 500 mg capsule Take 1 capsule (500 mg total) by mouth every 6 (six) hours for 5 days 20 capsule 0   • clotrimazole (MYCELEX) 10 mg yu Take 1 tablet (10 mg total) by mouth 5 (five) times a day 35 Yu 1   • cyanocobalamin (VITAMIN B-12) 100 mcg tablet Take 500 mcg by mouth daily 500 mcg     • famotidine (PEPCID) 10 mg tablet Take 10 mg by mouth as needed Takes as needed   If heartburn is really bad she will take 20 mg      • LORazepam (ATIVAN) 0 5 mg tablet Take 1 tablet (0 5 mg total) by mouth every 12 (twelve) hours as needed for anxiety 30 tablet 0   • Magnesium 500 MG CAPS Take by mouth daily     • Misc Natural Products (CURCUMAX PRO PO) Take by mouth     • Multiple Vitamin (MULTI-VITAMIN PO) Take by mouth     • naloxone (NARCAN) 4 mg/0 1 mL nasal spray Administer 1 spray into a nostril  If no response after 2-3 minutes, give another dose in the other nostril using a new spray  1 each 1   • oxyCODONE (ROXICODONE) 5 immediate release tablet Take 1 tablet (5 mg total) by mouth every 6 (six) hours as needed for moderate pain Max Daily Amount: 20 mg 30 tablet 0   • potassium chloride (Klor-Con 10) 10 mEq tablet Take 1 tablet (10 mEq total) by mouth 3 (three) times a day 270 tablet 1   • predniSONE 10 mg tablet Take 1 tablet (10 mg total) by mouth daily 90 tablet 1   • Vitamin Mixture (JOSE RAMON-C PO) Take 1 tablet by mouth daily       No current facility-administered medications for this visit       Allergies   Allergen Reactions   • Penicillins Shortness Of Breath and Edema   • Cymbalta [Duloxetine Hcl] Diarrhea   • Duloxetine Diarrhea   • Dust Mite Extract    • Lactose - Food Allergy GI Intolerance   • Neosporin [Neomycin-Bacitracin Zn-Polymyx]      Other reaction(s): REDNESS   • Nitrofurantoin Rash   • Omeprazole Rash   • Sulfamethoxazole-Trimethoprim Rash      Immunizations:     Immunization History   Administered Date(s) Administered   • COVID-19 MODERNA VACC 0 5 ML IM 04/05/2021, 05/05/2021, 12/29/2021, 06/13/2022   • COVID-19 Moderna Vac BIVALENT 18 Yr+ Im (BOOSTER ONLY) 11/28/2022   • INFLUENZA 12/11/2012, 10/08/2013, 12/16/2014, 10/18/2018, 10/03/2022   • Influenza Quadrivalent Preservative Free 3 years and older IM 09/09/2015, 10/04/2016, 10/05/2017   • Influenza Quadrivalent, 6-35 Months IM 09/11/2014   • Influenza, high dose seasonal 0 7 mL 10/07/2021, 10/03/2022   • Influenza, injectable, quadrivalent, preservative free 0 5 mL 10/15/2020   • Influenza, recombinant, quadrivalent,injectable, preservative free 11/04/2019   • Pneumococcal Conjugate PCV 7 03/05/2013   • Pneumococcal Polysaccharide PPV23 04/06/2018   • TD (adult) Preservative Free 03/22/2022   • Td (adult), adsorbed 10/13/2003   • Tdap 10/20/2010   • Tuberculin Skin Test-PPD Intradermal 10/13/2003, 03/15/2005   • Zoster 08/29/2018      Health Maintenance:         Topic Date Due   • Breast Cancer Screening: Mammogram  09/14/2023   • Colorectal Cancer Screening  09/16/2024   • Cervical Cancer Screening  04/21/2025   • Hepatitis C Screening  Discontinued         Topic Date Due   • Hepatitis A Vaccine (1 of 2 - Risk 2-dose series) Never done   • Hepatitis B Vaccine (1 of 3 - Risk 3-dose series) Never done   • Pneumococcal Vaccine: 65+ Years (2 - PCV) 04/06/2019      Medicare Screening Tests and Risk Assessments:     Jeramy Tafoya is here for her Subsequent Wellness visit  Health Risk Assessment:   Patient rates overall health as poor  Patient feels that their physical health rating is slightly better  Patient is satisfied with their life  Eyesight was rated as same  Hearing was rated as same  Patient feels that their emotional and mental health rating is same  Patients states they are never, rarely angry  Patient states they are often unusually tired/fatigued  Pain experienced in the last 7 days has been a lot  Patient's pain rating has been 7/10  Patient states that she has experienced no weight loss or gain in last 6 months  Depression Screening:   PHQ-2 Score: 0      Fall Risk Screening: In the past year, patient has experienced: no history of falling in past year      Urinary Incontinence Screening:   Patient has not leaked urine accidently in the last six months  Home Safety:  Patient does not have trouble with stairs inside or outside of their home  Patient has working smoke alarms and has working carbon monoxide detector  Home safety hazards include: none  Nutrition:   Current diet is Regular  Medications:   Patient is currently taking over-the-counter supplements  OTC medications include: see medication list  Patient is able to manage medications       Activities of Daily Living (ADLs)/Instrumental Activities of Daily Living (IADLs):   Walk and transfer into and out of bed and chair?: Yes  Dress and groom yourself?: Yes    Bathe or shower yourself?: Yes    Feed yourself? Yes  Do your laundry/housekeeping?: Yes  Manage your money, pay your bills and track your expenses?: Yes  Make your own meals?: Yes    Do your own shopping?: Yes    Previous Hospitalizations:   Any hospitalizations or ED visits within the last 12 months?: Yes    How many hospitalizations have you had in the last year?: 1-2    Advance Care Planning:   Living will: No    Durable POA for healthcare: No    Advanced directive: No      Cognitive Screening:   Provider or family/friend/caregiver concerned regarding cognition?: No    PREVENTIVE SCREENINGS      Cardiovascular Screening:    General: Screening Not Indicated, History Lipid Disorder, Risks and Benefits Discussed and Screening Current      Diabetes Screening:     General: Screening Current and Risks and Benefits Discussed      Colorectal Cancer Screening:     General: Screening Current      Breast Cancer Screening:     General: Screening Current and Risks and Benefits Discussed      Cervical Cancer Screening:    General: Screening Not Indicated, Risks and Benefits Discussed and Screening Current      Osteoporosis Screening:    General: Screening Not Indicated, History Osteoporosis, Risks and Benefits Discussed and Patient Declines      Abdominal Aortic Aneurysm (AAA) Screening:        General: Risks and Benefits Discussed and Screening Not Indicated      Lung Cancer Screening:     General: Screening Not Indicated, History Lung Cancer and Risks and Benefits Discussed      Hepatitis C Screening:    General: Risks and Benefits Discussed    Review of Current Opioid Use    Opioid Risk Tool (ORT) Interpretation: Complete Opioid Risk Tool (ORT)    Other Counseling Topics:   Regular weightbearing exercise  No results found       Physical Exam:     /78 (BP Location: Right arm, Patient Position: Sitting, Cuff Size: Standard)   Pulse 82 Temp 98 3 °F (36 8 °C) (Temporal)   Ht 5' 4" (1 626 m)   Wt 69 4 kg (153 lb)   LMP  (LMP Unknown)   SpO2 98%   BMI 26 26 kg/m²     Physical Exam  Vitals and nursing note reviewed  Constitutional:       General: She is not in acute distress  Appearance: Normal appearance  She is well-developed  She is not ill-appearing, toxic-appearing or diaphoretic  HENT:      Head: Normocephalic and atraumatic  Right Ear: Tympanic membrane, ear canal and external ear normal       Left Ear: Tympanic membrane, ear canal and external ear normal       Nose: Nose normal       Mouth/Throat:      Pharynx: No oropharyngeal exudate  Eyes:      General: No scleral icterus  Right eye: No discharge  Left eye: No discharge  Conjunctiva/sclera: Conjunctivae normal       Pupils: Pupils are equal, round, and reactive to light  Neck:      Thyroid: No thyromegaly  Vascular: No carotid bruit or JVD  Trachea: No tracheal deviation  Cardiovascular:      Rate and Rhythm: Normal rate and regular rhythm  Pulses: Normal pulses  Heart sounds: Normal heart sounds  No murmur heard  No friction rub  No gallop  Pulmonary:      Effort: Pulmonary effort is normal  No respiratory distress  Breath sounds: Normal breath sounds  No stridor  No wheezing or rales  Chest:      Chest wall: No tenderness  Musculoskeletal:         General: No tenderness or deformity  Normal range of motion  Cervical back: Normal range of motion and neck supple  No rigidity or tenderness  Lymphadenopathy:      Cervical: No cervical adenopathy  Skin:     General: Skin is warm and dry  Capillary Refill: Capillary refill takes less than 2 seconds  Coloration: Skin is not pale  Findings: No erythema or rash  Comments: Laceration right leg intact with sutures  Neurological:      Mental Status: She is alert and oriented to person, place, and time  Mental status is at baseline  Cranial Nerves: No cranial nerve deficit  Sensory: No sensory deficit  Motor: No abnormal muscle tone  Coordination: Coordination normal       Deep Tendon Reflexes: Reflexes normal    Psychiatric:         Mood and Affect: Mood normal          Behavior: Behavior normal          Thought Content:  Thought content normal          Judgment: Judgment normal           Bhumika Escalante DO

## 2022-12-21 DIAGNOSIS — B37.0 ORAL YEAST INFECTION: Primary | ICD-10-CM

## 2022-12-21 NOTE — TELEPHONE ENCOUNTER
Patient called in stating she was on an antibiotic and now she has yeast in her mouth and she is wondering if something can be prescribed

## 2022-12-27 ENCOUNTER — OFFICE VISIT (OUTPATIENT)
Dept: FAMILY MEDICINE CLINIC | Facility: CLINIC | Age: 67
End: 2022-12-27

## 2022-12-27 VITALS
OXYGEN SATURATION: 98 % | SYSTOLIC BLOOD PRESSURE: 120 MMHG | TEMPERATURE: 97.1 F | BODY MASS INDEX: 26.26 KG/M2 | DIASTOLIC BLOOD PRESSURE: 70 MMHG | HEART RATE: 73 BPM | HEIGHT: 64 IN

## 2022-12-27 DIAGNOSIS — Z48.02 VISIT FOR SUTURE REMOVAL: ICD-10-CM

## 2022-12-27 DIAGNOSIS — J40 BRONCHITIS: Primary | ICD-10-CM

## 2022-12-27 DIAGNOSIS — B37.0 THRUSH: ICD-10-CM

## 2022-12-27 RX ORDER — LEVOFLOXACIN 500 MG/1
500 TABLET, FILM COATED ORAL EVERY 24 HOURS
Qty: 7 TABLET | Refills: 0 | Status: SHIPPED | OUTPATIENT
Start: 2022-12-27 | End: 2023-01-03

## 2022-12-27 RX ORDER — CLOTRIMAZOLE 10 MG/1
10 LOZENGE ORAL; TOPICAL
Qty: 70 TROCHE | Refills: 1 | Status: SHIPPED | OUTPATIENT
Start: 2022-12-27

## 2022-12-27 NOTE — PROGRESS NOTES
Assessment/Plan: ER records reviewed and other records reviewed  Patient will use Levaquin as directed along with clotrimazole atrocious  Sutures removed at this time  Follow-up per routine  Diagnoses and all orders for this visit:    Bronchitis  -     clotrimazole (MYCELEX) 10 mg valencia; Take 1 tablet (10 mg total) by mouth 5 (five) times a day  -     levofloxacin (LEVAQUIN) 500 mg tablet; Take 1 tablet (500 mg total) by mouth every 24 hours for 7 days    Thrush  -     clotrimazole (MYCELEX) 10 mg valencia; Take 1 tablet (10 mg total) by mouth 5 (five) times a day    Visit for suture removal            Subjective:        Patient ID: Sivakumar Rivas is a 79 y o  female  Patient is here for suture removal right leg  Patient had 13 sutures placed on the 12th  Patient also with cough, postnasal drip  This is going on for the past few weeks  The following portions of the patient's history were reviewed and updated as appropriate: allergies, current medications, past family history, past medical history, past social history, past surgical history and problem list       Review of Systems   Constitutional: Negative  HENT: Negative  Eyes: Negative  Respiratory: Positive for cough  Cardiovascular: Negative  Gastrointestinal: Negative  Endocrine: Negative  Genitourinary: Negative  Musculoskeletal: Negative  Skin: Negative  Allergic/Immunologic: Negative  Neurological: Negative  Hematological: Negative  Psychiatric/Behavioral: Negative  Objective:               /70 (BP Location: Left arm, Patient Position: Sitting, Cuff Size: Standard)   Pulse 73   Temp (!) 97 1 °F (36 2 °C) (Tympanic)   Ht 5' 4" (1 626 m)   LMP  (LMP Unknown)   SpO2 98%   BMI 26 26 kg/m²          Physical Exam  Nursing note reviewed  Constitutional:       General: She is not in acute distress  Appearance: Normal appearance   She is not ill-appearing, toxic-appearing or diaphoretic  HENT:      Head: Normocephalic and atraumatic  Cardiovascular:      Pulses: Normal pulses  Heart sounds: Normal heart sounds  Pulmonary:      Effort: Pulmonary effort is normal       Breath sounds: Normal breath sounds  Neurological:      Mental Status: She is alert  Psychiatric:         Mood and Affect: Mood normal          Behavior: Behavior normal          Thought Content:  Thought content normal          Judgment: Judgment normal

## 2022-12-28 ENCOUNTER — TELEPHONE (OUTPATIENT)
Dept: NEUROLOGY | Facility: CLINIC | Age: 67
End: 2022-12-28

## 2022-12-28 NOTE — TELEPHONE ENCOUNTER
Pt left  re: lab results and repeat blood work  Pt states that she never received results and she was told by her PCP that there were orders for repeat labs to be drawn that she knows nothing about  She is asking for a call back to get some clarification on any repeat labs, and also to find out the original lab results  Call back # [phone]  Thank you  Unable to post transcription as Voicemail Transcript box was empty

## 2022-12-29 ENCOUNTER — TELEPHONE (OUTPATIENT)
Dept: GYNECOLOGY | Facility: CLINIC | Age: 67
End: 2022-12-29

## 2022-12-29 NOTE — TELEPHONE ENCOUNTER
I called pt and left her a message to call the office back and ask for me  We ordered 3 pessary's (2 3/4, 3 & 3 1/4)  We did not receive the 2 3/4 & 3 so a message was sent to have them ordered

## 2022-12-29 NOTE — TELEPHONE ENCOUNTER
----- Message from Cesilia Rizo, DO sent at 11/10/2022  2:12 PM EST -----  Quan Gilbert,    Can you please order Lyudmila tena, 2 3/4, 3 and 3 1/4? They are for this patient  When they come in [facility] will call the patient and bring her in      Keep me updated  Thanks  Manohar Sibley

## 2023-01-17 ENCOUNTER — OFFICE VISIT (OUTPATIENT)
Dept: FAMILY MEDICINE CLINIC | Facility: CLINIC | Age: 68
End: 2023-01-17

## 2023-01-17 VITALS
HEART RATE: 95 BPM | TEMPERATURE: 97.1 F | OXYGEN SATURATION: 95 % | DIASTOLIC BLOOD PRESSURE: 70 MMHG | WEIGHT: 151 LBS | BODY MASS INDEX: 25.92 KG/M2 | SYSTOLIC BLOOD PRESSURE: 130 MMHG

## 2023-01-17 DIAGNOSIS — R77.9 ELEVATED SERUM PROTEIN LEVEL: ICD-10-CM

## 2023-01-17 DIAGNOSIS — M79.7 FIBROMYALGIA: ICD-10-CM

## 2023-01-17 DIAGNOSIS — R30.0 DYSURIA: ICD-10-CM

## 2023-01-17 DIAGNOSIS — F11.20 CONTINUOUS OPIOID DEPENDENCE (HCC): ICD-10-CM

## 2023-01-17 DIAGNOSIS — C34.00 MALIGNANT NEOPLASM OF HILUS OF LUNG, UNSPECIFIED LATERALITY (HCC): ICD-10-CM

## 2023-01-17 DIAGNOSIS — I74.09 AORTOILIAC OCCLUSIVE DISEASE (HCC): ICD-10-CM

## 2023-01-17 DIAGNOSIS — T50.Z95A ADVERSE EFFECT OF VACCINE, INITIAL ENCOUNTER: ICD-10-CM

## 2023-01-17 DIAGNOSIS — M32.19 OTHER SYSTEMIC LUPUS ERYTHEMATOSUS WITH OTHER ORGAN INVOLVEMENT (HCC): ICD-10-CM

## 2023-01-17 DIAGNOSIS — L03.115 CELLULITIS OF RIGHT LOWER EXTREMITY: Primary | ICD-10-CM

## 2023-01-17 LAB
SL AMB  POCT GLUCOSE, UA: NEGATIVE
SL AMB LEUKOCYTE ESTERASE,UA: NEGATIVE
SL AMB POCT BILIRUBIN,UA: NEGATIVE
SL AMB POCT BLOOD,UA: POSITIVE
SL AMB POCT CLARITY,UA: CLEAR
SL AMB POCT COLOR,UA: YELLOW
SL AMB POCT KETONES,UA: POSITIVE
SL AMB POCT NITRITE,UA: NEGATIVE
SL AMB POCT PH,UA: 6
SL AMB POCT SPECIFIC GRAVITY,UA: 1.01
SL AMB POCT URINE PROTEIN: POSITIVE
SL AMB POCT UROBILINOGEN: NEGATIVE

## 2023-01-17 RX ORDER — DOXYCYCLINE HYCLATE 100 MG/1
100 CAPSULE ORAL EVERY 12 HOURS SCHEDULED
Qty: 20 CAPSULE | Refills: 0 | Status: SHIPPED | OUTPATIENT
Start: 2023-01-17 | End: 2023-01-27

## 2023-01-17 NOTE — PROGRESS NOTES
Assessment/Plan: UA CNS to be done  Patient use doxycycline as directed  Patient go for laboratory studies  Diagnoses and all orders for this visit:    Cellulitis of right lower extremity  -     doxycycline hyclate (VIBRAMYCIN) 100 mg capsule; Take 1 capsule (100 mg total) by mouth every 12 (twelve) hours for 10 days    Dysuria  -     Cancel: Urine culture; Future  -     POCT urine dip  -     Urine culture    Elevated serum protein level  -     Protein electrophoresis, serum; Future    Malignant neoplasm of hilus of lung, unspecified laterality (HCC)    Other systemic lupus erythematosus with other organ involvement (HCC)    Aortoiliac occlusive disease (HCC)    Continuous opioid dependence ([facility] [address] )    Fibromyalgia    Adverse effect of vaccine, initial encounter            Subjective:        Patient ID: Violeta Gonzalez is a 79 y o  female  Patient is here with multiple issues status post COVID booster  The following portions of the patient's history were reviewed and updated as appropriate: allergies, current medications, past family history, past medical history, past social history, past surgical history and problem list       Review of Systems   Constitutional: Positive for fatigue  HENT: Negative  Eyes: Negative  Respiratory: Negative  Cardiovascular: Negative  Gastrointestinal: Negative  Endocrine: Negative  Genitourinary: Positive for dysuria  Musculoskeletal: Positive for arthralgias and myalgias  Skin: Negative  Allergic/Immunologic: Negative  Neurological: Positive for numbness  Hematological: Negative  Psychiatric/Behavioral: The patient is nervous/anxious  Objective:      BMI Counseling: Body mass index is 25 92 kg/m²  The BMI is above normal  Nutrition recommendations include decreasing fast food intake  Exercise recommendations include exercising 3-5 times per week  Rationale for BMI follow-up plan is due to patient being overweight or obese  Depression Screening and Follow-up Plan: Clincally patient does not have depression  No treatment is required  /70   Pulse 95   Temp (!) 97 1 °F (36 2 °C)   Wt 68 5 kg (151 lb)   LMP  (LMP Unknown)   SpO2 95%   BMI 25 92 kg/m²          Physical Exam  Vitals and nursing note reviewed  Constitutional:       General: She is not in acute distress  Appearance: She is not ill-appearing, toxic-appearing or diaphoretic  HENT:      Head: Normocephalic and atraumatic  Right Ear: Tympanic membrane, ear canal and external ear normal  There is no impacted cerumen  Left Ear: Tympanic membrane, ear canal and external ear normal  There is no impacted cerumen  Eyes:      General: No scleral icterus  Right eye: No discharge  Left eye: No discharge  Extraocular Movements: Extraocular movements intact  Conjunctiva/sclera: Conjunctivae normal       Pupils: Pupils are equal, round, and reactive to light  Neck:      Vascular: No carotid bruit  Cardiovascular:      Rate and Rhythm: Normal rate and regular rhythm  Pulses: Normal pulses  Heart sounds: Normal heart sounds  No murmur heard  No friction rub  No gallop  Pulmonary:      Effort: Pulmonary effort is normal  No respiratory distress  Breath sounds: Normal breath sounds  No stridor  No wheezing, rhonchi or rales  Chest:      Chest wall: No tenderness  Musculoskeletal:         General: Tenderness present  No swelling, deformity or signs of injury  Cervical back: Normal range of motion and neck supple  No rigidity  No muscular tenderness  Right lower leg: No edema  Left lower leg: No edema  Lymphadenopathy:      Cervical: No cervical adenopathy  Skin:     General: Skin is warm and dry  Capillary Refill: Capillary refill takes less than 2 seconds  Coloration: Skin is not jaundiced  Findings: No bruising, erythema, lesion or rash     Neurological: Mental Status: She is alert and oriented to person, place, and time  Mental status is at baseline  Cranial Nerves: No cranial nerve deficit  Sensory: No sensory deficit  Motor: No weakness  Coordination: Coordination normal       Gait: Gait normal    Psychiatric:         Mood and Affect: Mood normal          Behavior: Behavior normal          Thought Content:  Thought content normal          Judgment: Judgment normal

## 2023-01-18 LAB — BACTERIA UR CULT: NORMAL

## 2023-01-25 ENCOUNTER — TELEPHONE (OUTPATIENT)
Dept: FAMILY MEDICINE CLINIC | Facility: CLINIC | Age: 68
End: 2023-01-25

## 2023-01-25 NOTE — TELEPHONE ENCOUNTER
Patient would like a call regarding her urine culture results - no growth  Taking an antibiotic for leg wound with sutures right now, per patient leg wound looks better  Patient wanted to give an update

## 2023-03-09 ENCOUNTER — OFFICE VISIT (OUTPATIENT)
Dept: GYNECOLOGY | Facility: CLINIC | Age: 68
End: 2023-03-09

## 2023-03-09 VITALS
HEIGHT: 64 IN | WEIGHT: 151 LBS | BODY MASS INDEX: 25.78 KG/M2 | SYSTOLIC BLOOD PRESSURE: 124 MMHG | DIASTOLIC BLOOD PRESSURE: 68 MMHG

## 2023-03-09 DIAGNOSIS — N81.4 UTERINE PROLAPSE: Primary | ICD-10-CM

## 2023-03-09 NOTE — PROGRESS NOTES
Assessment/Plan:     Uterine prolapse-we will reorder more pessaries  We ordered from 2 different companies due to difficulty getting the sizes we needed  We will hopefully get them in sooner  She will otherwise return in 3 months for her pessary check but we will call her if we get them prior to 3 months  There are no diagnoses linked to this encounter  Subjective:     Patient ID: Dano Lane is a 79 y o  female  Patient here for pessary check  We ordered a larger pessary for her as her Silvino Redder keeps sliding down  The ones that we ordered are not correct  1 is too large, 1 is the same and 1 is much too small  She has no complaints  Great news, her son and daughter-in-law are having a baby! Review of Systems   Constitutional: Negative  Gastrointestinal: Negative  Genitourinary: Negative  Objective:     Physical Exam  Genitourinary:     General: Normal vulva        Comments: 21/2 shaatz pessary removed, cleaned and reinserted without difficulty, no vaginal excoriation

## 2023-04-04 ENCOUNTER — TELEPHONE (OUTPATIENT)
Dept: FAMILY MEDICINE CLINIC | Facility: CLINIC | Age: 68
End: 2023-04-04

## 2023-04-04 ENCOUNTER — OFFICE VISIT (OUTPATIENT)
Dept: FAMILY MEDICINE CLINIC | Facility: CLINIC | Age: 68
End: 2023-04-04

## 2023-04-04 VITALS
DIASTOLIC BLOOD PRESSURE: 64 MMHG | BODY MASS INDEX: 25.85 KG/M2 | TEMPERATURE: 96.8 F | SYSTOLIC BLOOD PRESSURE: 128 MMHG | HEIGHT: 64 IN | WEIGHT: 151.4 LBS | OXYGEN SATURATION: 96 % | HEART RATE: 80 BPM

## 2023-04-04 DIAGNOSIS — R29.898 WEAKNESS OF LEFT ARM: Primary | ICD-10-CM

## 2023-04-04 DIAGNOSIS — M67.912 DISORDER OF LEFT ROTATOR CUFF: ICD-10-CM

## 2023-04-04 NOTE — TELEPHONE ENCOUNTER
Called Pt to set up a 2 mo F/U appt for 6/05/2023 or after and an AWV appt due after 12/17/2023. L/M to call back to schedule.

## 2023-04-04 NOTE — PROGRESS NOTES
Assessment/Plan: Patient will continue with ice  further x-ray of the cervical spine and left shoulder  Patient will go to physical therapy  To consider EMG  Patient may need MRI of the shoulder as well as the cervical spine and brain  Follow-up in 6 weeks     Diagnoses and all orders for this visit:    Weakness of left arm  -     XR spine cervical complete 4 or 5 vw non injury; Future  -     Ambulatory Referral to Physical Therapy; Future    Disorder of left rotator cuff  -     XR shoulder 2+ vw left; Future  -     Ambulatory Referral to Physical Therapy; Future            Subjective:        Patient ID: Guadlupe Estimable is a 79 y o  female  Patient was weakness in the left upper extremity  Patient also with some noted numbness  Patient also with pain in left upper arm  Patient is symptoms started after COVID-vaccine  Some radiation to the left neck  No headaches  No chest pain shortness of breath  This is been going on for roughly 6 months  Patient using ice  The following portions of the patient's history were reviewed and updated as appropriate: allergies, current medications, past family history, past medical history, past social history, past surgical history and problem list       Review of Systems   Constitutional: Negative  HENT: Negative  Eyes: Negative  Respiratory: Negative  Cardiovascular: Negative  Gastrointestinal: Negative  Endocrine: Negative  Genitourinary: Negative  Musculoskeletal: Positive for neck pain  Skin: Negative  Allergic/Immunologic: Negative  Neurological: Positive for weakness and numbness  Hematological: Negative  Psychiatric/Behavioral: Negative  Objective:        Depression Screening and Follow-up Plan: Clincally patient does not have depression  No treatment is required  Tobacco Cessation Counseling: Tobacco cessation counseling was provided   The patient is sincerely urged to quit consumption of tobacco  She "is not ready to quit tobacco              /64 (BP Location: Right arm, Patient Position: Sitting, Cuff Size: Standard)   Pulse 80   Temp (!) 96 8 °F (36 °C) (Temporal)   Ht 5' 4\" (1 626 m)   Wt 68 7 kg (151 lb 6 4 oz)   LMP  (LMP Unknown)   SpO2 96%   BMI 25 99 kg/m²          Physical Exam  Vitals and nursing note reviewed  Constitutional:       General: She is not in acute distress  Appearance: Normal appearance  She is not ill-appearing, toxic-appearing or diaphoretic  HENT:      Head: Normocephalic and atraumatic  Cardiovascular:      Rate and Rhythm: Normal rate and regular rhythm  Pulses: Normal pulses  Heart sounds: Normal heart sounds  Pulmonary:      Effort: Pulmonary effort is normal       Breath sounds: Normal breath sounds  Musculoskeletal:         General: Tenderness present  Comments: Pain with palpation left rotator cuff/shoulder anteriorly and laterally   Neurological:      Mental Status: She is alert  Motor: Weakness present  Psychiatric:         Mood and Affect: Mood normal          Behavior: Behavior normal          Thought Content:  Thought content normal          Judgment: Judgment normal          "

## 2023-04-05 ENCOUNTER — HOSPITAL ENCOUNTER (OUTPATIENT)
Dept: BONE DENSITY | Facility: MEDICAL CENTER | Age: 68
Discharge: HOME | End: 2023-04-05

## 2023-04-05 ENCOUNTER — HOSPITAL ENCOUNTER (OUTPATIENT)
Dept: MAMMOGRAPHY | Facility: MEDICAL CENTER | Age: 68
Discharge: HOME | End: 2023-04-05

## 2023-04-05 DIAGNOSIS — Z12.31 ENCOUNTER FOR SCREENING MAMMOGRAM FOR BREAST CANCER: ICD-10-CM

## 2023-04-05 DIAGNOSIS — Z78.0 ASYMPTOMATIC MENOPAUSE: ICD-10-CM

## 2023-05-02 ENCOUNTER — TELEPHONE (OUTPATIENT)
Dept: GYNECOLOGY | Facility: CLINIC | Age: 68
End: 2023-05-02

## 2023-05-04 ENCOUNTER — OFFICE VISIT (OUTPATIENT)
Dept: GYNECOLOGY | Facility: CLINIC | Age: 68
End: 2023-05-04

## 2023-05-04 VITALS
DIASTOLIC BLOOD PRESSURE: 64 MMHG | HEIGHT: 64 IN | SYSTOLIC BLOOD PRESSURE: 118 MMHG | BODY MASS INDEX: 25.78 KG/M2 | WEIGHT: 151 LBS

## 2023-05-04 DIAGNOSIS — N81.4 UTERINE PROLAPSE: Primary | ICD-10-CM

## 2023-05-04 NOTE — PROGRESS NOTES
Assessment/Plan:     Uterine prolapse-her pessary was replaced  We are awaiting larger pessary sizes  I will email the person who orders them to see how long this will take  She does have an appointment next month  There are no diagnoses linked to this encounter  Subjective:     Patient ID: Ke Lennon is a 76 y o  female  Patient is here because her pessary came out  We had ordered new ones for her because the 2-1/2 shaatz pessary was too small and was often slipping down  Typically she would push it up further however this time, she was using the bathroom and it came out and she was unable to place it back in  We have 2 and three-quarter and 3 shaatz pessaries on order for her      Review of Systems   Constitutional: Negative  Gastrointestinal: Negative  Genitourinary: Negative  Objective:     Physical Exam  Genitourinary:     Vagina: Normal       Cervix: Normal       Uterus: Normal        Adnexa: Right adnexa normal and left adnexa normal       Comments: shaatz pessary reinserted without difficulty

## 2023-05-12 ENCOUNTER — TELEPHONE (OUTPATIENT)
Dept: FAMILY MEDICINE CLINIC | Facility: CLINIC | Age: 68
End: 2023-05-12

## 2023-05-12 DIAGNOSIS — E55.9 VITAMIN D DEFICIENCY, UNSPECIFIED: ICD-10-CM

## 2023-05-12 DIAGNOSIS — Z00.00 ROUTINE ADULT HEALTH MAINTENANCE: Primary | ICD-10-CM

## 2023-05-12 NOTE — TELEPHONE ENCOUNTER
Pt needs a script for blood work, before her next AWV in December  Pt wants to know if Vitamin D is included

## 2023-05-17 ENCOUNTER — LAB (OUTPATIENT)
Dept: LAB | Facility: MEDICAL CENTER | Age: 68
End: 2023-05-17

## 2023-05-17 DIAGNOSIS — E55.9 VITAMIN D DEFICIENCY, UNSPECIFIED: ICD-10-CM

## 2023-05-17 DIAGNOSIS — Z00.00 ROUTINE ADULT HEALTH MAINTENANCE: ICD-10-CM

## 2023-05-17 DIAGNOSIS — R77.9 ELEVATED SERUM PROTEIN LEVEL: ICD-10-CM

## 2023-05-17 LAB
25(OH)D3 SERPL-MCNC: 25.4 NG/ML (ref 30–100)
ALBUMIN SERPL BCP-MCNC: 3.2 G/DL (ref 3.5–5)
ALP SERPL-CCNC: 85 U/L (ref 46–116)
ALT SERPL W P-5'-P-CCNC: 29 U/L (ref 12–78)
ANION GAP SERPL CALCULATED.3IONS-SCNC: 1 MMOL/L (ref 4–13)
AST SERPL W P-5'-P-CCNC: 19 U/L (ref 5–45)
BASOPHILS # BLD AUTO: 0.07 THOUSANDS/ÂΜL (ref 0–0.1)
BASOPHILS NFR BLD AUTO: 1 % (ref 0–1)
BILIRUB SERPL-MCNC: 0.55 MG/DL (ref 0.2–1)
BUN SERPL-MCNC: 14 MG/DL (ref 5–25)
CALCIUM ALBUM COR SERPL-MCNC: 9.2 MG/DL (ref 8.3–10.1)
CALCIUM SERPL-MCNC: 8.6 MG/DL (ref 8.3–10.1)
CHLORIDE SERPL-SCNC: 108 MMOL/L (ref 96–108)
CO2 SERPL-SCNC: 29 MMOL/L (ref 21–32)
CREAT SERPL-MCNC: 0.84 MG/DL (ref 0.6–1.3)
EOSINOPHIL # BLD AUTO: 0.39 THOUSAND/ÂΜL (ref 0–0.61)
EOSINOPHIL NFR BLD AUTO: 4 % (ref 0–6)
ERYTHROCYTE [DISTWIDTH] IN BLOOD BY AUTOMATED COUNT: 14.4 % (ref 11.6–15.1)
GFR SERPL CREATININE-BSD FRML MDRD: 71 ML/MIN/1.73SQ M
GLUCOSE P FAST SERPL-MCNC: 72 MG/DL (ref 65–99)
HCT VFR BLD AUTO: 51.1 % (ref 34.8–46.1)
HGB BLD-MCNC: 16.8 G/DL (ref 11.5–15.4)
IMM GRANULOCYTES # BLD AUTO: 0.05 THOUSAND/UL (ref 0–0.2)
IMM GRANULOCYTES NFR BLD AUTO: 1 % (ref 0–2)
LYMPHOCYTES # BLD AUTO: 3.19 THOUSANDS/ÂΜL (ref 0.6–4.47)
LYMPHOCYTES NFR BLD AUTO: 29 % (ref 14–44)
MCH RBC QN AUTO: 29.8 PG (ref 26.8–34.3)
MCHC RBC AUTO-ENTMCNC: 32.9 G/DL (ref 31.4–37.4)
MCV RBC AUTO: 91 FL (ref 82–98)
MONOCYTES # BLD AUTO: 0.86 THOUSAND/ÂΜL (ref 0.17–1.22)
MONOCYTES NFR BLD AUTO: 8 % (ref 4–12)
NEUTROPHILS # BLD AUTO: 6.54 THOUSANDS/ÂΜL (ref 1.85–7.62)
NEUTS SEG NFR BLD AUTO: 57 % (ref 43–75)
NRBC BLD AUTO-RTO: 0 /100 WBCS
PLATELET # BLD AUTO: 255 THOUSANDS/UL (ref 149–390)
PMV BLD AUTO: 9.8 FL (ref 8.9–12.7)
POTASSIUM SERPL-SCNC: 3.5 MMOL/L (ref 3.5–5.3)
PROT SERPL-MCNC: 6.6 G/DL (ref 6.4–8.4)
RBC # BLD AUTO: 5.64 MILLION/UL (ref 3.81–5.12)
SODIUM SERPL-SCNC: 138 MMOL/L (ref 135–147)
TSH SERPL DL<=0.05 MIU/L-ACNC: 0.64 UIU/ML (ref 0.45–4.5)
WBC # BLD AUTO: 11.1 THOUSAND/UL (ref 4.31–10.16)

## 2023-05-19 LAB
ALBUMIN SERPL ELPH-MCNC: 3.55 G/DL (ref 3.5–5)
ALBUMIN SERPL ELPH-MCNC: 56.3 % (ref 52–65)
ALPHA1 GLOB SERPL ELPH-MCNC: 0.33 G/DL (ref 0.1–0.4)
ALPHA1 GLOB SERPL ELPH-MCNC: 5.3 % (ref 2.5–5)
ALPHA2 GLOB SERPL ELPH-MCNC: 0.84 G/DL (ref 0.4–1.2)
ALPHA2 GLOB SERPL ELPH-MCNC: 13.3 % (ref 7–13)
BETA GLOB ABNORMAL SERPL ELPH-MCNC: 0.4 G/DL (ref 0.4–0.8)
BETA1 GLOB SERPL ELPH-MCNC: 6.4 % (ref 5–13)
BETA2 GLOB SERPL ELPH-MCNC: 5.8 % (ref 2–8)
BETA2+GAMMA GLOB SERPL ELPH-MCNC: 0.37 G/DL (ref 0.2–0.5)
GAMMA GLOB ABNORMAL SERPL ELPH-MCNC: 0.81 G/DL (ref 0.5–1.6)
GAMMA GLOB SERPL ELPH-MCNC: 12.9 % (ref 12–22)
IGG/ALB SER: 1.29 {RATIO} (ref 1.1–1.8)
M PEAK ID 2: 1.7 %
M PROTEIN 1 MFR SERPL ELPH: 1.3 %
M PROTEIN 1 SERPL ELPH-MCNC: 0.08 G/DL
M PROTEIN 2 SERPL ELPH-MCNC: 0.11 G/DL
PROT PATTERN SERPL ELPH-IMP: ABNORMAL
PROT SERPL-MCNC: 6.3 G/DL (ref 6.4–8.2)

## 2023-06-06 ENCOUNTER — TELEPHONE (OUTPATIENT)
Dept: GYNECOLOGY | Facility: CLINIC | Age: 68
End: 2023-06-06

## 2023-06-06 NOTE — TELEPHONE ENCOUNTER
Patient left message  She has an appointment on 6/9/23 to try new pessaries that will fit better  Her old one fell out 3 times  She is feeling pain and irritation  She does not want to come in for an appointment unless the new pessaries are in the office  Sent message to Radha Rothman who is following up on this

## 2023-06-08 ENCOUNTER — TELEPHONE (OUTPATIENT)
Dept: GYNECOLOGY | Facility: CLINIC | Age: 68
End: 2023-06-08

## 2023-06-08 NOTE — TELEPHONE ENCOUNTER
---- Message from Albina Parsons sent at 6/8/2023 10:51 AM EDT -----  Regarding: FW: Pessaries  I have not heard back  They sent a request to see if there is an alternative as the one she needs isn't now expected available until august   ----- Message -----  From: Yordan Mayer  Sent: 6/6/2023   1:31 PM EDT  To: Albina Parsons  Subject: Pessaries                                        Patient has appointment to try a new pessary on Friday 6/9/23  She wants to be sure the new ones are in the office for her to try  Kashmir Cline says you are managing this  Please call patient @ [phone]  Left message to remind patient of appointment on 6/9/23  Some new pessaries will be available to try but not every suggested one

## 2023-06-09 ENCOUNTER — OFFICE VISIT (OUTPATIENT)
Dept: GYNECOLOGY | Facility: CLINIC | Age: 68
End: 2023-06-09
Payer: MEDICARE

## 2023-06-09 VITALS — BODY MASS INDEX: 25.92 KG/M2 | HEIGHT: 64 IN | DIASTOLIC BLOOD PRESSURE: 70 MMHG | SYSTOLIC BLOOD PRESSURE: 130 MMHG

## 2023-06-09 DIAGNOSIS — E78.2 MIXED HYPERLIPIDEMIA: ICD-10-CM

## 2023-06-09 DIAGNOSIS — I73.9 PAD (PERIPHERAL ARTERY DISEASE) (HCC): ICD-10-CM

## 2023-06-09 DIAGNOSIS — I74.09 AORTOILIAC OCCLUSIVE DISEASE (HCC): ICD-10-CM

## 2023-06-09 DIAGNOSIS — N95.2 VAGINAL ATROPHY: ICD-10-CM

## 2023-06-09 DIAGNOSIS — N81.4 UTERINE PROLAPSE: Primary | ICD-10-CM

## 2023-06-09 PROCEDURE — A4562 PESSARY, NON RUBBER,ANY TYPE: HCPCS | Performed by: OBSTETRICS & GYNECOLOGY

## 2023-06-09 PROCEDURE — 99213 OFFICE O/P EST LOW 20 MIN: CPT | Performed by: OBSTETRICS & GYNECOLOGY

## 2023-06-09 RX ORDER — ATORVASTATIN CALCIUM 10 MG/1
TABLET, FILM COATED ORAL
Qty: 90 TABLET | Refills: 1 | Status: SHIPPED | OUTPATIENT
Start: 2023-06-09

## 2023-06-09 RX ORDER — ESTRADIOL 10 UG/1
1 INSERT VAGINAL 2 TIMES WEEKLY
Qty: 24 TABLET | Refills: 3 | Status: SHIPPED | OUTPATIENT
Start: 2023-06-12

## 2023-06-09 NOTE — PROGRESS NOTES
Assessment/Plan:     Uterine prolapse-she was fitted with a 3-1/4 Shaatz pessary  This seemed to work very well for her  It is a [employer] brand, division of [employer]  We will call her next week to see how she is doing  If she continues to do well with this, she will return in 3 months for a pessary check  She is aware to call sooner than that if she has any issues  I explained that over the weekend if she has any concerns she could go to urgent care or the ER if she needs to have it removed and is unable to do it herself  She has taken her old pessary out and reinserted in the past so hopefully this would not be the case  She needs a new prescription for her Janie Cerda and this was sent to her pharmacy  I also gave her the information for urogynecology in case this does not work and she may decide to go to them to discuss surgical options  There are no diagnoses linked to this encounter  Subjective:     Patient ID: Belkys Roman is a 76 y o  female  Patient here for pessary check  Her pessary fell out and when she inserted it again, she was uncomfortable  She has a 2-1/2 shaatz and needs a slightly larger 1 however this is on backorder  She has been having a great deal of distress from this pessary  It shifts and when it moves around, it is quite uncomfortable  It is not helping with her prolapse any longer and she is having difficulty with urinary frequency and her prolapse  Review of Systems   Constitutional: Negative  Genitourinary: Positive for frequency  Negative for vaginal bleeding and vaginal discharge  Uterine prolapse is worsening         Objective:     Physical Exam  Genitourinary:     Comments: Removed the 2-1/2 shaatz pessary without difficulty, it is much too small for her prolapse  We looked at several other pessaries that we have in the office  The 2 and three-quarter and the 3 have been on backorder    I had a 3-1/4 and we decided to try this since her current 1 is significantly too small for her prolapse  The 3 recorder was placed without difficulty  She spent time in the office walking around, she used the bathroom and sat and was very comfortable  She was not aware that it was in place and she had no discomfort  She felt that it was supporting her uterine prolapse very well

## 2023-06-12 ENCOUNTER — TELEPHONE (OUTPATIENT)
Dept: GYNECOLOGY | Facility: CLINIC | Age: 68
End: 2023-06-12

## 2023-06-12 ENCOUNTER — OFFICE VISIT (OUTPATIENT)
Dept: FAMILY MEDICINE CLINIC | Facility: CLINIC | Age: 68
End: 2023-06-12
Payer: MEDICARE

## 2023-06-12 VITALS
HEART RATE: 86 BPM | BODY MASS INDEX: 25.95 KG/M2 | HEIGHT: 64 IN | TEMPERATURE: 98 F | OXYGEN SATURATION: 97 % | WEIGHT: 152 LBS | SYSTOLIC BLOOD PRESSURE: 126 MMHG | DIASTOLIC BLOOD PRESSURE: 72 MMHG

## 2023-06-12 DIAGNOSIS — R77.8 ABNORMAL SERUM PROTEIN ELECTROPHORESIS: Primary | ICD-10-CM

## 2023-06-12 DIAGNOSIS — E78.2 MIXED HYPERLIPIDEMIA: ICD-10-CM

## 2023-06-12 DIAGNOSIS — M54.12 CERVICAL RADICULOPATHY: ICD-10-CM

## 2023-06-12 PROCEDURE — 99214 OFFICE O/P EST MOD 30 MIN: CPT | Performed by: FAMILY MEDICINE

## 2023-06-12 NOTE — TELEPHONE ENCOUNTER
Called patient and LM that Dr Nirmala Castro wanted us to check to see how she is doing  She is to call back if she is having any problems and if everything is OK, she does not need to call, but we wanted her to know Dr Nirmala Castro was checking up on her      ----- Message from Shlomo Irizarry DO sent at 6/9/2023  4:34 PM EDT -----  Jewel Edmonds was in on Friday and having a lot of distress from her 2-1/2 shaatz pessary because it is much too small and keeps shifting around  We decided to try the 3-1/4 and it seemed to fit quite well  She spent some time in the office and felt that it was very comfortable  She went home with this  Can you please give her a call on Monday and see how she is doing, I want to make sure she is using the bathroom okay and not having any discomfort  Please let me know how she is doing  We can still use the ones we ordered even if she does not need them as we can use them for someone else      Thanks

## 2023-06-12 NOTE — PROGRESS NOTES
"Assessment/Plan: Vitamin D 25 TSH 0 6  Abnormal serum protein electrophoresis  CBC reviewed  CMP reviewed  Patient will continue with current regimen for hyperlipidemia  Patient will be referred to hematology for abnormal serum protein electrophoresis  Patient will take vitamin D supplementation  Patient with ongoing pain left shoulder/arm  Patient will have MRI of the cervical spine  Diagnoses and all orders for this visit:    Abnormal serum protein electrophoresis  -     Ambulatory Referral to Hematology / Oncology; Future    Mixed hyperlipidemia    Cervical radiculopathy  -     MRI cervical spine wo contrast; Future            Subjective:        Patient ID: Becca Harris is a 76 y o  female  Patient is here to follow-up on laboratory studies and left arm pain  Patient with ongoing neck and arm pain in the left  No right arm pain  No chest pain shortness of breath  Patient did have laboratory studies done with abnormalities  The following portions of the patient's history were reviewed and updated as appropriate: allergies, current medications, past family history, past medical history, past social history, past surgical history and problem list       Review of Systems   Constitutional: Negative  HENT: Negative  Eyes: Negative  Respiratory: Negative  Cardiovascular: Negative  Gastrointestinal: Negative  Endocrine: Negative  Genitourinary: Negative  Musculoskeletal: Positive for arthralgias and neck pain  Skin: Negative  Allergic/Immunologic: Negative  Neurological: Negative  Hematological: Negative  Psychiatric/Behavioral: Negative  Objective:        Depression Screening and Follow-up Plan: Clincally patient does not have depression  No treatment is required               /72 (BP Location: Right arm, Patient Position: Sitting, Cuff Size: Large)   Pulse 86   Temp 98 °F (36 7 °C) (Temporal)   Ht 5' 4\" (1 626 m)   Wt 68 9 kg (152 " lb)   LMP  (LMP Unknown)   SpO2 97%   BMI 26 09 kg/m²          Physical Exam  Vitals and nursing note reviewed  Constitutional:       General: She is not in acute distress  Appearance: Normal appearance  She is not ill-appearing, toxic-appearing or diaphoretic  HENT:      Head: Normocephalic and atraumatic  Right Ear: Tympanic membrane, ear canal and external ear normal  There is no impacted cerumen  Left Ear: Tympanic membrane, ear canal and external ear normal  There is no impacted cerumen  Nose: Nose normal  No congestion or rhinorrhea  Mouth/Throat:      Mouth: Mucous membranes are moist       Pharynx: No oropharyngeal exudate or posterior oropharyngeal erythema  Eyes:      General: No scleral icterus  Right eye: No discharge  Left eye: No discharge  Extraocular Movements: Extraocular movements intact  Conjunctiva/sclera: Conjunctivae normal       Pupils: Pupils are equal, round, and reactive to light  Neck:      Vascular: No carotid bruit  Cardiovascular:      Rate and Rhythm: Normal rate and regular rhythm  Pulses: Normal pulses  Heart sounds: Normal heart sounds  No murmur heard  No friction rub  No gallop  Pulmonary:      Effort: Pulmonary effort is normal  No respiratory distress  Breath sounds: Normal breath sounds  No stridor  No wheezing, rhonchi or rales  Chest:      Chest wall: No tenderness  Musculoskeletal:         General: Tenderness present  No swelling, deformity or signs of injury  Cervical back: Normal range of motion and neck supple  No rigidity  No muscular tenderness  Right lower leg: No edema  Left lower leg: No edema  Comments: Left arm pain and weakness   Lymphadenopathy:      Cervical: No cervical adenopathy  Skin:     General: Skin is warm and dry  Capillary Refill: Capillary refill takes less than 2 seconds  Coloration: Skin is not jaundiced        Findings: No bruising, erythema, lesion or rash  Neurological:      Mental Status: She is alert and oriented to person, place, and time  Cranial Nerves: No cranial nerve deficit  Sensory: No sensory deficit  Motor: Weakness present  Coordination: Coordination normal       Gait: Gait normal    Psychiatric:         Mood and Affect: Mood normal          Behavior: Behavior normal          Thought Content:  Thought content normal          Judgment: Judgment normal

## 2023-06-13 ENCOUNTER — TELEPHONE (OUTPATIENT)
Dept: HEMATOLOGY ONCOLOGY | Facility: CLINIC | Age: 68
End: 2023-06-13

## 2023-06-13 NOTE — TELEPHONE ENCOUNTER
I called Anisha Conn in response to a referral that was received for patient to establish care with Hematology  Outreach was made to schedule a consultation     I left a voicemail explaining the reason for my call and advised patient to call [facility] at [phone]  Another attempt will be made to contact patient

## 2023-06-14 ENCOUNTER — TELEPHONE (OUTPATIENT)
Dept: HEMATOLOGY ONCOLOGY | Facility: CLINIC | Age: 68
End: 2023-06-14

## 2023-06-14 NOTE — TELEPHONE ENCOUNTER
I called Joy Yu in response to a referral that was received for patient to establish care with Hematology  Outreach was made to schedule a consultation     I left a voicemail explaining the reason for my call and advised patient to call [facility] at [phone]  Another attempt will be made to contact patient

## 2023-06-15 ENCOUNTER — TELEPHONE (OUTPATIENT)
Dept: HEMATOLOGY ONCOLOGY | Facility: CLINIC | Age: 68
End: 2023-06-15

## 2023-06-15 NOTE — TELEPHONE ENCOUNTER
I called Pamela Biswas in response to a referral that was received for patient to establish care with Hematology  Outreach was made to schedule a consultation       I left a voicemail explaining the reason for my call and advised patient to call [facility] at [phone]  This is the third attempt to schedule patient unsuccessfully  The referral has been closed, a [employer] message has been sent to patient (if applicable) and a letter has been sent to the address on file

## 2023-06-16 DIAGNOSIS — M54.50 LUMBAR PAIN: ICD-10-CM

## 2023-06-19 RX ORDER — PREDNISONE 10 MG/1
TABLET ORAL
Qty: 90 TABLET | Refills: 1 | Status: SHIPPED | OUTPATIENT
Start: 2023-06-19

## 2023-06-20 DIAGNOSIS — E78.2 MIXED HYPERLIPIDEMIA: ICD-10-CM

## 2023-06-20 DIAGNOSIS — I74.09 AORTOILIAC OCCLUSIVE DISEASE (HCC): ICD-10-CM

## 2023-06-20 DIAGNOSIS — I73.9 PAD (PERIPHERAL ARTERY DISEASE) (HCC): ICD-10-CM

## 2023-06-20 DIAGNOSIS — M32.19 OTHER SYSTEMIC LUPUS ERYTHEMATOSUS WITH OTHER ORGAN INVOLVEMENT (HCC): ICD-10-CM

## 2023-06-20 DIAGNOSIS — F41.9 ANXIETY: ICD-10-CM

## 2023-06-20 RX ORDER — LORAZEPAM 0.5 MG/1
0.5 TABLET ORAL EVERY 12 HOURS PRN
Qty: 30 TABLET | Refills: 0 | Status: SHIPPED | OUTPATIENT
Start: 2023-06-20

## 2023-06-20 RX ORDER — POTASSIUM CHLORIDE 750 MG/1
10 TABLET, FILM COATED, EXTENDED RELEASE ORAL 3 TIMES DAILY
Qty: 270 TABLET | Refills: 1 | Status: SHIPPED | OUTPATIENT
Start: 2023-06-20

## 2023-06-20 RX ORDER — ATORVASTATIN CALCIUM 10 MG/1
10 TABLET, FILM COATED ORAL
Qty: 90 TABLET | Refills: 1 | Status: SHIPPED | OUTPATIENT
Start: 2023-06-20

## 2023-07-03 ENCOUNTER — HOSPITAL ENCOUNTER (OUTPATIENT)
Facility: MEDICAL CENTER | Age: 68
Discharge: HOME | End: 2023-07-03
Payer: MEDICARE

## 2023-07-03 DIAGNOSIS — M54.12 CERVICAL RADICULOPATHY: ICD-10-CM

## 2023-07-03 PROCEDURE — G1004 CDSM NDSC: HCPCS

## 2023-07-03 PROCEDURE — 72141 MRI NECK SPINE W/O DYE: CPT

## 2023-07-07 NOTE — RESULT ENCOUNTER NOTE
Advised patient of results, patient understands and agrees. Advised of future scheduled appointment as well.

## 2023-07-17 ENCOUNTER — TELEPHONE (OUTPATIENT)
Dept: HEMATOLOGY ONCOLOGY | Facility: CLINIC | Age: 68
End: 2023-07-17

## 2023-07-17 ENCOUNTER — CONSULT (OUTPATIENT)
Dept: HEMATOLOGY ONCOLOGY | Facility: CLINIC | Age: 68
End: 2023-07-17
Payer: MEDICARE

## 2023-07-17 VITALS
SYSTOLIC BLOOD PRESSURE: 120 MMHG | WEIGHT: 153 LBS | HEIGHT: 64 IN | TEMPERATURE: 97.9 F | OXYGEN SATURATION: 96 % | RESPIRATION RATE: 16 BRPM | BODY MASS INDEX: 26.12 KG/M2 | DIASTOLIC BLOOD PRESSURE: 70 MMHG | HEART RATE: 86 BPM

## 2023-07-17 DIAGNOSIS — D75.1 POLYCYTHEMIA: ICD-10-CM

## 2023-07-17 DIAGNOSIS — D47.2 BICLONAL GAMMOPATHY: Primary | ICD-10-CM

## 2023-07-17 DIAGNOSIS — R71.8 OTHER ABNORMALITY OF RED BLOOD CELLS: ICD-10-CM

## 2023-07-17 DIAGNOSIS — R93.7 BONE MARROW EDEMA: ICD-10-CM

## 2023-07-17 DIAGNOSIS — D75.1 ERYTHROCYTOSIS: ICD-10-CM

## 2023-07-17 DIAGNOSIS — R77.8 ABNORMAL SERUM PROTEIN ELECTROPHORESIS: ICD-10-CM

## 2023-07-17 DIAGNOSIS — D72.829 LEUKOCYTOSIS, UNSPECIFIED TYPE: ICD-10-CM

## 2023-07-17 PROCEDURE — 99204 OFFICE O/P NEW MOD 45 MIN: CPT

## 2023-07-17 NOTE — PROGRESS NOTES
[address] [facility] [id number]  Hematology Ambulatory Consult  Be Barbour, 1955, [id number]  7/17/2023    Assessment/Plan:  1. Biclonal gammopathy  2. Abnormal serum protein electrophoresis  Ms. Isis Camargo is a 80-year-old female seen in consultation for abnormal SPEP. She is found to have a biclonal gammopathy since November 2022. Her M spike has been elevated with free lambda light chain of 0.8 g/dL and IgG kappa 0.11 g/dL. There does not appear to be endorgan damage on CBC or CMP. I discussed with the patient that due to other abnormalities as listed below including erythrocytosis and leukocytosis a bone marrow biopsy would be recommended for further evaluation of these findings. She is in agreement with this plan and referral was placed to interventional radiology. Prior to her follow-up with me full myeloma/MGUS labs will be obtained to establish baseline.    - Ambulatory Referral to Hematology / Oncology  - IgG, IgA, IgM; Future  - Immunoglobulin free LT chains blood; Future  - LD,Blood; Future  - Protein electrophoresis, serum; Future  - Protein electrophoresis, urine  - CBC and differential; Future  - Comprehensive metabolic panel; Future    3. Erythrocytosis  4. Polycythemia  Prior to 2021 hemoglobin hematocrit and RBC were within normal limit. Most recently they have been consistently elevated without clear change in lifestyle habits or symptoms. We will proceed with further work-up including erythropoietin, iron panel, and carboxyhemoglobin. We will also assess for myeloproliferative disorder and bone marrow biopsy. - Ambulatory Referral to Interventional Radiology; Future  - CBC and differential; Future  - Comprehensive metabolic panel; Future  - Erythropoietin; Future  - Iron Panel (Includes Ferritin, Iron Sat%, Iron, and TIBC); Future  - Carboxyhemoglobin; Future    5.  Leukocytosis, unspecified type  Most recent CBC demonstrated mildly elevated white blood cell count of 11.1. This appears to be chronic at least for the past few years. Differential relatively stable with no abnormality. Could be reactive to underlying inflammation. We will complete peripheral smear and inflammatory markers with next set of labs. - Ambulatory Referral to Interventional Radiology; Future  - C-reactive protein; Future  - Sedimentation rate, automated; Future  - Peripheral Smear; Future    The patient is scheduled for follow-up in approximately 3 weeks after bone marrow biopsy is obtained to review the results. Patient voiced agreement and understanding to the above. Patient knows to call the Hematology/Oncology office with any questions and concerns regarding the above. Barrier(s) to care: None. The patient is able to self care.    -------------------------------------------------------------------------------------------------------    Chief Complaint   Patient presents with   • Consult       Referring provider:  Holly Sandoval DO  [address] [place] [address]    History of present illness:  Miya Altamirano is a 60-year-old female with past medical history of GERD, Raynaud's, neuropathy, peripheral artery disease, osteoporosis, lupus, fibromyalgia, mixed lipidemia, and smoker. She is seen in consultation for abnormal SPEP found by neurology for undergoing work-up for her fibromyalgia. On this SPEP she was found to have a biclonal gammopathy. This was first noted in November 2022 as free lambda light chain M spike of 0.1 g/dL and IgG kappa 0.12 g/dL. Other lab abnormalities include thrombocytosis and erythrocytosis. No other evidence of endorgan damage. She also had an MRI of her cervical spine demonstrating marrow edema which has prompted hematology evaluation. She also has a history of cancer in her left lung status post left lower lobectomy and did not receive chemo or radiation postoperatively.   She is not certain on any details surrounding this diagnosis and there is no pathology to be reviewed in the records. She has no family history of any blood disorders or blood conditions. She does take 81 mg of aspirin daily along with Lipitor for her peripheral artery disease. Platelet count does appear to be stable within normal limits. She has no constitutional complaints or concerns today. She is excited that her first grandchild is being born at the end of this month. Review of Systems   Constitutional: Negative for activity change, appetite change, fatigue, fever and unexpected weight change. HENT: Negative. Eyes: Negative for photophobia and visual disturbance. Respiratory: Negative. Cardiovascular: Negative. Gastrointestinal: Negative. Endocrine: Negative for cold intolerance and heat intolerance. Genitourinary: Negative. Musculoskeletal: Negative. Neurological: Negative for dizziness, light-headedness and headaches. Hematological: Negative for adenopathy. Does not bruise/bleed easily. Psychiatric/Behavioral: Negative.         Patient Active Problem List   Diagnosis   • Chronic bilateral low back pain with bilateral sciatica   • Fibromyalgia   • GERD (gastroesophageal reflux disease)   • Lung cancer ([address])   • Osteoporosis   • Primary osteoarthritis involving multiple joints   • SLE (systemic lupus erythematosus) ([facility])   • Localized edema   • Acute cystitis without hematuria   • Open toe wound, initial encounter   • Neuropathy   • Raynaud's disease without gangrene   • Tobacco dependence   • Aortoiliac occlusive disease ([facility])   • PAD (peripheral artery disease) ([facility])   • Lumbar pain   • Thrush   • Microscopic hematuria   • Right flank pain   • Benign essential microscopic hematuria   • Cellulitis   • Right foot pain   • History of lung cancer   • Chronic hypokalemia   • Tobacco user   • Constipation   • Acute sore throat   • Multiple thyroid nodules   • Preop cardiovascular exam   • Cortical age-related cataract of both eyes   • Mixed hyperlipidemia   • Continuous opioid dependence (HCC)   • Leg swelling   • Bronchitis   • Visit for suture removal   • Elevated serum protein level   • Vaccine reaction   • Cellulitis of right lower extremity   • Weakness of left arm   • Disorder of left rotator cuff   • Abnormal serum protein electrophoresis   • Cervical radiculopathy       Past Medical History:   Diagnosis Date   • Allergic    • Anxiety    • Arthritis 12/20/2014   • Cataract     eulalia   • Depression    • Disease of thyroid gland     nodules   • Fibromyalgia, primary    • Frequent UTI    • GERD (gastroesophageal reflux disease)    • History of lung cancer     lower left lobe removed - 2004   • Irritable bowel syndrome    • Lactose intolerance    • Lupus (HCC)    • 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   • Osteoporosis    • PAD (peripheral artery disease) (HCC)    • Presence of pessary    • Raynaud disease    • Sciatica    • Sinus headache    • Sjogren's syndrome ([address])    • Smoker        Past Surgical History:   Procedure Laterality Date   • LUNG REMOVAL, PARTIAL  05/01/2004    Prior Surgical/Procedural History - 5/4 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 - lung resection - subtotal   • MOUTH SURGERY      Tooth extraction   • DC TEAEC W/WO PATCH GRAFT COMMON FEMORAL Left 9/10/2019    Procedure: ENDARTERECTOMY ARTERIAL FEMORAL,  WITH BOVINE GRAFT AND PROFUNDOPLASTY;  Surgeon: Hu Christianson DO;  Location: [facility];  Service: Vascular   • SINUS SURGERY         Family History   Problem Relation Age of Onset   • Arthritis Mother    • Asthma Mother    • Dementia Mother    • Heart disease Mother    • Hypothyroidism Mother    • Irritable bowel syndrome Mother    • Uterine cancer Mother 72   • Heart attack Father    • Stroke Father    • Hyperlipidemia Sister    • Uterine cancer Sister         age unknown   • Heart attack Maternal Grandfather    • Asthma Family    • Cataracts Family    • Thyroid disease Family    • No Known Problems Paternal Aunt    • No Known Problems Maternal Aunt    • No Known Problems Maternal Grandmother    • No Known Problems Paternal Grandmother    • No Known Problems Paternal Grandfather        Social History     Socioeconomic History   • Marital status: /Civil Union     Spouse name: None   • Number of children: 1   • Years of education: None   • Highest education level: None   Occupational History   • Occupation: office work retired   Tobacco Use   • Smoking status: Every Day     Packs/day: 0.50     Years: 40.00     Total pack years: 20.00     Types: Cigarettes   • Smokeless tobacco: Never   Vaping Use   • Vaping Use: Never used   Substance and Sexual Activity   • Alcohol use: Not Currently     Comment: liquor rarely   • Drug use: Yes     Types: Oxycodone   • Sexual activity: Not Currently     Partners: Male   Other Topics Concern   • None   Social History Narrative    Always uses seat belt    Caffeine use    Primary spoken language English    Congregation    Racial background:      Drinks Tea     Social Determinants of Health     Financial Resource Strain: Low Risk  (12/16/2022)    Overall Financial Resource Strain (CARDIA)    • Difficulty of Paying Living Expenses: Not hard at all   Food Insecurity: Not on file   Transportation Needs: No Transportation Needs (12/16/2022)    PRAPARE - Transportation    • Lack of Transportation (Medical): No    • Lack of Transportation (Non-Medical):  No   Physical Activity: Not on file   Stress: Not on file   Social Connections: Not on file   Intimate Partner Violence: Not on file   Housing Stability: Not on file         Current Outpatient Medications:   •  aspirin (ECOTRIN LOW STRENGTH) 81 mg EC tablet, Take 1 tablet (81 mg total) by mouth daily, Disp: 90 tablet, Rfl: 3  •  atorvastatin (LIPITOR) 10 mg tablet, Take 1 tablet (10 mg total) by mouth daily with dinner, Disp: 90 tablet, Rfl: 1  •  clotrimazole (MYCELEX) 10 mg yu, Take 1 tablet (10 mg total) by mouth 5 (five) times a day, Disp: 70 Yu, Rfl: 1  •  cyanocobalamin (VITAMIN B-12) 100 mcg tablet, Take 500 mcg by mouth daily 500 mcg, Disp: , Rfl:   •  estradiol (VAGIFEM, YUVAFEM) 10 MCG TABS vaginal tablet, Insert 1 tablet (10 mcg total) into the vagina 2 (two) times a week, Disp: 24 tablet, Rfl: 3  •  famotidine (PEPCID) 10 mg tablet, Take 10 mg by mouth as needed Takes as needed. If heartburn is really bad she will take 20 mg., Disp: , Rfl:   •  LORazepam (ATIVAN) 0.5 mg tablet, Take 1 tablet (0.5 mg total) by mouth every 12 (twelve) hours as needed for anxiety, Disp: 30 tablet, Rfl: 0  •  Magnesium 500 MG CAPS, Take by mouth daily, Disp: , Rfl:   •  Misc Natural Products (CURCUMAX PRO PO), Take by mouth, Disp: , Rfl:   •  Multiple Vitamin (MULTI-VITAMIN PO), Take by mouth, Disp: , Rfl:   •  naloxone (NARCAN) 4 mg/0.1 mL nasal spray, Administer 1 spray into a nostril.  If no response after 2-3 minutes, give another dose in the other nostril using a new spray., Disp: 1 each, Rfl: 1  •  nystatin (MYCOSTATIN) 500,000 units/5 mL suspension, Apply 5 mL (500,000 Units total) to the mouth or throat 4 (four) times a day, Disp: 60 mL, Rfl: 1  •  oxyCODONE (ROXICODONE) 5 immediate release tablet, Take 1 tablet (5 mg total) by mouth every 6 (six) hours as needed for moderate pain Max Daily Amount: 20 mg, Disp: 30 tablet, Rfl: 0  •  potassium chloride (Klor-Con 10) 10 mEq tablet, Take 1 tablet (10 mEq total) by mouth 3 (three) times a day, Disp: 270 tablet, Rfl: 1  •  predniSONE 10 mg tablet, TAKE 1 TABLET BY MOUTH EVERY DAY, Disp: 90 tablet, Rfl: 1  •  Vitamin Mixture (JOSE RAMON-C PO), Take 1 tablet by mouth daily, Disp: , Rfl:     Allergies   Allergen Reactions   • Penicillins Shortness Of Breath and Edema   • Cymbalta [Duloxetine Hcl] Diarrhea   • Duloxetine Diarrhea   • Dust Mite Extract    • Lactose - Food Allergy GI Intolerance   • Neosporin [Neomycin-Bacitracin Zn-Polymyx]      Other reaction(s): REDNESS   • Nitrofurantoin Rash   • Omeprazole Rash   • Sulfamethoxazole-Trimethoprim Rash       Objective:  /70 (BP Location: Right arm, Patient Position: Sitting, Cuff Size: Standard)   Pulse 86   Temp 97.9 °F (36.6 °C) (Temporal)   Resp 16   Ht 5' 4" (1.626 m)   Wt 69.4 kg (153 lb)   LMP  (LMP Unknown)   SpO2 96%   BMI 26.26 kg/m²   Physical Exam  Constitutional:       General: She is not in acute distress. Appearance: Normal appearance. She is not ill-appearing. HENT:      Head: Normocephalic and atraumatic. Eyes:      Extraocular Movements: Extraocular movements intact. Conjunctiva/sclera: Conjunctivae normal.   Cardiovascular:      Rate and Rhythm: Normal rate and regular rhythm. Pulses: Normal pulses. Heart sounds: No murmur heard. Pulmonary:      Effort: Pulmonary effort is normal. No respiratory distress. Breath sounds: Normal breath sounds. Abdominal:      General: Abdomen is flat. Bowel sounds are normal. There is no distension. Palpations: Abdomen is soft. Tenderness: There is no abdominal tenderness. Musculoskeletal:         General: No swelling. Cervical back: Normal range of motion. No rigidity. Right lower leg: No edema. Left lower leg: No edema. Skin:     General: Skin is warm and dry. Capillary Refill: Capillary refill takes less than 2 seconds. Coloration: Skin is not jaundiced. Neurological:      General: No focal deficit present. Mental Status: She is alert and oriented to person, place, and time. Motor: No weakness. Gait: Gait normal.   Psychiatric:         Mood and Affect: Mood normal.         Behavior: Behavior normal.         Thought Content: Thought content normal.         Judgment: Judgment normal.         Result Review  Labs:   No visits with results within 1 Month(s) from this visit.    Latest known visit with results is: Lab on 05/17/2023   Component Date Value Ref Range Status   • Sodium 05/17/2023 138  135 - 147 mmol/L Final   • Potassium 05/17/2023 3.5  3.5 - 5.3 mmol/L Final   • Chloride 05/17/2023 108  96 - 108 mmol/L Final   • CO2 05/17/2023 29  21 - 32 mmol/L Final   • ANION GAP 05/17/2023 1 (L)  4 - 13 mmol/L Final   • BUN 05/17/2023 14  5 - 25 mg/dL Final   • Creatinine 05/17/2023 0.84  0.60 - 1.30 mg/dL Final    Standardized to IDMS reference method   • Glucose, Fasting 05/17/2023 72  65 - 99 mg/dL Final    Specimen collection should occur prior to Sulfasalazine administration due to the potential for falsely depressed results. Specimen collection should occur prior to Sulfapyridine administration due to the potential for falsely elevated results. • Calcium 05/17/2023 8.6  8.3 - 10.1 mg/dL Final   • Corrected Calcium 05/17/2023 9.2  8.3 - 10.1 mg/dL Final   • AST 05/17/2023 19  5 - 45 U/L Final    Specimen collection should occur prior to Sulfasalazine administration due to the potential for falsely depressed results. • ALT 05/17/2023 29  12 - 78 U/L Final    Specimen collection should occur prior to Sulfasalazine and/or Sulfapyridine administration due to the potential for falsely depressed results. • Alkaline Phosphatase 05/17/2023 85  46 - 116 U/L Final   • Total Protein 05/17/2023 6.6  6.4 - 8.4 g/dL Final   • Albumin 05/17/2023 3.2 (L)  3.5 - 5.0 g/dL Final   • Total Bilirubin 05/17/2023 0.55  0.20 - 1.00 mg/dL Final    Use of this assay is not recommended for patients undergoing treatment with eltrombopag due to the potential for falsely elevated results.    • eGFR 05/17/2023 71  ml/min/1.73sq m Final   • WBC 05/17/2023 11.10 (H)  4.31 - 10.16 Thousand/uL Final   • RBC 05/17/2023 5.64 (H)  3.81 - 5.12 Million/uL Final   • Hemoglobin 05/17/2023 16.8 (H)  11.5 - 15.4 g/dL Final   • Hematocrit 05/17/2023 51.1 (H)  34.8 - 46.1 % Final   • MCV 05/17/2023 91  82 - 98 fL Final   • MCH 05/17/2023 29.8  26.8 - 34.3 pg Final   • MCHC 05/17/2023 32.9  31.4 - 37.4 g/dL Final   • RDW 05/17/2023 14.4  11.6 - 15.1 % Final   • MPV 05/17/2023 9.8  8.9 - 12.7 fL Final   • Platelets 99/02/7544 255  149 - 390 Thousands/uL Final   • nRBC 05/17/2023 0  /100 WBCs Final   • Neutrophils Relative 05/17/2023 57  43 - 75 % Final   • Immat GRANS % 05/17/2023 1  0 - 2 % Final   • Lymphocytes Relative 05/17/2023 29  14 - 44 % Final   • Monocytes Relative 05/17/2023 8  4 - 12 % Final   • Eosinophils Relative 05/17/2023 4  0 - 6 % Final   • Basophils Relative 05/17/2023 1  0 - 1 % Final   • Neutrophils Absolute 05/17/2023 6.54  1.85 - 7.62 Thousands/µL Final   • Immature Grans Absolute 05/17/2023 0.05  0.00 - 0.20 Thousand/uL Final   • Lymphocytes Absolute 05/17/2023 3.19  0.60 - 4.47 Thousands/µL Final   • Monocytes Absolute 05/17/2023 0.86  0.17 - 1.22 Thousand/µL Final   • Eosinophils Absolute 05/17/2023 0.39  0.00 - 0.61 Thousand/µL Final   • Basophils Absolute 05/17/2023 0.07  0.00 - 0.10 Thousands/µL Final   • TSH 3RD GENERATON 05/17/2023 0.636  0.450 - 4.500 uIU/mL Final    The recommended reference ranges for TSH during pregnancy are as follows:   First trimester 0.1 to 2.5 uIU/mL   Second trimester  0.2 to 3.0 uIU/mL   Third trimester 0.3 to 3.0 uIU/m    Note: Normal ranges may not apply to patients who are transgender, non-binary, or whose legal sex, sex at birth, and gender identity differ. Adult TSH (3rd generation) reference range follows the recommended guidelines of the American Thyroid Association, January, 2020.    • Vit D, 25-Hydroxy 05/17/2023 25.4 (L)  30.0 - 100.0 ng/mL Final    Vitamin D guidelines established by Clinical Guidelines Subcommittee  of the Endocrine Society Task Force, 2011    Deficiency <20ng/ml   Insufficiency 20-30ng/ml   Sufficient  ng/ml    • A/G Ratio 05/17/2023 1.29  1.10 - 1.80 Final   • Albumin Electrophoresis 05/17/2023 56.3  52.0 - 65.0 % Final   • Albumin CONC 05/17/2023 3.55  3.50 - 5.00 g/dl Final   • Alpha 1 2023 5.3 (H)  2.5 - 5.0 % Final   • ALPHA 1 CONC 2023 0.33  0.10 - 0.40 g/dL Final   • Alpha 2 2023 13.3 (H)  7.0 - 13.0 % Final   • ALPHA 2 CONC 2023 0.84  0.40 - 1.20 g/dL Final   • Beta-1 2023 6.4  5.0 - 13.0 % Final   • BETA 1 CONC 2023 0.40  0.40 - 0.80 g/dL Final   • Beta-2 2023 5.8  2.0 - 8.0 % Final   • BETA 2 CONC 2023 0.37  0.20 - 0.50 g/dL Final   • Gamma Globulin 2023 12.9  12.0 - 22.0 % Final   • GAMMA CONC 2023 0.81  0.50 - 1.60 g/dL Final   • M Peak ID 1 2023 1.30  % Final   • M PEAK 1 CONC 2023 0.08  g/dL Final   • M Peak ID 2 2023 1.70  % Final   • M Peak 2 CONC 2023 0.11  g/dL Final   • Total Protein 2023 6.3 (L)  6.4 - 8.2 g/dL Final   • SPEP Interpretation 2023 See Comment   Final    The SPEP shows a biclonal gammopathy. Previous immunofixation 22 identified a biclonal gammopathy as free lambda light chains, and IgG kappa. Reviewed by: Nikia Becker. Anisha Cowart MD ** Electronic Signature**           Imagin/3/2023: MRI cervical spine  IMPRESSION:  Marrow edema left facet joint C4-5 potentially represents active facet arthropathy. Mild left foraminal narrowing at this level.   Normal appearance of the cervical spinal cord.        Please note: This report has been generated by a voice recognition software system. Therefore there may be syntax, spelling, and/or grammatical errors. Please call if you have any questions.

## 2023-07-17 NOTE — TELEPHONE ENCOUNTER
Pt will be calling central scheduling to schedule the Bone marrow test as she needs to coordinate with 's schedule.

## 2023-07-31 RX ORDER — SODIUM CHLORIDE 9 MG/ML
75 INJECTION, SOLUTION INTRAVENOUS CONTINUOUS
Status: CANCELLED | OUTPATIENT
Start: 2023-07-31

## 2023-08-04 ENCOUNTER — TELEPHONE (OUTPATIENT)
Dept: HEMATOLOGY ONCOLOGY | Facility: CLINIC | Age: 68
End: 2023-08-04

## 2023-08-04 NOTE — TELEPHONE ENCOUNTER
Patient Call    Who are you speaking with? Patient    If it is not the patient, are they listed on an active communication consent form? N/A   What is the reason for this call? The patient would like to speak to somebody about her bone marrow biopsy, she spoke to her cousin who had this completed and she feels like the office is lying to her about how painful this is and she is very worried. Does this require a call back? Yes     Patient has an appointment today and she needs a call before 1PM    If a call back is required, please list best call back number [phone]   If a call back is required, advise that a message will be forwarded to their care team and someone will return their call as soon as possible. Did you relay this information to the patient?  Yes

## 2023-08-04 NOTE — TELEPHONE ENCOUNTER
Spoke with patient to address her concerns. She was under the impression that her IR bone marrow biopsy would be under FULL anesthesia and not conscious sedation. Her cousin had recently had a bone marrow biopsy done at  and was in excruciating pain during the entire procedure. She stated that she was told by Melyssa Winn that it would be mild discomfort. Patient feels that she was lied to because it was painful for her cousin. She stated that she is disappointed in our office and the entire organization. She stated that we need to run our office better. She does not feel that she can trust the providers at [facility]. She stated that she was also not made aware that Melyssa Winn was a CRNP. She was under the impression that she was a physician. She does not like to see PA's or CRNP's. I advised patient that she should have been made aware of this when the appointment was scheduled but she states that she was not. Message sent to IR to discuss the need for full anesthesia for her procedure. Awaiting details on this. I will make patient aware of the update once I have one. She verbalized understanding and agreed to plan. IR Shayne David) will update patient's order to be anesthesia per request and let SLA know. I will also send a message to our management team for further review.

## 2023-08-04 NOTE — TELEPHONE ENCOUNTER
Unfortunately IR is unable to accommodate patient on 8/8 with total anesthesia. Pamela Graham will reach out to patient to r/s her appointment to be with total anesthesia on another date. Patient verbalized understanding and agreed to plan.

## 2023-08-07 DIAGNOSIS — F41.9 ANXIETY: ICD-10-CM

## 2023-08-08 ENCOUNTER — HOSPITAL ENCOUNTER (OUTPATIENT)
Dept: CT IMAGING | Facility: HOSPITAL | Age: 68
Discharge: HOME | End: 2023-08-08
Attending: INTERNAL MEDICINE

## 2023-08-08 RX ORDER — LORAZEPAM 0.5 MG/1
0.5 TABLET ORAL EVERY 12 HOURS PRN
Qty: 30 TABLET | Refills: 0 | Status: SHIPPED | OUTPATIENT
Start: 2023-08-08

## 2023-08-08 RX ORDER — NALOXONE HYDROCHLORIDE 4 MG/.1ML
SPRAY NASAL
Qty: 1 EACH | Refills: 1 | Status: SHIPPED | OUTPATIENT
Start: 2023-08-08 | End: 2024-08-07

## 2023-08-10 NOTE — PRE-PROCEDURE INSTRUCTIONS
Pre-procedure Instructions for Interventional Radiology  [address] [address] [place]  [phone]    You are scheduled for a/an Bone Marrow Biopsy. On Friday 8/18/23. Your tentative arrival time is 0800. Short stay will notify you the day before your procedure with the exact arrival time and the location to arrive. To prepare for your procedure:  1. Please arrange for someone to drive you home after the procedure and stay with you until the next morning if you are instructed to do so. This is typically for patients receiving some type of sedative or anesthetic for the procedure. 2. DO NOT EAT OR DRINK ANYTHING after midnight on the evening before your procedure including candy & gum.  3. ONLY SIPS OF WATER with your medications are allowed on the morning of your procedure. 4. TAKE ALL OF YOUR REGULAR MEDICATIONS THE MORNING OF YOUR PROCEDURE with sips of water! We may call you to stop some of your blood sugar, blood pressure and blood thinning medications depending on the procedure. Please take all of these medications unless we instruct you to stop them. 5. If you have an allergy to x-ray dye, please contact Interventional Radiology for an x-ray dye preparation which usually consists of an oral steroid and Benadryl. The day of your procedure:  1. Bring a list of the medications you take at home. 2. Bring medications you take for breathing problems (such as inhalers), medications for chest pain, or both. 3. Bring a case for your glasses or contacts. 4. Bring your insurance card and a form of photo ID.  5. Please leave all valuables such as credit cards and jewelry at home. 6. Report to the registration desk in the main lobby at the [facility], Entrance B. Ask to be directed to [facility]. 7. While your procedure is being performed, your family may wait in the Radiology Waiting Room on the 1st floor in Radiology.   if they need to leave, they may provide a number to be called following the procedure. 8. Be prepared to stay overnight just in case. Sometimes procedures will indicate the need for further observation or treatment. 9. If you are scheduled for a follow-up visit with the Interventional Radiologist after your procedure, you will be called with a date and time.     Special Instructions (Medications to stop taking before your procedure etc.):

## 2023-08-17 ENCOUNTER — TELEPHONE (OUTPATIENT)
Dept: HEMATOLOGY ONCOLOGY | Facility: CLINIC | Age: 68
End: 2023-08-17

## 2023-08-17 ENCOUNTER — ANESTHESIA EVENT (OUTPATIENT)
Dept: RADIOLOGY | Facility: HOSPITAL | Age: 68
End: 2023-08-17

## 2023-08-17 NOTE — ANESTHESIA PREPROCEDURE EVALUATION
Procedure:  IR BIOPSY BONE MARROW    Relevant Problems   CARDIO   (+) Aortoiliac occlusive disease (HCC)   (+) Mixed hyperlipidemia      GI/HEPATIC   (+) GERD (gastroesophageal reflux disease)      MUSCULOSKELETAL   (+) Chronic bilateral low back pain with bilateral sciatica   (+) Fibromyalgia   (+) Lumbar pain   (+) Primary osteoarthritis involving multiple joints      NEURO/PSYCH   (+) Chronic bilateral low back pain with bilateral sciatica   (+) Continuous opioid dependence (HCC)   (+) Fibromyalgia   (+) Weakness of left arm      Respiratory   (+) Lung cancer (HCC)      Nervous and Auditory   (+) Cervical radiculopathy      Other   (+) Chronic hypokalemia   (+) History of lung cancer   (+) Tobacco user       TTE and EKG: normal      WBC 4.31 - 10.16 Thousand/uL 11.10 High   12.08 High   12.97 High   11.75 High   11.60 High   9.76  11.58 High     RBC 3.81 - 5.12 Million/uL 5.64 High   5.57 High   5.18 High   5.60 High   4.48  4.37  4.52    Hemoglobin 11.5 - 15.4 g/dL 16.8 High   16.6 High   15.5 High   16.5 High   13.7  13.0  13.5    Hematocrit 34.8 - 46.1 % 51.1 High   50.8 High   48.4 High   50.8 High   42.0             Physical Exam    Airway    Mallampati score: II  TM Distance: >3 FB  Neck ROM: full     Dental       Cardiovascular      Pulmonary      Other Findings    CMP: Albumin = 3.2    Anesthesia Plan  ASA Score- 3     Anesthesia Type- general with ASA Monitors. Additional Monitors:   Airway Plan: ETT. Plan Factors-    Chart reviewed. EKG reviewed. Existing labs reviewed. Patient summary reviewed. Patient is not a current smoker. Induction- intravenous. Postoperative Plan- Plan for postoperative opioid use. Planned trial extubation    Informed Consent- Anesthetic plan and risks discussed with patient. I personally reviewed this patient with the CRNA. Discussed and agreed on the Anesthesia Plan with the CRNA. Armando Evangelista

## 2023-08-17 NOTE — TELEPHONE ENCOUNTER
Appointment Schedule   Who are you speaking with? self   If it is not the patient, are they listed on an active communication consent form? self   Which provider is the appointment scheduled with? [facility]   At which location is the appointment scheduled for? [facility]   When is the appointment scheduled? Please list date and time 8/31 1pm   What is the reason for this appointment? Follow up, biopsy   Did patient voice understanding of the details of this appointment? yes   Was the no show policy reviewed with patient?  yes

## 2023-08-18 ENCOUNTER — ANESTHESIA (OUTPATIENT)
Dept: RADIOLOGY | Facility: HOSPITAL | Age: 68
End: 2023-08-18

## 2023-08-18 ENCOUNTER — HOSPITAL ENCOUNTER (OUTPATIENT)
Dept: RADIOLOGY | Facility: HOSPITAL | Age: 68
Discharge: HOME | End: 2023-08-18
Attending: INTERNAL MEDICINE
Payer: MEDICARE

## 2023-08-18 VITALS
WEIGHT: 152 LBS | RESPIRATION RATE: 17 BRPM | HEART RATE: 76 BPM | SYSTOLIC BLOOD PRESSURE: 135 MMHG | DIASTOLIC BLOOD PRESSURE: 65 MMHG | OXYGEN SATURATION: 99 % | BODY MASS INDEX: 25.95 KG/M2 | HEIGHT: 64 IN | TEMPERATURE: 97.2 F

## 2023-08-18 DIAGNOSIS — D75.1 ERYTHROCYTOSIS: ICD-10-CM

## 2023-08-18 DIAGNOSIS — D75.1 POLYCYTHEMIA: ICD-10-CM

## 2023-08-18 DIAGNOSIS — D72.829 LEUKOCYTOSIS, UNSPECIFIED TYPE: ICD-10-CM

## 2023-08-18 DIAGNOSIS — D47.2 BICLONAL GAMMOPATHY: ICD-10-CM

## 2023-08-18 LAB
ANION GAP SERPL CALCULATED.3IONS-SCNC: 5 MMOL/L
BUN SERPL-MCNC: 16 MG/DL (ref 5–25)
CALCIUM SERPL-MCNC: 8.6 MG/DL (ref 8.3–10.1)
CHLORIDE SERPL-SCNC: 113 MMOL/L (ref 96–108)
CO2 SERPL-SCNC: 25 MMOL/L (ref 21–32)
CREAT SERPL-MCNC: 0.78 MG/DL (ref 0.6–1.3)
ERYTHROCYTE [DISTWIDTH] IN BLOOD BY AUTOMATED COUNT: 14.3 % (ref 11.6–15.1)
GFR SERPL CREATININE-BSD FRML MDRD: 78 ML/MIN/1.73SQ M
GLUCOSE P FAST SERPL-MCNC: 86 MG/DL (ref 65–99)
GLUCOSE SERPL-MCNC: 86 MG/DL (ref 65–140)
HCT VFR BLD AUTO: 48.5 % (ref 34.8–46.1)
HGB BLD-MCNC: 16 G/DL (ref 11.5–15.4)
MCH RBC QN AUTO: 29.3 PG (ref 26.8–34.3)
MCHC RBC AUTO-ENTMCNC: 33 G/DL (ref 31.4–37.4)
MCV RBC AUTO: 89 FL (ref 82–98)
NRBC BLD AUTO-RTO: 0 /100 WBCS
PLATELET # BLD AUTO: 218 THOUSANDS/UL (ref 149–390)
PMV BLD AUTO: 9.4 FL (ref 8.9–12.7)
POTASSIUM SERPL-SCNC: 3.8 MMOL/L (ref 3.5–5.3)
RBC # BLD AUTO: 5.46 MILLION/UL (ref 3.81–5.12)
SODIUM SERPL-SCNC: 143 MMOL/L (ref 135–147)
WBC # BLD AUTO: 11.44 THOUSAND/UL (ref 4.31–10.16)

## 2023-08-18 PROCEDURE — 80048 BASIC METABOLIC PNL TOTAL CA: CPT | Performed by: RADIOLOGY

## 2023-08-18 PROCEDURE — 38222 DX BONE MARROW BX & ASPIR: CPT

## 2023-08-18 PROCEDURE — 85007 BL SMEAR W/DIFF WBC COUNT: CPT | Performed by: RADIOLOGY

## 2023-08-18 RX ORDER — SODIUM CHLORIDE 9 MG/ML
75 INJECTION, SOLUTION INTRAVENOUS CONTINUOUS
Status: DISCONTINUED | OUTPATIENT
Start: 2023-08-18 | End: 2023-08-19 | Stop reason: HOSPADM

## 2023-08-18 RX ORDER — SODIUM CHLORIDE 9 MG/ML
INJECTION, SOLUTION INTRAVENOUS CONTINUOUS PRN
Status: DISCONTINUED | OUTPATIENT
Start: 2023-08-18 | End: 2023-08-18

## 2023-08-18 RX ORDER — DEXAMETHASONE SODIUM PHOSPHATE 10 MG/ML
INJECTION, SOLUTION INTRAMUSCULAR; INTRAVENOUS AS NEEDED
Status: DISCONTINUED | OUTPATIENT
Start: 2023-08-18 | End: 2023-08-18

## 2023-08-18 RX ORDER — ONDANSETRON 2 MG/ML
4 INJECTION INTRAMUSCULAR; INTRAVENOUS ONCE AS NEEDED
Status: DISCONTINUED | OUTPATIENT
Start: 2023-08-18 | End: 2023-08-18 | Stop reason: HOSPADM

## 2023-08-18 RX ORDER — FENTANYL CITRATE 50 UG/ML
INJECTION, SOLUTION INTRAMUSCULAR; INTRAVENOUS AS NEEDED
Status: DISCONTINUED | OUTPATIENT
Start: 2023-08-18 | End: 2023-08-18

## 2023-08-18 RX ORDER — LIDOCAINE HYDROCHLORIDE 10 MG/ML
INJECTION, SOLUTION EPIDURAL; INFILTRATION; INTRACAUDAL; PERINEURAL AS NEEDED
Status: DISCONTINUED | OUTPATIENT
Start: 2023-08-18 | End: 2023-08-18

## 2023-08-18 RX ORDER — EPHEDRINE SULFATE 50 MG/ML
INJECTION INTRAVENOUS AS NEEDED
Status: DISCONTINUED | OUTPATIENT
Start: 2023-08-18 | End: 2023-08-18

## 2023-08-18 RX ORDER — GLYCOPYRROLATE 0.2 MG/ML
INJECTION INTRAMUSCULAR; INTRAVENOUS AS NEEDED
Status: DISCONTINUED | OUTPATIENT
Start: 2023-08-18 | End: 2023-08-18

## 2023-08-18 RX ORDER — LIDOCAINE HYDROCHLORIDE 10 MG/ML
INJECTION, SOLUTION EPIDURAL; INFILTRATION; INTRACAUDAL; PERINEURAL AS NEEDED
Status: COMPLETED | OUTPATIENT
Start: 2023-08-18 | End: 2023-08-18

## 2023-08-18 RX ORDER — ALBUTEROL SULFATE 2.5 MG/3ML
2.5 SOLUTION RESPIRATORY (INHALATION) EVERY 4 HOURS PRN
Status: DISCONTINUED | OUTPATIENT
Start: 2023-08-18 | End: 2023-08-18 | Stop reason: HOSPADM

## 2023-08-18 RX ORDER — FENTANYL CITRATE/PF 50 MCG/ML
25 SYRINGE (ML) INJECTION
Status: DISCONTINUED | OUTPATIENT
Start: 2023-08-18 | End: 2023-08-18 | Stop reason: HOSPADM

## 2023-08-18 RX ORDER — PROPOFOL 10 MG/ML
INJECTION, EMULSION INTRAVENOUS AS NEEDED
Status: DISCONTINUED | OUTPATIENT
Start: 2023-08-18 | End: 2023-08-18

## 2023-08-18 RX ORDER — SODIUM CHLORIDE 9 MG/ML
100 INJECTION, SOLUTION INTRAVENOUS CONTINUOUS
Status: DISCONTINUED | OUTPATIENT
Start: 2023-08-18 | End: 2023-08-19 | Stop reason: HOSPADM

## 2023-08-18 RX ORDER — MIDAZOLAM HYDROCHLORIDE 2 MG/2ML
INJECTION, SOLUTION INTRAMUSCULAR; INTRAVENOUS AS NEEDED
Status: DISCONTINUED | OUTPATIENT
Start: 2023-08-18 | End: 2023-08-18

## 2023-08-18 RX ORDER — ROCURONIUM BROMIDE 10 MG/ML
INJECTION, SOLUTION INTRAVENOUS AS NEEDED
Status: DISCONTINUED | OUTPATIENT
Start: 2023-08-18 | End: 2023-08-18

## 2023-08-18 RX ORDER — SUCCINYLCHOLINE/SOD CL,ISO/PF 100 MG/5ML
SYRINGE (ML) INTRAVENOUS AS NEEDED
Status: DISCONTINUED | OUTPATIENT
Start: 2023-08-18 | End: 2023-08-18

## 2023-08-18 RX ORDER — ONDANSETRON 2 MG/ML
INJECTION INTRAMUSCULAR; INTRAVENOUS AS NEEDED
Status: DISCONTINUED | OUTPATIENT
Start: 2023-08-18 | End: 2023-08-18

## 2023-08-18 RX ADMIN — SODIUM CHLORIDE 75 ML/HR: 0.9 INJECTION, SOLUTION INTRAVENOUS at 08:30

## 2023-08-18 RX ADMIN — PHENYLEPHRINE HYDROCHLORIDE 25 MCG/MIN: 10 INJECTION INTRAVENOUS at 09:46

## 2023-08-18 RX ADMIN — LIDOCAINE HYDROCHLORIDE 50 MG: 10 INJECTION, SOLUTION EPIDURAL; INFILTRATION; INTRACAUDAL; PERINEURAL at 09:24

## 2023-08-18 RX ADMIN — FENTANYL CITRATE 100 MCG: 50 INJECTION INTRAMUSCULAR; INTRAVENOUS at 09:24

## 2023-08-18 RX ADMIN — ROCURONIUM BROMIDE 5 MG: 10 INJECTION, SOLUTION INTRAVENOUS at 09:23

## 2023-08-18 RX ADMIN — PROPOFOL 150 MG: 10 INJECTION, EMULSION INTRAVENOUS at 09:24

## 2023-08-18 RX ADMIN — Medication 100 MG: at 09:24

## 2023-08-18 RX ADMIN — EPHEDRINE SULFATE 5 MG: 50 INJECTION INTRAVENOUS at 09:48

## 2023-08-18 RX ADMIN — ONDANSETRON 4 MG: 2 INJECTION INTRAMUSCULAR; INTRAVENOUS at 09:23

## 2023-08-18 RX ADMIN — SODIUM CHLORIDE: 0.9 INJECTION, SOLUTION INTRAVENOUS at 09:04

## 2023-08-18 RX ADMIN — DEXAMETHASONE SODIUM PHOSPHATE 10 MG: 10 INJECTION, SOLUTION INTRAMUSCULAR; INTRAVENOUS at 09:26

## 2023-08-18 RX ADMIN — MIDAZOLAM 2 MG: 1 INJECTION INTRAMUSCULAR; INTRAVENOUS at 09:14

## 2023-08-18 RX ADMIN — LIDOCAINE HYDROCHLORIDE 10 ML: 10 INJECTION, SOLUTION EPIDURAL; INFILTRATION; INTRACAUDAL; PERINEURAL at 09:58

## 2023-08-18 RX ADMIN — GLYCOPYRROLATE 0.1 MG: 0.2 INJECTION, SOLUTION INTRAMUSCULAR; INTRAVENOUS at 09:14

## 2023-08-18 NOTE — SEDATION DOCUMENTATION
Bone Marrow Biopsy by Miriam Bone. Anesthesia present throughout entire case. Bandaid placed on biopsy site. Patient transported to PACU with CRNA and report given to PACU RN at bedside.  Bed rest start time 4511

## 2023-08-18 NOTE — H&P
Interventional Radiology  History and Physical 8/18/2023     Shonda Sequeira   [birth year]   [id number]    Assessment/Plan:  76year old with polycythemia here for BM biopsy. Understands the risks and benefits and wishes to proceed. Problem List Items Addressed This Visit    None  Visit Diagnoses     Biclonal gammopathy        Relevant Orders    IR biopsy bone marrow    Erythrocytosis        Relevant Orders    IR biopsy bone marrow    Polycythemia        Relevant Orders    IR biopsy bone marrow    Leukocytosis, unspecified type        Relevant Orders    IR biopsy bone marrow             Subjective:     Patient ID: Shonda Sequeira is a 76 y.o. female. History of Present Illness  76year old with polycythemia here for BM biopsy. Understands the risks and benefits and wishes to proceed.     Review of Systems  as above    Past Medical History:   Diagnosis Date   • Allergic    • Anxiety    • Arthritis 12/20/2014   • Cataract     eulalia   • Depression    • Disease of thyroid gland     nodules   • Fibromyalgia, primary    • Frequent UTI    • GERD (gastroesophageal reflux disease)    • History of lung cancer     lower left lobe removed - 2004   • Irritable bowel syndrome    • Lactose intolerance    • Lupus (HCC)    • 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   • Osteoporosis    • PAD (peripheral artery disease) (HCC)    • Presence of pessary    • Raynaud disease    • Sciatica    • Sinus headache    • Sjogren's syndrome ([address])    • Smoker         Past Surgical History:   Procedure Laterality Date   • LUNG REMOVAL, PARTIAL  05/01/2004    Prior Surgical/Procedural History - 5/4 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 - lung resection - subtotal   • MOUTH SURGERY      Tooth extraction   • MS TEAEC W/WO PATCH GRAFT COMMON FEMORAL Left 9/10/2019    Procedure: ENDARTERECTOMY ARTERIAL FEMORAL,  WITH BOVINE GRAFT AND PROFUNDOPLASTY;  Surgeon: Hu Christianson DO;  Location: [facility];  Service: Vascular   • SINUS SURGERY          Social History     Tobacco Use   Smoking Status Every Day   • Packs/day: 0.50   • Years: 40.00   • Total pack years: 20.00   • Types: Cigarettes   Smokeless Tobacco Never        Social History     Substance and Sexual Activity   Alcohol Use Not Currently    Comment: liquor rarely        Social History     Substance and Sexual Activity   Drug Use Yes   • Types: Oxycodone        Allergies   Allergen Reactions   • Penicillins Shortness Of Breath and Edema   • Cymbalta [Duloxetine Hcl] Diarrhea   • Duloxetine Diarrhea   • Dust Mite Extract    • Lactose - Food Allergy GI Intolerance   • Neosporin [Neomycin-Bacitracin Zn-Polymyx]      Other reaction(s): REDNESS   • Nitrofurantoin Rash   • Omeprazole Rash   • Sulfamethoxazole-Trimethoprim Rash       Current Outpatient Medications   Medication Sig Dispense Refill   • famotidine (PEPCID) 10 mg tablet Take 10 mg by mouth as needed Takes as needed. If heartburn is really bad she will take 20 mg.     • aspirin (ECOTRIN LOW STRENGTH) 81 mg EC tablet Take 1 tablet (81 mg total) by mouth daily 90 tablet 3   • atorvastatin (LIPITOR) 10 mg tablet Take 1 tablet (10 mg total) by mouth daily with dinner 90 tablet 1   • clotrimazole (MYCELEX) 10 mg yu Take 1 tablet (10 mg total) by mouth 5 (five) times a day 70 Yu 1   • cyanocobalamin (VITAMIN B-12) 100 mcg tablet Take 500 mcg by mouth daily 500 mcg     • estradiol (VAGIFEM, YUVAFEM) 10 MCG TABS vaginal tablet Insert 1 tablet (10 mcg total) into the vagina 2 (two) times a week 24 tablet 3   • LORazepam (ATIVAN) 0.5 mg tablet Take 1 tablet (0.5 mg total) by mouth every 12 (twelve) hours as needed for anxiety 30 tablet 0   • Magnesium 500 MG CAPS Take by mouth daily     • Misc Natural Products (CURCUMAX PRO PO) Take by mouth     • Multiple Vitamin (MULTI-VITAMIN PO) Take by mouth     • naloxone (NARCAN) 4 mg/0.1 mL nasal spray Administer 1 spray into a nostril.  If no response after 2-3 minutes, give another dose in the other nostril using a new spray. 1 each 1   • naloxone (NARCAN) 4 mg/0.1 mL nasal spray Administer 1 spray into a nostril. If no response after 2-3 minutes, give another dose in the other nostril using a new spray.  1 each 1   • nystatin (MYCOSTATIN) 500,000 units/5 mL suspension Apply 5 mL (500,000 Units total) to the mouth or throat 4 (four) times a day 60 mL 1   • oxyCODONE (ROXICODONE) 5 immediate release tablet Take 1 tablet (5 mg total) by mouth every 6 (six) hours as needed for moderate pain Max Daily Amount: 20 mg 30 tablet 0   • potassium chloride (Klor-Con 10) 10 mEq tablet Take 1 tablet (10 mEq total) by mouth 3 (three) times a day 270 tablet 1   • predniSONE 10 mg tablet TAKE 1 TABLET BY MOUTH EVERY DAY 90 tablet 1   • Vitamin Mixture (JOSE RAMON-C PO) Take 1 tablet by mouth daily       Current Facility-Administered Medications   Medication Dose Route Frequency Provider Last Rate Last Admin   • lidocaine (PF) (XYLOCAINE-MPF) 1 % injection    PRN Ramya Tinoco MD   10 mL at 08/18/23 8913   • sodium chloride 0.9 % infusion  75 mL/hr Intravenous Continuous Kevin Pemberton MD 75 mL/hr at 08/18/23 0830 75 mL/hr at 08/18/23 0830     Facility-Administered Medications Ordered in Other Encounters   Medication Dose Route Frequency Provider Last Rate Last Admin   • dexamethasone (PF) (DECADRON) injection   Intravenous PRN Jessenia Tillman, CRNA   10 mg at 08/18/23 3598   • ePHEDrine injection   Intravenous PRN Jessenia Tillman, CRNA   5 mg at 08/18/23 0948   • fentanyl citrate (PF) 100 MCG/2ML   Intravenous PRN Jessenia Tillman, CRNA   100 mcg at 08/18/23 4123   • glycopyrrolate (ROBINUL) injection   Intravenous PRN Jessenia Tillman, CRNA   0.1 mg at 08/18/23 0914   • lidocaine (PF) (XYLOCAINE-MPF) 1 % injection   Intravenous PRN Jessenia Tillman, CRNA   50 mg at 08/18/23 0924   • midazolam (VERSED) injection   Intravenous PRN Jessenia Weiiss, CRNA   2 mg at 08/18/23 0914   • ondansetron (ZOFRAN) injection   Intravenous PRN Marilou Alvarez, CRNA   4 mg at 08/18/23 8966   • phenylephrine (MISSY-SYNEPHRINE) 50 mg (STANDARD CONCENTRATION) in sodium chloride 0.9% 250 mL   Intravenous Continuous PRN Jessenia B Sanjuanaiss, CRNA   Stopped at 08/18/23 0958   • phenylephrine bolus from bag   Intravenous PRN Jessenia B Sanjuanaiss, CRNA   100 mcg at 08/18/23 0946   • propofol (DIPRIVAN) 1000 mg in 100 mL infusion (premix)   Intravenous PRN Jessenia B Greiss, CRNA   150 mg at 08/18/23 6875   • ROCuronium (ZEMURON) injection   Intravenous PRN Jessenia B Greiss, CRNA   5 mg at 08/18/23 5851   • sodium chloride 0.9 % infusion   Intravenous Continuous PRN Jessenia B Greiss, CRNA   New Bag at 08/18/23 6866   • Succinylcholine Chloride 100 mg/5 mL syringe   Intravenous PRN Jessenia B Sanjuanaiss, CRNA   100 mg at 08/18/23 0924          Objective:    Vitals:    08/18/23 0821   BP: 169/65   Pulse: 72   Resp: 18   Temp: 97.6 °F (36.4 °C)   TempSrc: Temporal   SpO2: 95%   Weight: 68.9 kg (152 lb)   Height: 5' 4" (1.626 m)        Physical Exam  Gen: NAD  HEENT: normocephalic  Heart: Regular rate  Lungs: Breathing comfortably  Abd: Non-distended  Neuro: Grossly intact    No results found for: "BNP"   Lab Results   Component Value Date    WBC 11.44 (H) 08/18/2023    HGB 16.0 (H) 08/18/2023    HCT 48.5 (H) 08/18/2023    MCV 89 08/18/2023     08/18/2023     Lab Results   Component Value Date    INR 1.04 09/10/2019    PROTIME 13.7 09/10/2019     No results found for: "PTT"      I have personally reviewed pertinent imaging and laboratory results. Code Status: No Order  Advance Directive and Living Will:      Power of :    POLST:      This text is generated with voice recognition software. There may be translation, syntax,  or grammatical errors. If you have any questions, please contact the dictating provider.

## 2023-08-18 NOTE — ADDENDUM NOTE
Addendum  created 08/18/23 1344 by Sven Ferguson MD    Clinical Note Signed, Review and Sign - Ready for Procedure

## 2023-08-18 NOTE — DISCHARGE INSTRUCTIONS
Bone Marrow Biopsy     WHAT YOU NEED TO KNOW:   A bone marrow biopsy is a procedure to remove a small amount of bone marrow from your bone. Bone marrow is the soft tissue inside your bone that helps to make blood cells. The sample is tested for disease or infection. DISCHARGE INSTRUCTIONS:     1. Limit your activities day of biopsy as directed by your doctor. 2. Use medication as ordered. 3. Return to your normal diet. Small sips of flat soda will help with nausea. 4. Remove band-aid or dressing 24 hours after procedure. Contact Interventional Radiology at [phone] [place] PATIENTS: Contact Interventional Radiology at [phone]) Keny Cooper PATIENTS: Contact Interventional Radiology at [phone]) if:    1. Difficulty breathing, nausea or vomiting. 2. Chills or fever above 101 F.    3. Pain at biopsy site not relieved by medication. 4. Develop any redness, swelling, heat, unusual drainage, heavy bruising or bleeding from biopsy site.

## 2023-08-18 NOTE — BRIEF OP NOTE (RAD/CATH)
INTERVENTIONAL RADIOLOGY PROCEDURE NOTE    Date: 8/18/2023    Procedure:   Procedure Summary     Date: 08/18/23 Room / Location: [address] [place] [address] Scan    Anesthesia Start: 7266 Anesthesia Stop:     Procedure: IR BIOPSY BONE MARROW Diagnosis:       Biclonal gammopathy      Erythrocytosis      Polycythemia      Leukocytosis, unspecified type      (Polycythemia )    Scheduled Providers: Ramya Tinoco MD Responsible Provider: Ramya Tinoco MD    Anesthesia Type: general ASA Status: 3          Preoperative diagnosis:   1. Biclonal gammopathy    2. Erythrocytosis    3. Polycythemia    4. Leukocytosis, unspecified type         Postoperative diagnosis: Same. Surgeon: Edilberto Maier MD     Assistant: None. No qualified resident was available. Blood loss: Minimal    Specimens: 1 bone core, 6 cc bm     Findings: BM biopsy    Complications: None immediate.     Anesthesia: general

## 2023-08-18 NOTE — ANESTHESIA POSTPROCEDURE EVALUATION
Post-Op Assessment Note    CV Status:  Stable    Pain management: adequate     Mental Status:  Awake and sleepy   Hydration Status:  Euvolemic   PONV Controlled:  Controlled   Airway Patency:  Patent      Post Op Vitals Reviewed: Yes      Staff: Anesthesiologist, CRNA         There were no known notable events for this encounter.     /67 (08/18/23 1025)    Temp (!) 97 °F (36.1 °C) (08/18/23 1025)    Pulse 85 (08/18/23 1025)   Resp 16 (08/18/23 1025)    SpO2 98 % (08/18/23 1025)

## 2023-08-22 LAB
BASOPHILS NFR BLD MANUAL: 1 % (ref 0–1)
IMM EOSINOPHIL NFR BLD MANUAL: 2 % (ref 0–6)
LYMPHOCYTES NFR BLD: 17 % (ref 14–44)
MONOCYTES NFR BLD AUTO: 5 % (ref 4–12)
NEUTS SEG NFR BLD AUTO: 74 % (ref 45–77)
PLATELET BLD QL SMEAR: ADEQUATE
RBC MORPH BLD: NORMAL
TOTAL CELLS COUNTED SPEC: 100
VARIANT LYMPHS BLD QL SMEAR: 1 % (ref 0–0)

## 2023-08-23 ENCOUNTER — TELEPHONE (OUTPATIENT)
Dept: FAMILY MEDICINE CLINIC | Facility: CLINIC | Age: 68
End: 2023-08-23

## 2023-08-23 ENCOUNTER — TELEPHONE (OUTPATIENT)
Dept: HEMATOLOGY ONCOLOGY | Facility: CLINIC | Age: 68
End: 2023-08-23

## 2023-08-23 DIAGNOSIS — C34.90 MALIGNANT NEOPLASM OF LUNG, UNSPECIFIED LATERALITY, UNSPECIFIED PART OF LUNG (HCC): Primary | ICD-10-CM

## 2023-08-23 RX ORDER — TRAMADOL HYDROCHLORIDE 50 MG/1
50 TABLET ORAL DAILY PRN
Qty: 10 TABLET | Refills: 0 | Status: SHIPPED | OUTPATIENT
Start: 2023-08-23

## 2023-08-23 RX ORDER — NALOXONE HYDROCHLORIDE 4 MG/.1ML
SPRAY NASAL
Qty: 1 EACH | Refills: 1 | Status: SHIPPED | OUTPATIENT
Start: 2023-08-23 | End: 2023-08-31

## 2023-08-23 NOTE — TELEPHONE ENCOUNTER
Patient calling in regarding her symptoms. I reached out to [facility] via teams, who states the patient should follow up with her PCP. I informed the patient and she voiced understanding.

## 2023-08-23 NOTE — TELEPHONE ENCOUNTER
Pt is calling once again regarding meds for pain . several messages were left from spouse and oncolgy regarding this .  pleae review and advise

## 2023-08-23 NOTE — TELEPHONE ENCOUNTER
URVASHI reviewed and gave script for tramadol that might be less potent with fewer side effects but discuss with dr Gian Kulkarni

## 2023-08-23 NOTE — TELEPHONE ENCOUNTER
Patient called in. She had the bone marrow test on 8/18 and was hoping that the results were available. Will advise her that they take 10-14 days to result. Patient mentioned that you told her you would order more blood tests after the bone marrow, so she wanted to get the blood tests out of the way. I am unsure which ones she would be referring to. She is still having pain from the base of her skull/spine up into her neck. She can not stand for any length of time or sit. She also can not go up and down the stairs. She is taking oxycodon but it causes her to get constipation. Then, she will take Dulcoloax and get horrible diarrhea. She has been vomiting on and off as well. Please advise.

## 2023-08-23 NOTE — TELEPHONE ENCOUNTER
Patient is calling again, still has nausea and is requesting some to help this. Please advise. Please see all previous notes.

## 2023-08-24 LAB — PATHOLOGIST INTERPRETATION: NORMAL

## 2023-08-24 NOTE — TELEPHONE ENCOUNTER
Script has been sent to the pharmacy. Left message for patient to call back to advise of prescription.

## 2023-08-25 ENCOUNTER — APPOINTMENT (OUTPATIENT)
Dept: LAB | Facility: HOSPITAL | Age: 68
End: 2023-08-25
Payer: MEDICARE

## 2023-08-25 ENCOUNTER — APPOINTMENT (OUTPATIENT)
Dept: LAB | Facility: MEDICAL CENTER | Age: 68
End: 2023-08-25
Payer: MEDICARE

## 2023-08-25 DIAGNOSIS — R93.7 BONE MARROW EDEMA: ICD-10-CM

## 2023-08-25 DIAGNOSIS — R71.8 OTHER ABNORMALITY OF RED BLOOD CELLS: ICD-10-CM

## 2023-08-25 DIAGNOSIS — D72.829 LEUKOCYTOSIS, UNSPECIFIED TYPE: ICD-10-CM

## 2023-08-25 DIAGNOSIS — D75.1 ERYTHROCYTOSIS: ICD-10-CM

## 2023-08-25 DIAGNOSIS — R77.8 ABNORMAL SERUM PROTEIN ELECTROPHORESIS: ICD-10-CM

## 2023-08-25 DIAGNOSIS — D75.1 POLYCYTHEMIA: ICD-10-CM

## 2023-08-25 DIAGNOSIS — D47.2 BICLONAL GAMMOPATHY: ICD-10-CM

## 2023-08-25 LAB
ALBUMIN SERPL BCP-MCNC: 3.9 G/DL (ref 3.5–5)
ALP SERPL-CCNC: 79 U/L (ref 34–104)
ALT SERPL W P-5'-P-CCNC: 17 U/L (ref 7–52)
ANION GAP SERPL CALCULATED.3IONS-SCNC: 6 MMOL/L
AST SERPL W P-5'-P-CCNC: 17 U/L (ref 13–39)
BILIRUB SERPL-MCNC: 0.8 MG/DL (ref 0.2–1)
BUN SERPL-MCNC: 16 MG/DL (ref 5–25)
CALCIUM SERPL-MCNC: 8.7 MG/DL (ref 8.4–10.2)
CHLORIDE SERPL-SCNC: 106 MMOL/L (ref 96–108)
CO2 SERPL-SCNC: 27 MMOL/L (ref 21–32)
CREAT SERPL-MCNC: 0.85 MG/DL (ref 0.6–1.3)
CRP SERPL QL: 17.4 MG/L
ERYTHROCYTE [DISTWIDTH] IN BLOOD BY AUTOMATED COUNT: 14.4 % (ref 11.6–15.1)
ERYTHROCYTE [SEDIMENTATION RATE] IN BLOOD: 18 MM/HOUR (ref 0–29)
FERRITIN SERPL-MCNC: 106 NG/ML (ref 11–307)
GAS + CO PNL BLDA: 4.4 % (ref 0–1.5)
GFR SERPL CREATININE-BSD FRML MDRD: 70 ML/MIN/1.73SQ M
GLUCOSE SERPL-MCNC: 81 MG/DL (ref 65–140)
HCT VFR BLD AUTO: 52.9 % (ref 34.8–46.1)
HGB BLD-MCNC: 17.1 G/DL (ref 11.5–15.4)
IGA SERPL-MCNC: 279 MG/DL (ref 66–433)
IGG SERPL-MCNC: 654 MG/DL (ref 635–1741)
IGM SERPL-MCNC: 44 MG/DL (ref 45–281)
IMM EOSINOPHIL NFR BLD MANUAL: 2 % (ref 0–6)
IRON SATN MFR SERPL: 25 % (ref 15–50)
IRON SERPL-MCNC: 74 UG/DL (ref 50–212)
LDH SERPL-CCNC: 173 U/L (ref 140–271)
LYMPHOCYTES NFR BLD: 9 % (ref 14–44)
MCH RBC QN AUTO: 29.5 PG (ref 26.8–34.3)
MCHC RBC AUTO-ENTMCNC: 32.3 G/DL (ref 31.4–37.4)
MCV RBC AUTO: 91 FL (ref 82–98)
MISCELLANEOUS LAB TEST RESULT: NORMAL
MONOCYTES NFR BLD AUTO: 2 % (ref 4–12)
NEUTS SEG NFR BLD AUTO: 87 % (ref 45–77)
NRBC BLD AUTO-RTO: 0 /100 WBCS
PLATELET # BLD AUTO: 199 THOUSANDS/UL (ref 149–390)
PMV BLD AUTO: 10 FL (ref 8.9–12.7)
POTASSIUM SERPL-SCNC: 3.8 MMOL/L (ref 3.5–5.3)
PROT SERPL-MCNC: 6.6 G/DL (ref 6.4–8.4)
RBC # BLD AUTO: 5.8 MILLION/UL (ref 3.81–5.12)
RBC MORPH BLD: NORMAL
SODIUM SERPL-SCNC: 139 MMOL/L (ref 135–147)
TIBC SERPL-MCNC: 297 UG/DL (ref 250–450)
TOTAL CELLS COUNTED SPEC: 100
UIBC SERPL-MCNC: 223 UG/DL (ref 155–355)
WBC # BLD AUTO: 12.62 THOUSAND/UL (ref 4.31–10.16)

## 2023-08-25 PROCEDURE — 85652 RBC SED RATE AUTOMATED: CPT

## 2023-08-25 PROCEDURE — 82375 ASSAY CARBOXYHB QUANT: CPT

## 2023-08-25 PROCEDURE — 86334 IMMUNOFIX E-PHORESIS SERUM: CPT

## 2023-08-25 PROCEDURE — 85007 BL SMEAR W/DIFF WBC COUNT: CPT

## 2023-08-25 PROCEDURE — 83550 IRON BINDING TEST: CPT

## 2023-08-25 PROCEDURE — 82728 ASSAY OF FERRITIN: CPT

## 2023-08-25 PROCEDURE — 80053 COMPREHEN METABOLIC PANEL: CPT

## 2023-08-25 PROCEDURE — 36415 COLL VENOUS BLD VENIPUNCTURE: CPT

## 2023-08-25 PROCEDURE — 86140 C-REACTIVE PROTEIN: CPT

## 2023-08-25 PROCEDURE — 82784 ASSAY IGA/IGD/IGG/IGM EACH: CPT

## 2023-08-25 PROCEDURE — 82668 ASSAY OF ERYTHROPOIETIN: CPT

## 2023-08-25 PROCEDURE — 84165 PROTEIN E-PHORESIS SERUM: CPT

## 2023-08-25 PROCEDURE — 83521 IG LIGHT CHAINS FREE EACH: CPT

## 2023-08-25 PROCEDURE — 83615 LACTATE (LD) (LDH) ENZYME: CPT

## 2023-08-25 PROCEDURE — 83540 ASSAY OF IRON: CPT

## 2023-08-26 LAB
EPO SERPL-ACNC: 14.5 MIU/ML (ref 2.6–18.5)
KAPPA LC FREE SER-MCNC: 18.7 MG/L (ref 3.3–19.4)
KAPPA LC FREE/LAMBDA FREE SER: 1.28 {RATIO} (ref 0.26–1.65)
LAMBDA LC FREE SERPL-MCNC: 14.6 MG/L (ref 5.7–26.3)

## 2023-08-27 LAB
MISCELLANEOUS LAB TEST RESULT: NORMAL
MISCELLANEOUS LAB TEST RESULT: NORMAL

## 2023-08-28 ENCOUNTER — TELEPHONE (OUTPATIENT)
Dept: HEMATOLOGY ONCOLOGY | Facility: CLINIC | Age: 68
End: 2023-08-28

## 2023-08-28 LAB
ALBUMIN SERPL ELPH-MCNC: 3.48 G/DL (ref 3.2–5.1)
ALBUMIN SERPL ELPH-MCNC: 58 % (ref 48–70)
ALPHA1 GLOB SERPL ELPH-MCNC: 0.32 G/DL (ref 0.15–0.47)
ALPHA1 GLOB SERPL ELPH-MCNC: 5.3 % (ref 1.8–7)
ALPHA2 GLOB SERPL ELPH-MCNC: 0.8 G/DL (ref 0.42–1.04)
ALPHA2 GLOB SERPL ELPH-MCNC: 13.3 % (ref 5.9–14.9)
BETA GLOB ABNORMAL SERPL ELPH-MCNC: 0.37 G/DL (ref 0.31–0.57)
BETA1 GLOB SERPL ELPH-MCNC: 6.2 % (ref 4.7–7.7)
BETA2 GLOB SERPL ELPH-MCNC: 5.8 % (ref 3.1–7.9)
BETA2+GAMMA GLOB SERPL ELPH-MCNC: 0.35 G/DL (ref 0.2–0.58)
GAMMA GLOB ABNORMAL SERPL ELPH-MCNC: 0.68 G/DL (ref 0.4–1.66)
GAMMA GLOB SERPL ELPH-MCNC: 11.4 % (ref 6.9–22.3)
IGG/ALB SER: 1.38 {RATIO} (ref 1.1–1.8)
INTERPRETATION UR IFE-IMP: NORMAL
M PROTEIN 1 MFR SERPL ELPH: 1.3 %
M PROTEIN 1 SERPL ELPH-MCNC: 0.08 G/DL
MISCELLANEOUS LAB TEST RESULT: NORMAL
MISCELLANEOUS LAB TEST RESULT: NORMAL
PROT PATTERN SERPL ELPH-IMP: ABNORMAL
PROT SERPL-MCNC: 6 G/DL (ref 6.4–8.4)

## 2023-08-28 PROCEDURE — 84165 PROTEIN E-PHORESIS SERUM: CPT | Performed by: PATHOLOGY

## 2023-08-28 PROCEDURE — 86334 IMMUNOFIX E-PHORESIS SERUM: CPT | Performed by: PATHOLOGY

## 2023-08-28 NOTE — TELEPHONE ENCOUNTER
Called and lvm for patient mentioning that her 8/31 appointment needed to be rescheduled due to her bmbx results not being back yet. Upon speaking with the pathology department they mentioned additional testing went out and could take up to 10 days for the results to come back. I rescheduled patient's appointment to 9/15 @10:20 with [facility] [place]. Mentioned if patient had any questions and or concerns to please give the office a call back.

## 2023-08-28 NOTE — TELEPHONE ENCOUNTER
Patient Call    Who are you speaking with? Patient    If it is not the patient, are they listed on an active communication consent form? N/A   What is the reason for this call? Patient calling to speak with Eusebia Sanchez. States it is urgent. Would not discuss reason for call    Does this require a call back? Yes   If a call back is required, please list best call back number [phone]   If a call back is required, advise that a message will be forwarded to their care team and someone will return their call as soon as possible. Did you relay this information to the patient?  yes

## 2023-08-28 NOTE — TELEPHONE ENCOUNTER
Returned call to patient, she would like to keep 8/31 appointment with Sukhdeep Velazquez to review labs. She is aware that BMBX results may not be resulted for that visit. She has a f/u to [facility] with Dr. Elvis Tena on 9/13. She has my direct number to call with any additional questions or concerns.

## 2023-08-28 NOTE — TELEPHONE ENCOUNTER
Spent 30 minutes on the phone with patient to attempt to address her concerns/return her phone call. Patient began the conversation with wanting this nurse to not try to address any concern until she was done speaking about what she needed to. I did as I was asked. Patient was first upset with the fact that it took so long to get through to someone on the phone. She states that she was angry when she called. The staff member who answered the phone hung up on the patient, per the patient. Patient stated that this was very inappropriate since it is an "oncology meyer." She did state that she feels very unsupported in by our department and that the staff is unprofessional/unknowledgeable. She was unaware from the beginning that Flipbelén Brownedanilo was not a physician. She was under the impression that she was a physician. She also stated that she did not know that she needed to have labs collected. She stated that Lucero An told her she would know what labs needed to be ordered after the biopsy results came back. However, labs were placed on 7/21 and patient stated that she was unaware of these needing to be done. However, Denia's note does mention labs to be collected several times. When aware of the labs needed, she went to the lab and was told that the labs could not be drawn there, since it was not connected to a hospital. One of the labs required the blood to be sent immediately to the lab. Patient states that she was also unaware that a urine test needed to be collected. She has a prolapsed uterus and can not hold on to urine. She had used the bathroom before she went to the lab so it took awhile for her to urinate there. She eventually was able to, but was frustrated by this situation. She also stated that she has been in such pain since the bone marrow biopsy. She usually uses a cane, but has been having to use a walker. She described her pain as bone pain.  Patient needed complete anesthesia, so her appointment needed to be pushed back for the biopsy appointment due to the original location not offering full anesthesia. She used profanity when describing the anesthesiologist and the encounter that they had. She was told by the IR MD that the testing would be back within 9 days. However, she had the bone marrow biopsy done on 8/18 and the results are still not available. Pathology department stated that the results would not be back by her 8/31 appointment. They had to send out additional testing (NGS). Pathology was unsure exactly when the results would be back, but that it could be up to 10 days. Therefore, patient's appointment needed to be r/s due to not having these results back to discuss next steps as needed. Patient stated that she had no intention of cancelling her appointment and that she will still be there at the appointment on Thursday. She was left a message with details pertaining to this by MA staff. Patient stated that she does not appreciate the appointment being cancelled and rescheduled without her being spoken with first. I explained the reasoning for the appointment being rescheduled since the results weren't available yet. Again, patient stated that she will be at the appointment on Thursday to get answers to all of her concerns. Patient needs an appointment after 1pm and worked around her already scheduled appointments. I advised that I would touch base with management before anything is done/changed. Patient asked what this nurse can tell her to make her stay with our practice. I advised patient that I would reach out to management to address her concerns as this felt larger than just myself. She did agree and wished to speak with management. She understood and agreed to the next steps at this time. I did make Chica Chahal RN aware of this call and asked that she contact her to discuss/help further.

## 2023-08-29 LAB
ALBUMIN UR ELPH-MCNC: 100 %
ALPHA1 GLOB MFR UR ELPH: 0 %
ALPHA2 GLOB MFR UR ELPH: 0 %
B-GLOBULIN MFR UR ELPH: 0 %
GAMMA GLOB MFR UR ELPH: 0 %
PROT PATTERN UR ELPH-IMP: NORMAL
PROT UR-MCNC: 17.8 MG/DL

## 2023-08-29 PROCEDURE — 84166 PROTEIN E-PHORESIS/URINE/CSF: CPT | Performed by: PATHOLOGY

## 2023-08-30 LAB — MISCELLANEOUS LAB TEST RESULT: NORMAL

## 2023-08-31 ENCOUNTER — TELEPHONE (OUTPATIENT)
Dept: HEMATOLOGY ONCOLOGY | Facility: CLINIC | Age: 68
End: 2023-08-31

## 2023-08-31 ENCOUNTER — OFFICE VISIT (OUTPATIENT)
Dept: HEMATOLOGY ONCOLOGY | Facility: CLINIC | Age: 68
End: 2023-08-31
Payer: MEDICARE

## 2023-08-31 VITALS
HEART RATE: 94 BPM | BODY MASS INDEX: 25.78 KG/M2 | HEIGHT: 64 IN | RESPIRATION RATE: 16 BRPM | DIASTOLIC BLOOD PRESSURE: 80 MMHG | OXYGEN SATURATION: 96 % | TEMPERATURE: 98.3 F | SYSTOLIC BLOOD PRESSURE: 128 MMHG | WEIGHT: 151 LBS

## 2023-08-31 DIAGNOSIS — D47.2 MONOCLONAL GAMMOPATHY OF UNKNOWN SIGNIFICANCE (MGUS): Primary | ICD-10-CM

## 2023-08-31 DIAGNOSIS — M54.41 CHRONIC BILATERAL LOW BACK PAIN WITH BILATERAL SCIATICA: ICD-10-CM

## 2023-08-31 DIAGNOSIS — G89.29 CHRONIC BILATERAL LOW BACK PAIN WITH BILATERAL SCIATICA: ICD-10-CM

## 2023-08-31 DIAGNOSIS — F17.200 CURRENT EVERY DAY SMOKER: ICD-10-CM

## 2023-08-31 DIAGNOSIS — M54.42 CHRONIC BILATERAL LOW BACK PAIN WITH BILATERAL SCIATICA: ICD-10-CM

## 2023-08-31 DIAGNOSIS — D47.2 IGG MONOCLONAL GAMMOPATHY OF UNCERTAIN SIGNIFICANCE: ICD-10-CM

## 2023-08-31 DIAGNOSIS — D75.1 POLYCYTHEMIA SECONDARY TO SMOKING: ICD-10-CM

## 2023-08-31 PROCEDURE — 99215 OFFICE O/P EST HI 40 MIN: CPT

## 2023-08-31 RX ORDER — OXYCODONE AND ACETAMINOPHEN 7.5; 325 MG/1; MG/1
1 TABLET ORAL EVERY 6 HOURS PRN
Qty: 28 TABLET | Refills: 0 | Status: SHIPPED | OUTPATIENT
Start: 2023-08-31 | End: 2023-09-07

## 2023-08-31 NOTE — TELEPHONE ENCOUNTER
Patient Running Late       Who are you speaking with? Physician Office   If it is not the patient, are they listed on an active communication consent form? N/A   When is the appointment scheduled? Please list date and time 08/31/23 1PM   At which location is the appointment scheduled to take place? [place]   If patient is running less than 15 minutes late, have patient proceed to office. Appointment may need to be rescheduled at providers discretion. If provider cannot see patient today, the office will reschedule the visit. Was this relayed to patient?  N/A

## 2023-08-31 NOTE — PROGRESS NOTES
[address] [facility] [id number]  Hematology Ambulatory Follow-Up  Too Allison, 1955, [id number]  8/31/2023    Assessment/Plan:  1. Monoclonal gammopathy of unknown significance (MGUS)  2. IgG monoclonal gammopathy of uncertain significance  Ms. Jessica Zamora is a 14-year-old female seen in follow-up to review bone marrow biopsy and laboratory results. She underwent bone marrow biopsy on 8/18/2023 for biclonal gammopathy, erythrocytosis, and leukocytosis. Results demonstrate 4% plasma cells kappa to lambda ratio 1:1 consistent with a diagnosis of monoclonal gammopathy of unknown significance. UPEP negative for monoclonal bands, M spike on SPEP was 0.08 g/dL, free light chain ratio within normal limits, IgM low at 44, no evidence of end organ damage or crab criteria on CBC or CMP. I discussed that monitoring of labs is important for this diagnosis. We reviewed that the risk of conversion to multiple myeloma is 1% each year. Patient will see physician in follow-up in about 4 months with repeat labs for monitoring per her request.    - CBC and differential; Future  - Comprehensive metabolic panel; Future  - IgG, IgA, IgM; Future  - Immunoglobulin free LT chains blood; Future  - LD,Blood; Future  - Protein electrophoresis, serum; Future      3. 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 Current every day smoker  Reviewed that leukocytosis and erythrocytosis is likely secondary to her smoking status. She continues to smoke half pack per day and carboxyhemoglobin was elevated at 4.4% consistent with this diagnosis. We reviewed bone marrow testing was negative for myeloproliferative abnormalities on NGS or FISH. She does take 81 mg of aspirin daily for other underlying comorbidities which she will continue to take.   I stressed the importance of her avoiding dehydration and increasing hydration for which she states is difficult for her due to her urinary issues/incontinence. She  declined pulmonary consultation for smoking sensation. At this time due to reactive nature and lack of symptoms therapeutic phlebotomy not recommended at this time. - CBC and differential; Future  - Comprehensive metabolic panel; Future    5. Chronic bilateral low back pain with bilateral sciatica  Patient has worsening sciatica nerve pain since undergoing bone marrow biopsy. She has been taking oxycodone 5 mg immediate release for as needed pain control which has not been helping her sleep. Her sleep has been drastically interrupted due to the pain. She was also given Ultram which would did not affect her pain at all. She has become more constipated due to the increased use. Will do a trial of Percocet 7.5/325 mg to take as needed every 6 hours for severe pain. She will discuss further if this is successful in dealing with her pain with her PCP for further refills as well as possible pain management referral.  I instructed her to use MiraLAX, Colace, or Senokot as needed for increased constipation. - oxyCODONE-acetaminophen (Percocet) 7.5-325 MG per tablet; Take 1 tablet by mouth every 6 (six) hours as needed for moderate pain or severe pain for up to 7 days Max Daily Amount: 4 tablets  Dispense: 28 tablet; Refill: 0      The patient is scheduled for follow-up in approximately 4 months with Dr. Mireille Nguyen (patient preferrs physician do not schedule with PA or NP). Patient wishes to call the office in the next few weeks to months to schedule this follow-up as she needs her calendar. She does not want to schedule prior to leaving the office today. Patient voiced agreement and understanding to the above. Patient knows to call the Hematology/Oncology office with any questions and concerns regarding the above.       Barrier(s) to care: None  The patient is able to self care.  ------------------------------------------------------------------------------------------------------    No chief complaint on file. History of present illness:   Kriss Kim is a 57-year-old female with past medical history of GERD, Raynaud's, neuropathy, peripheral artery disease, osteoporosis, lupus, fibromyalgia, mixed lipidemia, and smoker. She was originally seen in consultation in July 2023 with abnormal SPEP demonstrating biclonal gammopathy. This was first noted in November 2022 as free lambda light chain M spike of 0.1 g/dL and IgG kappa 0.12 g/dL. Other lab abnormalities include thrombocytosis and erythrocytosis. No other evidence of endorgan damage. She also had an MRI of her cervical spine demonstrating marrow edema which has prompted hematology evaluation. She also has a history of cancer in her left lung status post left lower lobectomy and did not receive chemo or radiation postoperatively. She is not certain on any details surrounding this diagnosis and there is no pathology to be reviewed in the records. She has no family history of any blood disorders or blood conditions. She does take 81 mg of aspirin daily along with Lipitor for her peripheral artery disease. Platelet count does appear to be stable within normal limits. She was also found to have persistent leukocytosis and new erythrocytosis as of July 2021 with a hemoglobin ranging from 15-16 with a hematocrit above 50%. Due to the multiple abnormalities found a bone marrow biopsy was recommended which she underwent with full anesthesia on 8/18/2023. BMBx: 8/18/2023  Final Diagnosis   A-C. Bone Marrow, Left Iliac Crest, Core, Clot and Aspirate:  - Normocellular stewart (50% cellularity) with trilineage hematopoiesis, scattered T cell predominant lymphoid aggregates (5%) and 4% plasma cells (Kappa to Lambda ratio 1:1). - Decreased iron stores. - No reticulin fibrosis.   - No apple green birefringence material is identified on Congo red stain. - T-Cell Receptor Gamma Gene Rearrangement by PCR: Positive  - No MPN/myeloma-related abnormalities identified by FISH or NGS. - A normal female karyotype.     Comment:   Correlation with SPEP/UPEP with heavy chains (IgA, IgG, IgM) and free light chains (kappa, lambda). Those methodologies may be more sensitive for low-level plasma cell neoplasm.      Minute clonal T cell population with positive T-cell receptor Gamma gene rearrangement is identified. A clonal T-cell population is not synonymous with a T-cell lymphoproliferative disorder. Clonotypic expansion may occur in a variety of neoplastic and non-neoplastic conditions, including viral infections (EBV-related or other) autoimmune disorders, immune deficiency syndromes, and in stem cell transplant patients. Clinical correlation is recommended to rule out T-cell lymphoproliferative disorder.     Elevated haemoglobin concentration (16 g/dL) with normal erythropoietin level and mild leukocytosis is noted, favor reactive changes. There is no evidence of MPN in current specimen. Re-biopsy might be considered if erythrocytosis and leukocytosis persist.        8/25/2023: UPEP-no monoclonal bands   SPEP-IgG kappa M spike 0.08 g/dL   Free light chain ratio within normal limits   IgA 279, IgG 654, IgM 44  Hemoglobin 17.1, hematocrit 52.9, platelets 484, WBC 96.36   Neutrophils 87%, lymphocytes 9%, monocytes 2%  Erythropoietin 14.5   Carboxyhemoglobin 4.4%  Sed rate 18, CRP 17.4    Interval history: She did not tolerate bone marrow biopsy well and has been experiencing increased sciatic nerve pain as well as lower back pain. This has been a chronic problem prior to bone marrow biopsy but was exacerbated by the procedure. She has been taking oxycodone 5 mg as needed without relief. She was also given tramadol which had also not relieved the pain. She was experiencing increased constipation with nausea for which Zofran has been helping. She also took Dulcolax for the constipation which gave her diarrhea. The pain has been ongoing since the procedure on the 18th and is not getting any better or worse. We reviewed in detail the results of her bone marrow biopsy and lab work demonstrating monoclonal gammopathy of unknown significance and secondary polycythemia. Review of Systems   Constitutional: Positive for appetite change (due to nause). Negative for activity change, fatigue, fever and unexpected weight change. HENT: Negative. Eyes: Negative for photophobia and visual disturbance. Respiratory: Negative. Cardiovascular: Negative. Gastrointestinal: Positive for constipation. Negative for nausea (resolved with zofran). Endocrine: Negative for cold intolerance and heat intolerance. Genitourinary: Negative. Musculoskeletal: Positive for back pain and gait problem. Neurological: Negative for dizziness, light-headedness and headaches. Hematological: Negative for adenopathy. Does not bruise/bleed easily. Psychiatric/Behavioral: Positive for sleep disturbance (due to pain).        Patient Active Problem List   Diagnosis   • Chronic bilateral low back pain with bilateral sciatica   • Fibromyalgia   • GERD (gastroesophageal reflux disease)   • Lung cancer ([address])   • Osteoporosis   • Primary osteoarthritis involving multiple joints   • SLE (systemic lupus erythematosus) ([facility])   • Localized edema   • Acute cystitis without hematuria   • Open toe wound, initial encounter   • Neuropathy   • Raynaud's disease without gangrene   • Tobacco dependence   • Aortoiliac occlusive disease ([facility])   • PAD (peripheral artery disease) ([facility])   • Lumbar pain   • Thrush   • Microscopic hematuria   • Right flank pain   • Benign essential microscopic hematuria   • Cellulitis   • Right foot pain   • History of lung cancer   • Chronic hypokalemia   • Tobacco user   • Constipation   • Acute sore throat   • Multiple thyroid nodules   • Preop cardiovascular exam   • Cortical age-related cataract of both eyes   • Mixed hyperlipidemia   • Continuous opioid dependence ([address])   • Leg swelling   • Bronchitis   • Visit for suture removal   • Elevated serum protein level   • Vaccine reaction   • Cellulitis of right lower extremity   • Weakness of left arm   • Disorder of left rotator cuff   • Abnormal serum protein electrophoresis   • Cervical radiculopathy       Past Medical History:   Diagnosis Date   • Allergic    • Anxiety    • Arthritis 12/20/2014   • Cataract     eulalia   • Depression    • Disease of thyroid gland     nodules   • Fibromyalgia, primary    • Frequent UTI    • GERD (gastroesophageal reflux disease)    • History of lung cancer     lower left lobe removed - 2004   • Irritable bowel syndrome    • Lactose intolerance    • Lupus ([facility])    • 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   • Osteoporosis    • PAD (peripheral artery disease) ([facility])    • Presence of pessary    • Raynaud disease    • Sciatica    • Sinus headache    • Sjogren's syndrome ([address])    • Smoker        Past Surgical History:   Procedure Laterality Date   • IR BIOPSY BONE MARROW  8/18/2023   • LUNG REMOVAL, PARTIAL  05/01/2004    Prior Surgical/Procedural History - 5/4 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 - lung resection - subtotal   • MOUTH SURGERY      Tooth extraction   • MS TEAEC W/WO PATCH GRAFT COMMON FEMORAL Left 9/10/2019    Procedure: ENDARTERECTOMY ARTERIAL FEMORAL,  WITH BOVINE GRAFT AND PROFUNDOPLASTY;  Surgeon: Williams Louis DO;  Location: [facility];  Service: Vascular   • SINUS SURGERY         Family History   Problem Relation Age of Onset   • Arthritis Mother    • Asthma Mother    • Dementia Mother    • Heart disease Mother    • Hypothyroidism Mother    • Irritable bowel syndrome Mother    • Uterine cancer Mother 72   • Heart attack Father    • Stroke Father    • Hyperlipidemia Sister    • Uterine cancer Sister         age unknown   • Heart attack Maternal Grandfather    • Asthma Family    • Cataracts Family    • Thyroid disease Family    • No Known Problems Paternal Aunt    • No Known Problems Maternal Aunt    • No Known Problems Maternal Grandmother    • No Known Problems Paternal Grandmother    • No Known Problems Paternal Grandfather        Social History     Socioeconomic History   • Marital status: /Civil Union     Spouse name: None   • Number of children: 1   • Years of education: None   • Highest education level: None   Occupational History   • Occupation: office work retired   Tobacco Use   • Smoking status: Every Day     Packs/day: 0.50     Years: 40.00     Total pack years: 20.00     Types: Cigarettes   • Smokeless tobacco: Never   Vaping Use   • Vaping Use: Never used   Substance and Sexual Activity   • Alcohol use: Not Currently     Comment: liquor rarely   • Drug use: Yes     Types: Oxycodone   • Sexual activity: Not Currently     Partners: Male   Other Topics Concern   • None   Social History Narrative    Always uses seat belt    Caffeine use    Primary spoken language English    Orthodox    Racial background:      Drinks Tea     Social Determinants of Health     Financial Resource Strain: Low Risk  (12/16/2022)    Overall Financial Resource Strain (CARDIA)    • Difficulty of Paying Living Expenses: Not hard at all   Food Insecurity: Not on file   Transportation Needs: No Transportation Needs (12/16/2022)    PRAPARE - Transportation    • Lack of Transportation (Medical): No    • Lack of Transportation (Non-Medical):  No   Physical Activity: Not on file   Stress: Not on file   Social Connections: Not on file   Intimate Partner Violence: Not on file   Housing Stability: Not on file         Current Outpatient Medications:   •  aspirin (ECOTRIN LOW STRENGTH) 81 mg EC tablet, Take 1 tablet (81 mg total) by mouth daily, Disp: 90 tablet, Rfl: 3  •  atorvastatin (LIPITOR) 10 mg tablet, Take 1 tablet (10 mg total) by mouth daily with dinner, Disp: 90 tablet, Rfl: 1  •  cyanocobalamin (VITAMIN B-12) 100 mcg tablet, Take 500 mcg by mouth daily 500 mcg, Disp: , Rfl:   •  estradiol (VAGIFEM, YUVAFEM) 10 MCG TABS vaginal tablet, Insert 1 tablet (10 mcg total) into the vagina 2 (two) times a week, Disp: 24 tablet, Rfl: 3  •  famotidine (PEPCID) 10 mg tablet, Take 10 mg by mouth daily at bedtime If heartburn is really bad she will take 20 mg., Disp: , Rfl:   •  LORazepam (ATIVAN) 0.5 mg tablet, Take 1 tablet (0.5 mg total) by mouth every 12 (twelve) hours as needed for anxiety, Disp: 30 tablet, Rfl: 0  •  Multiple Vitamin (MULTI-VITAMIN PO), Take by mouth, Disp: , Rfl:   •  oxyCODONE (ROXICODONE) 5 immediate release tablet, Take 1 tablet (5 mg total) by mouth every 6 (six) hours as needed for moderate pain Max Daily Amount: 20 mg, Disp: 30 tablet, Rfl: 0  •  oxyCODONE-acetaminophen (Percocet) 7.5-325 MG per tablet, Take 1 tablet by mouth every 6 (six) hours as needed for moderate pain or severe pain for up to 7 days Max Daily Amount: 4 tablets, Disp: 28 tablet, Rfl: 0  •  nystatin (MYCOSTATIN) 500,000 units/5 mL suspension, Apply 5 mL (500,000 Units total) to the mouth or throat 4 (four) times a day (Patient not taking: Reported on 8/31/2023), Disp: 60 mL, Rfl: 1  •  ondansetron (ZOFRAN) 4 mg tablet, Take 1 tablet (4 mg total) by mouth every 6 (six) hours as needed for nausea or vomiting, Disp: 30 tablet, Rfl: 0  •  potassium chloride (Klor-Con 10) 10 mEq tablet, Take 1 tablet (10 mEq total) by mouth 3 (three) times a day, Disp: 270 tablet, Rfl: 1  •  predniSONE 10 mg tablet, TAKE 1 TABLET BY MOUTH EVERY DAY, Disp: 90 tablet, Rfl: 1  •  traMADol (ULTRAM) 50 mg tablet, Take 1 tablet (50 mg total) by mouth daily as needed for severe pain, Disp: 10 tablet, Rfl: 0  •  Vitamin Mixture (JOSE RAMON-C PO), Take 1 tablet by mouth daily, Disp: , Rfl:     Allergies   Allergen Reactions   • Dust Mite Extract Shortness Of Breath   • Penicillins Shortness Of Breath and Edema   • Duloxetine Diarrhea   • Lactose - Food Allergy GI Intolerance   • Neosporin [Neomycin-Bacitracin Zn-Polymyx] Itching and Rash   • Nitrofurantoin Rash   • Omeprazole Rash   • Sulfamethoxazole-Trimethoprim Rash       Objective:  /80 (BP Location: Right arm, Patient Position: Sitting, Cuff Size: Adult)   Pulse 94   Temp 98.3 °F (36.8 °C) (Temporal)   Resp 16   Ht 5' 4" (1.626 m)   Wt 68.5 kg (151 lb)   LMP  (LMP Unknown)   SpO2 96%   BMI 25.92 kg/m²    Physical Exam  Constitutional:       General: She is not in acute distress. Appearance: Normal appearance. She is not ill-appearing. HENT:      Head: Normocephalic and atraumatic. Eyes:      Extraocular Movements: Extraocular movements intact. Conjunctiva/sclera: Conjunctivae normal.   Cardiovascular:      Rate and Rhythm: Normal rate and regular rhythm. Pulses: Normal pulses. Heart sounds: No murmur heard. Pulmonary:      Effort: Pulmonary effort is normal. No respiratory distress. Breath sounds: Normal breath sounds. Abdominal:      General: Abdomen is flat. Bowel sounds are normal. There is no distension. Palpations: Abdomen is soft. Tenderness: There is no abdominal tenderness. Musculoskeletal:         General: No swelling. Cervical back: Normal range of motion. No rigidity. Right lower leg: No edema. Left lower leg: No edema. Skin:     General: Skin is warm and dry. Capillary Refill: Capillary refill takes less than 2 seconds. Coloration: Skin is not jaundiced. Neurological:      General: No focal deficit present. Mental Status: She is alert and oriented to person, place, and time. Motor: No weakness. Gait: Gait normal.   Psychiatric:         Mood and Affect: Mood normal.         Behavior: Behavior normal.         Thought Content:  Thought content normal.         Judgment: Judgment normal.         Result Review  Labs:  Appointment on 08/25/2023   Component Date Value Ref Range Status   • IGA 08/25/2023 279  66 - 433 mg/dL Final   • IGG 08/25/2023 654  635 - 1,741 mg/dL Final   • IGM 08/25/2023 44 (L)  45 - 281 mg/dL Final   • Ig Kappa Free Light Chain 08/25/2023 18.7  3.3 - 19.4 mg/L Final   • Ig Lambda Free Light Chain 08/25/2023 14.6  5.7 - 26.3 mg/L Final   • Kappa/Lambda FluidC Ratio 08/25/2023 1.28  0.26 - 1.65 Final   • LD 08/25/2023 173  140 - 271 U/L Final   • A/G Ratio 08/25/2023 1.38  1.10 - 1.80 Final   • Albumin Electrophoresis 08/25/2023 58.0  48.0 - 70.0 % Final   • Albumin CONC 08/25/2023 3.48  3.20 - 5.10 g/dl Final   • Alpha 1 08/25/2023 5.3  1.8 - 7.0 % Final   • ALPHA 1 CONC 08/25/2023 0.32  0.15 - 0.47 g/dL Final   • Alpha 2 08/25/2023 13.3  5.9 - 14.9 % Final   • ALPHA 2 CONC 08/25/2023 0.80  0.42 - 1.04 g/dL Final   • Beta-1 08/25/2023 6.2  4.7 - 7.7 % Final   • BETA 1 CONC 08/25/2023 0.37  0.31 - 0.57 g/dL Final   • Beta-2 08/25/2023 5.8  3.1 - 7.9 % Final   • BETA 2 CONC 08/25/2023 0.35  0.20 - 0.58 g/dL Final   • Gamma Globulin 08/25/2023 11.4  6.9 - 22.3 % Final   • GAMMA CONC 08/25/2023 0.68  0.40 - 1.66 g/dL Final   • M Peak ID 1 08/25/2023 1.30  % Final   • M PEAK 1 CONC 08/25/2023 0.08  g/dL Final   • Total Protein 08/25/2023 6.0 (L)  6.4 - 8.4 g/dL Final   • SPEP Interpretation 08/25/2023 See Comment   Final    The SPEP shows a monoclonal peak in the gamma region. Previous immunofixation on 11/02/2022 identified a biclonal gammopathy as IgG kappa and free lambda light chains. Repeat immunofixation to be performed.  Reviewed by:  Rick Cornelius MD  **Electronic Signature**   • WBC 08/25/2023 12.62 (H)  4.31 - 10.16 Thousand/uL Final   • RBC 08/25/2023 5.80 (H)  3.81 - 5.12 Million/uL Final   • Hemoglobin 08/25/2023 17.1 (H)  11.5 - 15.4 g/dL Final   • Hematocrit 08/25/2023 52.9 (H)  34.8 - 46.1 % Final   • MCV 08/25/2023 91  82 - 98 fL Final   • MCH 08/25/2023 29.5  26.8 - 34.3 pg Final   • MCHC 08/25/2023 32.3  31.4 - 37.4 g/dL Final   • RDW 08/25/2023 14.4  11.6 - 15.1 % Final   • MPV 08/25/2023 10.0  8.9 - 12.7 fL Final   • Platelets 04/31/2533 199  149 - 390 Thousands/uL Final   • nRBC 08/25/2023 0  /100 WBCs Final   • Sodium 08/25/2023 139  135 - 147 mmol/L Final   • Potassium 08/25/2023 3.8  3.5 - 5.3 mmol/L Final   • Chloride 08/25/2023 106  96 - 108 mmol/L Final   • CO2 08/25/2023 27  21 - 32 mmol/L Final   • ANION GAP 08/25/2023 6  mmol/L Final   • BUN 08/25/2023 16  5 - 25 mg/dL Final   • Creatinine 08/25/2023 0.85  0.60 - 1.30 mg/dL Final    Standardized to IDMS reference method   • Glucose 08/25/2023 81  65 - 140 mg/dL Final    If the patient is fasting, the ADA then defines impaired fasting glucose as > 100 mg/dL and diabetes as > or equal to 123 mg/dL. • Calcium 08/25/2023 8.7  8.4 - 10.2 mg/dL Final   • AST 08/25/2023 17  13 - 39 U/L Final   • ALT 08/25/2023 17  7 - 52 U/L Final    Specimen collection should occur prior to Sulfasalazine administration due to the potential for falsely depressed results. • Alkaline Phosphatase 08/25/2023 79  34 - 104 U/L Final   • Total Protein 08/25/2023 6.6  6.4 - 8.4 g/dL Final   • Albumin 08/25/2023 3.9  3.5 - 5.0 g/dL Final   • Total Bilirubin 08/25/2023 0.80  0.20 - 1.00 mg/dL Final    Use of this assay is not recommended for patients undergoing treatment with eltrombopag due to the potential for falsely elevated results. N-acetyl-p-benzoquinone imine (metabolite of Acetaminophen) will generate erroneously low results in samples for patients that have taken an overdose of Acetaminophen. • eGFR 08/25/2023 70  ml/min/1.73sq m Final   • CRP 08/25/2023 17.4 (H)  <3.0 mg/L Final   • Sed Rate 08/25/2023 18  0 - 29 mm/hour Final   • Erythropoietin 08/25/2023 14.5  2.6 - 18.5 mIU/mL Final    Nilsa Moxie DxI 800 Immunoassay System  Values obtained with different assay methods or kits cannot be used  interchangeably.  Results cannot be interpreted as absolute evidence  of the presence or absence of malignant disease. • Carbon Monoxide, Blood 08/25/2023 4.4 (H)  0.0 - 1.5 % Final   • Segmented Neutrophils Manual 08/25/2023 87 (H)  45 - 77 % Final   • Lymphocytes Manual 08/25/2023 9 (L)  14 - 44 % Final   • Monocytes Manual 08/25/2023 2 (L)  4 - 12 % Final   • Eosinophils Manual 08/25/2023 2  0 - 6 % Final   • Total Counted 08/25/2023 100   Final   • RBC Morphology 08/25/2023 Normal   Final   • Iron Saturation 08/25/2023 25  15 - 50 % Final    Unable to Calculate. • TIBC 08/25/2023 297  250 - 450 ug/dL Final   • Iron 08/25/2023 74  50 - 212 ug/dL Final    Patients treated with metal-binding drugs (ie. Deferoxamine) may have depressed iron values. • UIBC 08/25/2023 223  155 - 355 ug/dL Final   • Ferritin 08/25/2023 106  11 - 307 ng/mL Final   • Immunofixation Interpretation 08/25/2023 See Comment   Final    Serum immunofixation shows a monoclonal gammopathy identified as IgG kappa (0.08 g/dL).   Reviewed by:  Divine Blevins MD **Electronic [facility] Outpatient Visit on 08/18/2023   Component Date Value Ref Range Status   • WBC 08/18/2023 11.44 (H)  4.31 - 10.16 Thousand/uL Final   • RBC 08/18/2023 5.46 (H)  3.81 - 5.12 Million/uL Final   • Hemoglobin 08/18/2023 16.0 (H)  11.5 - 15.4 g/dL Final   • Hematocrit 08/18/2023 48.5 (H)  34.8 - 46.1 % Final   • MCV 08/18/2023 89  82 - 98 fL Final   • MCH 08/18/2023 29.3  26.8 - 34.3 pg Final   • MCHC 08/18/2023 33.0  31.4 - 37.4 g/dL Final   • RDW 08/18/2023 14.3  11.6 - 15.1 % Final   • MPV 08/18/2023 9.4  8.9 - 12.7 fL Final   • Platelets 76/58/1048 218  149 - 390 Thousands/uL Final   • nRBC 08/18/2023 0  /100 WBCs Final   • Segmented Neutrophils Manual 08/18/2023 74  45 - 77 % Final   • Lymphocytes Manual 08/18/2023 17  14 - 44 % Final   • Monocytes Manual 08/18/2023 5  4 - 12 % Final   • Eosinophils Manual 08/18/2023 2  0 - 6 % Final   • Basos 08/18/2023 1  0 - 1 % Final   • Atypical Lymphocytes Relative 08/18/2023 1 (H)  0 - 0 % Final   • Total Counted 08/18/2023 100   Final   • RBC Morphology 08/18/2023 Normal   Final   • Platelet Estimate 13/49/8085 Adequate  Adequate Final   • Path Review 08/18/2023 Peripheral blood smear shows normochromic normocytic RBCs, normal number and morphology of platelets, mild leukocytosis with neutrophilia. Evaluated by Eliz Chauhan M.D. Final   • Sodium 08/18/2023 143  135 - 147 mmol/L Final   • Potassium 08/18/2023 3.8  3.5 - 5.3 mmol/L Final   • Chloride 08/18/2023 113 (H)  96 - 108 mmol/L Final   • CO2 08/18/2023 25  21 - 32 mmol/L Final   • ANION GAP 08/18/2023 5  mmol/L Final   • BUN 08/18/2023 16  5 - 25 mg/dL Final   • Creatinine 08/18/2023 0.78  0.60 - 1.30 mg/dL Final    Standardized to IDMS reference method   • Glucose 08/18/2023 86  65 - 140 mg/dL Final    Specimen collection should occur prior to Sulfasalazine administration due to the potential for falsely depressed results. Specimen collection should occur prior to Sulfapyridine administration due to the potential for falsely elevated results. If the patient is fasting, the ADA then defines impaired fasting glucose as > 100 mg/dL and diabetes as > or equal to 123 mg/dL. • Glucose, Fasting 08/18/2023 86  65 - 99 mg/dL Final    Specimen collection should occur prior to Sulfasalazine administration due to the potential for falsely depressed results. Specimen collection should occur prior to Sulfapyridine administration due to the potential for falsely elevated results.    • Calcium 08/18/2023 8.6  8.3 - 10.1 mg/dL Final   • eGFR 08/18/2023 78  ml/min/1.73sq m Final   • Case Report 08/18/2023    Final                    Value:Surgical Pathology Report                         Case: [id number]                                   Authorizing Provider:  Alexis Alaniz MD        Collected:           08/18/2023 1002              Ordering Location:     [facility]      Received:            08/18/2023 [address] CAT Scan Pathologist:           Jonathan Powell MD                                                                  Specimens:   A) - Iliac Crest, Left, core                                                                        B) - Iliac Crest, Left, clot                                                                        C) - Iliac Crest, Left, slide                                                             • Final Diagnosis 08/18/2023    Final                    Value: This result contains rich text formatting which cannot be displayed here. • Note 08/18/2023    Final                    Value: This result contains rich text formatting which cannot be displayed here. • Additional Information 08/18/2023    Final                    Value: This result contains rich text formatting which cannot be displayed here. • Gross Description 08/18/2023    Final                    Value: This result contains rich text formatting which cannot be displayed here. • Clinical Information 08/18/2023    Final                    Value:biclonal gammopathy, abnormal spep, erythrocytosis, polycythemia, hx of left lung ca   • Miscellaneous Lab Test Result 08/18/2023 see written report   Final   • Miscellaneous Lab Test Result 08/18/2023 COMPREHENSIVE MYELOID DISORDER  BONE MARROW   Final   • Miscellaneous Lab Test Result 08/18/2023 see written report   Final   • Miscellaneous Lab Test Result 08/18/2023 SEE WRITTEN REPORT   Final   • Miscellaneous Lab Test Result 08/18/2023 see written report   Final   • Miscellaneous Lab Test Result 08/18/2023 see written report   Final       Imaging:   No relevant imaging to review    Please note: This report has been generated by a voice recognition software system. Therefore there may be syntax, spelling, and/or grammatical errors. Please call if you have any questions.

## 2023-09-01 PROBLEM — F17.200 CURRENT EVERY DAY SMOKER: Status: ACTIVE | Noted: 2023-09-01

## 2023-09-01 PROBLEM — D47.2 MONOCLONAL GAMMOPATHY OF UNKNOWN SIGNIFICANCE (MGUS): Status: ACTIVE | Noted: 2023-09-01

## 2023-09-01 PROBLEM — D47.2 IGG MONOCLONAL GAMMOPATHY OF UNCERTAIN SIGNIFICANCE: Status: ACTIVE | Noted: 2023-09-01

## 2023-09-01 PROBLEM — D75.1 POLYCYTHEMIA SECONDARY TO SMOKING: Status: ACTIVE | Noted: 2023-09-01

## 2023-09-05 ENCOUNTER — HOSPITAL ENCOUNTER (OUTPATIENT)
Dept: NON INVASIVE DIAGNOSTICS | Facility: CLINIC | Age: 68
Discharge: HOME | End: 2023-09-05
Payer: MEDICARE

## 2023-09-05 DIAGNOSIS — I74.09 AORTOILIAC OCCLUSIVE DISEASE (HCC): ICD-10-CM

## 2023-09-05 PROCEDURE — 93978 VASCULAR STUDY: CPT

## 2023-09-05 PROCEDURE — 93923 UPR/LXTR ART STDY 3+ LVLS: CPT

## 2023-09-06 PROCEDURE — 93922 UPR/L XTREMITY ART 2 LEVELS: CPT | Performed by: SURGERY

## 2023-09-06 PROCEDURE — 93978 VASCULAR STUDY: CPT | Performed by: SURGERY

## 2023-09-11 ENCOUNTER — TRANSCRIBE ORDERS (OUTPATIENT)
Dept: VASCULAR SURGERY | Facility: CLINIC | Age: 68
End: 2023-09-11

## 2023-09-11 DIAGNOSIS — I74.09 AORTOILIAC OCCLUSIVE DISEASE (HCC): Primary | ICD-10-CM

## 2023-09-20 LAB — MISCELLANEOUS LAB TEST RESULT: NORMAL

## 2023-09-27 ENCOUNTER — HOSPITAL ENCOUNTER (OUTPATIENT)
Dept: ULTRASOUND IMAGING | Facility: MEDICAL CENTER | Age: 68
Discharge: HOME | End: 2023-09-27
Payer: MEDICARE

## 2023-09-27 DIAGNOSIS — E04.2 MULTIPLE THYROID NODULES: ICD-10-CM

## 2023-09-27 PROCEDURE — 76536 US EXAM OF HEAD AND NECK: CPT

## 2023-10-05 ENCOUNTER — CLINICAL SUPPORT (OUTPATIENT)
Dept: FAMILY MEDICINE CLINIC | Facility: CLINIC | Age: 68
End: 2023-10-05
Payer: MEDICARE

## 2023-10-05 DIAGNOSIS — Z23 NEED FOR IMMUNIZATION AGAINST INFLUENZA: Primary | ICD-10-CM

## 2023-10-05 PROCEDURE — G0008 ADMIN INFLUENZA VIRUS VAC: HCPCS

## 2023-10-05 PROCEDURE — 90662 IIV NO PRSV INCREASED AG IM: CPT

## 2023-10-10 ENCOUNTER — OFFICE VISIT (OUTPATIENT)
Dept: ENDOCRINOLOGY | Facility: CLINIC | Age: 68
End: 2023-10-10
Payer: MEDICARE

## 2023-10-10 VITALS
BODY MASS INDEX: 25.85 KG/M2 | SYSTOLIC BLOOD PRESSURE: 120 MMHG | WEIGHT: 151.4 LBS | DIASTOLIC BLOOD PRESSURE: 80 MMHG | HEART RATE: 89 BPM | HEIGHT: 64 IN

## 2023-10-10 DIAGNOSIS — M81.0 AGE-RELATED OSTEOPOROSIS WITHOUT CURRENT PATHOLOGICAL FRACTURE: ICD-10-CM

## 2023-10-10 DIAGNOSIS — E04.2 MULTIPLE THYROID NODULES: Primary | ICD-10-CM

## 2023-10-10 PROCEDURE — 99214 OFFICE O/P EST MOD 30 MIN: CPT | Performed by: INTERNAL MEDICINE

## 2023-10-10 NOTE — PROGRESS NOTES
Brennan Spearing 76 y.o. female MRN: [id number]    Encounter: [id number]      Assessment/Plan     Assessment: This is a 76y.o.-year-old female with thyroid nodules and osteoporosis. Plan:  1. Thyroid nodules-these are stable on ultrasound. Since the interval between the ultrasound has been about 8 years and the stability is still present, I would recommend doing yearly clinical exam and repeating an ultrasound if the nodules have increased in size or number. 2.  Osteoporosis-she does not wish to go back on osteoporosis medications because she has lost all of her teeth which she believes is due to the osteoporosis medications. I did encourage her to start taking vitamin D 2000 units/day and obtaining 1200 mg of calcium per day. I did give her some more information about this. CC:   Thyroid nodules    History of Present Illness     HPI:  70-year-old woman with thyroid nodules presents for follow-up. She denies any difficulty swallowing or breathing. She denies any family history of thyroid cancer or personal history of radiation exposure. She denies any symptoms of hyper or hyperthyroidism. On most recent DEXA scan, she was noted to have a FRAX score of the hip fracture of greater than 8% and major osteoporotic fracture of 25%.     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   • Osteoporosis    • PAD (peripheral artery disease) ([facility])    • Presence of pessary    • Raynaud disease    • Sciatica    • Sinus headache    • Sjogren's syndrome ([address])    • Smoker Past Surgical History:   Procedure Laterality Date   • IR BIOPSY BONE MARROW  8/18/2023   • LUNG REMOVAL, PARTIAL  05/01/2004    Prior Surgical/Procedural History - 5/4 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 - lung resection - subtotal   • MOUTH SURGERY      Tooth extraction   • MN TEAEC W/WO PATCH GRAFT COMMON FEMORAL Left 9/10/2019    Procedure: ENDARTERECTOMY ARTERIAL FEMORAL,  WITH BOVINE GRAFT AND PROFUNDOPLASTY;  Surgeon: Geo Carlson DO;  Location: [facility];  Service: Vascular   • SINUS SURGERY       Social History   Social History     Substance and Sexual Activity   Alcohol Use Not Currently    Comment: liquor rarely     Social History     Substance and Sexual Activity   Drug Use Yes   • Types: Oxycodone     Social History     Tobacco Use   Smoking Status Every Day   • Packs/day: 0.50   • Years: 40.00   • Total pack years: 20.00   • Types: Cigarettes   Smokeless Tobacco Never     Family History:   Family History   Problem Relation Age of Onset   • Arthritis Mother    • Asthma Mother    • Dementia Mother    • Heart disease Mother    • Hypothyroidism Mother    • Irritable bowel syndrome Mother    • Uterine cancer Mother 72   • Heart attack Father    • Stroke Father    • Hyperlipidemia Sister    • Uterine cancer Sister         age unknown   • Heart attack Maternal Grandfather    • Asthma Family    • Cataracts Family    • Thyroid disease Family    • No Known Problems Paternal Aunt    • No Known Problems Maternal Aunt    • No Known Problems Maternal Grandmother    • No Known Problems Paternal Grandmother    • No Known Problems Paternal Grandfather        Meds/Allergies   Current Outpatient Medications   Medication Sig Dispense Refill   • aspirin (ECOTRIN LOW STRENGTH) 81 mg EC tablet Take 1 tablet (81 mg total) by mouth daily 90 tablet 3   • atorvastatin (LIPITOR) 10 mg tablet Take 1 tablet (10 mg total) by mouth daily with dinner 90 tablet 1   • cyanocobalamin (VITAMIN B-12) 100 mcg tablet Take 500 mcg by mouth daily 500 mcg     • estradiol (VAGIFEM, YUVAFEM) 10 MCG TABS vaginal tablet Insert 1 tablet (10 mcg total) into the vagina 2 (two) times a week 24 tablet 3   • famotidine (PEPCID) 10 mg tablet Take 10 mg by mouth daily at bedtime If heartburn is really bad she will take 20 mg.     • LORazepam (ATIVAN) 0.5 mg tablet Take 1 tablet (0.5 mg total) by mouth every 12 (twelve) hours as needed for anxiety 30 tablet 0   • Multiple Vitamin (MULTI-VITAMIN PO) Take by mouth     • oxyCODONE (ROXICODONE) 5 immediate release tablet Take 1 tablet (5 mg total) by mouth every 6 (six) hours as needed for moderate pain Max Daily Amount: 20 mg 30 tablet 0   • potassium chloride (Klor-Con 10) 10 mEq tablet Take 1 tablet (10 mEq total) by mouth 3 (three) times a day 270 tablet 1   • predniSONE 10 mg tablet TAKE 1 TABLET BY MOUTH EVERY DAY 90 tablet 1   • Vitamin Mixture (JOSE RAMON-C PO) Take 1 tablet by mouth daily     • nystatin (MYCOSTATIN) 500,000 units/5 mL suspension Apply 5 mL (500,000 Units total) to the mouth or throat 4 (four) times a day (Patient not taking: Reported on 8/31/2023) 60 mL 1   • ondansetron (ZOFRAN) 4 mg tablet Take 1 tablet (4 mg total) by mouth every 6 (six) hours as needed for nausea or vomiting (Patient not taking: Reported on 10/10/2023) 30 tablet 0   • traMADol (ULTRAM) 50 mg tablet Take 1 tablet (50 mg total) by mouth daily as needed for severe pain (Patient not taking: Reported on 10/10/2023) 10 tablet 0     No current facility-administered medications for this visit.      Allergies   Allergen Reactions   • Dust Mite Extract Shortness Of Breath   • Penicillins Shortness Of Breath and Edema   • Duloxetine Diarrhea   • Lactose - Food Allergy GI Intolerance   • Neosporin [Neomycin-Bacitracin Zn-Polymyx] Itching and Rash   • Nitrofurantoin Rash   • Omeprazole Rash   • Sulfamethoxazole-Trimethoprim Rash       Objective   Vitals: Blood pressure 120/80, pulse 89, height 5' 4" (1.626 m), weight 68.7 kg (151 lb 6.4 oz), not currently breastfeeding. Physical Exam    The history was obtained from the review of the chart, patient. Lab Results:   Lab Results   Component Value Date/Time    TSH 3RD GENERATON 0.636 05/17/2023 12:24 PM    TSH 3RD GENERATON 0.733 11/02/2022 11:29 AM    Free T4 0.99 11/02/2022 11:29 AM       Imaging Studies:   Results for orders placed during the hospital encounter of 09/27/23    US thyroid    Impression  Multinodular thyroid gland as above. Nodule #1 was not clearly visualized on the 2015 ultrasound. Nodule #2 is not significantly changed and has had a prior benign biopsy. Recommend follow-up thyroid ultrasound in 1 year. Reference: ACR Thyroid Imaging, Reporting and Data System (TI-RADS): White Paper of the [employer]. J AM Lillie Radiol 1623;98:821-306. (additional recommendations based on American Thyroid Association 2015 guidelines.)      Workstation performed: [id number]    CENTRAL  DXA SCAN     CLINICAL HISTORY:   79year old post-menopausal  female risk factors include osteoporosis screening. Additional medical history; Lupus disease. Raynaud's disease. Steroid medication 1991 to present time. Lung cancer 2001.     TECHNIQUE: Bone densitometry was performed using a [employer] W bone densitometer. Regions of interest appear properly placed.  There are   other confounding variables which could limit the study.       Degenerative or osteoarthritic changes appear to be present on the spine image at L2-3 and 4 but these vertebrae are not able eliminated from evaluation.     COMPARISON:  Baseline     RESULTS:   LUMBAR SPINE:  L1-L4:  BMD 0.975 gm/cm2  T-score -0.7  Z-score 1.3     LEFT TOTAL HIP:  BMD 0.659 gm/cm2  T-score -2.3  Z-score -0.9     LEFT FEMORAL NECK:  BMD 0.618 gm/cm2  T-score -2.1  Z-score -0.4     In view of the Frax score treatment may be a consideration after excluding secondary causes for osteoporosis and might include Prolia for a period of time followed by intravenous Reclast.           IMPRESSION:  1. Based on the [facility] classification, this study identifies a diagnosis of low bone mass, notable at the total hip and femoral neck areas but the patient may be considered at risk for fracture as evidenced by the increased fracture risk noted on the Frax   score. 2. A daily intake of calcium of at least 1200 mg and vitamin D, 800-1000 IU, as well as weight bearing and muscle strengthening exercise, fall prevention and avoidance of tobacco and excessive alcohol intake as basic preventive measures are recommended.     3. Repeat DXA scan on the same equipment in 18-24 months as clinically indicated.        The 10 year risk of hip fracture is 8.6%, with the 10 year risk of major osteoporotic fracture being 25%, as calculated by the WHO fracture risk assessment tool (FRAX). The current NOF guidelines recommend treating patients with FRAX 10 year risk score   of >3% for hip fracture and >20% for major osteoporotic fracture.     Fracture risks exceed treatment thresholds.     WHO CLASSIFICATION:  Normal (a T-score of -1.0 or higher)  Low bone mineral density (a T-score of less than -1.0 but higher than -2.5)  Osteoporosis (a T-score of -2.5 or less)  Severe osteoporosis (a T-score of -2.5 or less with a fragility fracture)     Thank you for allowing us the opportunity to participate in your patient care.     The expanded DEXA report will no longer be arriving in your mail. If you desire to view the full report please contact [facility] or access the PACS system. I have personally reviewed pertinent reports. Portions of the record may have been created with voice recognition software. Occasional wrong word or "sound a like" substitutions may have occurred due to the inherent limitations of voice recognition software. Read the chart carefully and recognize, using context, where substitutions have occurred.

## 2023-10-13 ENCOUNTER — OFFICE VISIT (OUTPATIENT)
Dept: GYNECOLOGY | Facility: CLINIC | Age: 68
End: 2023-10-13

## 2023-10-13 VITALS — WEIGHT: 149.4 LBS | BODY MASS INDEX: 25.64 KG/M2 | SYSTOLIC BLOOD PRESSURE: 136 MMHG | DIASTOLIC BLOOD PRESSURE: 76 MMHG

## 2023-10-13 DIAGNOSIS — N81.4 UTERINE PROLAPSE: Primary | ICD-10-CM

## 2023-10-13 NOTE — PROGRESS NOTES
Assessment/Plan:     Prolapse  - #3 shaatz pessary inserted for her and this seemed very comfortable. The 3-1/4 size was comfortable but was too difficult to remove. She does not want this 1 again and I would agree. She will call if she has any concerns, she will continue using vaginal estrogen cream.  She will return in 3 months for pessary check. Osteopenia - reviewed DEXA with her. She does not have osteoporosis, she has osteopenia but she has a greatly elevated fracture risk. I reviewed this along with the DEXA with her. She has limited mobility but we discussed balance and flexibility exercises, lifting light weights and what ever weightbearing exercise she can tolerate. Her endocrinologist suggested that she start physical therapy and I think this would be very helpful to her. She is planning to do this soon. Congratulations given on the birth of her granddaughter! There are no diagnoses linked to this encounter. Subjective:     Patient ID: Rahul Vincent is a 76 y.o. female. Patient here for pessary check. We had been awaiting delivery of a #3 Shaatz. It has arrived. In the meantime, she had a 3-1/4. This has been working quite well well for her. The 2-3/4 was too small as it was shifting. With the 3-1/4, she does notice more stress incontinence. She has been using vaginal estrogen without difficulty. She would like to review her DEXA. Her endocrinologist was concerned over her elevated fracture risk. Lumbar spine density is normal, she has significant osteopenia in the hip and femoral neck. Risk of hip fracture is 8.6% and risk of major osteoporotic fracture is 25%. She has been on Fosamax in the past but does not want to go on this again as she has had multiple dental problems that she feels may be related to this. Review of Systems   Constitutional: Negative. Gastrointestinal: Negative. Genitourinary: Negative.           Objective:     Physical Exam  Genitourinary:     General: Normal vulva. Vagina: Normal.      Cervix: Normal.      Uterus: Normal.       Adnexa: Right adnexa normal and left adnexa normal.      Comments: 3 1/4 shaatz removed with some difficulty. The vagina and the cervix were normal with no sign of excoriation. There was a tiny amount of bleeding from an abrasion at 6:00 on the introitus from removal of the pessary. A #3 shaatz was inserted. This seemed very comfortable.

## 2023-11-16 DIAGNOSIS — I70.209 OCCLUSION OF COMMON FEMORAL ARTERY (HCC): ICD-10-CM

## 2023-11-16 DIAGNOSIS — I73.9 PAD (PERIPHERAL ARTERY DISEASE) (HCC): ICD-10-CM

## 2023-11-17 RX ORDER — OXYCODONE HYDROCHLORIDE 5 MG/1
5 TABLET ORAL EVERY 6 HOURS PRN
Qty: 30 TABLET | Refills: 0 | Status: SHIPPED | OUTPATIENT
Start: 2023-11-17

## 2023-12-11 ENCOUNTER — RA CDI HCC (OUTPATIENT)
Dept: OTHER | Facility: HOSPITAL | Age: 68
End: 2023-12-11

## 2023-12-12 DIAGNOSIS — M54.50 LUMBAR PAIN: ICD-10-CM

## 2023-12-12 RX ORDER — PREDNISONE 10 MG/1
10 TABLET ORAL DAILY
Qty: 90 TABLET | Refills: 1 | Status: CANCELLED | OUTPATIENT
Start: 2023-12-12

## 2023-12-14 ENCOUNTER — OFFICE VISIT (OUTPATIENT)
Dept: FAMILY MEDICINE CLINIC | Facility: CLINIC | Age: 68
End: 2023-12-14
Payer: MEDICARE

## 2023-12-14 VITALS
TEMPERATURE: 97.7 F | HEIGHT: 64 IN | OXYGEN SATURATION: 96 % | DIASTOLIC BLOOD PRESSURE: 80 MMHG | SYSTOLIC BLOOD PRESSURE: 118 MMHG | BODY MASS INDEX: 25.85 KG/M2 | WEIGHT: 151.4 LBS | HEART RATE: 95 BPM

## 2023-12-14 DIAGNOSIS — E78.2 MIXED HYPERLIPIDEMIA: Primary | ICD-10-CM

## 2023-12-14 DIAGNOSIS — K21.00 GASTROESOPHAGEAL REFLUX DISEASE WITH ESOPHAGITIS WITHOUT HEMORRHAGE: ICD-10-CM

## 2023-12-14 DIAGNOSIS — I73.9 PAD (PERIPHERAL ARTERY DISEASE) (HCC): ICD-10-CM

## 2023-12-14 DIAGNOSIS — M54.50 LUMBAR PAIN: ICD-10-CM

## 2023-12-14 DIAGNOSIS — M81.8 OTHER OSTEOPOROSIS WITHOUT CURRENT PATHOLOGICAL FRACTURE: ICD-10-CM

## 2023-12-14 DIAGNOSIS — L03.90 CELLULITIS, UNSPECIFIED CELLULITIS SITE: Primary | ICD-10-CM

## 2023-12-14 PROCEDURE — 99214 OFFICE O/P EST MOD 30 MIN: CPT | Performed by: FAMILY MEDICINE

## 2023-12-14 RX ORDER — DOXYCYCLINE HYCLATE 100 MG/1
100 CAPSULE ORAL EVERY 12 HOURS SCHEDULED
Qty: 20 CAPSULE | Refills: 0 | Status: SHIPPED | OUTPATIENT
Start: 2023-12-14 | End: 2023-12-24

## 2023-12-14 RX ORDER — PREDNISONE 10 MG/1
10 TABLET ORAL DAILY
Qty: 90 TABLET | Refills: 1 | Status: SHIPPED | OUTPATIENT
Start: 2023-12-14

## 2023-12-14 RX ORDER — MELATONIN
1000 DAILY
COMMUNITY

## 2023-12-14 NOTE — PROGRESS NOTES
Assessment/Plan: Labs and records reviewed. Other refills will be given when needed for chronic conditions. Diagnoses and all orders for this visit:    Mixed hyperlipidemia    Gastroesophageal reflux disease with esophagitis without hemorrhage    PAD (peripheral artery disease) (HCC)    Other osteoporosis without current pathological fracture    Lumbar pain  -     predniSONE 10 mg tablet; Take 1 tablet (10 mg total) by mouth daily    Other orders  -     Calcium Carbonate (CALCIUM 500 PO); Take by mouth  -     cholecalciferol (VITAMIN D3) 1,000 units tablet; Take 1,000 Units by mouth daily            Subjective:        Patient ID: Brennan Clayton is a 76 y.o. female. Patient is here to follow-up on hyperlipidemia GERD PAD osteoporosis. The following portions of the patient's history were reviewed and updated as appropriate: allergies, current medications, past family history, past medical history, past social history, past surgical history and problem list.      Review of Systems   Constitutional: Negative. HENT: Negative. Eyes: Negative. Respiratory: Negative. Cardiovascular: Negative. Gastrointestinal: Negative. Endocrine: Negative. Genitourinary: Negative. Musculoskeletal:  Positive for arthralgias, back pain and gait problem. Skin: Negative. Allergic/Immunologic: Negative. Hematological: Negative. Psychiatric/Behavioral: Negative. Objective:        Depression Screening and Follow-up Plan: Patient was screened for depression during today's encounter. They screened negative with a PHQ-2 score of 0. Urinary Incontinence Plan of Care: counseling topics discussed: preventing constipation.              /80 (BP Location: Right arm, Patient Position: Sitting, Cuff Size: Standard)   Pulse 95   Temp 97.7 °F (36.5 °C) (Temporal)   Ht 5' 4" (1.626 m)   Wt 68.7 kg (151 lb 6.4 oz)   LMP  (LMP Unknown)   SpO2 96%   BMI 25.99 kg/m² Physical Exam  Vitals and nursing note reviewed. Constitutional:       General: She is not in acute distress. Appearance: Normal appearance. She is not ill-appearing, toxic-appearing or diaphoretic. HENT:      Head: Normocephalic and atraumatic. Right Ear: Tympanic membrane, ear canal and external ear normal. There is no impacted cerumen. Left Ear: Tympanic membrane, ear canal and external ear normal. There is no impacted cerumen. Nose: Nose normal. No congestion or rhinorrhea. Eyes:      General: No scleral icterus. Right eye: No discharge. Left eye: No discharge. Neck:      Vascular: No carotid bruit. Cardiovascular:      Rate and Rhythm: Normal rate and regular rhythm. Pulses: Normal pulses. Heart sounds: Normal heart sounds. No murmur heard. No friction rub. No gallop. Pulmonary:      Effort: Pulmonary effort is normal. No respiratory distress. Breath sounds: Normal breath sounds. No stridor. No wheezing, rhonchi or rales. Chest:      Chest wall: No tenderness. Musculoskeletal:         General: Tenderness present. No swelling, deformity or signs of injury. Cervical back: Normal range of motion and neck supple. No rigidity. No muscular tenderness. Right lower leg: No edema. Left lower leg: No edema. Lymphadenopathy:      Cervical: No cervical adenopathy. Skin:     General: Skin is warm and dry. Capillary Refill: Capillary refill takes less than 2 seconds. Coloration: Skin is not jaundiced. Findings: No bruising, erythema, lesion or rash. Neurological:      Mental Status: She is alert and oriented to person, place, and time. Mental status is at baseline. Cranial Nerves: No cranial nerve deficit. Sensory: No sensory deficit. Motor: No weakness.       Coordination: Coordination normal.      Gait: Gait normal.   Psychiatric:         Mood and Affect: Mood normal.         Behavior: Behavior normal. Thought Content:  Thought content normal.         Judgment: Judgment normal.

## 2023-12-26 DIAGNOSIS — B37.0 ORAL CANDIDIASIS: Primary | ICD-10-CM

## 2023-12-26 RX ORDER — CLOTRIMAZOLE 10 MG/1
10 LOZENGE ORAL; TOPICAL
Qty: 70 TABLET | Refills: 0 | Status: SHIPPED | OUTPATIENT
Start: 2023-12-26 | End: 2024-01-09

## 2023-12-26 NOTE — TELEPHONE ENCOUNTER
Left message for patient to contact office so I can let her know the script for the clotrimazole valencia was sent to her pharmacy.

## 2023-12-26 NOTE — TELEPHONE ENCOUNTER
Patient called in stating she was taking an antibiotic and now she has yeast in her mouth. Patient is wondering if she can get a script for clotrimazole valencia tablets [facility]. Please advise

## 2024-01-02 DIAGNOSIS — N95.2 VAGINAL ATROPHY: ICD-10-CM

## 2024-01-02 RX ORDER — ESTRADIOL 10 UG/1
1 INSERT VAGINAL 2 TIMES WEEKLY
Qty: 24 TABLET | Refills: 3 | Status: SHIPPED | OUTPATIENT
Start: 2024-01-04

## 2024-01-02 NOTE — TELEPHONE ENCOUNTER
Patient called for Yuvafem refill.  Her Yearly is scheduled for 1/11/24.  Her last appointment was on 10/13/23 for pelvic check.  She now uses [facility] on [facility].  Please forward escript.

## 2024-01-08 DIAGNOSIS — E78.2 MIXED HYPERLIPIDEMIA: ICD-10-CM

## 2024-01-08 DIAGNOSIS — I73.9 PAD (PERIPHERAL ARTERY DISEASE) (HCC): ICD-10-CM

## 2024-01-08 DIAGNOSIS — M32.19 OTHER SYSTEMIC LUPUS ERYTHEMATOSUS WITH OTHER ORGAN INVOLVEMENT (HCC): ICD-10-CM

## 2024-01-08 DIAGNOSIS — I74.09 AORTOILIAC OCCLUSIVE DISEASE (HCC): ICD-10-CM

## 2024-01-08 RX ORDER — ATORVASTATIN CALCIUM 10 MG/1
10 TABLET, FILM COATED ORAL
Qty: 90 TABLET | Refills: 1 | Status: SHIPPED | OUTPATIENT
Start: 2024-01-08

## 2024-01-08 RX ORDER — POTASSIUM CHLORIDE 750 MG/1
10 TABLET, FILM COATED, EXTENDED RELEASE ORAL 3 TIMES DAILY
Qty: 270 TABLET | Refills: 1 | Status: SHIPPED | OUTPATIENT
Start: 2024-01-08

## 2024-01-09 DIAGNOSIS — N95.2 VAGINAL ATROPHY: ICD-10-CM

## 2024-01-09 RX ORDER — ESTRADIOL 10 UG/1
1 INSERT VAGINAL 2 TIMES WEEKLY
Qty: 24 TABLET | Refills: 3 | Status: SHIPPED | OUTPATIENT
Start: 2024-01-11

## 2024-01-09 NOTE — TELEPHONE ENCOUNTER
Patient called to ask for her Rx for Yuvafem be sent to [facility] as she previously used.  Despite her insurance changes, she states her Rxs will still be more affordable at [facility].  Please forward attached escript.

## 2024-01-09 NOTE — TELEPHONE ENCOUNTER
Patient called c/o pessary fell out 3 times  She managed to reinsert it  C/o pain and irritation  Has appointment on Friday 6/9/23 to try different sizes  Advised keep appointment since she is having pain  There are some other sizes in the office for her to try 
98

## 2024-01-11 ENCOUNTER — OFFICE VISIT (OUTPATIENT)
Dept: GYNECOLOGY | Facility: CLINIC | Age: 69
End: 2024-01-11
Payer: MEDICARE

## 2024-01-11 VITALS — SYSTOLIC BLOOD PRESSURE: 140 MMHG | DIASTOLIC BLOOD PRESSURE: 80 MMHG | BODY MASS INDEX: 26.18 KG/M2 | WEIGHT: 152.5 LBS

## 2024-01-11 DIAGNOSIS — N81.4 UTERINE PROLAPSE: Primary | ICD-10-CM

## 2024-01-11 DIAGNOSIS — M85.80 OSTEOPENIA, UNSPECIFIED LOCATION: ICD-10-CM

## 2024-01-11 DIAGNOSIS — N95.2 VAGINAL ATROPHY: ICD-10-CM

## 2024-01-11 PROCEDURE — 99213 OFFICE O/P EST LOW 20 MIN: CPT | Performed by: OBSTETRICS & GYNECOLOGY

## 2024-01-11 RX ORDER — DOXYCYCLINE HYCLATE 100 MG/1
1 CAPSULE ORAL EVERY 12 HOURS
COMMUNITY

## 2024-01-11 NOTE — PROGRESS NOTES
Assessment/Plan:     Thyroid nodules stable-the endocrinologist stated that she could have a yearly thyroid exam and repeat thyroid ultrasound if they seem to be larger.    Uterine prolapse-she is happy with her pessary.  When she returns in 3 months for her pessary check, we will do a yearly exam.    Osteopenia with elevated fracture risk.  She does not want medication.  She is planning to start physical therapy.  She gets calcium and vitamin D.    Yearly at next visit     There are no diagnoses linked to this encounter.      Subjective:     Patient ID: Nae Hernandez is a 68 y.o. female.    Patient here for pessary check.  She has a #3 Shaatz pessary and this has been working well for her.  She uses vaginal estrogen once a week.    She has no GYN complaints.    DEXA showed osteopenia with elevated fracture hip fracture risk and risk of major osteoporotic fracture.  She discussed this with endocrinology.  She does not want medication.  She is planning to start physical therapy.    Also, she has had stable thyroid nodules for many years.  The endocrinologist told her that she could have a yearly exam with me and Dr. Medel.  Repeat US if the nodules become larger.      Review of Systems   Constitutional: Negative.    Gastrointestinal: Negative.    Genitourinary: Negative.          Objective:     Physical Exam  Genitourinary:     General: Normal vulva.      Comments: Shaatz removed without difficulty, cleaned and reinserted   No excoriation noted

## 2024-01-22 ENCOUNTER — HOSPITAL ENCOUNTER (OUTPATIENT)
Facility: MEDICAL CENTER | Age: 69
Discharge: HOME | End: 2024-01-22
Payer: MEDICARE

## 2024-01-22 DIAGNOSIS — M25.562 LEFT KNEE PAIN, UNSPECIFIED CHRONICITY: ICD-10-CM

## 2024-01-22 PROCEDURE — 73721 MRI JNT OF LWR EXTRE W/O DYE: CPT

## 2024-01-22 PROCEDURE — G1004 CDSM NDSC: HCPCS

## 2024-02-21 PROBLEM — N30.00 ACUTE CYSTITIS WITHOUT HEMATURIA: Status: RESOLVED | Noted: 2018-04-06 | Resolved: 2024-02-21

## 2024-03-01 ENCOUNTER — OFFICE VISIT (OUTPATIENT)
Dept: FAMILY MEDICINE CLINIC | Facility: CLINIC | Age: 69
End: 2024-03-01
Payer: MEDICARE

## 2024-03-01 VITALS
OXYGEN SATURATION: 96 % | HEIGHT: 64 IN | SYSTOLIC BLOOD PRESSURE: 126 MMHG | DIASTOLIC BLOOD PRESSURE: 74 MMHG | HEART RATE: 93 BPM | WEIGHT: 152 LBS | TEMPERATURE: 99 F | BODY MASS INDEX: 25.95 KG/M2

## 2024-03-01 DIAGNOSIS — Q38.3 TONGUE ABNORMALITY: ICD-10-CM

## 2024-03-01 DIAGNOSIS — R58 ECCHYMOSIS: ICD-10-CM

## 2024-03-01 DIAGNOSIS — N30.90 CYSTITIS: Primary | ICD-10-CM

## 2024-03-01 LAB
BACTERIA UR QL AUTO: ABNORMAL /HPF
BILIRUB UR QL STRIP: NEGATIVE
CLARITY UR: CLEAR
COLOR UR: ABNORMAL
GLUCOSE UR STRIP-MCNC: NEGATIVE MG/DL
HGB UR QL STRIP.AUTO: NEGATIVE
KETONES UR STRIP-MCNC: NEGATIVE MG/DL
LEUKOCYTE ESTERASE UR QL STRIP: ABNORMAL
MUCOUS THREADS UR QL AUTO: ABNORMAL
NITRITE UR QL STRIP: NEGATIVE
NON-SQ EPI CELLS URNS QL MICRO: ABNORMAL /HPF
PH UR STRIP.AUTO: 6.5 [PH]
PROT UR STRIP-MCNC: NEGATIVE MG/DL
RBC #/AREA URNS AUTO: ABNORMAL /HPF
SP GR UR STRIP.AUTO: 1.02 (ref 1–1.03)
UROBILINOGEN UR STRIP-ACNC: <2 MG/DL
WBC #/AREA URNS AUTO: ABNORMAL /HPF

## 2024-03-01 PROCEDURE — 99214 OFFICE O/P EST MOD 30 MIN: CPT | Performed by: FAMILY MEDICINE

## 2024-03-01 PROCEDURE — G2211 COMPLEX E/M VISIT ADD ON: HCPCS | Performed by: FAMILY MEDICINE

## 2024-03-01 PROCEDURE — 87086 URINE CULTURE/COLONY COUNT: CPT | Performed by: FAMILY MEDICINE

## 2024-03-01 PROCEDURE — 81001 URINALYSIS AUTO W/SCOPE: CPT | Performed by: FAMILY MEDICINE

## 2024-03-01 RX ORDER — CLOTRIMAZOLE 10 MG/1
LOZENGE ORAL; TOPICAL
COMMUNITY

## 2024-03-01 NOTE — PROGRESS NOTES
"Assessment/Plan: UA C&S done at this time.  Patient will increase fluids.  Will treat with antibiotics culture positive.  Will observe tongue and ecchymosis.  Follow-up as needed       Diagnoses and all orders for this visit:    Cystitis    Ecchymosis    Tongue abnormality    Other orders  -     clotrimazole (MYCELEX) 10 mg valencia; TAKE 1 TABLET BY MOUTH FIVE TIMES A DAY FOR 14 DAYS            Subjective:        Patient ID: Nae Hernandez is a 68 y.o. female.      Patient is here with urinary frequency and urgency intermittently over the past 3 months.  Patient also with tongue issues.  Occasional bleeding tongue over time.  Patient also with ecchymosis right fourth finger.  No trauma    Urinary Tract Infection   Associated symptoms include frequency and urgency.         The following portions of the patient's history were reviewed and updated as appropriate: allergies, current medications, past family history, past medical history, past social history, past surgical history and problem list.      Review of Systems   Constitutional: Negative.    HENT:  Positive for sore throat.    Eyes: Negative.    Respiratory: Negative.     Cardiovascular: Negative.    Gastrointestinal: Negative.    Endocrine: Negative.    Genitourinary:  Positive for frequency and urgency.   Musculoskeletal: Negative.    Skin:  Positive for rash.   Allergic/Immunologic: Negative.    Neurological: Negative.    Hematological:  Bruises/bleeds easily.   Psychiatric/Behavioral: Negative.             Objective:        Tobacco Cessation Counseling: Tobacco cessation counseling was provided. The patient is sincerely urged to quit consumption of tobacco. She is not ready to quit tobacco. Medication options discussed.             /74 (BP Location: Right arm, Patient Position: Sitting, Cuff Size: Standard)   Pulse 93   Temp 99 °F (37.2 °C) (Temporal)   Ht 5' 4\" (1.626 m)   Wt 68.9 kg (152 lb)   LMP  (LMP Unknown)   SpO2 96%   BMI 26.09 kg/m² "          Physical Exam  Vitals and nursing note reviewed.   Constitutional:       General: She is not in acute distress.     Appearance: Normal appearance. She is not ill-appearing, toxic-appearing or diaphoretic.   HENT:      Head: Normocephalic and atraumatic.      Right Ear: Tympanic membrane, ear canal and external ear normal. There is no impacted cerumen.      Left Ear: Tympanic membrane, ear canal and external ear normal. There is no impacted cerumen.      Nose: Nose normal. No congestion or rhinorrhea.      Mouth/Throat:      Mouth: Mucous membranes are moist.      Pharynx: No oropharyngeal exudate or posterior oropharyngeal erythema.   Eyes:      General: No scleral icterus.        Right eye: No discharge.         Left eye: No discharge.   Neck:      Vascular: No carotid bruit.   Cardiovascular:      Rate and Rhythm: Normal rate and regular rhythm.      Pulses: Normal pulses.      Heart sounds: Normal heart sounds. No murmur heard.     No friction rub. No gallop.   Pulmonary:      Effort: Pulmonary effort is normal. No respiratory distress.      Breath sounds: Normal breath sounds. No stridor. No wheezing, rhonchi or rales.   Chest:      Chest wall: No tenderness.   Musculoskeletal:         General: No swelling, tenderness, deformity or signs of injury.      Cervical back: Normal range of motion and neck supple. No rigidity. No muscular tenderness.      Right lower leg: No edema.      Left lower leg: No edema.   Lymphadenopathy:      Cervical: No cervical adenopathy.   Skin:     General: Skin is warm and dry.      Capillary Refill: Capillary refill takes less than 2 seconds.      Coloration: Skin is not jaundiced.      Findings: Bruising present. No erythema, lesion or rash.      Comments: Ecchymosis right finger #4.  Capillary refill normal   Neurological:      Mental Status: She is alert and oriented to person, place, and time. Mental status is at baseline.      Cranial Nerves: No cranial nerve deficit.       Sensory: No sensory deficit.      Motor: No weakness.      Coordination: Coordination normal.      Gait: Gait normal.   Psychiatric:         Mood and Affect: Mood normal.         Behavior: Behavior normal.         Thought Content: Thought content normal.         Judgment: Judgment normal.

## 2024-03-03 LAB — BACTERIA UR CULT: ABNORMAL

## 2024-03-04 ENCOUNTER — APPOINTMENT (OUTPATIENT)
Dept: LAB | Facility: MEDICAL CENTER | Age: 69
End: 2024-03-04
Payer: MEDICARE

## 2024-03-04 DIAGNOSIS — D75.1 POLYCYTHEMIA SECONDARY TO SMOKING: ICD-10-CM

## 2024-03-04 DIAGNOSIS — D47.2 MONOCLONAL GAMMOPATHY OF UNKNOWN SIGNIFICANCE (MGUS): ICD-10-CM

## 2024-03-04 LAB
ALBUMIN SERPL BCP-MCNC: 3.9 G/DL (ref 3.5–5)
ALP SERPL-CCNC: 70 U/L (ref 34–104)
ALT SERPL W P-5'-P-CCNC: 22 U/L (ref 7–52)
ANION GAP SERPL CALCULATED.3IONS-SCNC: 9 MMOL/L
AST SERPL W P-5'-P-CCNC: 22 U/L (ref 13–39)
BASOPHILS # BLD AUTO: 0.09 THOUSANDS/ÂΜL (ref 0–0.1)
BASOPHILS NFR BLD AUTO: 1 % (ref 0–1)
BILIRUB SERPL-MCNC: 0.71 MG/DL (ref 0.2–1)
BUN SERPL-MCNC: 15 MG/DL (ref 5–25)
CALCIUM SERPL-MCNC: 8.9 MG/DL (ref 8.4–10.2)
CHLORIDE SERPL-SCNC: 103 MMOL/L (ref 96–108)
CO2 SERPL-SCNC: 29 MMOL/L (ref 21–32)
CREAT SERPL-MCNC: 0.83 MG/DL (ref 0.6–1.3)
EOSINOPHIL # BLD AUTO: 0.31 THOUSAND/ÂΜL (ref 0–0.61)
EOSINOPHIL NFR BLD AUTO: 3 % (ref 0–6)
ERYTHROCYTE [DISTWIDTH] IN BLOOD BY AUTOMATED COUNT: 14.6 % (ref 11.6–15.1)
GFR SERPL CREATININE-BSD FRML MDRD: 72 ML/MIN/1.73SQ M
GLUCOSE P FAST SERPL-MCNC: 74 MG/DL (ref 65–99)
HCT VFR BLD AUTO: 52.2 % (ref 34.8–46.1)
HGB BLD-MCNC: 16.5 G/DL (ref 11.5–15.4)
IGA SERPL-MCNC: 320 MG/DL (ref 66–433)
IGG SERPL-MCNC: 712 MG/DL (ref 635–1741)
IGM SERPL-MCNC: 52 MG/DL (ref 45–281)
IMM GRANULOCYTES # BLD AUTO: 0.1 THOUSAND/UL (ref 0–0.2)
IMM GRANULOCYTES NFR BLD AUTO: 1 % (ref 0–2)
LDH SERPL-CCNC: 181 U/L (ref 140–271)
LYMPHOCYTES # BLD AUTO: 3.01 THOUSANDS/ÂΜL (ref 0.6–4.47)
LYMPHOCYTES NFR BLD AUTO: 26 % (ref 14–44)
MCH RBC QN AUTO: 29.6 PG (ref 26.8–34.3)
MCHC RBC AUTO-ENTMCNC: 31.6 G/DL (ref 31.4–37.4)
MCV RBC AUTO: 94 FL (ref 82–98)
MONOCYTES # BLD AUTO: 0.9 THOUSAND/ÂΜL (ref 0.17–1.22)
MONOCYTES NFR BLD AUTO: 8 % (ref 4–12)
NEUTROPHILS # BLD AUTO: 7.25 THOUSANDS/ÂΜL (ref 1.85–7.62)
NEUTS SEG NFR BLD AUTO: 61 % (ref 43–75)
NRBC BLD AUTO-RTO: 0 /100 WBCS
PLATELET # BLD AUTO: 236 THOUSANDS/UL (ref 149–390)
PMV BLD AUTO: 9.7 FL (ref 8.9–12.7)
POTASSIUM SERPL-SCNC: 3.7 MMOL/L (ref 3.5–5.3)
PROT SERPL-MCNC: 6.5 G/DL (ref 6.4–8.4)
RBC # BLD AUTO: 5.58 MILLION/UL (ref 3.81–5.12)
SODIUM SERPL-SCNC: 141 MMOL/L (ref 135–147)
WBC # BLD AUTO: 11.66 THOUSAND/UL (ref 4.31–10.16)

## 2024-03-04 PROCEDURE — 84165 PROTEIN E-PHORESIS SERUM: CPT

## 2024-03-04 PROCEDURE — 85025 COMPLETE CBC W/AUTO DIFF WBC: CPT

## 2024-03-04 PROCEDURE — 36415 COLL VENOUS BLD VENIPUNCTURE: CPT

## 2024-03-04 PROCEDURE — 80053 COMPREHEN METABOLIC PANEL: CPT

## 2024-03-04 PROCEDURE — 83521 IG LIGHT CHAINS FREE EACH: CPT

## 2024-03-04 PROCEDURE — 83615 LACTATE (LD) (LDH) ENZYME: CPT

## 2024-03-04 PROCEDURE — 82784 ASSAY IGA/IGD/IGG/IGM EACH: CPT

## 2024-03-05 ENCOUNTER — TELEPHONE (OUTPATIENT)
Dept: HEMATOLOGY ONCOLOGY | Facility: CLINIC | Age: 69
End: 2024-03-05

## 2024-03-05 DIAGNOSIS — F41.9 ANXIETY: ICD-10-CM

## 2024-03-05 DIAGNOSIS — I73.9 PAD (PERIPHERAL ARTERY DISEASE) (HCC): ICD-10-CM

## 2024-03-05 DIAGNOSIS — I70.209 OCCLUSION OF COMMON FEMORAL ARTERY (HCC): ICD-10-CM

## 2024-03-05 LAB
ALBUMIN SERPL ELPH-MCNC: 3.54 G/DL (ref 3.2–5.1)
ALBUMIN SERPL ELPH-MCNC: 57.1 % (ref 48–70)
ALPHA1 GLOB SERPL ELPH-MCNC: 0.3 G/DL (ref 0.15–0.47)
ALPHA1 GLOB SERPL ELPH-MCNC: 4.8 % (ref 1.8–7)
ALPHA2 GLOB SERPL ELPH-MCNC: 0.81 G/DL (ref 0.42–1.04)
ALPHA2 GLOB SERPL ELPH-MCNC: 13 % (ref 5.9–14.9)
BETA GLOB ABNORMAL SERPL ELPH-MCNC: 0.41 G/DL (ref 0.31–0.57)
BETA1 GLOB SERPL ELPH-MCNC: 6.6 % (ref 4.7–7.7)
BETA2 GLOB SERPL ELPH-MCNC: 6.2 % (ref 3.1–7.9)
BETA2+GAMMA GLOB SERPL ELPH-MCNC: 0.38 G/DL (ref 0.2–0.58)
GAMMA GLOB ABNORMAL SERPL ELPH-MCNC: 0.76 G/DL (ref 0.4–1.66)
GAMMA GLOB SERPL ELPH-MCNC: 12.3 % (ref 6.9–22.3)
IGG/ALB SER: 1.33 {RATIO} (ref 1.1–1.8)
KAPPA LC FREE SER-MCNC: 24.3 MG/L (ref 3.3–19.4)
KAPPA LC FREE/LAMBDA FREE SER: 1.14 {RATIO} (ref 0.26–1.65)
LAMBDA LC FREE SERPL-MCNC: 21.3 MG/L (ref 5.7–26.3)
M PROTEIN 1 MFR SERPL ELPH: 2.5 %
M PROTEIN 1 SERPL ELPH-MCNC: 0.16 G/DL
PROT PATTERN SERPL ELPH-IMP: ABNORMAL
PROT SERPL-MCNC: 6.2 G/DL (ref 6.4–8.2)

## 2024-03-05 PROCEDURE — 84165 PROTEIN E-PHORESIS SERUM: CPT | Performed by: STUDENT IN AN ORGANIZED HEALTH CARE EDUCATION/TRAINING PROGRAM

## 2024-03-05 NOTE — TELEPHONE ENCOUNTER
Appointment Change  Cancel, Reschedule, Change to Virtual      Who are you speaking with? Patient   If it is not the patient, is the caller listed on the communication consent form? Yes   Which provider is the appointment scheduled with? Dr. Desir   When was the original appointment scheduled?    Please list date and time 3/12   At which location is the appointment scheduled to take place? [place]   Was the appointment rescheduled?     Was the appointment changed from an in person visit to a virtual visit?    If so, please list the details of the change. No, refused service   What is the reason for the appointment change? Refuses [facility], not satisfied with service       Was [facility] scheduled? no   Does [facility] need to be scheduled for the new visit (if applicable) no   Does the patient need an infusion appointment rescheduled? No   Does the patient have an upcoming infusion appointment scheduled? If so, when? No   Is the patient undergoing chemotherapy? No   For appointments cancelled with less than 24 hours:  Was the no-show policy reviewed? Yes

## 2024-03-05 NOTE — TELEPHONE ENCOUNTER
Called patient regarding follow up appointment. Left voicemail for patient with date and time, provided patient with phone number if date and time does not work.

## 2024-03-07 RX ORDER — LORAZEPAM 0.5 MG/1
0.5 TABLET ORAL EVERY 12 HOURS PRN
Qty: 30 TABLET | Refills: 0 | Status: SHIPPED | OUTPATIENT
Start: 2024-03-07

## 2024-03-07 RX ORDER — OXYCODONE HYDROCHLORIDE 5 MG/1
5 TABLET ORAL EVERY 6 HOURS PRN
Qty: 30 TABLET | Refills: 0 | Status: SHIPPED | OUTPATIENT
Start: 2024-03-07

## 2024-03-07 NOTE — TELEPHONE ENCOUNTER
Patient state her symptoms come and go at the moment she is feeling ok. Patient was advised if symptoms return to contact our office. No further concerns at the time of visit.

## 2024-03-07 NOTE — TELEPHONE ENCOUNTER
Patient is asking if labs look good. She is wondering why she may need to follow up with hematology. She states she though she was done with hematology.

## 2024-04-02 ENCOUNTER — TELEPHONE (OUTPATIENT)
Age: 69
End: 2024-04-02

## 2024-04-02 DIAGNOSIS — Z12.31 ENCOUNTER FOR SCREENING MAMMOGRAM FOR MALIGNANT NEOPLASM OF BREAST: Primary | ICD-10-CM

## 2024-04-12 ENCOUNTER — TELEPHONE (OUTPATIENT)
Age: 69
End: 2024-04-12

## 2024-04-12 NOTE — TELEPHONE ENCOUNTER
Physical Therapy call their are  looking for  a script . They want to  now if  Dr rene  could  put one  in their  please.

## 2024-04-15 NOTE — TELEPHONE ENCOUNTER
Milly from Physical therapy called again.     They have patient scheduled for an eval on 4/30.    They are asking if Dr. Hunter can place a physical therapy referral.     Please advise.    Milly asking for call back once this is placed at : [phone]

## 2024-04-16 DIAGNOSIS — M81.0 AGE-RELATED OSTEOPOROSIS WITHOUT CURRENT PATHOLOGICAL FRACTURE: Primary | ICD-10-CM

## 2024-04-25 ENCOUNTER — ANNUAL EXAM (OUTPATIENT)
Dept: GYNECOLOGY | Facility: CLINIC | Age: 69
End: 2024-04-25
Payer: MEDICARE

## 2024-04-25 VITALS
BODY MASS INDEX: 26.09 KG/M2 | SYSTOLIC BLOOD PRESSURE: 134 MMHG | WEIGHT: 152.8 LBS | DIASTOLIC BLOOD PRESSURE: 68 MMHG | HEIGHT: 64 IN

## 2024-04-25 DIAGNOSIS — E04.1 THYROID NODULE: ICD-10-CM

## 2024-04-25 DIAGNOSIS — Z01.419 ENCOUNTER FOR ANNUAL ROUTINE GYNECOLOGICAL EXAMINATION: Primary | ICD-10-CM

## 2024-04-25 DIAGNOSIS — Z12.31 ENCOUNTER FOR SCREENING MAMMOGRAM FOR BREAST CANCER: ICD-10-CM

## 2024-04-25 PROCEDURE — G0101 CA SCREEN;PELVIC/BREAST EXAM: HCPCS | Performed by: OBSTETRICS & GYNECOLOGY

## 2024-04-25 NOTE — PROGRESS NOTES
"Assessment/Plan:    pap is up to date    mammogram reviewed with her including breast density.  RX given and she will schedule.    Discussed self breast exams    colon cancer screening - due at the end of the year    Uterine prolapse - she will continue with her pessary.  She does not need more vaginal estrogen yet.    Thyroid nodules stable, TSH ordered    discussed preventive care, regular exercise and a healthy diet    RTO 3 months for pessary check      No problem-specific Assessment & Plan notes found for this encounter.       Diagnoses and all orders for this visit:    Thyroid nodule  -     TSH, 3rd generation; Future          Subjective:      Patient ID: Nae Hernandez is a 69 y.o. female.    Patient here for yearly.  She is also here for pessary check.  She is using vaginal estrogen once a week.  The pessary is working well for her and she has no complaints.      Normal 3D mammogram last April with scattered fibroglandular densities and average risk  DEXA in 2023 showed osteopenia with elevated fracture risk.she has been on medications for several years in the past and had a lot of issues with broken teeth.  She does not want any further medication.  She does have a referral to do physical therapy for  osteoporosis and she is going to start this soon.        The following portions of the patient's history were reviewed and updated as appropriate: allergies, current medications, past family history, past medical history, past social history, past surgical history, and problem list.    Review of Systems   Constitutional: Negative.    Gastrointestinal: Negative.    Genitourinary: Negative.          Objective:      /68 (BP Location: Left arm, Patient Position: Sitting)   Ht 5' 4\" (1.626 m)   Wt 69.3 kg (152 lb 12.8 oz)   LMP  (LMP Unknown)   BMI 26.23 kg/m²          Physical Exam  Vitals reviewed.   Constitutional:       Appearance: Normal appearance. She is well-developed.   Neck:      Thyroid: No " thyromegaly.      Trachea: No tracheal deviation.      Comments: Thyroid nodules unchanged  Cardiovascular:      Rate and Rhythm: Normal rate and regular rhythm.   Pulmonary:      Effort: Pulmonary effort is normal.      Breath sounds: Normal breath sounds.   Chest:   Breasts:     Breasts are symmetrical.      Right: Normal. No inverted nipple, mass, nipple discharge, skin change or tenderness.      Left: Normal. No inverted nipple, mass, nipple discharge, skin change or tenderness.   Abdominal:      General: There is no distension.      Palpations: Abdomen is soft. There is no mass.      Tenderness: There is no abdominal tenderness.   Genitourinary:     Labia:         Right: No rash, tenderness, lesion or injury.         Left: No rash, tenderness, lesion or injury.       Vagina: Normal.      Cervix: Normal. No cervical motion tenderness, discharge or friability.      Uterus: Normal.       Adnexa: Right adnexa normal and left adnexa normal.        Right: No mass, tenderness or fullness.          Left: No mass, tenderness or fullness.        Rectum: Normal.      Comments: Shaatz removed, cleaned and reinserted without difficulty  No excoriation of vagian  Neurological:      Mental Status: She is alert.

## 2024-04-30 ENCOUNTER — APPOINTMENT (OUTPATIENT)
Dept: LAB | Facility: MEDICAL CENTER | Age: 69
End: 2024-04-30
Payer: MEDICARE

## 2024-04-30 ENCOUNTER — EVALUATION (OUTPATIENT)
Dept: PHYSICAL THERAPY | Facility: MEDICAL CENTER | Age: 69
End: 2024-04-30
Payer: MEDICARE

## 2024-04-30 DIAGNOSIS — M81.0 AGE-RELATED OSTEOPOROSIS WITHOUT CURRENT PATHOLOGICAL FRACTURE: Primary | ICD-10-CM

## 2024-04-30 DIAGNOSIS — E04.1 THYROID NODULE: ICD-10-CM

## 2024-04-30 LAB — TSH SERPL DL<=0.05 MIU/L-ACNC: 0.46 UIU/ML (ref 0.45–4.5)

## 2024-04-30 PROCEDURE — 36415 COLL VENOUS BLD VENIPUNCTURE: CPT

## 2024-04-30 PROCEDURE — 97161 PT EVAL LOW COMPLEX 20 MIN: CPT | Performed by: PHYSICAL THERAPIST

## 2024-04-30 PROCEDURE — 84443 ASSAY THYROID STIM HORMONE: CPT

## 2024-04-30 NOTE — PROGRESS NOTES
PT Evaluation     Today's date: 2024  Patient name: Nae Hernandez  : 1955  MRN: [id number]  Referring provider: Rebekah Burgos PA-C  Dx:   Encounter Diagnosis     ICD-10-CM    1. Age-related osteoporosis without current pathological fracture  M81.0 Ambulatory Referral to Physical Therapy          Start Time: 1300  Stop Time: 1345  Total time in clinic (min): 45 minutes    Assessment  Assessment details: Nae Hernandez is a 69 y.o. female was evaluated on 2024  for Age-related osteoporosis without current pathological fracture  (primary encounter diagnosis). Nae Hernandez has the below listed impairments resulting in functional deficits and negative impact to quality of life.  Patient is appropriate for skilled PT intervention to promote maximal return to function and patient specific goals.      Patient agrees with outlined treatment plan and all questions were answered to their satisfaction.     Impairments: abnormal coordination, abnormal muscle firing, abnormal muscle tone, abnormal or restricted ROM, activity intolerance, impaired balance, impaired physical strength, lacks appropriate home exercise program and pain with function    Symptom irritability: highBarriers to therapy: none  Understanding of Dx/Px/POC: good   Prognosis: fair  Prognosis details: Negative prognostic factors: chronicity of symptoms, history of fibromyalgia and arthritis    Goals  Nae will be able to climb a flight of stairs without limitation to access her laundry in the basement.  Nae will improve her hip and knee ROM to improve activity.  Nae will be able to stand for 15 minutes while cooking dinner.   Nae will improve her strength to 4/5 in all affected areas.    Plan  Patient would benefit from: skilled physical therapy  Referral necessary: No  Planned modality interventions: thermotherapy: hydrocollator packs  Planned therapy interventions: balance, balance/weight bearing training, coordination,  flexibility, functional ROM exercises, graded activity, home exercise program, therapeutic exercise, therapeutic activities, stretching, strengthening, patient education, neuromuscular re-education, muscle pump exercises and motor coordination training  Frequency: 1x week  Duration in weeks: 12  Treatment plan discussed with: patient        Subjective Evaluation    History of Present Illness  Mechanism of injury: Nae Hernandez is a 69 y.o. female presenting to therapy with complaints of age related osteoporosis, decreased global strength and balance deficits. 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 Nae also has global nerve pain from her fibromyalgia and only finds relief from taking oxycodone. Nae mostly uses a cane when in her home and in public but also has a rolling walker and rollator. Reports she uses the rollator when at [employer] and big grocery stores so she can rest.      Date of onset:  4-5 years ago   Symptom AGGS:  standing, walking, sleeping, stairs  Symptom EASES: resting in a recliner, medications  Prior Treatment:  none  Relevant PMH:  arthritis, fibromyalgia, sciatica, lupus   Prior Imaging: MRI L knee - normal    Occupation:  retired, customer service   Activity goals: stairs, walking and standing without as much pain                    Recurrent probem    Quality of life: fair    Patient Goals  Patient goals for therapy: decreased pain, improved balance, increased motion, independence with ADLs/IADLs and increased strength    Pain  Current pain ratin  At best pain ratin  At worst pain rating: 10  Quality: burning, needle-like, radiating and sharp  Relieving factors: medications  Aggravating factors: standing, stair climbing and walking    Social Support  Stairs in house: yes (down to basement for laundry)   12  Lives with: spouse    Employment status:  not working    Diagnostic Tests  MRI studies: normal        Objective     Tenderness   Left Knee   Tenderness in the patellar tendon and popliteal fossa.     Neurological Testing     Sensation     Hip   Left Hip   Intact: light touch    Right Hip   Intact: light touch    Knee   Left Knee   Intact: Light touch    Right Knee   Intact: light touch     Additional Neurological Details  Global nerve pain throughout bilateral LE's    Passive Range of Motion   Left Hip   Flexion: with pain  Abduction: with pain  External rotation (90/90): with pain  Internal rotation (90/90): with pain    Right Hip   Flexion: with pain  Abduction: with pain  External rotation (90/90): with pain  Internal rotation (90/90): with pain  Left Knee   Flexion: with pain    Additional Passive Range of Motion Details  Maximally limited in bilateral hip ROM.   Moderately limited in bilateral knee flexion    Strength/Myotome Testing     Left Hip   Planes of Motion   Flexion: 3+  Extension: 3+  Abduction: 3+  External rotation: 3    Right Hip   Planes of Motion   Flexion: 3+  Extension: 3+  Abduction: 3+  External rotation: 3    Left Knee   Flexion: 3+  Extension: 3+  Quadriceps contraction: poor    Right Knee   Flexion: 3+  Extension: 3+  Quadriceps contraction: poor             Precautions: none    HEP: hip adduction with ball, LAQs    Manuals                                                                 Neuro Re-Ed                                                                                                        Ther Ex             Hip adduction with ball 15x            LAQs 10x                                                                                          Ther Activity                                       Gait Training                                       Modalities

## 2024-05-03 ENCOUNTER — TELEPHONE (OUTPATIENT)
Dept: FAMILY MEDICINE CLINIC | Facility: CLINIC | Age: 69
End: 2024-05-03

## 2024-05-07 ENCOUNTER — OFFICE VISIT (OUTPATIENT)
Dept: PHYSICAL THERAPY | Facility: MEDICAL CENTER | Age: 69
End: 2024-05-07
Payer: MEDICARE

## 2024-05-07 DIAGNOSIS — M81.0 AGE-RELATED OSTEOPOROSIS WITHOUT CURRENT PATHOLOGICAL FRACTURE: Primary | ICD-10-CM

## 2024-05-07 PROCEDURE — 97110 THERAPEUTIC EXERCISES: CPT | Performed by: PHYSICAL THERAPIST

## 2024-05-07 NOTE — PROGRESS NOTES
Daily Note     Today's date: 2024  Patient name: Nae Hernandez  : 1955  MRN: [id number]  Referring provider: Rebekah Burgos PA-C  Dx:   Encounter Diagnosis     ICD-10-CM    1. Age-related osteoporosis without current pathological fracture  M81.0           Start Time: 1240  Stop Time: 1315  Total time in clinic (min): 35 minutes    Subjective: Nae is in pretty much pain today and says that after last visit she had to take pain killers Tuesday through Thursday and on Saturday. Nae notes most of her pain is in her L hip where she had her femoral artery surgery.         Objective: See treatment diary below      Assessment: Tolerated treatment fair. Patient demonstrated fatigue post treatment. Unable to tolerate bike today, causes too much pain in her hip. Nae reports that hip adduction caused her too much pain last time so we did not do that today. Nae does not want to push her limits with exercises because she does not want to be in pain afterwards. Continue to work on strengthening, balance and ROM exercises that Nae can tolerate. Nae cannot hold balance exercises for too long, and feels unsteady when on her L leg because she feels as if her left knee is going to buckle.      Plan: Continue per plan of care.      Precautions: none      Manuals                                                                 Neuro Re-Ed                                                                                                        Ther Ex             Hip adduction with ball 15x            LAQs 10x 10x           Seated heel raises/toe raises  10x           Supine clamshells  10x           Standing marches  10x           Standing hip abduction  5x           SAQs             Standing hamstring curls  5x           Tandem stance   8 sec            Ther Activity                                       Gait Training                                       Modalities

## 2024-05-09 ENCOUNTER — TELEPHONE (OUTPATIENT)
Age: 69
End: 2024-05-09

## 2024-05-09 NOTE — TELEPHONE ENCOUNTER
Pt not interested in scheduling an AWV. She will call back later to schedule a f/u closer to September

## 2024-05-09 NOTE — TELEPHONE ENCOUNTER
Incoming call from patient to review TSH level. Reviewed note from provider. Patient verbalized understanding. No further questions at this time.

## 2024-05-09 NOTE — TELEPHONE ENCOUNTER
----- Message from Radha Arce DO sent at 5/6/2024 12:51 PM EDT -----  Please let Nae know that her TSH is normal.  I believe she was going to discuss this with her family doctor.

## 2024-05-14 ENCOUNTER — OFFICE VISIT (OUTPATIENT)
Dept: PHYSICAL THERAPY | Facility: MEDICAL CENTER | Age: 69
End: 2024-05-14
Payer: MEDICARE

## 2024-05-14 DIAGNOSIS — M81.0 AGE-RELATED OSTEOPOROSIS WITHOUT CURRENT PATHOLOGICAL FRACTURE: Primary | ICD-10-CM

## 2024-05-14 PROCEDURE — 97110 THERAPEUTIC EXERCISES: CPT | Performed by: PHYSICAL THERAPIST

## 2024-05-14 NOTE — PROGRESS NOTES
Daily Note     Today's date: 2024  Patient name: Nae Hernandez  : 1955  MRN: [id number]  Referring provider: Rebekah Burgos PA-C  Dx:   Encounter Diagnosis     ICD-10-CM    1. Age-related osteoporosis without current pathological fracture  M81.0             Start Time: 1250  Stop Time: 1330  Total time in clinic (min): 40 minutes    Subjective: Nae reports that she was feeling anxious today because she was a few minutes late to an appointment because of traffic. Nae reports that after last visit she was in pain for 2 days afterwards and had to take pain medication. She reports that she tried to do as many of the exercises at home that she could tolerate.    Objective: See treatment diary below      Assessment: Tolerated treatment fair. Patient demonstrated fatigue post treatment.  Nae reports during any single leg exercises while on her L leg she feels unsteady and that her knee might give out, she also reports pain on that side. Nae does not want to push herself too much with exercise because she does not want to be in severe pain for days after. aNe did better with tandem stance balance today. Added sit to stands today, Nae used her cane for assistance with standing. Continue to work on strengthening, balance and ROM exercises that Nae can tolerate and increasing repetitions and resistance.     Plan: Continue per plan of care.      Precautions: none      Manuals                                                                Neuro Re-Ed                                                                                                        Ther Ex             Hip adduction with ball 15x            LAQs 10x 10x 2x10          Seated heel raises/toe raises  10x 10x          Supine clamshells  10x           Cone taps    5x          Standing marches  10x           Standing hip abduction  5x 5x R  10x L          Sit to stands   7x          Standing hamstring curls  5x 8x           Tandem stance   8 sec  2x10 sec          Ther Activity                                       Gait Training                                       Modalities

## 2024-05-21 ENCOUNTER — OFFICE VISIT (OUTPATIENT)
Dept: PHYSICAL THERAPY | Facility: MEDICAL CENTER | Age: 69
End: 2024-05-21
Payer: MEDICARE

## 2024-05-21 DIAGNOSIS — M81.0 AGE-RELATED OSTEOPOROSIS WITHOUT CURRENT PATHOLOGICAL FRACTURE: Primary | ICD-10-CM

## 2024-05-21 PROCEDURE — 97110 THERAPEUTIC EXERCISES: CPT | Performed by: PHYSICAL THERAPIST

## 2024-05-21 NOTE — PROGRESS NOTES
Daily Note     Today's date: 2024  Patient name: Nae Hernandez  : 1955  MRN: [id number]  Referring provider: Rebekah Burgos PA-C  Dx:   Encounter Diagnosis     ICD-10-CM    1. Age-related osteoporosis without current pathological fracture  M81.0           Start Time: 1300  Stop Time: 1340  Total time in clinic (min): 40 minutes    Subjective: Nae reports that she is tired today. She reports that last week was bad for her and she could not get down her steps from Tuesday to Saturday due to the amount of pain she was in, more than previously.     Objective: See treatment diary below      Assessment: Tolerated treatment fair. Patient demonstrated fatigue post treatment.  Nae continues to complete exercises at low repetitions due to decreased tolerance to exercise and she does not want to be in too much pain following therapy. Cannot decrease exercise intensity anymore, continue to try what she can and monitor her response to exercise.      Plan: Continue per plan of care.      Precautions: none      Manuals                                                               Neuro Re-Ed                                                                                                        Ther Ex             Hip adduction with ball 15x            LAQs 10x 10x 2x10 2x10         Seated heel raises/toe raises  10x 10x 10x         Standing heel raises    10x         Supine clamshells  10x           Cone taps    5x          Standing marches  10x  20x         Standing hip abduction  5x 5x R  10x L 10x         Sit to stands   7x 6x         Standing hamstring curls  5x 8x 10x         Tandem stance   8 sec  2x10 sec ea 2x10 sec ea         Ther Activity                                       Gait Training                                       Modalities

## 2024-05-24 ENCOUNTER — TELEPHONE (OUTPATIENT)
Dept: FAMILY MEDICINE CLINIC | Facility: CLINIC | Age: 69
End: 2024-05-24

## 2024-05-28 ENCOUNTER — OFFICE VISIT (OUTPATIENT)
Dept: PHYSICAL THERAPY | Facility: MEDICAL CENTER | Age: 69
End: 2024-05-28
Payer: MEDICARE

## 2024-05-28 DIAGNOSIS — M81.0 AGE-RELATED OSTEOPOROSIS WITHOUT CURRENT PATHOLOGICAL FRACTURE: Primary | ICD-10-CM

## 2024-05-28 PROCEDURE — 97110 THERAPEUTIC EXERCISES: CPT | Performed by: PHYSICAL THERAPIST

## 2024-05-28 NOTE — PROGRESS NOTES
Daily Note     Today's date: 2024  Patient name: Nae Hernandez  : 1955  MRN: [id number]  Referring provider: Rebekah Burgos PA-C  Dx:   Encounter Diagnosis     ICD-10-CM    1. Age-related osteoporosis without current pathological fracture  M81.0             Start Time: 1300  Stop Time: 1340  Total time in clinic (min): 40 minutes    Subjective: Nae reports that after her visit last Tuesday she had a bad week due to her pain. She reports that she had to use her walker more because she was having increased pain when standing and walking. Most of the pain she has is by the incision by her left femoral artery. Nae reports that she started to take a small dose of pain medication before coming to therapy and that helps her for the day but her pain begins the next morning after her session.     Objective: See treatment diary below      Assessment: Tolerated treatment fair. Patient demonstrated fatigue post treatment.  Nae is able to do a few repetitions of each exercise but does not push the limits due to her experiencing pain for days after PT. She reports that she is nervous to do PT because she does not want to be in more pain when she is done treatment. Nae is able to complete more repetitions when breaking up exercises into sets.  Started mini squats today, went well, not too much pain with these. Sit to stands continue to be challenging for Nae, she reports she is having more difficulty standing up form her toilet at home, continue to work on these to increase leg strength. Continue to work with Nae to increase her tolerance to TE and monitor her response.     Plan: Continue per plan of care.      Precautions: none      Manuals                                                              Neuro Re-Ed                                                                                                        Ther Ex             LAQs 10x 10x 2x10 2x10 2 x10        Seated toe  raises  10x 10x 10x 10x        Standing heel raises    10x 10x        Cone taps    5x          Mini squat      10x        Standing marches  10x  20x 10x        Standing hip abduction  5x 5x R  10x L 10x 10x        Sit to stands   7x 6x 6x        Standing hamstring curls  5x 8x 10x 10x        Tandem stance   8 sec  2x10 sec ea 2x10 sec ea 2x10 sec ea        Ther Activity                                       Gait Training                                       Modalities

## 2024-06-04 ENCOUNTER — OFFICE VISIT (OUTPATIENT)
Dept: PHYSICAL THERAPY | Facility: MEDICAL CENTER | Age: 69
End: 2024-06-04
Payer: MEDICARE

## 2024-06-04 DIAGNOSIS — M81.0 AGE-RELATED OSTEOPOROSIS WITHOUT CURRENT PATHOLOGICAL FRACTURE: Primary | ICD-10-CM

## 2024-06-04 PROCEDURE — 97110 THERAPEUTIC EXERCISES: CPT | Performed by: PHYSICAL THERAPIST

## 2024-06-04 NOTE — PROGRESS NOTES
Daily Note     Today's date: 2024  Patient name: Nae Hernandez  : 1955  MRN: [id number]  Referring provider: Rebekah Burgos PA-C  Dx:   Encounter Diagnosis     ICD-10-CM    1. Age-related osteoporosis without current pathological fracture  M81.0             Start Time: 1255  Stop Time: 1335  Total time in clinic (min): 40 minutes    Subjective: Nae reports that when she was getting in her car after the last visit she noticed right hip pain that she  has never had before. She reports that the pain has been there for the past week and she is having difficulty getting up and down the stairs and getting in and out of the car. Ice helps to alleviate her hip pain for a little bit but overall nothing helps, not even pain medication (she had to take more than usual). Nae reports that she is having more pain than when she first started PT and has had to use the walker more which she is not happy about.    Objective: See treatment diary below      Assessment: Tolerated treatment fair. Patient demonstrated fatigue post treatment. Nae did as much as she could today without provoking pain. Began with seated exercises, incorporating more standing exercises as able. Nae continues to be limited by her decreased activity tolerance and does not increase sets or reps due to fear of being in more pain. Nae reports that she continues to do LAQs throughout the day at home as she is able. Nae must use the cane for assistance when standing up from a chair. Continue to work with Nae to increase her tolerance to TE and monitor her response.     Plan: Continue per plan of care.      Precautions: none      Manuals                                                             Neuro Re-Ed                                                                                                        Ther Ex             LAQs 10x 10x 2x10 2x10 2 x10 2x10       Seated toe and heel raises  10x 10x 10x 10x 10x        Standing heel raises    10x 10x 10x       Cone taps    5x          Mini squat      10x        Standing marches  10x  20x 10x 10x       Standing hip abduction  5x 5x R  10x L 10x 10x 5x R 10x L       Sit to stands   7x 6x 6x 5x       Standing hamstring curls  5x 8x 10x 10x 10x       Tandem stance   8 sec  2x10 sec ea 2x10 sec ea 2x10 sec ea 2x10 sec ea       Ther Activity                                       Gait Training                                       Modalities

## 2024-06-05 DIAGNOSIS — M54.50 LUMBAR PAIN: ICD-10-CM

## 2024-06-05 RX ORDER — PREDNISONE 10 MG/1
10 TABLET ORAL DAILY
Qty: 90 TABLET | Refills: 1 | Status: SHIPPED | OUTPATIENT
Start: 2024-06-05

## 2024-06-05 NOTE — TELEPHONE ENCOUNTER
Patient requests refill of predniSONE 10 mg tablet be sent to [facility]/pharmacy #[id number] - [place], [place] - [address]    Thank you.

## 2024-06-11 ENCOUNTER — OFFICE VISIT (OUTPATIENT)
Dept: PHYSICAL THERAPY | Facility: MEDICAL CENTER | Age: 69
End: 2024-06-11
Payer: MEDICARE

## 2024-06-11 DIAGNOSIS — M81.0 AGE-RELATED OSTEOPOROSIS WITHOUT CURRENT PATHOLOGICAL FRACTURE: Primary | ICD-10-CM

## 2024-06-11 PROCEDURE — 97110 THERAPEUTIC EXERCISES: CPT | Performed by: PHYSICAL THERAPIST

## 2024-06-11 NOTE — PROGRESS NOTES
Daily Note     Today's date: 2024  Patient name: Nae Hernandez  : 1955  MRN: [id number]  Referring provider: Rebekah Burgos PA-C  Dx:   Encounter Diagnosis     ICD-10-CM    1. Age-related osteoporosis without current pathological fracture  M81.0               Start Time: 1300  Stop Time: 1335  Total time in clinic (min): 35 minutes    Subjective: Nae reports that she is still having a lot of pain after treatment. She is unable to get down her steps to do her laundry. Reports that after last week's treatment she was in a lot of pain for 4 days after. Nae reports her left knee feels like it is going to buckle more recently.     Objective: See treatment diary below      Assessment: Tolerated treatment fair. Patient demonstrated fatigue post treatment. Nae continues to have pain with exercises and post exercise. Unable to tolerate progression of TE. Occasionally able to complete more reps when split into sets through treatment. Continue to work with Nae to increase her tolerance to TE and monitor her response.     Plan: Continue per plan of care.      Precautions: none      Manuals                                                            Neuro Re-Ed                                                                                                        Ther Ex             LAQs 10x 10x 2x10 2x10 2 x10 2x10 2x10      Seated toe and heel raises  10x 10x 10x 10x 10x 10x      Standing heel raises    10x 10x 10x 10x      Cone taps    5x          Mini squat      10x  7x      Standing marches  10x  20x 10x 10x 10x      Standing hip abduction  5x 5x R  10x L 10x 10x 5x R 10x L 2x10      Sit to stands   7x 6x 6x 5x 7x      Standing hamstring curls  5x 8x 10x 10x 10x 10x      Tandem stance   8 sec  2x10 sec ea 2x10 sec ea 2x10 sec ea 2x10 sec ea 2x10 sec ea      Ther Activity                                       Gait Training                                       Modalities

## 2024-06-18 ENCOUNTER — OFFICE VISIT (OUTPATIENT)
Dept: PHYSICAL THERAPY | Facility: MEDICAL CENTER | Age: 69
End: 2024-06-18
Payer: MEDICARE

## 2024-06-18 DIAGNOSIS — M81.0 AGE-RELATED OSTEOPOROSIS WITHOUT CURRENT PATHOLOGICAL FRACTURE: Primary | ICD-10-CM

## 2024-06-18 PROCEDURE — 97110 THERAPEUTIC EXERCISES: CPT | Performed by: PHYSICAL THERAPIST

## 2024-06-18 NOTE — PROGRESS NOTES
"Daily Note     Today's date: 2024  Patient name: Nae Hernandez  : 1955  MRN: [id number]  Referring provider: Rebekah Burgos PA-C  Dx:   Encounter Diagnosis     ICD-10-CM    1. Age-related osteoporosis without current pathological fracture  M81.0             Start Time: 1300  Stop Time: 1345  Total time in clinic (min): 45 minutes    Subjective: Nae reports that her pain is \"constant and getting worse\". She reports her left knee is buckling more while she is walking around the house, she has started using her walker in the house. She reports that last week after PT on Tuesday she went out to the grocery store Wednesday but then was in a lot of pain Thursday.       Objective: See treatment diary below      Assessment: Tolerated treatment fair. Patient demonstrated fatigue post treatment. Nae continues to have pain with exercises and has low tolerance to TE. Mini squats are challenging for Nae and she reports pain in her left knee. Nae reports she continues to do some exercises at home if she feels that she has energy. Nae has not noticed improvement yet since beginning PT.       Plan: Continue per plan of care.      Precautions: none      Manuals                                                                 Neuro Re-Ed                                                                                                        Ther Ex             LAQs 2x10            Seated toe and heel raises 10x            Standing heel raises 10x            Mini squat  5x            Standing marches 16x            Standing hip abduction 2x10            Sit to stands 10x            Standing hamstring curls 10x            Semi-tandem stance  2x10 sec            Ther Activity                                       Gait Training                                       Modalities                                               "

## 2024-06-25 ENCOUNTER — OFFICE VISIT (OUTPATIENT)
Dept: PHYSICAL THERAPY | Facility: MEDICAL CENTER | Age: 69
End: 2024-06-25
Payer: MEDICARE

## 2024-06-25 DIAGNOSIS — M81.0 AGE-RELATED OSTEOPOROSIS WITHOUT CURRENT PATHOLOGICAL FRACTURE: Primary | ICD-10-CM

## 2024-06-25 PROCEDURE — 97110 THERAPEUTIC EXERCISES: CPT | Performed by: PHYSICAL THERAPIST

## 2024-06-26 NOTE — PROGRESS NOTES
"Daily Note     Today's date: 2024  Patient name: Nae Hernandez  : 1955  MRN: [id number]  Referring provider: Rebekah Burgos PA-C  Dx:   Encounter Diagnosis     ICD-10-CM    1. Age-related osteoporosis without current pathological fracture  M81.0                        Subjective: Nae reports that her pain is \"constant and getting worse\". She reports her left knee is buckling more while she is walking around the house, she has started using her walker in the house. She reports that last week after PT on Tuesday she went out to the grocery store Wednesday but then was in a lot of pain Thursday.       Objective: See treatment diary below      Assessment: Tolerated treatment fair. Patient demonstrated fatigue post treatment. Nae continues to have pain with exercises and has low tolerance to TE. Mini squats are challenging for Nae and she reports pain in her left knee. Nae reports she continues to do some exercises at home if she feels that she has energy. Nae has not noticed improvement yet since beginning PT.       Plan: Continue per plan of care.      Precautions: none      Manuals                                                                 Neuro Re-Ed                                                                                                        Ther Ex             LAQs 2x10            Seated toe and heel raises 10x            Standing heel raises 10x            Mini squat  5x            Standing marches 16x            Standing hip abduction 2x10            Sit to stands 10x            Standing hamstring curls 10x            Semi-tandem stance  2x10 sec            Ther Activity                                       Gait Training                                       Modalities                                               "

## 2024-07-02 ENCOUNTER — OFFICE VISIT (OUTPATIENT)
Dept: PHYSICAL THERAPY | Facility: MEDICAL CENTER | Age: 69
End: 2024-07-02
Payer: MEDICARE

## 2024-07-02 DIAGNOSIS — M81.0 AGE-RELATED OSTEOPOROSIS WITHOUT CURRENT PATHOLOGICAL FRACTURE: Primary | ICD-10-CM

## 2024-07-02 PROCEDURE — 97110 THERAPEUTIC EXERCISES: CPT | Performed by: PHYSICAL THERAPIST

## 2024-07-03 NOTE — PROGRESS NOTES
"Daily Note     Today's date: 7/3/2024  Patient name: Nae Hernandez  : 1955  MRN: [id number]  Referring provider: Rebekah Burgos PA-C  Dx:   Encounter Diagnosis     ICD-10-CM    1. Age-related osteoporosis without current pathological fracture  M81.0                        Subjective: Nae reports that her pain is \"constant and getting worse\". She reports her left knee is buckling more while she is walking around the house, she has started using her walker in the house. She reports that last week after PT on Tuesday she went out to the grocery store Wednesday but then was in a lot of pain Thursday.       Objective: See treatment diary below      Assessment: Tolerated treatment fair. Patient demonstrated fatigue post treatment. Nae continues to have pain with exercises and has low tolerance to TE. Mini squats are challenging for Nae and she reports pain in her left knee. Nae reports she continues to do some exercises at home if she feels that she has energy. Nae has not noticed improvement yet since beginning PT.       Plan: Continue per plan of care.      Precautions: none      Manuals                                                                 Neuro Re-Ed                                                                                                        Ther Ex             LAQs 2x10            Seated toe and heel raises 10x            Standing heel raises 10x            Mini squat  5x            Standing marches 16x            Standing hip abduction 2x10            Sit to stands 10x            Standing hamstring curls 10x            Semi-tandem stance  2x10 sec            Ther Activity                                       Gait Training                                       Modalities                                               "

## 2024-07-09 ENCOUNTER — OFFICE VISIT (OUTPATIENT)
Dept: FAMILY MEDICINE CLINIC | Facility: CLINIC | Age: 69
End: 2024-07-09
Payer: MEDICARE

## 2024-07-09 VITALS
SYSTOLIC BLOOD PRESSURE: 137 MMHG | TEMPERATURE: 97.7 F | WEIGHT: 153.4 LBS | DIASTOLIC BLOOD PRESSURE: 70 MMHG | OXYGEN SATURATION: 97 % | HEART RATE: 96 BPM | BODY MASS INDEX: 26.19 KG/M2 | HEIGHT: 64 IN

## 2024-07-09 DIAGNOSIS — M32.19 OTHER SYSTEMIC LUPUS ERYTHEMATOSUS WITH OTHER ORGAN INVOLVEMENT (HCC): ICD-10-CM

## 2024-07-09 DIAGNOSIS — C34.00 MALIGNANT NEOPLASM OF HILUS OF LUNG, UNSPECIFIED LATERALITY (HCC): ICD-10-CM

## 2024-07-09 DIAGNOSIS — I73.9 PAD (PERIPHERAL ARTERY DISEASE) (HCC): Primary | ICD-10-CM

## 2024-07-09 DIAGNOSIS — Z00.00 MEDICARE ANNUAL WELLNESS VISIT, SUBSEQUENT: ICD-10-CM

## 2024-07-09 DIAGNOSIS — F11.20 CONTINUOUS OPIOID DEPENDENCE (HCC): ICD-10-CM

## 2024-07-09 DIAGNOSIS — F41.9 ANXIETY: ICD-10-CM

## 2024-07-09 DIAGNOSIS — G62.9 NEUROPATHY: ICD-10-CM

## 2024-07-09 DIAGNOSIS — E78.2 MIXED HYPERLIPIDEMIA: ICD-10-CM

## 2024-07-09 DIAGNOSIS — F33.9 DEPRESSION, RECURRENT (HCC): ICD-10-CM

## 2024-07-09 DIAGNOSIS — I74.09 AORTOILIAC OCCLUSIVE DISEASE (HCC): ICD-10-CM

## 2024-07-09 DIAGNOSIS — I83.93 VARICOSE VEINS OF BOTH LOWER EXTREMITIES, UNSPECIFIED WHETHER COMPLICATED: ICD-10-CM

## 2024-07-09 DIAGNOSIS — I70.209 OCCLUSION OF COMMON FEMORAL ARTERY (HCC): ICD-10-CM

## 2024-07-09 PROCEDURE — G0439 PPPS, SUBSEQ VISIT: HCPCS | Performed by: FAMILY MEDICINE

## 2024-07-09 PROCEDURE — 99214 OFFICE O/P EST MOD 30 MIN: CPT | Performed by: FAMILY MEDICINE

## 2024-07-09 RX ORDER — ATORVASTATIN CALCIUM 10 MG/1
10 TABLET, FILM COATED ORAL
Qty: 90 TABLET | Refills: 1 | Status: SHIPPED | OUTPATIENT
Start: 2024-07-09

## 2024-07-09 RX ORDER — OXYCODONE HYDROCHLORIDE 5 MG/1
5 TABLET ORAL EVERY 6 HOURS PRN
Qty: 30 TABLET | Refills: 0 | Status: SHIPPED | OUTPATIENT
Start: 2024-07-09

## 2024-07-09 RX ORDER — POTASSIUM CHLORIDE 750 MG/1
10 TABLET, FILM COATED, EXTENDED RELEASE ORAL 3 TIMES DAILY
Qty: 270 TABLET | Refills: 1 | Status: SHIPPED | OUTPATIENT
Start: 2024-07-09

## 2024-07-09 RX ORDER — LORAZEPAM 0.5 MG/1
0.5 TABLET ORAL EVERY 12 HOURS PRN
Qty: 30 TABLET | Refills: 0 | Status: SHIPPED | OUTPATIENT
Start: 2024-07-09

## 2024-07-09 NOTE — PATIENT INSTRUCTIONS
Medicare Preventive Visit Patient Instructions  Thank you for completing your Welcome to Medicare Visit or Medicare Annual Wellness Visit today. Your next wellness visit will be due in one year (7/10/2025).  The screening/preventive services that you may require over the next 5-10 years are detailed below. Some tests may not apply to you based off risk factors and/or age. Screening tests ordered at today's visit but not completed yet may show as past due. Also, please note that scanned in results may not display below.  Preventive Screenings:  Service Recommendations Previous Testing/Comments   Colorectal Cancer Screening  * Colonoscopy    * Fecal Occult Blood Test (FOBT)/Fecal Immunochemical Test (FIT)  * Fecal DNA/Cologuard Test  * Flexible Sigmoidoscopy Age: 45-75 years old   Colonoscopy: every 10 years (may be performed more frequently if at higher risk)  OR  FOBT/FIT: every 1 year  OR  Cologuard: every 3 years  OR  Sigmoidoscopy: every 5 years  Screening may be recommended earlier than age 45 if at higher risk for colorectal cancer. Also, an individualized decision between you and your healthcare provider will decide whether screening between the ages of 76-85 would be appropriate. Colonoscopy: 09/17/2021  FOBT/FIT: Not on file  Cologuard: Not on file  Sigmoidoscopy: Not on file    Screening Current     Breast Cancer Screening Age: 40+ years old  Frequency: every 1-2 years  Not required if history of left and right mastectomy Mammogram: 04/05/2023    Screening Current   Cervical Cancer Screening Between the ages of 21-29, pap smear recommended once every 3 years.   Between the ages of 30-65, can perform pap smear with HPV co-testing every 5 years.   Recommendations may differ for women with a history of total hysterectomy, cervical cancer, or abnormal pap smears in past. Pap Smear: 04/25/2024    Screening Not Indicated   Hepatitis C Screening Once for adults born between 1945 and 1965  More frequently in  patients at high risk for Hepatitis C Hep C Antibody: Not on file        Diabetes Screening 1-2 times per year if you're at risk for diabetes or have pre-diabetes Fasting glucose: 74 mg/dL (3/4/2024)  A1C: 5.5 % (8/22/2019)  Screening Current   Cholesterol Screening Once every 5 years if you don't have a lipid disorder. May order more often based on risk factors. Lipid panel: 11/02/2022    Screening Not Indicated  History Lipid Disorder     Other Preventive Screenings Covered by Medicare:  Abdominal Aortic Aneurysm (AAA) Screening: covered once if your at risk. You're considered to be at risk if you have a family history of AAA.  Lung Cancer Screening: covers low dose CT scan once per year if you meet all of the following conditions: (1) Age 55-77; (2) No signs or symptoms of lung cancer; (3) Current smoker or have quit smoking within the last 15 years; (4) You have a tobacco smoking history of at least 20 pack years (packs per day multiplied by number of years you smoked); (5) You get a written order from a healthcare provider.  Glaucoma Screening: covered annually if you're considered high risk: (1) You have diabetes OR (2) Family history of glaucoma OR (3)  aged 50 and older OR (4)  American aged 65 and older  Osteoporosis Screening: covered every 2 years if you meet one of the following conditions: (1) You're estrogen deficient and at risk for osteoporosis based off medical history and other findings; (2) Have a vertebral abnormality; (3) On glucocorticoid therapy for more than 3 months; (4) Have primary hyperparathyroidism; (5) On osteoporosis medications and need to assess response to drug therapy.   Last bone density test (DXA Scan): 04/10/2023.  HIV Screening: covered annually if you're between the age of 15-65. Also covered annually if you are younger than 15 and older than 65 with risk factors for HIV infection. For pregnant patients, it is covered up to 3 times per  pregnancy.    Immunizations:  Immunization Recommendations   Influenza Vaccine Annual influenza vaccination during flu season is recommended for all persons aged >= 6 months who do not have contraindications   Pneumococcal Vaccine   * Pneumococcal conjugate vaccine = PCV13 (Prevnar 13), PCV15 (Vaxneuvance), PCV20 (Prevnar 20)  * Pneumococcal polysaccharide vaccine = PPSV23 (Pneumovax) Adults 19-65 yo with certain risk factors or if 65+ yo  If never received any pneumonia vaccine: recommend Prevnar 20 (PCV20)  Give PCV20 if previously received 1 dose of PCV13 or PPSV23   Hepatitis B Vaccine 3 dose series if at intermediate or high risk (ex: diabetes, end stage renal disease, liver disease)   Respiratory syncytial virus (RSV) Vaccine - COVERED BY MEDICARE PART D  * RSVPreF3 (Arexvy) CDC recommends that adults 60 years of age and older may receive a single dose of RSV vaccine using shared clinical decision-making (SCDM)   Tetanus (Td) Vaccine - COST NOT COVERED BY MEDICARE PART B Following completion of primary series, a booster dose should be given every 10 years to maintain immunity against tetanus. Td may also be given as tetanus wound prophylaxis.   Tdap Vaccine - COST NOT COVERED BY MEDICARE PART B Recommended at least once for all adults. For pregnant patients, recommended with each pregnancy.   Shingles Vaccine (Shingrix) - COST NOT COVERED BY MEDICARE PART B  2 shot series recommended in those 19 years and older who have or will have weakened immune systems or those 50 years and older     Health Maintenance Due:      Topic Date Due   • Colorectal Cancer Screening  09/16/2024   • Breast Cancer Screening: Mammogram  04/05/2025   • Cervical Cancer Screening  04/21/2025   • Hepatitis C Screening  Discontinued     Immunizations Due:      Topic Date Due   • Hepatitis A Vaccine (1 of 2 - Risk 2-dose series) Never done   • Pneumococcal Vaccine: 65+ Years (2 of 2 - PCV) 04/06/2019   • COVID-19 Vaccine (6 - 2023-24  season) 09/01/2023   • Influenza Vaccine (1) 09/01/2024     Advance Directives   What are advance directives?  Advance directives are legal documents that state your wishes and plans for medical care. These plans are made ahead of time in case you lose your ability to make decisions for yourself. Advance directives can apply to any medical decision, such as the treatments you want, and if you want to donate organs.   What are the types of advance directives?  There are many types of advance directives, and each state has rules about how to use them. You may choose a combination of any of the following:  Living will:  This is a written record of the treatment you want. You can also choose which treatments you do not want, which to limit, and which to stop at a certain time. This includes surgery, medicine, IV fluid, and tube feedings.   Durable power of  for healthcare (DPAHC):  This is a written record that states who you want to make healthcare choices for you when you are unable to make them for yourself. This person, called a proxy, is usually a family member or a friend. You may choose more than 1 proxy.  Do not resuscitate (DNR) order:  A DNR order is used in case your heart stops beating or you stop breathing. It is a request not to have certain forms of treatment, such as CPR. A DNR order may be included in other types of advance directives.  Medical directive:  This covers the care that you want if you are in a coma, near death, or unable to make decisions for yourself. You can list the treatments you want for each condition. Treatment may include pain medicine, surgery, blood transfusions, dialysis, IV or tube feedings, and a ventilator (breathing machine).  Values history:  This document has questions about your views, beliefs, and how you feel and think about life. This information can help others choose the care that you would choose.  Why are advance directives important?  An advance directive  helps you control your care. Although spoken wishes may be used, it is better to have your wishes written down. Spoken wishes can be misunderstood, or not followed. Treatments may be given even if you do not want them. An advance directive may make it easier for your family to make difficult choices about your care.   Depression   Depression  is a medical condition that causes feelings of sadness or hopelessness that do not go away. Depression may cause you to lose interest in things you used to enjoy. These feelings may interfere with your daily life.  Call your local emergency number (911 in the ) if:   You think about harming yourself or someone else.  You have done something on purpose to hurt yourself.  The following resources are available at any time to help you, if needed:   National Suicide Prevention Lifeline: 1-449.646.4493 (8-719-527-TALK)   Suicide Hotline: 1-245.108.1039 (1-393-PMGUJQB)   For a list of international numbers: https://save.org/find-help/international-resources/  Treatment for depression may include medicine to relieve depression. Medicine is often used together with therapy. Therapy is a way for you to talk about your feelings and anything that may be causing depression. Therapy can be done alone or in a group. It may also be done with family members or a significant other.  Get regular physical activity.    Create a regular sleep schedule.    Eat a variety of healthy foods.    Do not drink alcohol or use drugs.     Urinary Incontinence   Urinary incontinence (UI)  is when you lose control of your bladder. UI develops because your bladder cannot store or empty urine properly. The 3 most common types of UI are stress incontinence, urge incontinence, or both.  Medicines:   May be given to help strengthen your bladder control. Report any side effects of medication to your healthcare provider.  Do pelvic muscle exercises often:  Your pelvic muscles help you stop urinating. Squeeze these  muscles tight for 5 seconds, then relax for 5 seconds. Gradually work up to squeezing for 10 seconds. Do 3 sets of 15 repetitions a day, or as directed. This will help strengthen your pelvic muscles and improve bladder control.  Train your bladder:  Go to the bathroom at set times, such as every 2 hours, even if you do not feel the urge to go. You can also try to hold your urine when you feel the urge to go. For example, hold your urine for 5 minutes when you feel the urge to go. As that becomes easier, hold your urine for 10 minutes.   Self-care:   Keep a UI record.  Write down how often you leak urine and how much you leak. Make a note of what you were doing when you leaked urine.  Drink liquids as directed. You may need to limit the amount of liquid you drink to help control your urine leakage. Do not drink any liquid right before you go to bed. Limit or do not have drinks that contain caffeine or alcohol.   Prevent constipation.  Eat a variety of high-fiber foods. Good examples are high-fiber cereals, beans, vegetables, and whole-grain breads. Walking is the best way to trigger your intestines to have a bowel movement.  Exercise regularly and maintain a healthy weight.  Weight loss and exercise will decrease pressure on your bladder and help you control your leakage.   Use a catheter as directed  to help empty your bladder. A catheter is a tiny, plastic tube that is put into your bladder to drain your urine.   Go to behavior therapy as directed.  Behavior therapy may be used to help you learn to control your urge to urinate.    Cigarette Smoking and Your Health   Risks to your health if you smoke:  Nicotine and other chemicals found in tobacco damage every cell in your body. Even if you are a light smoker, you have an increased risk for cancer, heart disease, and lung disease. If you are pregnant or have diabetes, smoking increases your risk for complications.   Benefits to your health if you stop smoking:   You  decrease respiratory symptoms such as coughing, wheezing, and shortness of breath.   You reduce your risk for cancers of the lung, mouth, throat, kidney, bladder, pancreas, stomach, and cervix. If you already have cancer, you increase the benefits of chemotherapy. You also reduce your risk for cancer returning or a second cancer from developing.   You reduce your risk for heart disease, blood clots, heart attack, and stroke.   You reduce your risk for lung infections, and diseases such as pneumonia, asthma, chronic bronchitis, and emphysema.  Your circulation improves. More oxygen can be delivered to your body. If you have diabetes, you lower your risk for complications, such as kidney, artery, and eye diseases. You also lower your risk for nerve damage. Nerve damage can lead to amputations, poor vision, and blindness.  You improve your body's ability to heal and to fight infections.  For more information and support to stop smoking:   [employer]  Phone: 0- 658 - 548-6085  Web Address: www.[employer]  Weight Management   Why it is important to manage your weight:  Being overweight increases your risk of health conditions such as heart disease, high blood pressure, type 2 diabetes, and certain types of cancer. It can also increase your risk for osteoarthritis, sleep apnea, and other respiratory problems. Aim for a slow, steady weight loss. Even a small amount of weight loss can lower your risk of health problems.  How to lose weight safely:  A safe and healthy way to lose weight is to eat fewer calories and get regular exercise. You can lose up about 1 pound a week by decreasing the number of calories you eat by 500 calories each day.   Healthy meal plan for weight management:  A healthy meal plan includes a variety of foods, contains fewer calories, and helps you stay healthy. A healthy meal plan includes the following:  Eat whole-grain foods more often.  A healthy meal plan should contain fiber. Fiber is the  part of grains, fruits, and vegetables that is not broken down by your body. Whole-grain foods are healthy and provide extra fiber in your diet. Some examples of whole-grain foods are whole-wheat breads and pastas, oatmeal, brown rice, and bulgur.  Eat a variety of vegetables every day.  Include dark, leafy greens such as spinach, kale, bridger greens, and mustard greens. Eat yellow and orange vegetables such as carrots, sweet potatoes, and winter squash.   Eat a variety of fruits every day.  Choose fresh or canned fruit (canned in its own juice or light syrup) instead of juice. Fruit juice has very little or no fiber.  Eat low-fat dairy foods.  Drink fat-free (skim) milk or 1% milk. Eat fat-free yogurt and low-fat cottage cheese. Try low-fat cheeses such as mozzarella and other reduced-fat cheeses.  Choose meat and other protein foods that are low in fat.  Choose beans or other legumes such as split peas or lentils. Choose fish, skinless poultry (chicken or turkey), or lean cuts of red meat (beef or pork). Before you cook meat or poultry, cut off any visible fat.   Use less fat and oil.  Try baking foods instead of frying them. Add less fat, such as margarine, sour cream, regular salad dressing and mayonnaise to foods. Eat fewer high-fat foods. Some examples of high-fat foods include french fries, doughnuts, ice cream, and cakes.  Eat fewer sweets.  Limit foods and drinks that are high in sugar. This includes candy, cookies, regular soda, and sweetened drinks.  Exercise:  Exercise at least 30 minutes per day on most days of the week. Some examples of exercise include walking, biking, dancing, and swimming. You can also fit in more physical activity by taking the stairs instead of the elevator or parking farther away from stores. Ask your healthcare provider about the best exercise plan for you.   Narcotic (Opioid) Safety    Use narcotics safely:  Take prescribed narcotics exactly as directed  Do not give narcotics  to others or take narcotics that belong to someone else  Do not mix narcotics without medicines or alcohol  Do not drive or operate heavy machinery after you take the narcotic  Monitor for side effects and notify your healthcare provider if you experienced side effects such as nausea, sleepiness, itching, or trouble thinking clearly.    Manage constipation:    Constipation is the most common side effect of narcotic medicine. Constipation is when you have hard, dry bowel movements, or you go longer than usual between bowel movements. Tell your healthcare provider about all changes in your bowel movements while you are taking narcotics. He or she may recommend laxative medicine to help you have a bowel movement. He or she may also change the kind of narcotic you are taking, or change when you take it. The following are more ways you can prevent or relieve constipation:    Drink liquids as directed.  You may need to drink extra liquids to help soften and move your bowels. Ask how much liquid to drink each day and which liquids are best for you.  Eat high-fiber foods.  This may help decrease constipation by adding bulk to your bowel movements. High-fiber foods include fruits, vegetables, whole-grain breads and cereals, and beans. Your healthcare provider or dietitian can help you create a high-fiber meal plan. Your provider may also recommend a fiber supplement if you cannot get enough fiber from food.  Exercise regularly.  Regular physical activity can help stimulate your intestines. Walking is a good exercise to prevent or relieve constipation. Ask which exercises are best for you.  Schedule a time each day to have a bowel movement.  This may help train your body to have regular bowel movements. Bend forward while you are on the toilet to help move the bowel movement out. Sit on the toilet for at least 10 minutes, even if you do not have a bowel movement.    Store narcotics safely:   Store narcotics where others  cannot easily get them.  Keep them in a locked cabinet or secure area. Do not  keep them in a purse or other bag you carry with you. A person may be looking for something else and find the narcotics.  Make sure narcotics are stored out of the reach of children.  A child can easily overdose on narcotics. Narcotics may look like candy to a small child.    The best way to dispose of narcotics:      The laws vary by country and area. In the [place], the best way is to return the narcotics through a take-back program. This program is offered by the [place] Drug Enforcement Agency (DANIEL). The following are options for using the program:  Take the narcotics to a DANIEL collection site.  The site is often a law enforcement center. Call your local law enforcement center for scheduled take-back days in your area. You will be given information on where to go if the collection site is in a different location.  Take the narcotics to an approved pharmacy or hospital.  A pharmacy or hospital may be set up as a collection site. You will need to ask if it is a DANIEL collection site if you were not directed there. A pharmacy or doctor's office may not be able to take back narcotics unless it is a DANIEL site.  Use a mail-back system.  This means you are given containers to put the narcotics into. You will then mail them in the containers.  Use a take-back drop box.  This is a place to leave the narcotics at any time. People and animals will not be able to get into the box. Your local law enforcement agency can tell you where to find a drop box in your area.    Other ways to manage pain:   Ask your healthcare provider about non-narcotic medicines to control pain.  Nonprescription medicines include NSAIDs (such as ibuprofen) and acetaminophen. Prescription medicines include muscle relaxers, antidepressants, and steroids.  Pain may be managed without any medicines.  Some ways to relieve pain include massage, aromatherapy, or meditation.  Physical or occupational therapy may also help.    For more information:   Drug Enforcement Administration  [address] [place] [address]  Phone: 9- 197 - 619-0406  Web Address: https://www.deadiversion.[facility].gov/drug_disposal/     Food and Drug Administration  [address] [place] [address]  Phone: 7- 281 - 639-3237  Web Address: http://www.fda.gov     © Copyright [employer] 2018 Information is for End User's use only and may not be sold, redistributed or otherwise used for commercial purposes. All illustrations and images included in CareNotes® are the copyrighted property of [employer] or [employer]

## 2024-07-09 NOTE — PROGRESS NOTES
Ambulatory Visit  Name: Nae Hernandez      : 1955      MRN: [id number]  Encounter Provider: Sukhi Medel DO  Encounter Date: 2024   Encounter department: [facility]    Assessment & Plan   1. PAD (peripheral artery disease) (HCC)  -     atorvastatin (LIPITOR) 10 mg tablet; Take 1 tablet (10 mg total) by mouth daily with dinner  -     oxyCODONE (ROXICODONE) 5 immediate release tablet; Take 1 tablet (5 mg total) by mouth every 6 (six) hours as needed for moderate pain Max Daily Amount: 20 mg  2. Aortoiliac occlusive disease (HCC)  -     atorvastatin (LIPITOR) 10 mg tablet; Take 1 tablet (10 mg total) by mouth daily with dinner  3. Mixed hyperlipidemia  -     atorvastatin (LIPITOR) 10 mg tablet; Take 1 tablet (10 mg total) by mouth daily with dinner  4. 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HCC)  -     oxyCODONE (ROXICODONE) 5 immediate release tablet; Take 1 tablet (5 mg total) by mouth every 6 (six) hours as needed for moderate pain Max Daily Amount: 20 mg  5. Anxiety  -     LORazepam (ATIVAN) 0.5 mg tablet; Take 1 tablet (0.5 mg total) by mouth every 12 (twelve) hours as needed for anxiety  6. Other systemic lupus erythematosus with other organ involvement (HCC)  -     potassium chloride (Klor-Con 10) 10 mEq tablet; Take 1 tablet (10 mEq total) by mouth 3 (three) times a day  7. Depression, recurrent (HCC)  8. Malignant neoplasm of hilus of lung, unspecified laterality (HCC)  9. Continuous opioid dependence (HCC)  10. Varicose veins of both lower extremities, unspecified whether complicated  11. Medicare annual wellness visit, subsequent      Depression Screening and Follow-up Plan: Patient's depression screening was positive with a PHQ-2 score of 5. Their PHQ-9 score was 12. Continue regular follow-up with their mental health provider who is managing their mental health condition(s).     Urinary Incontinence Plan of Care: counseling topics discussed: limiting fluid intake 3-4  hours before bed.       Preventive health issues were discussed with patient, and age appropriate screening tests were ordered as noted in patient's After Visit Summary. Personalized health advice and appropriate referrals for health education or preventive services given if needed, as noted in patient's After Visit Summary.    History of Present Illness     Patient is here for wellness exam.  Patient will need refills on medications for chronic issues such as PAD hyperlipidemia anxiety lupus.  Patient has a varicose veins left leg.  Patient does have some bleeding.  Patient with extremity pain.       Patient Care Team:  Yaya Medel DO as PCP - General  Valarie Hunter MD as PCP - Endocrinology (Endocrinology)  Sarita Kang MD (Obstetrics and Gynecology)  Marsha Babin MD (Obstetrics and Gynecology)  Radha Arce DO (Obstetrics and Gynecology)  Nolvia Howard DO (Vascular Surgery)    Review of Systems   Constitutional: Negative.    HENT: Negative.     Eyes: Negative.    Respiratory: Negative.     Cardiovascular: Negative.    Gastrointestinal: Negative.    Endocrine: Negative.    Genitourinary: Negative.    Musculoskeletal:  Positive for arthralgias and gait problem.   Skin: Negative.    Allergic/Immunologic: Negative.    Hematological:  Bruises/bleeds easily.   Psychiatric/Behavioral: Negative.       Medical History Reviewed by provider this encounter:  Tobacco  Allergies  Meds  Problems  Med Hx  Surg Hx  Fam Hx       Annual Wellness Visit Questionnaire   Nae is here for her Subsequent Wellness visit.     Health Risk Assessment:   Patient rates overall health as poor. Patient feels that their physical health rating is much worse. Patient is dissatisfied with their life. Eyesight was rated as same. Hearing was rated as same. Patient feels that their emotional and mental health rating is slightly worse. Patients states they are sometimes angry. Patient states they are always unusually  tired/fatigued. Pain experienced in the last 7 days has been a lot. Patient's pain rating has been 10/10. Patient states that she has experienced no weight loss or gain in last 6 months.     Depression Screening:   PHQ-2 Score: 5  PHQ-9 Score: 12      Fall Risk Screening:   In the past year, patient has experienced: no history of falling in past year      Urinary Incontinence Screening:   Patient has leaked urine accidently in the last six months.     Home Safety:  Patient has trouble with stairs inside or outside of their home. Patient has working smoke alarms and has working carbon monoxide detector. Home safety hazards include: none.     Nutrition:   Due to increased acid reflux problem and tendency to diarrhea patient east an extremely limited diet which is probably not healthy. (Per patient)    Medications:   Patient is currently taking over-the-counter supplements. OTC medications include: see medication list. Patient is able to manage medications.     Activities of Daily Living (ADLs)/Instrumental Activities of Daily Living (IADLs):   Walk and transfer into and out of bed and chair?: Yes  Dress and groom yourself?: Yes    Bathe or shower yourself?: Yes    Feed yourself? Yes  Do your laundry/housekeeping?: Yes  Manage your money, pay your bills and track your expenses?: Yes  Make your own meals?: Yes    Do your own shopping?: Yes    ADL comments: Laundry and shopping with someone's help    Previous Hospitalizations:   Any hospitalizations or ED visits within the last 12 months?: No      Advance Care Planning:   Living will: No    Durable POA for healthcare: Yes    Advanced directive: No      Cognitive Screening:   Provider or family/friend/caregiver concerned regarding cognition?: No    PREVENTIVE SCREENINGS      Cardiovascular Screening:    General: Screening Not Indicated, History Lipid Disorder, Risks and Benefits Discussed and Screening Current      Diabetes Screening:     General: Screening Current and  Risks and Benefits Discussed      Colorectal Cancer Screening:     General: Screening Current and Risks and Benefits Discussed      Breast Cancer Screening:     General: Screening Current and Risks and Benefits Discussed      Cervical Cancer Screening:    General: Screening Not Indicated, Risks and Benefits Discussed and Screening Current      Osteoporosis Screening:    General: Screening Not Indicated, History Osteoporosis, Risks and Benefits Discussed and Screening Current      Abdominal Aortic Aneurysm (AAA) Screening:        General: Risks and Benefits Discussed and Screening Not Indicated      Lung Cancer Screening:     General: Screening Not Indicated, History Lung Cancer and Risks and Benefits Discussed      Hepatitis C Screening:    General: Risks and Benefits Discussed and Patient Declines    Review of Current Opioid Use    Opioid Risk Tool (ORT) Interpretation: Complete Opioid Risk Tool (ORT)    Other Counseling Topics:   Regular weightbearing exercise.     Social Determinants of Health     Financial Resource Strain: Low Risk  (12/16/2022)    Overall Financial Resource Strain (CARDIA)     Difficulty of Paying Living Expenses: Not hard at all   Food Insecurity: No Food Insecurity (7/9/2024)    Hunger Vital Sign     Worried About Running Out of Food in the Last Year: Never true     Ran Out of Food in the Last Year: Never true   Transportation Needs: No Transportation Needs (7/9/2024)    PRAPARE - Transportation     Lack of Transportation (Medical): No     Lack of Transportation (Non-Medical): No   Housing Stability: Low Risk  (7/9/2024)    Housing Stability Vital Sign     Unable to Pay for Housing in the Last Year: No     Number of Times Moved in the Last Year: 1     Homeless in the Last Year: No   Utilities: Not At Risk (7/9/2024)    [facility] Utilities     Threatened with loss of utilities: No     No results found.    Objective     /70 (BP Location: Right arm, Patient Position: Sitting, Cuff Size:  "Standard)   Pulse 96   Temp 97.7 °F (36.5 °C) (Temporal)   Ht 5' 4\" (1.626 m)   Wt 69.6 kg (153 lb 6.4 oz)   LMP  (LMP Unknown)   SpO2 97%   BMI 26.33 kg/m²     Physical Exam  Vitals and nursing note reviewed.   Constitutional:       General: She is not in acute distress.     Appearance: Normal appearance. She is well-developed. She is not ill-appearing, toxic-appearing or diaphoretic.   HENT:      Head: Normocephalic and atraumatic.      Right Ear: Tympanic membrane, ear canal and external ear normal. There is no impacted cerumen.      Left Ear: Tympanic membrane, ear canal and external ear normal. There is no impacted cerumen.      Nose: Nose normal. No congestion or rhinorrhea.      Mouth/Throat:      Mouth: Mucous membranes are moist.      Pharynx: No oropharyngeal exudate or posterior oropharyngeal erythema.   Eyes:      General:         Right eye: No discharge.         Left eye: No discharge.   Neck:      Thyroid: No thyromegaly.      Vascular: No carotid bruit.      Trachea: No tracheal deviation.   Cardiovascular:      Rate and Rhythm: Normal rate and regular rhythm.      Pulses: Normal pulses.      Heart sounds: Normal heart sounds. No murmur heard.     No gallop.   Pulmonary:      Effort: Pulmonary effort is normal. No respiratory distress.      Breath sounds: Normal breath sounds. No stridor. No wheezing or rales.   Chest:      Chest wall: No tenderness.   Musculoskeletal:         General: Tenderness present. No deformity.      Cervical back: Normal range of motion and neck supple.      Comments: Varicose veins bilateral lower extremities.   Lymphadenopathy:      Cervical: No cervical adenopathy.   Skin:     General: Skin is warm and dry.      Coloration: Skin is not pale.      Findings: No erythema or rash.   Neurological:      Mental Status: She is alert and oriented to person, place, and time. Mental status is at baseline.      Cranial Nerves: No cranial nerve deficit.      Motor: No abnormal " muscle tone.      Coordination: Coordination normal.      Gait: Gait normal.      Deep Tendon Reflexes: Reflexes normal.   Psychiatric:         Behavior: Behavior normal.         Thought Content: Thought content normal.         Judgment: Judgment normal.

## 2024-07-11 ENCOUNTER — OFFICE VISIT (OUTPATIENT)
Dept: GYNECOLOGY | Facility: CLINIC | Age: 69
End: 2024-07-11
Payer: MEDICARE

## 2024-07-11 VITALS — WEIGHT: 152.6 LBS | DIASTOLIC BLOOD PRESSURE: 80 MMHG | SYSTOLIC BLOOD PRESSURE: 148 MMHG | BODY MASS INDEX: 26.19 KG/M2

## 2024-07-11 DIAGNOSIS — N81.4 UTERINE PROLAPSE: Primary | ICD-10-CM

## 2024-07-11 PROCEDURE — 99213 OFFICE O/P EST LOW 20 MIN: CPT | Performed by: OBSTETRICS & GYNECOLOGY

## 2024-07-11 NOTE — PROGRESS NOTES
Assessment/Plan:     Prolapse-her pessary is working well for her.  She will return in 3 months for a pessary check.  She does not need a new prescription for vaginal estrogen yet.    Wished her the best on her upcoming trip to visit her granddaughter     There are no diagnoses linked to this encounter.      Subjective:     Patient ID: Nae Hernandez is a 69 y.o. female.    Pt here for pessary check.  She has a Shaatz pessary for uterine prolapse.  She uses vaginal estrogen about once a week.  She is doing well and has no GYN complaints.  She has been doing PT for osteoporosis.  She is anxious about an upcoming trip to visit her son and his wife and her granddaughter.  She does not like to fly.        Review of Systems   Constitutional: Negative.    Gastrointestinal: Negative.    Genitourinary: Negative.          Objective:     Physical Exam  Constitutional:       Appearance: Normal appearance.   Genitourinary:     General: Normal vulva.      Vagina: Normal.      Cervix: Normal.      Uterus: Normal. With uterine prolapse.       Adnexa: Right adnexa normal and left adnexa normal.      Comments: Shaatz pessary removed without difficulty, cleaned and reinserted.  She has no vaginal excoriation or irritation  Neurological:      Mental Status: She is alert.

## 2024-07-15 ENCOUNTER — TELEPHONE (OUTPATIENT)
Age: 69
End: 2024-07-15

## 2024-07-15 NOTE — TELEPHONE ENCOUNTER
Patient called in stating that she is having diarrhea and constipation.Patient stated she called her primary doc about this and they recommended her to take another medication. Patient stating that the GI doc might have had her on a high calcium supplement, and pt reporting that the provider mentioned another medication. Patient wanted to know the name of the calcium supplement that she should be taking. Pt stated that if she is called back, pt would like a voicemail left.

## 2024-07-16 ENCOUNTER — APPOINTMENT (OUTPATIENT)
Dept: PHYSICAL THERAPY | Facility: MEDICAL CENTER | Age: 69
End: 2024-07-16
Payer: MEDICARE

## 2024-07-16 NOTE — TELEPHONE ENCOUNTER
Patient did not answer return call.  Per HIPAA, left message to inform patient of Dr. Arce's advice.

## 2024-07-26 ENCOUNTER — APPOINTMENT (OUTPATIENT)
Dept: LAB | Facility: MEDICAL CENTER | Age: 69
End: 2024-07-26
Payer: MEDICARE

## 2024-07-26 DIAGNOSIS — G62.9 NEUROPATHY: ICD-10-CM

## 2024-07-26 PROCEDURE — 36415 COLL VENOUS BLD VENIPUNCTURE: CPT

## 2024-07-26 PROCEDURE — 86376 MICROSOMAL ANTIBODY EACH: CPT

## 2024-07-28 LAB — THYROPEROXIDASE AB SERPL-ACNC: 15 IU/ML (ref 0–34)

## 2024-08-12 ENCOUNTER — TELEPHONE (OUTPATIENT)
Age: 69
End: 2024-08-12

## 2024-08-12 NOTE — TELEPHONE ENCOUNTER
Patient called regarding her lab results for Anti-microsomal antibody test. Patient was advised that labs were normal. No further questions

## 2024-08-14 ENCOUNTER — TELEPHONE (OUTPATIENT)
Dept: GASTROENTEROLOGY | Facility: MEDICAL CENTER | Age: 69
End: 2024-08-14

## 2024-08-29 ENCOUNTER — HOSPITAL ENCOUNTER (OUTPATIENT)
Dept: MAMMOGRAPHY | Facility: MEDICAL CENTER | Age: 69
Discharge: HOME | End: 2024-08-29
Payer: MEDICARE

## 2024-08-29 VITALS — HEIGHT: 64 IN | WEIGHT: 152.56 LBS | BODY MASS INDEX: 26.05 KG/M2

## 2024-08-29 DIAGNOSIS — Z12.31 ENCOUNTER FOR SCREENING MAMMOGRAM FOR MALIGNANT NEOPLASM OF BREAST: ICD-10-CM

## 2024-08-29 PROCEDURE — 77063 BREAST TOMOSYNTHESIS BI: CPT

## 2024-08-29 PROCEDURE — 77067 SCR MAMMO BI INCL CAD: CPT

## 2024-09-27 ENCOUNTER — TELEPHONE (OUTPATIENT)
Age: 69
End: 2024-09-27

## 2024-09-27 DIAGNOSIS — I74.09 AORTOILIAC OCCLUSIVE DISEASE (HCC): Primary | ICD-10-CM

## 2024-09-27 NOTE — TELEPHONE ENCOUNTER
Received call from pt.  She was scheduled for an abdominal/iliac study with [facility] but had to cancel due to a sore on her leg from bumping into something.  Order is now . She is asking if new order can be placed.  She's also asking if she needs a repeat LEAD, as last one was 8/3/22, and when she saw Dr. Eden 11/3/22 a repeat one was not ordered.    Please advise.

## 2024-09-27 NOTE — TELEPHONE ENCOUNTER
Called and s/p pt. Placed new orders for testing. Offered to schedule testing and apt. Pt stated that she has an open cut and wants to wait for it to be healed prior to scheduling. Gave pt the number for central scheduling.

## 2024-09-30 ENCOUNTER — TELEPHONE (OUTPATIENT)
Age: 69
End: 2024-09-30

## 2024-09-30 NOTE — TELEPHONE ENCOUNTER
Left message for pt to call our office to schedule a nurse visit for a Prevnar 20 inj.  This was Ok'd by Dr. Medel

## 2024-09-30 NOTE — TELEPHONE ENCOUNTER
Patient called requesting pneumonia shot vaccine(s) for the following reason(s): overdue; last done in 2018.     No order in system. Please advise and/or schedule accordingly.

## 2024-10-07 ENCOUNTER — CLINICAL SUPPORT (OUTPATIENT)
Age: 69
End: 2024-10-07
Payer: MEDICARE

## 2024-10-07 DIAGNOSIS — Z23 ENCOUNTER FOR IMMUNIZATION: Primary | ICD-10-CM

## 2024-10-07 PROCEDURE — 90677 PCV20 VACCINE IM: CPT

## 2024-10-07 PROCEDURE — G0009 ADMIN PNEUMOCOCCAL VACCINE: HCPCS

## 2024-10-14 ENCOUNTER — TELEPHONE (OUTPATIENT)
Age: 69
End: 2024-10-14

## 2024-10-14 NOTE — TELEPHONE ENCOUNTER
Yaya Medel, DO to [facility]  17 minutes ago  Okay to do jury duty note    Contacted patient to make her aware we can send a note but we do need her juror number so we can put that on the letter before we fax it. Patient aware and will call back with these details.

## 2024-10-14 NOTE — TELEPHONE ENCOUNTER
Patient returning Denia's phone call, her juror # is [phone]. Please follow up with patient when letter is sent.

## 2024-10-14 NOTE — TELEPHONE ENCOUNTER
"Pt called in stating she needs Jury Duty Note excuse due to being on Opiates. Please approve and fax number to the [facility] [employer] [place] [phone]. Pt states this is \"Time Sensitive\". The report for jury duty is. 12/17/24.    When completed please call patient to advise it was approved and faxed.   "

## 2024-10-14 NOTE — LETTER
Date: 10/14/2024    RE: Nae Hernandez  : 1955  Juror #:[id number]     To whom it may concern:     This is to certify that Nae Hernandez has been under my care for the following diagnosis: chronic bilateral low back pain with bilateral sciatica,         I feel that Nae Hernandez is unable to serve on Jury Duty at this time for the above mentioned medical reasons.                  Sincerely,  Kimmy Recinos MA

## 2024-10-14 NOTE — LETTER
Date: 10/14/2024    RE: Nae Hernandez  : 1955  Juror #: [id number]      To whom it may concern:     This is to certify that Nae Hernandez has been under my care for the following diagnosis:chronic bilateral low back pain with bilateral sciatica, cervical radiculopathy and many other medical issues.     I feel that Nae Hernandez is unable to serve on Jury Duty at this time for the above mentioned medical reasons.          Sincerely,    Yaya Medel DO

## 2024-10-17 ENCOUNTER — OFFICE VISIT (OUTPATIENT)
Dept: GYNECOLOGY | Facility: CLINIC | Age: 69
End: 2024-10-17
Payer: MEDICARE

## 2024-10-17 VITALS — DIASTOLIC BLOOD PRESSURE: 70 MMHG | WEIGHT: 149 LBS | BODY MASS INDEX: 25.58 KG/M2 | SYSTOLIC BLOOD PRESSURE: 122 MMHG

## 2024-10-17 DIAGNOSIS — N81.4 UTERINE PROLAPSE: Primary | ICD-10-CM

## 2024-10-17 PROCEDURE — 99213 OFFICE O/P EST LOW 20 MIN: CPT | Performed by: OBSTETRICS & GYNECOLOGY

## 2024-10-17 NOTE — PROGRESS NOTES
Ambulatory Visit  Name: Nae Hernandez      : 1955      MRN: [id number]  Encounter Provider: Radha Arce DO  Encounter Date: 10/17/2024   Encounter department: [facility]    Assessment & Plan      History of Present Illness   {?Quick Links Encounters * My Last Note * Last Note in Specialty * Snapshot * Since Last Visit * History :27200}  Nae Hernandez is a 69 y.o. female who presents ***    {History obtained from (Optional):45833}  Review of Systems  {Select to Display PMH (Optional):54843}      Objective   {?Quick Links Trend Vitals * Enter New Vitals * Results Review * Timeline (Adult) * Labs * Imaging * Cardiology * Procedures * Lung Cancer Screening * Surgical eConsent :67860}  LMP  (LMP Unknown)     Physical Exam  {Administrative / Billing Section (Optional):52120}

## 2024-10-17 NOTE — PROGRESS NOTES
Pessary    Date/Time: 10/17/2024 2:00 PM    Performed by: Radha Arce DO  Authorized by: Radha Arce DO  Universal Protocol:  Consent: Verbal consent obtained.  Risks and benefits: risks, benefits and alternatives were discussed  Consent given by: patient  Patient understanding: patient states understanding of the procedure being performed  Patient identity confirmed: verbally with patient    Indication:     Indication for pessary: uterine prolapse    Pre-procedure:     Pessary procedure type:  Cleaning/check  Problems:     Pessary complications: none    Procedure:     Pessary type:  Shaatz    Patient tolerance of procedure:  Tolerated well, no immediate complications  Comments:     Procedure comments:  Patient here for pessary check.  She has no concerns regarding her pessary, it is working well for her.  She uses vaginal estrogen and does not yet need a new prescription.    Pessary removed, cleaned and reinserted without difficulty.  There is no irritation or excoriation from the pessary.  She will return in 3 to 4 months for recheck.

## 2024-10-18 ENCOUNTER — TELEPHONE (OUTPATIENT)
Dept: GASTROENTEROLOGY | Facility: MEDICAL CENTER | Age: 69
End: 2024-10-18

## 2024-10-18 ENCOUNTER — CONSULT (OUTPATIENT)
Dept: GASTROENTEROLOGY | Facility: MEDICAL CENTER | Age: 69
End: 2024-10-18
Payer: MEDICARE

## 2024-10-18 VITALS
DIASTOLIC BLOOD PRESSURE: 81 MMHG | WEIGHT: 148.4 LBS | HEART RATE: 87 BPM | BODY MASS INDEX: 25.33 KG/M2 | TEMPERATURE: 99.6 F | HEIGHT: 64 IN | OXYGEN SATURATION: 97 % | SYSTOLIC BLOOD PRESSURE: 134 MMHG

## 2024-10-18 DIAGNOSIS — Z86.0100 HISTORY OF COLON POLYPS: ICD-10-CM

## 2024-10-18 DIAGNOSIS — K21.9 GASTROESOPHAGEAL REFLUX DISEASE WITHOUT ESOPHAGITIS: Primary | ICD-10-CM

## 2024-10-18 DIAGNOSIS — R13.10 DYSPHAGIA, UNSPECIFIED TYPE: ICD-10-CM

## 2024-10-18 PROCEDURE — 99214 OFFICE O/P EST MOD 30 MIN: CPT | Performed by: INTERNAL MEDICINE

## 2024-10-18 RX ORDER — FAMOTIDINE 20 MG/1
20 TABLET, FILM COATED ORAL 2 TIMES DAILY
Qty: 60 TABLET | Refills: 3 | Status: SHIPPED | OUTPATIENT
Start: 2024-10-18

## 2024-10-18 NOTE — PROGRESS NOTES
Ambulatory Visit  Name: Nae Hernandez      : 1955      MRN: [id number]  Encounter Provider: Diana M Jaiyeola, MD  Encounter Date: 10/18/2024   Encounter department: [facility] [place]    Assessment & Plan  Gastroesophageal reflux disease without esophagitis  She has a history of chronic reflux and underwent EGD in  showing small hiatal hernia and gastritis with overall unremarkable pathology.  I recommend increasing famotidine to 20 mg twice daily in addition to dietary/lifestyle antireflux measures.  She will be scheduled for diagnostic EGD and if she has evidence of esophagitis may require short-term use of PPI  Orders:    famotidine (PEPCID) 20 mg tablet; Take 1 tablet (20 mg total) by mouth 2 (two) times a day    EGD; Future    Dysphagia, unspecified type  She notes new onset of dysphagia.  This may be related to mechanical obstruction due to esophagitis, peptic stricture, Schatzki's ring.  She will start twice daily H2 blocker therapy and be scheduled for diagnostic EGD.  I have also ordered barium swallow evaluation to rule out oropharyngeal causes of her symptoms  Orders:    FL barium swallow video w speech; Future    EGD; Future    History of colon polyps  She underwent colonoscopy in  after having a positive Cologuard.  Her colonoscopy showed multiple subcentimeter tubular adenomas and 118 mm tubular adenoma.  She is recommended repeat colonoscopy in 3 years for surveillance and is due at this time.  I discussed the indication, risk and benefit with her today and she is agreeable to proceed  Orders:    Colonoscopy; Future      History of Present Illness     Nae Hernandez is a 69 y.o. female who presents for follow-up evaluation of history of colon polyps and GERD.    She was last seen in the GI office in  after undergoing Cologuard testing which was positive.  He had previously undergone a colonoscopy in her 20s but none since.  She also noted history of  "gastroesophageal reflux and was recommended undergo EGD for Gil's esophagus screening.    Interval history: She underwent EGD September 2021 showing small hiatal hernia and gastritis.  Pathology showed gastritis negative for H. pylori.  Colonoscopy showed 5 subcentimeter polyps and one 18 mm ascending colon polyp which was removed, diverticulosis and internal and external hemorrhoids.  Polyp pathology showed tubular adenomas and she was recommended repeat in 3 years.    She reports onset of dysphagia particularly to larger pills.  She has a sensation like the food is \"stuck\" in her mid throat.  She also reports worsening acid reflux symptoms for which she has been using Pepcid 10 mg at night but will sometimes take a total of 20 mg an additional 10 mg during the day.  She reports her bowel movements are usually 4 times daily and soft.  She denies watery or bloody diarrhea.        She has no recent abdominal imaging available for review  3/2024 hemoglobin 16.5, platelets 236, liver enzymes are within normal limits        Review of Systems   Constitutional:  Negative for chills and fever.   HENT:  Negative for ear pain and sore throat.    Eyes:  Negative for pain and visual disturbance.   Respiratory:  Negative for cough and shortness of breath.    Cardiovascular:  Negative for chest pain and palpitations.   Gastrointestinal:  Negative for abdominal pain and vomiting.   Genitourinary:  Negative for dysuria and hematuria.   Musculoskeletal:  Negative for arthralgias and back pain.   Skin:  Negative for color change and rash.   Neurological:  Negative for seizures and syncope.   All other systems reviewed and are negative.    Past Medical History   Past Medical History:   Diagnosis Date    Allergic     Anxiety     Arthritis 12/20/2014    Cataract     eulalia    Depression     Disease of thyroid gland     nodules    Fibromyalgia, primary     Frequent UTI     GERD (gastroesophageal reflux disease)     History of lung " cancer     lower left lobe removed - 2004    Irritable bowel syndrome     Lactose intolerance     Lupus     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    Osteoporosis     PAD (peripheral artery disease) (HCC)     Presence of pessary     Raynaud disease     Sciatica     Sinus headache     Sjogren's syndrome (HCC)     Smoker      Past Surgical History:   Procedure Laterality Date    IR BIOPSY BONE MARROW  8/18/2023    LUNG REMOVAL, PARTIAL  05/01/2004    Prior Surgical/Procedural History - 5/4 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 - lung resection - subtotal    MOUTH SURGERY      Tooth extraction    IN TEAEC W/WO PATCH GRAFT COMMON FEMORAL Left 9/10/2019    Procedure: ENDARTERECTOMY ARTERIAL FEMORAL,  WITH BOVINE GRAFT AND PROFUNDOPLASTY;  Surgeon: Nolvia Howard DO;  Location: [facility];  Service: Vascular    SINUS SURGERY       Family History   Problem Relation Age of Onset    Arthritis Mother     Asthma Mother     Dementia Mother     Heart disease Mother     Hypothyroidism Mother     Irritable bowel syndrome Mother     Uterine cancer Mother 65    Heart attack Father     Stroke Father     Hyperlipidemia Sister     Uterine cancer Sister         age unknown    Heart attack Maternal Grandfather     Asthma Family     Cataracts Family     Thyroid disease Family     No Known Problems Paternal Aunt     No Known Problems Maternal Aunt     No Known Problems Maternal Grandmother     No Known Problems Paternal Grandmother     No Known Problems Paternal Grandfather      Current Outpatient Medications on File Prior to Visit   Medication Sig Dispense Refill    aspirin (ECOTRIN LOW STRENGTH) 81 mg EC tablet Take 1 tablet (81 mg total) by mouth daily 90 tablet 3    atorvastatin (LIPITOR) 10 mg tablet Take 1 tablet (10 mg total) by mouth daily with dinner 90 tablet 1    Calcium Carbonate (CALCIUM 500 PO) Take by mouth      cholecalciferol (VITAMIN D3) 1,000 units tablet Take 1,000 Units by mouth daily       "doxycycline hyclate (VIBRAMYCIN) 100 mg capsule Take 1 capsule by mouth every 12 (twelve) hours      estradiol (VAGIFEM, YUVAFEM) 10 MCG TABS vaginal tablet Insert 1 tablet (10 mcg total) into the vagina 2 (two) times a week 24 tablet 3    LORazepam (ATIVAN) 0.5 mg tablet Take 1 tablet (0.5 mg total) by mouth every 12 (twelve) hours as needed for anxiety 30 tablet 0    Multiple Vitamin (MULTI-VITAMIN PO) Take by mouth      oxyCODONE (ROXICODONE) 5 immediate release tablet Take 1 tablet (5 mg total) by mouth every 6 (six) hours as needed for moderate pain Max Daily Amount: 20 mg 30 tablet 0    potassium chloride (Klor-Con 10) 10 mEq tablet Take 1 tablet (10 mEq total) by mouth 3 (three) times a day 270 tablet 1    predniSONE 10 mg tablet Take 1 tablet (10 mg total) by mouth daily 90 tablet 1    Vitamin Mixture (JOSE RAMON-C PO) Take 1 tablet by mouth daily      [DISCONTINUED] famotidine (PEPCID) 10 mg tablet Take 10 mg by mouth daily at bedtime If heartburn is really bad she will take 20 mg.      clotrimazole (MYCELEX) 10 mg valencia TAKE 1 TABLET BY MOUTH FIVE TIMES A DAY FOR 14 DAYS (Patient not taking: Reported on 10/18/2024)      cyanocobalamin (VITAMIN B-12) 100 mcg tablet Take 500 mcg by mouth daily 500 mcg (Patient not taking: Reported on 10/18/2024)       No current facility-administered medications on file prior to visit.     Allergies   Allergen Reactions    Dust Mite Extract Shortness Of Breath    Penicillins Shortness Of Breath and Edema    Duloxetine Diarrhea    Lactose - Food Allergy GI Intolerance    Neosporin [Neomycin-Bacitracin Zn-Polymyx] Itching and Rash    Tilactase Diarrhea    Nitrofurantoin Rash    Omeprazole Rash    Sulfamethoxazole-Trimethoprim Rash          Objective     /81 (BP Location: Right arm, Patient Position: Sitting, Cuff Size: Standard)   Pulse 87   Temp 99.6 °F (37.6 °C) (Tympanic)   Ht 5' 4\" (1.626 m)   Wt 67.3 kg (148 lb 6.4 oz)   LMP  (LMP Unknown)   SpO2 97%   BMI 25.47 " kg/m²     Physical Exam  Vitals and nursing note reviewed.   Constitutional:       General: She is not in acute distress.     Appearance: She is well-developed.   HENT:      Head: Normocephalic and atraumatic.   Eyes:      Conjunctiva/sclera: Conjunctivae normal.   Cardiovascular:      Rate and Rhythm: Normal rate and regular rhythm.      Heart sounds: No murmur heard.  Pulmonary:      Effort: Pulmonary effort is normal. No respiratory distress.      Breath sounds: Normal breath sounds.   Abdominal:      Palpations: Abdomen is soft.      Tenderness: There is no abdominal tenderness.   Musculoskeletal:         General: No swelling.      Cervical back: Neck supple.   Skin:     General: Skin is warm and dry.      Capillary Refill: Capillary refill takes less than 2 seconds.   Neurological:      Mental Status: She is alert.   Psychiatric:         Mood and Affect: Mood normal.

## 2024-10-18 NOTE — ASSESSMENT & PLAN NOTE
She has a history of chronic reflux and underwent EGD in 2021 showing small hiatal hernia and gastritis with overall unremarkable pathology.  I recommend increasing famotidine to 20 mg twice daily in addition to dietary/lifestyle antireflux measures.  She will be scheduled for diagnostic EGD and if she has evidence of esophagitis may require short-term use of PPI  Orders:    famotidine (PEPCID) 20 mg tablet; Take 1 tablet (20 mg total) by mouth 2 (two) times a day    EGD; Future

## 2024-10-18 NOTE — TELEPHONE ENCOUNTER
Pt said she will call to schedule her Colon/EGD with Dr.Jaiyeola at [facility] due to ride issues. Gave pt the miralax prep instructions. Pt also needs to schedule a follow up after the procedure with a PA at [facility].

## 2024-10-22 ENCOUNTER — HOSPITAL ENCOUNTER (OUTPATIENT)
Dept: NON INVASIVE DIAGNOSTICS | Facility: CLINIC | Age: 69
Discharge: HOME | End: 2024-10-22
Payer: MEDICARE

## 2024-10-22 DIAGNOSIS — I74.09 AORTOILIAC OCCLUSIVE DISEASE (HCC): ICD-10-CM

## 2024-10-22 PROCEDURE — 93923 UPR/LXTR ART STDY 3+ LVLS: CPT

## 2024-10-22 PROCEDURE — 93978 VASCULAR STUDY: CPT

## 2024-10-22 PROCEDURE — 93925 LOWER EXTREMITY STUDY: CPT

## 2024-10-23 ENCOUNTER — IMMUNIZATIONS (OUTPATIENT)
Age: 69
End: 2024-10-23
Payer: MEDICARE

## 2024-10-23 DIAGNOSIS — Z23 ENCOUNTER FOR IMMUNIZATION: Primary | ICD-10-CM

## 2024-10-23 PROCEDURE — G0008 ADMIN INFLUENZA VIRUS VAC: HCPCS

## 2024-10-23 PROCEDURE — 93922 UPR/L XTREMITY ART 2 LEVELS: CPT | Performed by: SURGERY

## 2024-10-23 PROCEDURE — 93978 VASCULAR STUDY: CPT | Performed by: SURGERY

## 2024-10-23 PROCEDURE — 90662 IIV NO PRSV INCREASED AG IM: CPT

## 2024-10-23 PROCEDURE — 93925 LOWER EXTREMITY STUDY: CPT | Performed by: SURGERY

## 2024-11-14 ENCOUNTER — TELEPHONE (OUTPATIENT)
Age: 69
End: 2024-11-14

## 2024-11-14 DIAGNOSIS — F41.9 ANXIETY: ICD-10-CM

## 2024-11-14 RX ORDER — LORAZEPAM 0.5 MG/1
0.5 TABLET ORAL EVERY 12 HOURS PRN
Qty: 30 TABLET | Refills: 0 | Status: SHIPPED | OUTPATIENT
Start: 2024-11-14

## 2024-11-14 NOTE — TELEPHONE ENCOUNTER
Patient calling to check the status of her test results. She had an AOIL and VISH done 10/22 and hasn't received a letter in the mail telling her the results and when to follow up. She is requesting a letter be mailed to her so she can keep track of when she needs to follow up.

## 2024-11-14 NOTE — TELEPHONE ENCOUNTER
Reason for call:   [x] Refill   [] Prior Auth  [] Other:     Office:   [x] PCP/Provider - [facility]/ DO Gianfranco  [] Specialty/Provider -     Medication: LORazepam (ATIVAN) 0.5 mg tablet     Dose/Frequency: Take 1 tablet (0.5 mg total) by mouth every 12 (twelve) hours as needed for anxiety    Quantity: 30    Pharmacy: [facility]/pharmacy #[id number] - [place] - [address] [phone]    Does the patient have enough for 3 days?   [] Yes   [x] No - Send as HP to POD

## 2024-11-19 NOTE — TELEPHONE ENCOUNTER
Called & LMOM with patient to schedule an office visit w/us.  Patient is due for ov L/S 11/3/22 w/[facility] Due for rfm    At that visit will then print letter for AOIL/VIHS for patient to receive

## 2024-11-21 NOTE — TELEPHONE ENCOUNTER
Patient returned our call. She states she did not have positive experiences with vascular office visits in the past and is hesitant to schedule a follow up. Informed patient that we cannot print the letter or continue ordering testing for her unless she is seen in office, as after 3 years of not being seen she would be considered a new patient. She expressed understanding. She has a lot of other testing and procedures to schedule over the next few months, and will call to schedule for next fall at some point in the upcoming spring. She states she would like to see a new provider, as she was not happy with the providers she has seen in the past. She no longer feels the need for the letter, but will still call to schedule a follow up next year, once she has had her cataracts and colonoscopy procedures done.

## 2024-12-02 DIAGNOSIS — M54.50 LUMBAR PAIN: ICD-10-CM

## 2024-12-02 NOTE — TELEPHONE ENCOUNTER
Reason for call:   [x] Refill   [] Prior Auth  [] Other:     Office:   [x] PCP/Provider - [facility]  Authorized By: Yaya Medel DO  [] Specialty/Provider -     Medication: predniSONE 10 mg tablet     Dose/Frequency:  Take 1 tablet (10 mg total) by mouth daily     Quantity: 90 tablet     Pharmacy: [facility]/pharmacy #[id number] - [place] - [address]     Does the patient have enough for 3 days?   [x] Yes   [] No - Send as HP to POD

## 2024-12-03 ENCOUNTER — RESULTS FOLLOW-UP (OUTPATIENT)
Dept: VASCULAR SURGERY | Facility: CLINIC | Age: 69
End: 2024-12-03

## 2024-12-03 ENCOUNTER — HOSPITAL ENCOUNTER (OUTPATIENT)
Dept: RADIOLOGY | Facility: HOSPITAL | Age: 69
Discharge: HOME | End: 2024-12-03
Attending: INTERNAL MEDICINE
Payer: MEDICARE

## 2024-12-03 DIAGNOSIS — R13.10 DYSPHAGIA, UNSPECIFIED TYPE: ICD-10-CM

## 2024-12-03 PROCEDURE — 74230 X-RAY XM SWLNG FUNCJ C+: CPT

## 2024-12-03 PROCEDURE — 92611 MOTION FLUOROSCOPY/SWALLOW: CPT

## 2024-12-03 NOTE — PROCEDURES
Video Swallow Study      Patient Name: Nae Hernandez  Today's Date: 12/3/2024        Past Medical History  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    Osteoporosis     PAD (peripheral artery disease) (HCC)     Presence of pessary     Raynaud disease     Sciatica     Sinus headache     Sjogren's syndrome (HCC)     Smoker         Past Surgical History  Past Surgical History:   Procedure Laterality Date    IR BIOPSY BONE MARROW  8/18/2023    LUNG REMOVAL, PARTIAL  05/01/2004    Prior Surgical/Procedural History - 5/4 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 - lung resection - subtotal    MOUTH SURGERY      Tooth extraction    HI TEAEC W/WO PATCH GRAFT COMMON FEMORAL Left 9/10/2019    Procedure: ENDARTERECTOMY ARTERIAL FEMORAL,  WITH BOVINE GRAFT AND PROFUNDOPLASTY;  Surgeon: Nolvia Howard DO;  Location: [facility];  Service: Vascular    SINUS SURGERY       Modified (Video) Barium Swallow Study    Summary:  Images are on PACS for review.     Oral stage: WNL  WNL for lip closure, mastication, bolus formation, control, and transfer. Trace to no oral residue.     Pharyngeal stage: WNL  WNL for prompt swallow, velopharyngeal closure, laryngeal elevation, anterior hyoid excursion, epiglottic inversion,  laryngeal vestibular closure, pharyngeal constriction, passage through UES/UES opening, and tongue base retraction. Trace to no pharyngeal retention. No penetration or aspiration.  Small osteophytes at C6/7. Intermittent trace collection in that area that cleared.    Per gross esophageal screen:  "proximal thoracic retention following purees. Partially cleared from that area w/ cue for secondary swallow. Residue then throughout the esophagus. Proximal/mid thoracic residue noted after additional solids. Cleared w/ liquid wash. Pill retention thoracic and then distal esophagus. Cleared w/ additional thin liquid. On AP view, slow transit and stasis throughout the esophagus w/ puree. Partial clearance w/ nectar thick though nectar thick liquid stasis remained when checked again in ~ 30 seconds.   Pt pointed to her suprasternal notch x 2 that something was stuck. Informed and showed her on imaging that the material was lower in her esophagus.     Recommendations:  Diet:regular  Liquids:thin  Meds:as tolerated. Follow w/ additional liquid.   Strategies:chew well, slow rate, small sips after every few bites  Frequent/thorough oral care  Upright position  F/u ST tx:No  Reflux Precautions  Consider consult with: refer back to GI  Results reviewed with: pt  Repeat MBS as necessary  If a dedicated assessment of the esophagus is desired:  Consider esophagram/routine barium swallow w/ barium tablet administration, FL Upper GI/UGI,   or EGD (Options may be limited if pt cannot stand/maneuver for barium studies.)    Pt is a 69yopf referred by GI for MBS. Reports diffiuclty w/ solids getting stuck sometimes.     Functional Oral Intake Scale (FOIS)  Elver Ross PhD, F-ERASMO  (Does not include liquids)  7. Total oral diet with no restriction.    H&P/pertinent provider notes: (PMH noted above)  10/18/24 visit w/ GI: (PARTIAL note, refer to complete consult)  Nae Hernandez is a 69 y.o. female who presents for follow-up evaluation of history of colon polyps and GERD.   She reports onset of dysphagia particularly to larger pills. She has a sensation like the food is \"stuck\" in her mid throat. She also reports worsening acid reflux symptoms for which she has been using Pepcid 10 mg at night but will sometimes take a total of 20 mg " an additional 10 mg during the day. She reports her bowel movements are usually 4 times daily and soft. She denies watery or bloody diarrhea.   Gastroesophageal reflux disease without esophagitis  She has a history of chronic reflux and underwent EGD in 2021 showing small hiatal hernia and gastritis with overall unremarkable pathology.  I recommend increasing famotidine to 20 mg twice daily in addition to dietary/lifestyle antireflux measures.  She will be scheduled for diagnostic EGD and if she has evidence of esophagitis may require short-term use of PPI  Orders:    famotidine (PEPCID) 20 mg tablet; Take 1 tablet (20 mg total) by mouth 2 (two) times a day    EGD; Future     Dysphagia, unspecified type  She notes new onset of dysphagia.  This may be related to mechanical obstruction due to esophagitis, peptic stricture, Schatzki's ring.  She will start twice daily H2 blocker therapy and be scheduled for diagnostic EGD.  I have also ordered barium swallow evaluation to rule out oropharyngeal causes of her symptoms  Orders:    FL barium swallow video w speech; Future    EGD; Future    EGD 9/17/21:  IMPRESSION:  Small hiatal hernia. Regular z line  Mild gastritis. Biopsied  Normal duodenum  RECOMMENDATION:  Await pathology results  Continue pepcid as needed for reflux  Proceed with colonoscopy    Previous MBS:  None known    Food allergies: Lactose/GI intolerance   Current diet: Regular   Premorbid diet: Regular   Dentition: Partial natural, partial plate   O2 requirement: RA   Oral mech: WNL   Vocal quality/speech: WNL   Cognitive status: Alert and quite verbal       Consistencies administered: Puree, hard cookie, bread, barium pill w/ thin, thin, nectar.    Pt was viewed standing laterally and AP

## 2024-12-04 RX ORDER — PREDNISONE 5 MG/1
5 TABLET ORAL DAILY
Qty: 100 TABLET | Refills: 0 | Status: SHIPPED | OUTPATIENT
Start: 2024-12-04

## 2025-01-12 DIAGNOSIS — E78.2 MIXED HYPERLIPIDEMIA: ICD-10-CM

## 2025-01-12 DIAGNOSIS — I73.9 PAD (PERIPHERAL ARTERY DISEASE) (HCC): ICD-10-CM

## 2025-01-12 DIAGNOSIS — I74.09 AORTOILIAC OCCLUSIVE DISEASE (HCC): ICD-10-CM

## 2025-01-12 DIAGNOSIS — M32.19 OTHER SYSTEMIC LUPUS ERYTHEMATOSUS WITH OTHER ORGAN INVOLVEMENT (HCC): ICD-10-CM

## 2025-01-13 RX ORDER — ATORVASTATIN CALCIUM 10 MG/1
10 TABLET, FILM COATED ORAL
Qty: 30 TABLET | Refills: 0 | Status: SHIPPED | OUTPATIENT
Start: 2025-01-13 | End: 2025-01-14 | Stop reason: SDUPTHER

## 2025-01-13 RX ORDER — POTASSIUM CHLORIDE 750 MG/1
10 TABLET, FILM COATED, EXTENDED RELEASE ORAL 3 TIMES DAILY
Qty: 270 TABLET | Refills: 1 | Status: SHIPPED | OUTPATIENT
Start: 2025-01-13 | End: 2025-01-14 | Stop reason: SDUPTHER

## 2025-01-14 ENCOUNTER — OFFICE VISIT (OUTPATIENT)
Age: 70
End: 2025-01-14
Payer: MEDICARE

## 2025-01-14 VITALS
OXYGEN SATURATION: 97 % | DIASTOLIC BLOOD PRESSURE: 80 MMHG | SYSTOLIC BLOOD PRESSURE: 120 MMHG | WEIGHT: 149 LBS | HEART RATE: 90 BPM | BODY MASS INDEX: 25.58 KG/M2 | TEMPERATURE: 98.2 F

## 2025-01-14 DIAGNOSIS — M32.19 OTHER SYSTEMIC LUPUS ERYTHEMATOSUS WITH OTHER ORGAN INVOLVEMENT (HCC): ICD-10-CM

## 2025-01-14 DIAGNOSIS — I74.09 AORTOILIAC OCCLUSIVE DISEASE (HCC): ICD-10-CM

## 2025-01-14 DIAGNOSIS — C34.00 MALIGNANT NEOPLASM OF HILUS OF LUNG, UNSPECIFIED LATERALITY (HCC): ICD-10-CM

## 2025-01-14 DIAGNOSIS — E04.2 MULTIPLE THYROID NODULES: ICD-10-CM

## 2025-01-14 DIAGNOSIS — F33.9 DEPRESSION, RECURRENT (HCC): ICD-10-CM

## 2025-01-14 DIAGNOSIS — E78.2 MIXED HYPERLIPIDEMIA: ICD-10-CM

## 2025-01-14 DIAGNOSIS — I70.209 OCCLUSION OF COMMON FEMORAL ARTERY (HCC): ICD-10-CM

## 2025-01-14 DIAGNOSIS — E55.9 VITAMIN D DEFICIENCY: ICD-10-CM

## 2025-01-14 DIAGNOSIS — M54.50 LUMBAR PAIN: ICD-10-CM

## 2025-01-14 DIAGNOSIS — R11.10 VOMITING, UNSPECIFIED VOMITING TYPE, UNSPECIFIED WHETHER NAUSEA PRESENT: ICD-10-CM

## 2025-01-14 DIAGNOSIS — Z12.11 SCREENING FOR COLON CANCER: ICD-10-CM

## 2025-01-14 DIAGNOSIS — F11.20 CONTINUOUS OPIOID DEPENDENCE (HCC): ICD-10-CM

## 2025-01-14 DIAGNOSIS — I73.9 PAD (PERIPHERAL ARTERY DISEASE) (HCC): Primary | ICD-10-CM

## 2025-01-14 DIAGNOSIS — G62.9 NEUROPATHY: ICD-10-CM

## 2025-01-14 DIAGNOSIS — F41.9 ANXIETY: ICD-10-CM

## 2025-01-14 PROCEDURE — 99215 OFFICE O/P EST HI 40 MIN: CPT | Performed by: FAMILY MEDICINE

## 2025-01-14 RX ORDER — LORAZEPAM 0.5 MG/1
0.5 TABLET ORAL EVERY 12 HOURS PRN
Qty: 30 TABLET | Refills: 0 | Status: SHIPPED | OUTPATIENT
Start: 2025-01-14

## 2025-01-14 RX ORDER — ATORVASTATIN CALCIUM 10 MG/1
10 TABLET, FILM COATED ORAL
Qty: 100 TABLET | Refills: 1 | Status: SHIPPED | OUTPATIENT
Start: 2025-01-14

## 2025-01-14 RX ORDER — ONDANSETRON 4 MG/1
4 TABLET, FILM COATED ORAL EVERY 8 HOURS PRN
Qty: 90 TABLET | Refills: 0 | Status: SHIPPED | OUTPATIENT
Start: 2025-01-14

## 2025-01-14 RX ORDER — OXYCODONE HYDROCHLORIDE 10 MG/1
10 TABLET ORAL EVERY 6 HOURS PRN
Qty: 20 TABLET | Refills: 0 | Status: SHIPPED | OUTPATIENT
Start: 2025-01-14

## 2025-01-14 RX ORDER — PREDNISONE 10 MG/1
TABLET ORAL
Qty: 30 TABLET | Refills: 0 | Status: SHIPPED | OUTPATIENT
Start: 2025-01-14

## 2025-01-14 RX ORDER — POTASSIUM CHLORIDE 750 MG/1
10 TABLET, EXTENDED RELEASE ORAL 3 TIMES DAILY
Qty: 300 TABLET | Refills: 1 | Status: SHIPPED | OUTPATIENT
Start: 2025-01-14

## 2025-01-14 NOTE — PROGRESS NOTES
Assessment/Plan: Time with patient 45 minutes       Diagnoses and all orders for this visit:    PAD (peripheral artery disease) (HCC)  -     atorvastatin (LIPITOR) 10 mg tablet; Take 1 tablet (10 mg total) by mouth daily with dinner  -     Comprehensive metabolic panel; Future  -     CBC and differential; Future  -     TSH, 3rd generation with Free T4 reflex; Future  -     Lipid panel; Future  -     oxyCODONE (ROXICODONE) 10 MG TABS; Take 1 tablet (10 mg total) by mouth every 6 (six) hours as needed for moderate pain Max Daily Amount: 40 mg    Mixed hyperlipidemia  -     atorvastatin (LIPITOR) 10 mg tablet; Take 1 tablet (10 mg total) by mouth daily with dinner  -     Comprehensive metabolic panel; Future  -     CBC and differential; Future  -     TSH, 3rd generation with Free T4 reflex; Future  -     Lipid panel; Future    Aortoiliac occlusive disease (HCC)  -     atorvastatin (LIPITOR) 10 mg tablet; Take 1 tablet (10 mg total) by mouth daily with dinner  -     Comprehensive metabolic panel; Future  -     CBC and differential; Future  -     TSH, 3rd generation with Free T4 reflex; Future  -     Lipid panel; Future    Anxiety  -     LORazepam (ATIVAN) 0.5 mg tablet; Take 1 tablet (0.5 mg total) by mouth every 12 (twelve) hours as needed for anxiety  -     Comprehensive metabolic panel; Future  -     CBC and differential; Future  -     TSH, 3rd generation with Free T4 reflex; Future  -     Lipid panel; Future    Other systemic lupus erythematosus with other organ involvement (HCC)  -     potassium chloride (Klor-Con 10) 10 mEq tablet; Take 1 tablet (10 mEq total) by mouth 3 (three) times a day  -     predniSONE 10 mg tablet; 5 pills daily for 2 days, 4 for 2 days, 3 for 2 days, 2 for 2 days, 1 for 2 days    Screening for colon cancer  -     Ambulatory Referral to Gastroenterology; Future    Lumbar pain    Neuropathy  -     Comprehensive metabolic panel; Future  -     CBC and differential; Future  -     TSH, 3rd  generation with Free T4 reflex; Future  -     Lipid panel; Future    Malignant neoplasm of hilus of lung, unspecified laterality (HCC)    Continuous opioid dependence (HCC)    Depression, recurrent (HCC)    Vitamin D deficiency  -     Vitamin D 25 hydroxy; Future    Multiple thyroid nodules  Comments:  Patient wishes to hold off with ultrasound    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HCC)  -     oxyCODONE (ROXICODONE) 10 MG TABS; Take 1 tablet (10 mg total) by mouth every 6 (six) hours as needed for moderate pain Max Daily Amount: 40 mg    Vomiting, unspecified vomiting type, unspecified whether nausea present  -     ondansetron (ZOFRAN) 4 mg tablet; Take 1 tablet (4 mg total) by mouth every 8 (eight) hours as needed for nausea or vomiting            Subjective:        Patient ID: Nae Hernandez is a 69 y.o. female.      Patient here to follow-up on hyperlipidemia, anxiety, peripheral vascular disease and lupus.  Patient with ongoing neuropathy/pain in bilateral feet and legs.  Patient with back pain.  Patient with extremities.  Oxycodone not helping with pain.          The following portions of the patient's history were reviewed and updated as appropriate: allergies, current medications, past family history, past medical history, past social history, past surgical history and problem list.      Review of Systems   Constitutional: Negative.    HENT: Negative.     Eyes: Negative.    Respiratory: Negative.     Cardiovascular: Negative.    Gastrointestinal: Negative.    Endocrine: Negative.    Genitourinary: Negative.    Musculoskeletal:  Positive for arthralgias, back pain and gait problem.   Skin:  Positive for rash.   Allergic/Immunologic: Negative.    Hematological: Negative.    Psychiatric/Behavioral: Negative.             Objective:        Depression Screening and Follow-up Plan: Patient's depression screening was positive with a PHQ-9 score of 13.   Continue regular follow-up with their mental health provider who  is managing their mental health condition(s).           /80 (BP Location: Right arm, Patient Position: Sitting, Cuff Size: Standard)   Pulse 90   Temp 98.2 °F (36.8 °C)   Wt 67.6 kg (149 lb)   LMP  (LMP Unknown)   SpO2 97%   BMI 25.58 kg/m²          Physical Exam  Vitals and nursing note reviewed.   Constitutional:       General: She is not in acute distress.     Appearance: Normal appearance. She is not ill-appearing, toxic-appearing or diaphoretic.   HENT:      Head: Normocephalic and atraumatic.      Right Ear: Tympanic membrane, ear canal and external ear normal. There is no impacted cerumen.      Left Ear: Tympanic membrane, ear canal and external ear normal. There is no impacted cerumen.      Nose: Nose normal. No congestion or rhinorrhea.      Mouth/Throat:      Mouth: Mucous membranes are moist.      Pharynx: No oropharyngeal exudate or posterior oropharyngeal erythema.   Eyes:      General: No scleral icterus.        Right eye: No discharge.         Left eye: No discharge.   Neck:      Vascular: No carotid bruit.      Comments: Bilateral thyroid nodules  Cardiovascular:      Rate and Rhythm: Normal rate and regular rhythm.      Pulses: Normal pulses.      Heart sounds: Normal heart sounds. No murmur heard.     No friction rub. No gallop.   Pulmonary:      Effort: Pulmonary effort is normal. No respiratory distress.      Breath sounds: Normal breath sounds. No stridor. No wheezing, rhonchi or rales.   Chest:      Chest wall: No tenderness.   Musculoskeletal:         General: Tenderness present. No swelling, deformity or signs of injury.      Cervical back: Normal range of motion and neck supple. No rigidity. No muscular tenderness.      Right lower leg: No edema.      Left lower leg: No edema.   Lymphadenopathy:      Cervical: No cervical adenopathy.   Skin:     General: Skin is warm and dry.      Capillary Refill: Capillary refill takes less than 2 seconds.      Coloration: Skin is not  jaundiced.      Findings: Rash present. No bruising, erythema or lesion.   Neurological:      Mental Status: She is alert and oriented to person, place, and time. Mental status is at baseline.      Cranial Nerves: No cranial nerve deficit.      Sensory: No sensory deficit.      Motor: No weakness.      Coordination: Coordination normal.      Gait: Gait normal.   Psychiatric:         Mood and Affect: Mood normal.         Behavior: Behavior normal.         Thought Content: Thought content normal.         Judgment: Judgment normal.

## 2025-01-23 ENCOUNTER — TELEPHONE (OUTPATIENT)
Age: 70
End: 2025-01-23

## 2025-01-23 DIAGNOSIS — M54.50 LUMBAR PAIN: ICD-10-CM

## 2025-01-23 RX ORDER — PREDNISONE 5 MG/1
5 TABLET ORAL DAILY
Qty: 90 TABLET | Refills: 1 | Status: SHIPPED | OUTPATIENT
Start: 2025-01-23

## 2025-01-23 NOTE — TELEPHONE ENCOUNTER
Patient called in wanting to clarify labs and what is ordered    Advised she has 5 outstanding labs   States she doesn't have VitD on her papers  I advised that is an outstanding lab and reviewed fasting instructions.     Also advised that since she is going to [facility] I explained they have access to system and can see all outstanding labs.     All questions answered.

## 2025-01-31 ENCOUNTER — OFFICE VISIT (OUTPATIENT)
Dept: GYNECOLOGY | Facility: CLINIC | Age: 70
End: 2025-01-31
Payer: MEDICARE

## 2025-01-31 VITALS — WEIGHT: 148 LBS | BODY MASS INDEX: 25.4 KG/M2 | SYSTOLIC BLOOD PRESSURE: 126 MMHG | DIASTOLIC BLOOD PRESSURE: 72 MMHG

## 2025-01-31 DIAGNOSIS — N81.4 UTERINE PROLAPSE: Primary | ICD-10-CM

## 2025-01-31 DIAGNOSIS — M85.852 OSTEOPENIA OF LEFT HIP: ICD-10-CM

## 2025-01-31 DIAGNOSIS — N95.2 VAGINAL ATROPHY: ICD-10-CM

## 2025-01-31 PROCEDURE — 99213 OFFICE O/P EST LOW 20 MIN: CPT | Performed by: OBSTETRICS & GYNECOLOGY

## 2025-01-31 RX ORDER — ESTRADIOL 10 UG/1
1 INSERT VAGINAL 2 TIMES WEEKLY
Qty: 24 TABLET | Refills: 3 | Status: SHIPPED | OUTPATIENT
Start: 2025-02-03 | End: 2025-02-03 | Stop reason: SDUPTHER

## 2025-01-31 NOTE — PROGRESS NOTES
Pessary    Date/Time: 1/31/2025 1:00 PM    Performed by: Radha Arce DO  Authorized by: Radha Arce DO  Universal Protocol:  Consent: Verbal consent obtained.  Risks and benefits: risks, benefits and alternatives were discussed  Consent given by: patient  Patient understanding: patient states understanding of the procedure being performed  Patient identity confirmed: verbally with patient    Indication:     Indication for pessary: vaginal vault prolapse    Pre-procedure:     Pessary procedure type:  Cleaning/check  Problems:     Pessary complications: none    Procedure:     Pessary type:  Shaatz    Patient tolerance of procedure:  Tolerated well, no immediate complications  Comments:     Procedure comments:  Patient presents for pessary check.  She has no complaints.  She would like me to check her thyroid as she has nodules that have been stable for years and the endocrinologist told her that she no longer needs to see them.    She is also due for DEXA and this was ordered for April.    She uses [facility] and this was renewed for her.      Thyroid exam is normal.

## 2025-02-03 DIAGNOSIS — N95.2 VAGINAL ATROPHY: ICD-10-CM

## 2025-02-03 RX ORDER — ESTRADIOL 10 UG/1
1 INSERT VAGINAL 2 TIMES WEEKLY
Qty: 24 TABLET | Refills: 3 | Status: SHIPPED | OUTPATIENT
Start: 2025-02-03

## 2025-02-03 NOTE — TELEPHONE ENCOUNTER
Reason for call:   [x] Refill   [] Prior Auth  [] Other: not a duplicate, pt's  just left the pharmacy, was told they never got the script     Office:   [] PCP/Provider -   [x] Specialty/Provider - GYN    Medication: estradiol     Dose/Frequency: 10 mcg vaginal 2 times weekly     Quantity: 24    Pharmacy: [facility] [place]     Does the patient have enough for 3 days?   [] Yes   [x] No - Send as HP to POD

## 2025-02-26 ENCOUNTER — APPOINTMENT (OUTPATIENT)
Dept: LAB | Facility: MEDICAL CENTER | Age: 70
End: 2025-02-26
Payer: MEDICARE

## 2025-02-26 DIAGNOSIS — I73.9 PAD (PERIPHERAL ARTERY DISEASE) (HCC): ICD-10-CM

## 2025-02-26 DIAGNOSIS — I74.09 AORTOILIAC OCCLUSIVE DISEASE (HCC): ICD-10-CM

## 2025-02-26 DIAGNOSIS — F41.9 ANXIETY: ICD-10-CM

## 2025-02-26 DIAGNOSIS — G62.9 NEUROPATHY: ICD-10-CM

## 2025-02-26 DIAGNOSIS — E78.2 MIXED HYPERLIPIDEMIA: ICD-10-CM

## 2025-02-26 DIAGNOSIS — E55.9 VITAMIN D DEFICIENCY: ICD-10-CM

## 2025-02-26 LAB
25(OH)D3 SERPL-MCNC: 38.9 NG/ML (ref 30–100)
ALBUMIN SERPL BCG-MCNC: 3.7 G/DL (ref 3.5–5)
ALP SERPL-CCNC: 86 U/L (ref 34–104)
ALT SERPL W P-5'-P-CCNC: 11 U/L (ref 7–52)
ANION GAP SERPL CALCULATED.3IONS-SCNC: 10 MMOL/L (ref 4–13)
AST SERPL W P-5'-P-CCNC: 20 U/L (ref 13–39)
BASOPHILS # BLD AUTO: 0.07 THOUSANDS/ÂΜL (ref 0–0.1)
BASOPHILS NFR BLD AUTO: 1 % (ref 0–1)
BILIRUB SERPL-MCNC: 0.6 MG/DL (ref 0.2–1)
BUN SERPL-MCNC: 13 MG/DL (ref 5–25)
CALCIUM SERPL-MCNC: 8.7 MG/DL (ref 8.4–10.2)
CHLORIDE SERPL-SCNC: 107 MMOL/L (ref 96–108)
CHOLEST SERPL-MCNC: 155 MG/DL (ref ?–200)
CO2 SERPL-SCNC: 27 MMOL/L (ref 21–32)
CREAT SERPL-MCNC: 0.86 MG/DL (ref 0.6–1.3)
EOSINOPHIL # BLD AUTO: 0.23 THOUSAND/ÂΜL (ref 0–0.61)
EOSINOPHIL NFR BLD AUTO: 2 % (ref 0–6)
ERYTHROCYTE [DISTWIDTH] IN BLOOD BY AUTOMATED COUNT: 14 % (ref 11.6–15.1)
GFR SERPL CREATININE-BSD FRML MDRD: 69 ML/MIN/1.73SQ M
GLUCOSE P FAST SERPL-MCNC: 78 MG/DL (ref 65–99)
HCT VFR BLD AUTO: 50 % (ref 34.8–46.1)
HDLC SERPL-MCNC: 49 MG/DL
HGB BLD-MCNC: 16.3 G/DL (ref 11.5–15.4)
IMM GRANULOCYTES # BLD AUTO: 0.03 THOUSAND/UL (ref 0–0.2)
IMM GRANULOCYTES NFR BLD AUTO: 0 % (ref 0–2)
LDLC SERPL CALC-MCNC: 83 MG/DL (ref 0–100)
LYMPHOCYTES # BLD AUTO: 2.68 THOUSANDS/ÂΜL (ref 0.6–4.47)
LYMPHOCYTES NFR BLD AUTO: 26 % (ref 14–44)
MCH RBC QN AUTO: 29.5 PG (ref 26.8–34.3)
MCHC RBC AUTO-ENTMCNC: 32.6 G/DL (ref 31.4–37.4)
MCV RBC AUTO: 91 FL (ref 82–98)
MONOCYTES # BLD AUTO: 1 THOUSAND/ÂΜL (ref 0.17–1.22)
MONOCYTES NFR BLD AUTO: 10 % (ref 4–12)
NEUTROPHILS # BLD AUTO: 6.28 THOUSANDS/ÂΜL (ref 1.85–7.62)
NEUTS SEG NFR BLD AUTO: 61 % (ref 43–75)
NONHDLC SERPL-MCNC: 106 MG/DL
NRBC BLD AUTO-RTO: 0 /100 WBCS
PLATELET # BLD AUTO: 232 THOUSANDS/UL (ref 149–390)
PMV BLD AUTO: 10.1 FL (ref 8.9–12.7)
POTASSIUM SERPL-SCNC: 4.1 MMOL/L (ref 3.5–5.3)
PROT SERPL-MCNC: 6.4 G/DL (ref 6.4–8.4)
RBC # BLD AUTO: 5.52 MILLION/UL (ref 3.81–5.12)
SODIUM SERPL-SCNC: 144 MMOL/L (ref 135–147)
TRIGL SERPL-MCNC: 115 MG/DL (ref ?–150)
TSH SERPL DL<=0.05 MIU/L-ACNC: 0.86 UIU/ML (ref 0.45–4.5)
WBC # BLD AUTO: 10.29 THOUSAND/UL (ref 4.31–10.16)

## 2025-02-26 PROCEDURE — 82306 VITAMIN D 25 HYDROXY: CPT

## 2025-02-26 PROCEDURE — 80053 COMPREHEN METABOLIC PANEL: CPT

## 2025-02-26 PROCEDURE — 85025 COMPLETE CBC W/AUTO DIFF WBC: CPT

## 2025-02-26 PROCEDURE — 80061 LIPID PANEL: CPT

## 2025-02-26 PROCEDURE — 84443 ASSAY THYROID STIM HORMONE: CPT

## 2025-02-26 PROCEDURE — 36415 COLL VENOUS BLD VENIPUNCTURE: CPT

## 2025-02-28 ENCOUNTER — RESULTS FOLLOW-UP (OUTPATIENT)
Age: 70
End: 2025-02-28

## 2025-03-10 DIAGNOSIS — M79.7 FIBROMYALGIA: ICD-10-CM

## 2025-03-10 DIAGNOSIS — G89.29 CHRONIC BILATERAL LOW BACK PAIN WITH BILATERAL SCIATICA: Primary | ICD-10-CM

## 2025-03-10 DIAGNOSIS — M32.19 OTHER SYSTEMIC LUPUS ERYTHEMATOSUS WITH OTHER ORGAN INVOLVEMENT (HCC): ICD-10-CM

## 2025-03-10 DIAGNOSIS — M54.41 CHRONIC BILATERAL LOW BACK PAIN WITH BILATERAL SCIATICA: Primary | ICD-10-CM

## 2025-03-10 DIAGNOSIS — M54.42 CHRONIC BILATERAL LOW BACK PAIN WITH BILATERAL SCIATICA: Primary | ICD-10-CM

## 2025-03-10 NOTE — TELEPHONE ENCOUNTER
Patient states the wrong dose of Prednisone. She has always been on 10mg but 5mg was sent in January for her. She is asking for 10mg to be sent for her and asking why this was done.

## 2025-03-10 NOTE — TELEPHONE ENCOUNTER
Patient states she does not have mychart. She would like calls with results from now on. Patient aware of her results.

## 2025-03-11 RX ORDER — PREDNISONE 10 MG/1
10 TABLET ORAL DAILY
Qty: 90 TABLET | Refills: 0 | Status: SHIPPED | OUTPATIENT
Start: 2025-03-11

## 2025-03-11 NOTE — TELEPHONE ENCOUNTER
Spoke with pt, informed her of your recommendation, Pt stated she's still having inflammation and pain. Pt stated she's less concerns about the bone loss. Next done density test will be by the end of April/May. Pt would like to go back on the 10mg. Please advise.     Thank you

## 2025-03-13 ENCOUNTER — TELEPHONE (OUTPATIENT)
Age: 70
End: 2025-03-13

## 2025-03-13 NOTE — TELEPHONE ENCOUNTER
Scheduled date of colonoscopy (as of today):5/21/25    Physician performing colonoscopy:Dr Jaiyeola    Location of colonoscopy:[place]    Bowel prep reviewed with patient:miralax/dulcolax    Instructions reviewed with patient by:pt has prep instructions    Clearances: N/A

## 2025-04-02 ENCOUNTER — APPOINTMENT (EMERGENCY)
Dept: CT IMAGING | Facility: HOSPITAL | Age: 70
End: 2025-04-02
Payer: MEDICARE

## 2025-04-02 ENCOUNTER — HOSPITAL ENCOUNTER (EMERGENCY)
Facility: HOSPITAL | Age: 70
Discharge: HOME | End: 2025-04-02
Attending: EMERGENCY MEDICINE
Payer: MEDICARE

## 2025-04-02 VITALS
SYSTOLIC BLOOD PRESSURE: 119 MMHG | OXYGEN SATURATION: 97 % | HEART RATE: 88 BPM | TEMPERATURE: 98.3 F | RESPIRATION RATE: 18 BRPM | DIASTOLIC BLOOD PRESSURE: 85 MMHG

## 2025-04-02 DIAGNOSIS — L03.113 CELLULITIS OF RIGHT WRIST: Primary | ICD-10-CM

## 2025-04-02 DIAGNOSIS — D72.829 LEUKOCYTOSIS: ICD-10-CM

## 2025-04-02 LAB
ALBUMIN SERPL BCG-MCNC: 3.7 G/DL (ref 3.5–5)
ALP SERPL-CCNC: 81 U/L (ref 34–104)
ALT SERPL W P-5'-P-CCNC: 8 U/L (ref 7–52)
ANION GAP SERPL CALCULATED.3IONS-SCNC: 8 MMOL/L (ref 4–13)
APTT PPP: 25 SECONDS (ref 23–34)
AST SERPL W P-5'-P-CCNC: 10 U/L (ref 13–39)
BASOPHILS # BLD AUTO: 0.06 THOUSANDS/ÂΜL (ref 0–0.1)
BASOPHILS NFR BLD AUTO: 0 % (ref 0–1)
BILIRUB SERPL-MCNC: 0.84 MG/DL (ref 0.2–1)
BUN SERPL-MCNC: 19 MG/DL (ref 5–25)
CALCIUM SERPL-MCNC: 8.7 MG/DL (ref 8.4–10.2)
CHLORIDE SERPL-SCNC: 103 MMOL/L (ref 96–108)
CO2 SERPL-SCNC: 25 MMOL/L (ref 21–32)
CREAT SERPL-MCNC: 0.74 MG/DL (ref 0.6–1.3)
EOSINOPHIL # BLD AUTO: 0.09 THOUSAND/ÂΜL (ref 0–0.61)
EOSINOPHIL NFR BLD AUTO: 1 % (ref 0–6)
ERYTHROCYTE [DISTWIDTH] IN BLOOD BY AUTOMATED COUNT: 14.3 % (ref 11.6–15.1)
GFR SERPL CREATININE-BSD FRML MDRD: 82 ML/MIN/1.73SQ M
GLUCOSE SERPL-MCNC: 87 MG/DL (ref 65–140)
HCT VFR BLD AUTO: 47.4 % (ref 34.8–46.1)
HGB BLD-MCNC: 15.8 G/DL (ref 11.5–15.4)
IMM GRANULOCYTES # BLD AUTO: 0.05 THOUSAND/UL (ref 0–0.2)
IMM GRANULOCYTES NFR BLD AUTO: 0 % (ref 0–2)
INR PPP: 1.08 (ref 0.85–1.19)
LACTATE SERPL-SCNC: 1.1 MMOL/L (ref 0.5–2)
LYMPHOCYTES # BLD AUTO: 1.57 THOUSANDS/ÂΜL (ref 0.6–4.47)
LYMPHOCYTES NFR BLD AUTO: 10 % (ref 14–44)
MCH RBC QN AUTO: 29.1 PG (ref 26.8–34.3)
MCHC RBC AUTO-ENTMCNC: 33.3 G/DL (ref 31.4–37.4)
MCV RBC AUTO: 87 FL (ref 82–98)
MONOCYTES # BLD AUTO: 1.42 THOUSAND/ÂΜL (ref 0.17–1.22)
MONOCYTES NFR BLD AUTO: 9 % (ref 4–12)
NEUTROPHILS # BLD AUTO: 12.46 THOUSANDS/ÂΜL (ref 1.85–7.62)
NEUTS SEG NFR BLD AUTO: 80 % (ref 43–75)
NRBC BLD AUTO-RTO: 0 /100 WBCS
PLATELET # BLD AUTO: 210 THOUSANDS/UL (ref 149–390)
PMV BLD AUTO: 9.1 FL (ref 8.9–12.7)
POTASSIUM SERPL-SCNC: 3.6 MMOL/L (ref 3.5–5.3)
PROCALCITONIN SERPL-MCNC: 0.11 NG/ML
PROT SERPL-MCNC: 6.5 G/DL (ref 6.4–8.4)
PROTHROMBIN TIME: 14.2 SECONDS (ref 12.3–15)
RBC # BLD AUTO: 5.43 MILLION/UL (ref 3.81–5.12)
SODIUM SERPL-SCNC: 136 MMOL/L (ref 135–147)
WBC # BLD AUTO: 15.65 THOUSAND/UL (ref 4.31–10.16)

## 2025-04-02 PROCEDURE — 96365 THER/PROPH/DIAG IV INF INIT: CPT

## 2025-04-02 PROCEDURE — 73201 CT UPPER EXTREMITY W/DYE: CPT

## 2025-04-02 PROCEDURE — 83605 ASSAY OF LACTIC ACID: CPT | Performed by: EMERGENCY MEDICINE

## 2025-04-02 PROCEDURE — 36415 COLL VENOUS BLD VENIPUNCTURE: CPT | Performed by: EMERGENCY MEDICINE

## 2025-04-02 PROCEDURE — 80053 COMPREHEN METABOLIC PANEL: CPT | Performed by: EMERGENCY MEDICINE

## 2025-04-02 PROCEDURE — 99285 EMERGENCY DEPT VISIT HI MDM: CPT | Performed by: EMERGENCY MEDICINE

## 2025-04-02 PROCEDURE — 96375 TX/PRO/DX INJ NEW DRUG ADDON: CPT

## 2025-04-02 PROCEDURE — 85730 THROMBOPLASTIN TIME PARTIAL: CPT | Performed by: EMERGENCY MEDICINE

## 2025-04-02 PROCEDURE — 84145 PROCALCITONIN (PCT): CPT | Performed by: EMERGENCY MEDICINE

## 2025-04-02 PROCEDURE — 96366 THER/PROPH/DIAG IV INF ADDON: CPT

## 2025-04-02 PROCEDURE — 85025 COMPLETE CBC W/AUTO DIFF WBC: CPT | Performed by: EMERGENCY MEDICINE

## 2025-04-02 PROCEDURE — 87040 BLOOD CULTURE FOR BACTERIA: CPT | Performed by: EMERGENCY MEDICINE

## 2025-04-02 PROCEDURE — 85610 PROTHROMBIN TIME: CPT | Performed by: EMERGENCY MEDICINE

## 2025-04-02 PROCEDURE — 99284 EMERGENCY DEPT VISIT MOD MDM: CPT

## 2025-04-02 RX ORDER — ONDANSETRON 2 MG/ML
4 INJECTION INTRAMUSCULAR; INTRAVENOUS ONCE
Status: COMPLETED | OUTPATIENT
Start: 2025-04-02 | End: 2025-04-02

## 2025-04-02 RX ORDER — ONDANSETRON 4 MG/1
4 TABLET, ORALLY DISINTEGRATING ORAL EVERY 6 HOURS PRN
Qty: 10 TABLET | Refills: 0 | Status: SHIPPED | OUTPATIENT
Start: 2025-04-02

## 2025-04-02 RX ORDER — MORPHINE SULFATE 4 MG/ML
4 INJECTION, SOLUTION INTRAMUSCULAR; INTRAVENOUS ONCE
Status: COMPLETED | OUTPATIENT
Start: 2025-04-02 | End: 2025-04-02

## 2025-04-02 RX ORDER — CEPHALEXIN 500 MG/1
500 CAPSULE ORAL EVERY 6 HOURS SCHEDULED
Qty: 28 CAPSULE | Refills: 0 | Status: SHIPPED | OUTPATIENT
Start: 2025-04-02 | End: 2025-04-09

## 2025-04-02 RX ADMIN — SODIUM CHLORIDE 1000 ML: 0.9 INJECTION, SOLUTION INTRAVENOUS at 12:21

## 2025-04-02 RX ADMIN — IOHEXOL 100 ML: 350 INJECTION, SOLUTION INTRAVENOUS at 13:20

## 2025-04-02 RX ADMIN — MORPHINE SULFATE 4 MG: 4 INJECTION INTRAVENOUS at 12:22

## 2025-04-02 RX ADMIN — CEFTRIAXONE 1000 MG: 10 INJECTION, POWDER, FOR SOLUTION INTRAVENOUS at 12:26

## 2025-04-02 RX ADMIN — ONDANSETRON 4 MG: 2 INJECTION INTRAMUSCULAR; INTRAVENOUS at 12:22

## 2025-04-02 NOTE — ED PROVIDER NOTES
Time reflects when diagnosis was documented in both MDM as applicable and the Disposition within this note       Time User Action Codes Description Comment    4/2/2025  2:47 PM Kimberley Rodríguez Add [L03.113] Cellulitis of right wrist     4/2/2025  2:47 PM Kimberley Rodríguez Add [D72.829] Leukocytosis           ED Disposition       ED Disposition   Discharge    Condition   Stable    Date/Time   Wed Apr 2, 2025  2:47 PM    Comment   Nae Hernandez discharge to home/self care.                   Assessment & Plan       Medical Decision Making  A 70 yo female presents with right wrist and hand swelling that has progressed for the past 2 weeks.  Exam consistent with acute cellulitis.  Will proceed with labs and imaging to rule out deep tissue infection, will start IV antibiotics.    Amount and/or Complexity of Data Reviewed  Labs: ordered. Decision-making details documented in ED Course.  Radiology: ordered. Decision-making details documented in ED Course.    Risk  Prescription drug management.        ED Course as of 04/03/25 2251   Wed Apr 02, 2025   1300 WBC(!): 15.65  Trending up from 12 yesterday (per [facility] records)   1301 Remainder of labs WNL   1400 CT upper extremity w contrast right  Image independently interpreted by myself - No large fluid collection noted   1419 CT upper extremity w contrast right  Nonspecific subcutaneous edema, without a focal organized fluid collection. Degenerative changes, as described.   1427 Pt updated on CT results, recommending admission based on extent of cellulitis.  Pt deciding if she wishes to stay   1442 Pt decided to go home and trial PO antibiotics.  Will change antibiotics to Keflex.       Medications   sodium chloride 0.9 % bolus 1,000 mL (0 mL Intravenous Stopped 4/2/25 1505)   ondansetron (ZOFRAN) injection 4 mg (4 mg Intravenous Given 4/2/25 1222)   ceftriaxone (ROCEPHIN) 1 g/50 mL in dextrose IVPB (0 mg Intravenous Stopped 4/2/25 1441)   morphine injection 4 mg (4 mg  Intravenous Given 4/2/25 1222)   iohexol (OMNIPAQUE) 350 MG/ML injection (MULTI-DOSE) 100 mL (100 mL Intravenous Given 4/2/25 1320)       ED Risk Strat Scores                            SBIRT 22yo+      Flowsheet Row Most Recent Value   Initial Alcohol Screen: US AUDIT-C     1. How often do you have a drink containing alcohol? 0 Filed at: 04/02/2025 1228   2. How many drinks containing alcohol do you have on a typical day you are drinking?  0 Filed at: 04/02/2025 1228   3b. FEMALE Any Age, or MALE 65+: How often do you have 4 or more drinks on one occassion? 0 Filed at: 04/02/2025 1228   Audit-C Score 0 Filed at: 04/02/2025 1228   ROSA: How many times in the past year have you...    Used an illegal drug or used a prescription medication for non-medical reasons? Never Filed at: 04/02/2025 1228                            History of Present Illness       Chief Complaint   Patient presents with    Hand Swelling     Patient presents with significant redness and swelling to right hand. Atraumatic. Reports pain is worsening. Has been evaluated by PCP and urgent care previously and started on Clindamycin with no improvement.          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    Osteoporosis     PAD (peripheral artery disease) (HCC)     Presence of pessary     Raynaud disease     Sciatica     Sinus headache     Sjogren's syndrome (HCC)     Smoker       Past Surgical History:   Procedure Laterality Date    IR BIOPSY BONE MARROW  8/18/2023    LUNG REMOVAL, PARTIAL  05/01/2004    Prior Surgical/Procedural History - 5/4 LLL resection for bronchogenic  non-small cell cancer - lung resection - subtotal    MOUTH SURGERY      Tooth extraction    NC TEAEC W/WO PATCH GRAFT COMMON FEMORAL Left 9/10/2019    Procedure: ENDARTERECTOMY ARTERIAL FEMORAL,  WITH BOVINE GRAFT AND PROFUNDOPLASTY;  Surgeon: Nolvia Howard DO;  Location: [facility];  Service: Vascular    SINUS SURGERY        Family History   Problem Relation Age of Onset    Arthritis Mother     Asthma Mother     Dementia Mother     Heart disease Mother     Hypothyroidism Mother     Irritable bowel syndrome Mother     Uterine cancer Mother 65    Heart attack Father     Stroke Father     Hyperlipidemia Sister     Uterine cancer Sister         age unknown    Heart attack Maternal Grandfather     Asthma Family     Cataracts Family     Thyroid disease Family     No Known Problems Paternal Aunt     No Known Problems Maternal Aunt     No Known Problems Maternal Grandmother     No Known Problems Paternal Grandmother     No Known Problems Paternal Grandfather       Social History     Tobacco Use    Smoking status: Every Day     Current packs/day: 0.50     Average packs/day: 0.5 packs/day for 40.0 years (20.0 ttl pk-yrs)     Types: Cigarettes    Smokeless tobacco: Never   Vaping Use    Vaping status: Never Used   Substance Use Topics    Alcohol use: Not Currently     Comment: liquor rarely    Drug use: Yes     Types: Oxycodone      E-Cigarette/Vaping    E-Cigarette Use Never User       E-Cigarette/Vaping Substances    Nicotine No     THC No     CBD No     Flavoring No     Other No     Unknown No       I have reviewed and agree with the history as documented.     A 68 yo female with pmhx of depression, fibromyalgia, lung cancer s/p resection, PAD, Raynaud disease and Sjogren's syndrome; presents with right hand and wrist swelling and redness that has gotten progressively worse for the past 2 weeks, most prominent over the past few days.  Patient reports increased pain with any movement to the right wrist and forearm.   She has otherwise not had fever, chills, chest pain, shortness of breath, abdominal pain, vomiting and diarrhea.  She was seen at [facility] yesterday and started on Clindamycin, which she has taken 3 doses however the redness and swelling has progressed.      History provided by:  Patient and medical records      Review of Systems   Skin:  Positive for rash.   All other systems reviewed and are negative.      Objective       ED Triage Vitals [04/02/25 1023]   Temperature Pulse Blood Pressure Respirations SpO2 Patient Position - Orthostatic VS   100 °F (37.8 °C) 86 101/57 18 96 % Sitting      Temp Source Heart Rate Source BP Location FiO2 (%) Pain Score    Oral Monitor Left arm -- 10 - Worst Possible Pain      Vitals      Date and Time Temp Pulse SpO2 Resp BP Pain Score FACES Pain Rating User   04/02/25 1336 -- -- -- -- -- 8 --    04/02/25 1232 98.3 °F (36.8 °C) 88 97 % 18 119/85 -- --    04/02/25 1221 -- -- -- -- -- 10 - Worst Possible Pain --    04/02/25 1023 100 °F (37.8 °C) 86 96 % 18 101/57 10 - Worst Possible Pain -- RWK            Physical Exam  General Appearance: alert and oriented, nad, non toxic appearing  Skin:  Warm, dry, intact.  No cyanosis  HEENT: Atraumatic, normocephalic.  No eye drainage.  Normal hearing.  Moist mucous membranes.    Neck: Supple, trachea midline  Cardiac: RRR; no murmurs, rub, gallops.  No pedal edema, 2+ pulses  Pulmonary: lungs CTAB; no wheezes, rales, rhonchi  Gastrointestinal: abdomen soft, nontender, nondistended; no guarding or rebound tenderness; good bowel sounds, no mass or bruits  Extremities:  Erythema, warmth and tenderness along the distal forearm, wrist and hand with associated swelling.  Increased pain with active ROM to the wrist and hand.  No deformities.  No calf tenderness, no clubbing    Neuro:  no focal motor or sensory deficits, CN 2-12 grossly intact  Psych:  Normal mood and affect, normal judgement and insight       Results Reviewed       Procedure  Component Value Units Date/Time    Blood culture #1 [[id number]] Collected: 04/02/25 1225    Lab Status: Preliminary result Specimen: Blood from Arm, Left Updated: 04/03/25 1901     Blood Culture No Growth at 24 hrs.    Blood culture #2 [[id number]] Collected: 04/02/25 1217    Lab Status: Preliminary result Specimen: Blood from Arm, Right Updated: 04/03/25 1901     Blood Culture No Growth at 24 hrs.    Procalcitonin [[id number]]  (Normal) Collected: 04/02/25 1217    Lab Status: Final result Specimen: Blood from Arm, Right Updated: 04/02/25 1256     Procalcitonin 0.11 ng/ml     Comprehensive metabolic panel [[id number]]  (Abnormal) Collected: 04/02/25 1217    Lab Status: Final result Specimen: Blood from Arm, Right Updated: 04/02/25 1252     Sodium 136 mmol/L      Potassium 3.6 mmol/L      Chloride 103 mmol/L      CO2 25 mmol/L      ANION GAP 8 mmol/L      BUN 19 mg/dL      Creatinine 0.74 mg/dL      Glucose 87 mg/dL      Calcium 8.7 mg/dL      AST 10 U/L      ALT 8 U/L      Alkaline Phosphatase 81 U/L      Total Protein 6.5 g/dL      Albumin 3.7 g/dL      Total Bilirubin 0.84 mg/dL      eGFR 82 ml/min/1.73sq m     Narrative:      National Kid[employer] Disease Foundation guidelines for Chronic Kidney Disease (CKD):     Stage 1 with normal or high GFR (GFR > 90 mL/min/1.73 square meters)    Stage 2 Mild CKD (GFR = 60-89 mL/min/1.73 square meters)    Stage 3A Moderate CKD (GFR = 45-59 mL/min/1.73 square meters)    Stage 3B Moderate CKD (GFR = 30-44 mL/min/1.73 square meters)    Stage 4 Severe CKD (GFR = 15-29 mL/min/1.73 square meters)    Stage 5 End Stage CKD (GFR <15 mL/min/1.73 square meters)  Note: GFR calculation is accurate only with a steady state creatinine    Lactic acid [[id number]]  (Normal) Collected: 04/02/25 1217    Lab Status: Final result Specimen: Blood from Arm, Right Updated: 04/02/25 1250     LACTIC ACID 1.1 mmol/L     Narrative:      Result may be elevated if tourniquet was used during collection.     Protime-INR [[id number]]  (Normal) Collected: 04/02/25 1217    Lab Status: Final result Specimen: Blood from Arm, Right Updated: 04/02/25 1245     Protime 14.2 seconds      INR 1.08    Narrative:      INR Therapeutic Range    Indication                                             INR Range      Atrial Fibrillation                                               2.0-3.0  Hypercoagulable State                                    2.0.2.3  Left Ventricular Asist Device                            2.0-3.0  Mechanical Heart Valve                                  -    Aortic(with afib, MI, embolism, HF, LA enlargement,    and/or coagulopathy)                                     2.0-3.0 (2.5-3.5)     Mitral                                                             2.5-3.5  Prosthetic/Bioprosthetic Heart Valve               2.0-3.0  Venous thromboembolism (VTE: VT, PE        2.0-3.0    APTT [[id number]]  (Normal) Collected: 04/02/25 1217    Lab Status: Final result Specimen: Blood from Arm, Right Updated: 04/02/25 1245     PTT 25 seconds     CBC and differential [[id number]]  (Abnormal) Collected: 04/02/25 1217    Lab Status: Final result Specimen: Blood from Arm, Right Updated: 04/02/25 1233     WBC 15.65 Thousand/uL      RBC 5.43 Million/uL      Hemoglobin 15.8 g/dL      Hematocrit 47.4 %      MCV 87 fL      MCH 29.1 pg      MCHC 33.3 g/dL      RDW 14.3 %      MPV 9.1 fL      Platelets 210 Thousands/uL      nRBC 0 /100 WBCs      Segmented % 80 %      Immature Grans % 0 %      Lymphocytes % 10 %      Monocytes % 9 %      Eosinophils Relative 1 %      Basophils Relative 0 %      Absolute Neutrophils 12.46 Thousands/µL      Absolute Immature Grans 0.05 Thousand/uL      Absolute Lymphocytes 1.57 Thousands/µL      Absolute Monocytes 1.42 Thousand/µL      Eosinophils Absolute 0.09 Thousand/µL      Basophils Absolute 0.06 Thousands/µL             CT upper extremity w contrast right   Final Interpretation by Layo Whitfield MD  (04/02 1416)      Nonspecific subcutaneous edema, without a focal organized fluid collection. Degenerative changes, as described.         Workstation performed: [id number]             Procedures    ED Medication and Procedure Management   Prior to Admission Medications   Prescriptions Last Dose Informant Patient Reported? Taking?   Calcium Carbonate (CALCIUM 500 PO)   Yes No   Sig: Take by mouth   Patient not taking: Reported on 1/31/2025   LORazepam (ATIVAN) 0.5 mg tablet   No No   Sig: Take 1 tablet (0.5 mg total) by mouth every 12 (twelve) hours as needed for anxiety   Vitamin Mixture (JOSE RAMON-C PO)  Self Yes No   Sig: Take 1 tablet by mouth daily   aspirin (ECOTRIN LOW STRENGTH) 81 mg EC tablet  Self No No   Sig: Take 1 tablet (81 mg total) by mouth daily   atorvastatin (LIPITOR) 10 mg tablet   No No   Sig: Take 1 tablet (10 mg total) by mouth daily with dinner   cholecalciferol (VITAMIN D3) 1,000 units tablet   Yes No   Sig: Take 1,000 Units by mouth daily   estradiol (VAGIFEM, YUVAFEM) 10 MCG TABS vaginal tablet   No No   Sig: Insert 1 tablet (10 mcg total) into the vagina 2 (two) times a week   famotidine (PEPCID) 20 mg tablet   No No   Sig: Take 1 tablet (20 mg total) by mouth 2 (two) times a day   ondansetron (ZOFRAN) 4 mg tablet   No No   Sig: Take 1 tablet (4 mg total) by mouth every 8 (eight) hours as needed for nausea or vomiting   oxyCODONE (ROXICODONE) 10 MG TABS   No No   Sig: Take 1 tablet (10 mg total) by mouth every 6 (six) hours as needed for moderate pain Max Daily Amount: 40 mg   potassium chloride (Klor-Con 10) 10 mEq tablet   No No   Sig: Take 1 tablet (10 mEq total) by mouth 3 (three) times a day   predniSONE 10 mg tablet   No No   Sig: Take 1 tablet (10 mg total) by mouth daily   predniSONE 5 mg tablet   No No   Sig: TAKE 1 TABLET (5 MG TOTAL) BY MOUTH DAILY.      Facility-Administered Medications: None     Discharge Medication List as of 4/2/2025  2:49 PM        START taking these medications     Details   cephalexin (KEFLEX) 500 mg capsule Take 1 capsule (500 mg total) by mouth every 6 (six) hours for 7 days, Starting Wed 4/2/2025, Until Wed 4/9/2025, Normal      ondansetron (ZOFRAN-ODT) 4 mg disintegrating tablet Take 1 tablet (4 mg total) by mouth every 6 (six) hours as needed for nausea or vomiting, Starting Wed 4/2/2025, Normal           CONTINUE these medications which have NOT CHANGED    Details   aspirin (ECOTRIN LOW STRENGTH) 81 mg EC tablet Take 1 tablet (81 mg total) by mouth daily, Starting Thu 1/9/2020, Normal      atorvastatin (LIPITOR) 10 mg tablet Take 1 tablet (10 mg total) by mouth daily with dinner, Starting Tue 1/14/2025, Normal      Calcium Carbonate (CALCIUM 500 PO) Take by mouth, Historical Med      cholecalciferol (VITAMIN D3) 1,000 units tablet Take 1,000 Units by mouth daily, Historical Med      estradiol (VAGIFEM, YUVAFEM) 10 MCG TABS vaginal tablet Insert 1 tablet (10 mcg total) into the vagina 2 (two) times a week, Starting Mon 2/3/2025, Normal      famotidine (PEPCID) 20 mg tablet Take 1 tablet (20 mg total) by mouth 2 (two) times a day, Starting Fri 10/18/2024, Normal      LORazepam (ATIVAN) 0.5 mg tablet Take 1 tablet (0.5 mg total) by mouth every 12 (twelve) hours as needed for anxiety, Starting Tue 1/14/2025, Normal      ondansetron (ZOFRAN) 4 mg tablet Take 1 tablet (4 mg total) by mouth every 8 (eight) hours as needed for nausea or vomiting, Starting Tue 1/14/2025, Normal      oxyCODONE (ROXICODONE) 10 MG TABS Take 1 tablet (10 mg total) by mouth every 6 (six) hours as needed for moderate pain Max Daily Amount: 40 mg, Starting Tue 1/14/2025, Normal      potassium chloride (Klor-Con 10) 10 mEq tablet Take 1 tablet (10 mEq total) by mouth 3 (three) times a day, Starting Tue 1/14/2025, Normal      !! predniSONE 10 mg tablet Take 1 tablet (10 mg total) by mouth daily, Starting Tue 3/11/2025, Normal      !! predniSONE 5 mg tablet TAKE 1 TABLET (5 MG TOTAL) BY MOUTH DAILY.,  Starting Thu 1/23/2025, Normal      Vitamin Mixture (JOSE RAMON-C PO) Take 1 tablet by mouth daily, Starting Tue 3/5/2013, Historical Med       !! - Potential duplicate medications found. Please discuss with provider.        No discharge procedures on file.  ED SEPSIS DOCUMENTATION   Time reflects when diagnosis was documented in both MDM as applicable and the Disposition within this note       Time User Action Codes Description Comment    4/2/2025  2:47 PM Kimberley Rodríguez Add [L03.113] Cellulitis of right wrist     4/2/2025  2:47 PM Kimberley Rodríguez Add [D72.829] Leukocytosis                  Kimberley Rodríguez DO  04/03/25 2257

## 2025-04-02 NOTE — DISCHARGE INSTRUCTIONS
Complete course of antibiotics, Keflex/Cephalexin - take one tablet every 6 hours     Return to the ED if redness and swelling continues to worsen   0

## 2025-04-06 LAB
BACTERIA BLD CULT: NORMAL
BACTERIA BLD CULT: NORMAL

## 2025-04-07 ENCOUNTER — OFFICE VISIT (OUTPATIENT)
Age: 70
End: 2025-04-07
Payer: MEDICARE

## 2025-04-07 DIAGNOSIS — M32.19 OTHER SYSTEMIC LUPUS ERYTHEMATOSUS WITH OTHER ORGAN INVOLVEMENT (HCC): ICD-10-CM

## 2025-04-07 DIAGNOSIS — I73.9 PAD (PERIPHERAL ARTERY DISEASE) (HCC): ICD-10-CM

## 2025-04-07 DIAGNOSIS — F41.9 ANXIETY: ICD-10-CM

## 2025-04-07 DIAGNOSIS — D72.825 BANDEMIA: ICD-10-CM

## 2025-04-07 DIAGNOSIS — I70.209 OCCLUSION OF COMMON FEMORAL ARTERY (HCC): ICD-10-CM

## 2025-04-07 DIAGNOSIS — B37.0 THRUSH: ICD-10-CM

## 2025-04-07 DIAGNOSIS — L03.115 CELLULITIS OF RIGHT LOWER EXTREMITY: Primary | ICD-10-CM

## 2025-04-07 LAB
BACTERIA BLD CULT: NORMAL
BACTERIA BLD CULT: NORMAL

## 2025-04-07 PROCEDURE — G2211 COMPLEX E/M VISIT ADD ON: HCPCS | Performed by: FAMILY MEDICINE

## 2025-04-07 PROCEDURE — 99214 OFFICE O/P EST MOD 30 MIN: CPT | Performed by: FAMILY MEDICINE

## 2025-04-07 RX ORDER — OXYCODONE HYDROCHLORIDE 5 MG/1
5 TABLET ORAL EVERY 6 HOURS PRN
Start: 2025-04-07

## 2025-04-07 RX ORDER — LORAZEPAM 0.5 MG/1
0.5 TABLET ORAL EVERY 12 HOURS PRN
Qty: 30 TABLET | Refills: 0 | Status: SHIPPED | OUTPATIENT
Start: 2025-04-07

## 2025-04-07 RX ORDER — CLOTRIMAZOLE 10 MG/1
10 LOZENGE ORAL
Qty: 70 TROCHE | Refills: 1 | Status: SHIPPED | OUTPATIENT
Start: 2025-04-07

## 2025-04-07 NOTE — ASSESSMENT & PLAN NOTE
Clotrimazole atrocious.    Orders:    clotrimazole (MYCELEX) 10 mg valencia; Take 1 tablet (10 mg total) by mouth 5 (five) times a day

## 2025-04-07 NOTE — ASSESSMENT & PLAN NOTE
Stable.    Orders:    oxyCODONE (ROXICODONE) 5 immediate release tablet; Take 1 tablet (5 mg total) by mouth every 6 (six) hours as needed for moderate pain Max Daily Amount: 20 mg

## 2025-04-07 NOTE — PROGRESS NOTES
Name: Nae Hernandez      : 1955      MRN: [id number]  Encounter Provider: Sukhi Medel DO  Encounter Date: 2025   Encounter department: [facility]  :  Assessment & Plan  Cellulitis of right lower extremity  Warm compresses, elevate hand complete Keflex         Bandemia  Related to cellulitis of right upper extremity.         Other systemic lupus erythematosus with other organ involvement (HCC)  Stable.         PAD (peripheral artery disease) (HCC)  Stable.    Orders:    oxyCODONE (ROXICODONE) 5 immediate release tablet; Take 1 tablet (5 mg total) by mouth every 6 (six) hours as needed for moderate pain Max Daily Amount: 20 mg    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HCC)    Orders:    oxyCODONE (ROXICODONE) 5 immediate release tablet; Take 1 tablet (5 mg total) by mouth every 6 (six) hours as needed for moderate pain Max Daily Amount: 20 mg    Thrush  Clotrimazole atrocious.    Orders:    clotrimazole (MYCELEX) 10 mg valencia; Take 1 tablet (10 mg total) by mouth 5 (five) times a day    Anxiety    Orders:    LORazepam (ATIVAN) 0.5 mg tablet; Take 1 tablet (0.5 mg total) by mouth every 12 (twelve) hours as needed for anxiety           History of Present Illness   Patient is here status post urgent care as well as ER visit for presumed cellulitis of the right upper extremity.  Patient was placed on clindamycin originally and then ultimately switched to Keflex.  Patient did receive IV fluids as well as morphine.  ER records reviewed.  Patient was running low-grade fevers.  Patient temp normal today.  Patient's redness has transition and move towards her wrist.  No history of gout.  Patient needs refill on lorazepam for anxiety.      Review of Systems   Constitutional: Negative.    HENT: Negative.     Eyes: Negative.    Respiratory: Negative.     Cardiovascular: Negative.    Gastrointestinal: Negative.    Endocrine: Negative.    Genitourinary: Negative.    Musculoskeletal:  Positive for  arthralgias.   Skin:  Positive for color change.   Allergic/Immunologic: Negative.    Neurological: Negative.    Hematological: Negative.    Psychiatric/Behavioral: Negative.         Objective   LMP  (LMP Unknown)      Physical Exam  Vitals and nursing note reviewed.   Constitutional:       General: She is not in acute distress.     Appearance: Normal appearance. She is well-developed. She is not ill-appearing, toxic-appearing or diaphoretic.   HENT:      Head: Normocephalic and atraumatic.      Right Ear: External ear normal.      Left Ear: External ear normal.      Nose: Nose normal.      Mouth/Throat:      Pharynx: Oropharyngeal exudate present.   Eyes:      General:         Right eye: No discharge.         Left eye: No discharge.   Neck:      Thyroid: No thyromegaly.      Trachea: No tracheal deviation.   Cardiovascular:      Rate and Rhythm: Normal rate and regular rhythm.      Pulses: Normal pulses.      Heart sounds: Normal heart sounds. No murmur heard.     No gallop.   Pulmonary:      Effort: Pulmonary effort is normal. No respiratory distress.      Breath sounds: Normal breath sounds. No stridor. No wheezing or rales.   Chest:      Chest wall: No tenderness.   Abdominal:      Tenderness: There is abdominal tenderness. There is rebound.   Musculoskeletal:         General: Tenderness present. No deformity.      Cervical back: Normal range of motion and neck supple.      Comments: Redness and tenderness and swelling over the dorsal aspect of the right hand and wrist.   Lymphadenopathy:      Cervical: No cervical adenopathy.   Skin:     General: Skin is warm and dry.      Coloration: Skin is not pale.      Findings: Erythema present. No rash.   Neurological:      Mental Status: She is alert and oriented to person, place, and time. Mental status is at baseline.      Cranial Nerves: No cranial nerve deficit.      Motor: No abnormal muscle tone.      Coordination: Coordination normal.      Deep Tendon Reflexes:  Reflexes normal.   Psychiatric:         Behavior: Behavior normal.         Thought Content: Thought content normal.         Judgment: Judgment normal.

## 2025-04-14 ENCOUNTER — HOSPITAL ENCOUNTER (OUTPATIENT)
Dept: BONE DENSITY | Facility: MEDICAL CENTER | Age: 70
Discharge: HOME | End: 2025-04-14
Payer: MEDICARE

## 2025-04-14 DIAGNOSIS — M85.852 OSTEOPENIA OF LEFT HIP: ICD-10-CM

## 2025-04-14 PROCEDURE — 77080 DXA BONE DENSITY AXIAL: CPT

## 2025-04-15 ENCOUNTER — RESULTS FOLLOW-UP (OUTPATIENT)
Dept: GYNECOLOGY | Facility: CLINIC | Age: 70
End: 2025-04-15

## 2025-04-15 NOTE — TELEPHONE ENCOUNTER
Pt calling in and was notified of the DEXA results, pt would like to know the comparison from last exam, and pt wanting to know if her density is dropping, or improving, etc. Pt would like further recommendations.

## 2025-04-17 DIAGNOSIS — R11.10 VOMITING, UNSPECIFIED VOMITING TYPE, UNSPECIFIED WHETHER NAUSEA PRESENT: ICD-10-CM

## 2025-04-17 RX ORDER — ONDANSETRON 4 MG/1
4 TABLET, FILM COATED ORAL EVERY 8 HOURS PRN
Qty: 90 TABLET | Refills: 0 | Status: SHIPPED | OUTPATIENT
Start: 2025-04-17

## 2025-04-17 NOTE — TELEPHONE ENCOUNTER
Reason for call:   [x] Refill   [] Prior Auth  [] Other:     Office:   [x] PCP/Provider - [facility]   [] Specialty/Provider -     Medication: ondansetron (ZOFRAN) 4 mg tabl     Dose/Frequency: 4 mg, Every 8 hours PRN     Quantity: 90    Pharmacy: [id number]    Local Pharmacy   Does the patient have enough for 3 days?   [] Yes   [x] No - Send as HP to POD    Mail Away Pharmacy   Does the patient have enough for 10 days?   [] Yes   [] No - Send as HP to POD

## 2025-05-01 DIAGNOSIS — I73.9 PAD (PERIPHERAL ARTERY DISEASE) (HCC): ICD-10-CM

## 2025-05-01 DIAGNOSIS — I70.209 OCCLUSION OF COMMON FEMORAL ARTERY (HCC): ICD-10-CM

## 2025-05-01 NOTE — TELEPHONE ENCOUNTER
Reason for call:   [x] Refill   [] Prior Auth  [] Other:     Office:   [x] PCP/Provider -   [] Specialty/Provider -     Medication: oxyCODONE (ROXICODONE) 5 immediate release     Dose/Frequency:  Take 1 tablet (5 mg total) by mouth every 6 (six) hours as needed     Quantity: 30    Pharmacy: [id number] - [place], [place] - [address] [facility]   Does the patient have enough for 3 days?   [] Yes   [x] No - Send as HP to POD    Mail Away Pharmacy   Does the patient have enough for 10 days?   [] Yes   [] No - Send as HP to POD

## 2025-05-02 RX ORDER — OXYCODONE HYDROCHLORIDE 5 MG/1
5 TABLET ORAL DAILY PRN
Qty: 30 TABLET | Refills: 0 | Status: SHIPPED | OUTPATIENT
Start: 2025-05-02

## 2025-05-07 ENCOUNTER — ANESTHESIA (OUTPATIENT)
Dept: ANESTHESIOLOGY | Facility: HOSPITAL | Age: 70
End: 2025-05-07

## 2025-05-07 ENCOUNTER — ANESTHESIA EVENT (OUTPATIENT)
Dept: ANESTHESIOLOGY | Facility: HOSPITAL | Age: 70
End: 2025-05-07

## 2025-05-07 ENCOUNTER — TELEPHONE (OUTPATIENT)
Dept: GASTROENTEROLOGY | Facility: MEDICAL CENTER | Age: 70
End: 2025-05-07

## 2025-05-07 NOTE — TELEPHONE ENCOUNTER
Procedure Confirmation sent to pt via [employer] for pt to confirm and review instructions for procedure

## 2025-05-08 NOTE — TELEPHONE ENCOUNTER
Left voicemail and requested call back     Called pt to confirm procedure for 05/21 with Dr. Jaiyeola at [facility], asked to please give us a call to confirm

## 2025-05-21 ENCOUNTER — HOSPITAL ENCOUNTER (OUTPATIENT)
Dept: GASTROENTEROLOGY | Facility: MEDICAL CENTER | Age: 70
Setting detail: OUTPATIENT SURGERY
Discharge: HOME | End: 2025-05-21
Attending: INTERNAL MEDICINE
Payer: MEDICARE

## 2025-05-21 ENCOUNTER — ANESTHESIA (OUTPATIENT)
Dept: GASTROENTEROLOGY | Facility: MEDICAL CENTER | Age: 70
End: 2025-05-21
Payer: MEDICARE

## 2025-05-21 ENCOUNTER — ANESTHESIA EVENT (OUTPATIENT)
Dept: GASTROENTEROLOGY | Facility: MEDICAL CENTER | Age: 70
End: 2025-05-21
Payer: MEDICARE

## 2025-05-21 VITALS
TEMPERATURE: 97.1 F | HEART RATE: 76 BPM | BODY MASS INDEX: 25.4 KG/M2 | OXYGEN SATURATION: 97 % | DIASTOLIC BLOOD PRESSURE: 52 MMHG | HEIGHT: 64 IN | RESPIRATION RATE: 16 BRPM | SYSTOLIC BLOOD PRESSURE: 106 MMHG

## 2025-05-21 DIAGNOSIS — K21.9 GASTROESOPHAGEAL REFLUX DISEASE WITHOUT ESOPHAGITIS: ICD-10-CM

## 2025-05-21 DIAGNOSIS — Z86.0100 HISTORY OF COLON POLYPS: ICD-10-CM

## 2025-05-21 DIAGNOSIS — R13.10 DYSPHAGIA, UNSPECIFIED TYPE: ICD-10-CM

## 2025-05-21 PROCEDURE — 88305 TISSUE EXAM BY PATHOLOGIST: CPT | Performed by: PATHOLOGY

## 2025-05-21 PROCEDURE — 43239 EGD BIOPSY SINGLE/MULTIPLE: CPT | Performed by: INTERNAL MEDICINE

## 2025-05-21 PROCEDURE — 88342 IMHCHEM/IMCYTCHM 1ST ANTB: CPT | Performed by: PATHOLOGY

## 2025-05-21 PROCEDURE — 45385 COLONOSCOPY W/LESION REMOVAL: CPT | Performed by: INTERNAL MEDICINE

## 2025-05-21 RX ORDER — LIDOCAINE HYDROCHLORIDE 20 MG/ML
INJECTION, SOLUTION EPIDURAL; INFILTRATION; INTRACAUDAL; PERINEURAL AS NEEDED
Status: DISCONTINUED | OUTPATIENT
Start: 2025-05-21 | End: 2025-05-21

## 2025-05-21 RX ORDER — PROPOFOL 10 MG/ML
INJECTION, EMULSION INTRAVENOUS AS NEEDED
Status: DISCONTINUED | OUTPATIENT
Start: 2025-05-21 | End: 2025-05-21

## 2025-05-21 RX ORDER — SODIUM CHLORIDE, SODIUM LACTATE, POTASSIUM CHLORIDE, CALCIUM CHLORIDE 600; 310; 30; 20 MG/100ML; MG/100ML; MG/100ML; MG/100ML
INJECTION, SOLUTION INTRAVENOUS CONTINUOUS PRN
Status: DISCONTINUED | OUTPATIENT
Start: 2025-05-21 | End: 2025-05-21

## 2025-05-21 RX ADMIN — PROPOFOL 160 MCG/KG/MIN: 10 INJECTION, EMULSION INTRAVENOUS at 12:07

## 2025-05-21 RX ADMIN — SODIUM CHLORIDE, SODIUM LACTATE, POTASSIUM CHLORIDE, AND CALCIUM CHLORIDE: .6; .31; .03; .02 INJECTION, SOLUTION INTRAVENOUS at 12:03

## 2025-05-21 RX ADMIN — LIDOCAINE HYDROCHLORIDE 100 MG: 20 INJECTION, SOLUTION EPIDURAL; INFILTRATION; INTRACAUDAL at 12:06

## 2025-05-21 RX ADMIN — PROPOFOL 140 MG: 10 INJECTION, EMULSION INTRAVENOUS at 12:06

## 2025-05-21 NOTE — ANESTHESIA PREPROCEDURE EVALUATION
Procedure:  EGD  COLONOSCOPY    Relevant Problems   CARDIO   (+) Aortoiliac occlusive disease (HCC)   (+) Mixed hyperlipidemia   (+) PAD (peripheral artery disease) (HCC)   (+) Raynaud's disease without gangrene   (+) Varicose veins of both lower extremities      GI/HEPATIC   (+) GERD (gastroesophageal reflux disease)      MUSCULOSKELETAL   (+) Chronic bilateral low back pain with bilateral sciatica   (+) Fibromyalgia   (+) Lumbar pain   (+) Primary osteoarthritis involving multiple joints      NEURO/PSYCH   (+) Chronic bilateral low back pain with bilateral sciatica   (+) Continuous opioid dependence (HCC)   (+) Depression, recurrent (HCC)   (+) Fibromyalgia   (+) Weakness of left arm        Physical Exam    Airway     Mallampati score: II  TM Distance: >3 FB  Neck ROM: full  Mouth opening: >= 4 cm      Cardiovascular  Rhythm: regular, Rate: normalNo peripheral edema    Dental   No notable dental hx     Pulmonary   No stridor    Neurological    She appears awake, alert and oriented x3.      Other Findings  post-pubertal.      Anesthesia Plan  ASA Score- 3     Anesthesia Type- MAC with ASA Monitors.         Additional Monitors:     Airway Plan: natural airway.           Plan Factors-Exercise tolerance (METS): >4 METS.    Chart reviewed.   Existing labs reviewed. Patient summary reviewed.                  Induction- intravenous.    Postoperative Plan- .   Monitoring Plan - Monitoring plan - standard ASA monitoring  Post Operative Pain Plan - non-opiod analgesics        Informed Consent- Anesthetic plan and risks discussed with patient.  I personally reviewed this patient with the CRNA. Discussed and agreed on the Anesthesia Plan with the CRNA..      NPO Status:  No vitals data found for the desired time range.         retired

## 2025-05-21 NOTE — H&P
H&P - Gastroenterology   Name: Nae Hernandez 70 y.o. female I MRN: [id number]  Unit/Bed#:  I Date of Admission: 5/21/2025   Date of Service: 5/21/2025 I Hospital Day: 0     Assessment & Plan   This is a 70 y.o. year old female here for egd/colonoscopy, and she is stable and optimized for her procedure.    History of Present Illness    Nae Hernandez is a 70 y.o. year old female who presents for dysphagia, gerd, history of colon polyps    REVIEW OF SYSTEMS: Per the HPI, and otherwise unremarkable.    Historical Information   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    Osteoporosis     PAD (peripheral artery disease) (HCC)     Presence of pessary     Raynaud disease     Sciatica     Sinus headache     Sjogren's syndrome (HCC)     Smoker      Past Surgical History:   Procedure Laterality Date    IR BIOPSY BONE MARROW  8/18/2023    LUNG REMOVAL, PARTIAL  05/01/2004    Prior Surgical/Procedural History - 5/4 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 - lung resection - subtotal    MOUTH SURGERY      Tooth extraction    LA TEAEC W/WO PATCH GRAFT COMMON FEMORAL Left 9/10/2019    Procedure: ENDARTERECTOMY ARTERIAL FEMORAL,  WITH BOVINE GRAFT AND PROFUNDOPLASTY;  Surgeon: Nolvia Howard DO;  Location: [facility] OR;  Service: Vascular    SINUS SURGERY       Social History     Tobacco Use    Smoking status: Every Day     Current packs/day: 0.50     Average packs/day: 0.5 packs/day for 40.0 years (20.0 ttl pk-yrs)     Types: Cigarettes    Smokeless tobacco: Never   Vaping Use    Vaping status: Never Used   Substance and Sexual Activity     Alcohol use: Not Currently     Comment: liquor rarely    Drug use: Yes     Types: Oxycodone    Sexual activity: Not Currently     Partners: Male     E-Cigarette/Vaping    E-Cigarette Use Never User      E-Cigarette/Vaping Substances    Nicotine No     THC No     CBD No     Flavoring No     Other No     Unknown No      Family history non-contributory    Meds/Allergies   Current Medications[1]  Allergies[2]    Objective :  BP: ()/()     Physical Exam  Gen: NAD  Head: NCAT  CV: RRR  CHEST: Clear  ABD: soft, NT/ND  EXT: no edema       [1]   Current Outpatient Medications:     aspirin (ECOTRIN LOW STRENGTH) 81 mg EC tablet    atorvastatin (LIPITOR) 10 mg tablet    Calcium Carbonate (CALCIUM 500 PO)    cholecalciferol (VITAMIN D3) 1,000 units tablet    clotrimazole (MYCELEX) 10 mg valencia    estradiol (VAGIFEM, YUVAFEM) 10 MCG TABS vaginal tablet    famotidine (PEPCID) 20 mg tablet    LORazepam (ATIVAN) 0.5 mg tablet    ondansetron (ZOFRAN) 4 mg tablet    ondansetron (ZOFRAN-ODT) 4 mg disintegrating tablet    oxyCODONE (ROXICODONE) 5 immediate release tablet    potassium chloride (Klor-Con 10) 10 mEq tablet    predniSONE 10 mg tablet    predniSONE 5 mg tablet    Vitamin Mixture (JOSE RAMON-C PO)  [2]   Allergies  Allergen Reactions    Dust Mite Extract Shortness Of Breath    Penicillins Shortness Of Breath and Edema    Duloxetine Diarrhea    Lactose - Food Allergy GI Intolerance    Neosporin [Neomycin-Bacitracin Zn-Polymyx] Itching and Rash    Tilactase Diarrhea    Nitrofurantoin Rash    Omeprazole Rash    Sulfamethoxazole-Trimethoprim Rash

## 2025-05-21 NOTE — ANESTHESIA POSTPROCEDURE EVALUATION
Post-Op Assessment Note    CV Status:  Stable    Pain management: adequate       Mental Status:  Sleepy   Hydration Status:  Euvolemic   PONV Controlled:  Controlled   Airway Patency:  Patent     Post Op Vitals Reviewed: Yes    No anethesia notable event occurred.    Staff: CRNA           Last Filed PACU Vitals:  Vitals Value Taken Time   Temp     Pulse 79 05/21/25 12:31   /53 05/21/25 12:31   Resp 18 05/21/25 12:31   SpO2 96 % 05/21/25 12:31       Modified Farnaz:     Vitals Value Taken Time   Activity 0 05/21/25 12:32   Respiration 2 05/21/25 12:32   Circulation 2 05/21/25 12:32   Consciousness 0 05/21/25 12:32   Oxygen Saturation 2 05/21/25 12:32     Modified Farnaz Score: 6

## 2025-05-29 ENCOUNTER — RESULTS FOLLOW-UP (OUTPATIENT)
Dept: GASTROENTEROLOGY | Facility: MEDICAL CENTER | Age: 70
End: 2025-05-29

## 2025-05-29 PROCEDURE — 88305 TISSUE EXAM BY PATHOLOGIST: CPT | Performed by: PATHOLOGY

## 2025-05-29 PROCEDURE — 88342 IMHCHEM/IMCYTCHM 1ST ANTB: CPT | Performed by: PATHOLOGY

## 2025-06-03 DIAGNOSIS — M54.41 CHRONIC BILATERAL LOW BACK PAIN WITH BILATERAL SCIATICA: ICD-10-CM

## 2025-06-03 DIAGNOSIS — M32.19 OTHER SYSTEMIC LUPUS ERYTHEMATOSUS WITH OTHER ORGAN INVOLVEMENT (HCC): ICD-10-CM

## 2025-06-03 DIAGNOSIS — K21.9 GASTROESOPHAGEAL REFLUX DISEASE WITHOUT ESOPHAGITIS: ICD-10-CM

## 2025-06-03 DIAGNOSIS — G89.29 CHRONIC BILATERAL LOW BACK PAIN WITH BILATERAL SCIATICA: ICD-10-CM

## 2025-06-03 DIAGNOSIS — M54.42 CHRONIC BILATERAL LOW BACK PAIN WITH BILATERAL SCIATICA: ICD-10-CM

## 2025-06-03 DIAGNOSIS — M79.7 FIBROMYALGIA: ICD-10-CM

## 2025-06-04 RX ORDER — PREDNISONE 10 MG/1
10 TABLET ORAL DAILY
Qty: 90 TABLET | Refills: 0 | Status: SHIPPED | OUTPATIENT
Start: 2025-06-04

## 2025-06-04 RX ORDER — FAMOTIDINE 20 MG/1
20 TABLET, FILM COATED ORAL 2 TIMES DAILY
Qty: 180 TABLET | Refills: 1 | Status: SHIPPED | OUTPATIENT
Start: 2025-06-04

## 2025-07-15 ENCOUNTER — RA CDI HCC (OUTPATIENT)
Dept: OTHER | Facility: HOSPITAL | Age: 70
End: 2025-07-15

## 2025-07-17 ENCOUNTER — OFFICE VISIT (OUTPATIENT)
Age: 70
End: 2025-07-17
Payer: MEDICARE

## 2025-07-17 VITALS
HEART RATE: 83 BPM | SYSTOLIC BLOOD PRESSURE: 118 MMHG | HEIGHT: 64 IN | TEMPERATURE: 98.7 F | OXYGEN SATURATION: 97 % | BODY MASS INDEX: 24.17 KG/M2 | DIASTOLIC BLOOD PRESSURE: 70 MMHG | WEIGHT: 141.6 LBS

## 2025-07-17 DIAGNOSIS — M32.19 OTHER SYSTEMIC LUPUS ERYTHEMATOSUS WITH OTHER ORGAN INVOLVEMENT (HCC): ICD-10-CM

## 2025-07-17 DIAGNOSIS — M54.41 CHRONIC BILATERAL LOW BACK PAIN WITH BILATERAL SCIATICA: ICD-10-CM

## 2025-07-17 DIAGNOSIS — F33.9 DEPRESSION, RECURRENT (HCC): ICD-10-CM

## 2025-07-17 DIAGNOSIS — I70.209 OCCLUSION OF COMMON FEMORAL ARTERY (HCC): ICD-10-CM

## 2025-07-17 DIAGNOSIS — Z00.00 MEDICARE ANNUAL WELLNESS VISIT, SUBSEQUENT: ICD-10-CM

## 2025-07-17 DIAGNOSIS — F41.9 ANXIETY: ICD-10-CM

## 2025-07-17 DIAGNOSIS — E53.8 B12 DEFICIENCY: ICD-10-CM

## 2025-07-17 DIAGNOSIS — G89.29 CHRONIC BILATERAL LOW BACK PAIN WITH BILATERAL SCIATICA: ICD-10-CM

## 2025-07-17 DIAGNOSIS — M15.0 PRIMARY OSTEOARTHRITIS INVOLVING MULTIPLE JOINTS: ICD-10-CM

## 2025-07-17 DIAGNOSIS — M54.42 CHRONIC BILATERAL LOW BACK PAIN WITH BILATERAL SCIATICA: ICD-10-CM

## 2025-07-17 DIAGNOSIS — M79.7 FIBROMYALGIA: ICD-10-CM

## 2025-07-17 DIAGNOSIS — I73.9 PAD (PERIPHERAL ARTERY DISEASE) (HCC): Primary | ICD-10-CM

## 2025-07-17 PROCEDURE — 99214 OFFICE O/P EST MOD 30 MIN: CPT | Performed by: FAMILY MEDICINE

## 2025-07-17 PROCEDURE — G0439 PPPS, SUBSEQ VISIT: HCPCS | Performed by: FAMILY MEDICINE

## 2025-07-17 PROCEDURE — 96372 THER/PROPH/DIAG INJ SC/IM: CPT

## 2025-07-17 PROCEDURE — G2211 COMPLEX E/M VISIT ADD ON: HCPCS | Performed by: FAMILY MEDICINE

## 2025-07-17 RX ORDER — CYANOCOBALAMIN 1000 UG/ML
1000 INJECTION, SOLUTION INTRAMUSCULAR; SUBCUTANEOUS
Status: SHIPPED | OUTPATIENT
Start: 2025-07-17

## 2025-07-17 RX ORDER — OXYCODONE HYDROCHLORIDE 5 MG/1
5 TABLET ORAL DAILY PRN
Qty: 30 TABLET | Refills: 0 | Status: SHIPPED | OUTPATIENT
Start: 2025-07-17

## 2025-07-17 RX ORDER — PREDNISONE 10 MG/1
10 TABLET ORAL DAILY
Qty: 90 TABLET | Refills: 0 | Status: SHIPPED | OUTPATIENT
Start: 2025-07-17

## 2025-07-17 RX ORDER — LORAZEPAM 0.5 MG/1
0.5 TABLET ORAL EVERY 12 HOURS PRN
Qty: 30 TABLET | Refills: 0 | Status: SHIPPED | OUTPATIENT
Start: 2025-07-17

## 2025-07-17 RX ADMIN — CYANOCOBALAMIN 1000 MCG: 1000 INJECTION, SOLUTION INTRAMUSCULAR; SUBCUTANEOUS at 14:37

## 2025-07-17 NOTE — PROGRESS NOTES
Name: Nae Hernandez      : 1955      MRN: [id number]  Encounter Provider: Sukhi Medel DO  Encounter Date: 2025   Encounter department: [facility] PRIMARY CARE  :  Assessment & Plan  PAD (peripheral artery disease) (HCC)    Orders:    oxyCODONE (ROXICODONE) 5 immediate release tablet; Take 1 tablet (5 mg total) by mouth daily as needed for moderate pain Max Daily Amount: 5 mg    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HCC)    Orders:    oxyCODONE (ROXICODONE) 5 immediate release tablet; Take 1 tablet (5 mg total) by mouth daily as needed for moderate pain Max Daily Amount: 5 mg    Chronic bilateral low back pain with bilateral sciatica    Orders:    predniSONE 10 mg tablet; Take 1 tablet (10 mg total) by mouth daily    Fibromyalgia    Orders:    predniSONE 10 mg tablet; Take 1 tablet (10 mg total) by mouth daily    Medicare annual wellness visit, subsequent         Other systemic lupus erythematosus with other organ involvement (HCC)    Orders:    predniSONE 10 mg tablet; Take 1 tablet (10 mg total) by mouth daily    Anxiety    Orders:    LORazepam (ATIVAN) 0.5 mg tablet; Take 1 tablet (0.5 mg total) by mouth every 12 (twelve) hours as needed for anxiety    Primary osteoarthritis involving multiple joints         Depression, recurrent (HCC)           B12 deficiency    Orders:    cyanocobalamin injection 1,000 mcg      Depression Screening and Follow-up Plan: Patient was screened for depression during today's encounter. They screened negative with a PHQ-9 score of 0.      Urinary Incontinence Plan of Care: counseling topics discussed: limit alcohol, caffeine, spicy foods, and acidic foods.       Preventive health issues were discussed with patient, and age appropriate screening tests were ordered as noted in patient's After Visit Summary. Personalized health advice and appropriate referrals for health education or preventive services given if needed, as noted in patient's After Visit  Summary.    History of Present Illness     Patient is here for wellness exam as well as to follow-up on chronic issues.  Patient with increased stress and anxiety.  Ongoing back pain and gait dysfunction.       Patient Care Team:  Yaya Medel DO as PCP - General  Valarie Hunter MD as PCP - Endocrinology (Endocrinology)  Sarita Kang MD (Obstetrics and Gynecology)  Marsha Babin MD (Obstetrics and Gynecology)  Radha Arce DO (Obstetrics and Gynecology)  Nolvia Howard DO (Vascular Surgery)  Diana M Jaiyeola, MD (Gastroenterology)  Emmy Rangel PA-C (Gastroenterology)  LISA Hebert as Nurse Practitioner (Gastroenterology)    Review of Systems   Constitutional: Negative.    HENT: Negative.     Eyes: Negative.    Respiratory: Negative.     Cardiovascular: Negative.    Gastrointestinal: Negative.    Endocrine: Negative.    Genitourinary: Negative.    Musculoskeletal:  Positive for arthralgias, back pain, gait problem and neck pain.   Skin: Negative.    Allergic/Immunologic: Negative.    Neurological:  Positive for weakness.   Hematological: Negative.    Psychiatric/Behavioral:  Positive for dysphoric mood. The patient is nervous/anxious.      Medical History Reviewed by provider this encounter:  Tobacco  Allergies  Meds  Problems  Med Hx  Surg Hx  Fam Hx       Annual Wellness Visit Questionnaire   Nae is here for her Subsequent Wellness visit.     Health Risk Assessment:   Patient rates overall health as poor. Patient feels that their physical health rating is much worse. Patient is very dissatisfied with their life. Eyesight was rated as same. Hearing was rated as same. Patient feels that their emotional and mental health rating is much worse. Patients states they are sometimes angry. Patient states they are always unusually tired/fatigued. Pain experienced in the last 7 days has been a lot. Patient's pain rating has been 9/10. Patient states that she has experienced no  weight loss or gain in last 6 months.     Depression Screening:   PHQ-9 Score: 0      Fall Risk Screening:   In the past year, patient has experienced: no history of falling in past year      Urinary Incontinence Screening:   Patient has leaked urine accidently in the last six months.     Home Safety:  Patient has trouble with stairs inside or outside of their home. Patient has working smoke alarms and has working carbon monoxide detector. Home safety hazards include: none.     Nutrition:   Current diet is Regular.     Medications:   Patient is currently taking over-the-counter supplements. OTC medications include: see medication list. Patient is able to manage medications.     Activities of Daily Living (ADLs)/Instrumental Activities of Daily Living (IADLs):   Walk and transfer into and out of bed and chair?: Yes  Dress and groom yourself?: Yes    Bathe or shower yourself?: Yes    Feed yourself? Yes  Do your laundry/housekeeping?: Yes  Manage your money, pay your bills and track your expenses?: Yes  Make your own meals?: Yes    Do your own shopping?: Yes    Previous Hospitalizations:   Any hospitalizations or ED visits within the last 12 months?: No      Advance Care Planning:   Living will: No    Durable POA for healthcare: No    Advanced directive: Yes      Cognitive Screening:   Provider or family/friend/caregiver concerned regarding cognition?: No    Preventive Screenings      Cardiovascular Screening:    General: Screening Not Indicated, History Lipid Disorder, Risks and Benefits Discussed and Screening Current      Diabetes Screening:     General: Screening Current and Risks and Benefits Discussed      Colorectal Cancer Screening:     General: Screening Current      Breast Cancer Screening:     General: Screening Current and Risks and Benefits Discussed      Cervical Cancer Screening:    General: Screening Not Indicated, Risks and Benefits Discussed and Screening Current      Osteoporosis Screening:     General: Screening Not Indicated, History Osteoporosis, Risks and Benefits Discussed and Screening Current      Abdominal Aortic Aneurysm (AAA) Screening:        General: Screening Not Indicated, History AAA and Risks and Benefits Discussed      Lung Cancer Screening:     General: Screening Not Indicated, History Lung Cancer and Risks and Benefits Discussed      Hepatitis C Screening:    General: Risks and Benefits Discussed and Screening Current    Immunizations:  - Immunizations due: Zoster (Shingrix)    Screening, Brief Intervention, and Referral to Treatment (SBIRT)     Screening  Typical number of drinks in a day: 0  Typical number of drinks in a week: 0  Interpretation: Low risk drinking behavior.    AUDIT-C Screenin) How often did you have a drink containing alcohol in the past year? never  2) How many drinks did you have on a typical day when you were drinking in the past year? 0  3) How often did you have 6 or more drinks on one occasion in the past year? never    AUDIT-C Score: 0  Interpretation: Score 0-2 (female): Negative screen for alcohol misuse    Single Item Drug Screening:  How often have you used an illegal drug (including marijuana) or a prescription medication for non-medical reasons in the past year? never    Single Item Drug Screen Score: 0  Interpretation: Negative screen for possible drug use disorder    Review of Current Opioid Use    Opioid Risk Tool (ORT) Interpretation: Complete Opioid Risk Tool (ORT)    Other Counseling Topics:   Regular weightbearing exercise.     Social Drivers of Health     Financial Resource Strain: Low Risk  (2022)    Overall Financial Resource Strain (CARDIA)     Difficulty of Paying Living Expenses: Not hard at all   Food Insecurity: No Food Insecurity (2025)    Nursing - Inadequate Food Risk Classification     Worried About Running Out of Food in the Last Year: Never true     Ran Out of Food in the Last Year: Never true   Transportation Needs: No  "Transportation Needs (7/17/2025)    PRAPARE - Transportation     Lack of Transportation (Medical): No     Lack of Transportation (Non-Medical): No   Housing Stability: Low Risk  (7/17/2025)    Housing Stability Vital Sign     Unable to Pay for Housing in the Last Year: No     Number of Times Moved in the Last Year: 0     Homeless in the Last Year: No   Utilities: Not At Risk (7/17/2025)    [facility] Utilities     Threatened with loss of utilities: No     No results found.    Objective   /70 (BP Location: Left arm, Patient Position: Sitting, Cuff Size: Standard)   Pulse 83   Temp 98.7 °F (37.1 °C) (Temporal)   Ht 5' 4\" (1.626 m)   Wt 64.2 kg (141 lb 9.6 oz)   LMP  (LMP Unknown)   SpO2 97%   BMI 24.31 kg/m²     Physical Exam  Vitals and nursing note reviewed.   Constitutional:       General: She is not in acute distress.     Appearance: She is well-developed. She is ill-appearing. She is not toxic-appearing or diaphoretic.   HENT:      Head: Normocephalic and atraumatic.      Right Ear: Tympanic membrane, ear canal and external ear normal.      Left Ear: Tympanic membrane, ear canal and external ear normal.      Nose: Nose normal.     Eyes:      General:         Right eye: No discharge.         Left eye: No discharge.     Neck:      Thyroid: No thyromegaly.      Trachea: No tracheal deviation.     Cardiovascular:      Rate and Rhythm: Normal rate and regular rhythm.      Pulses: Normal pulses.      Heart sounds: Normal heart sounds. No murmur heard.     No gallop.   Pulmonary:      Effort: Pulmonary effort is normal. No respiratory distress.      Breath sounds: Normal breath sounds. No stridor. No wheezing or rales.   Chest:      Chest wall: No tenderness.   Abdominal:      General: Bowel sounds are normal.     Musculoskeletal:         General: Tenderness present.      Cervical back: Normal range of motion and neck supple.      Right lower leg: No edema.      Left lower leg: No edema.   Lymphadenopathy:      " Cervical: No cervical adenopathy.     Skin:     General: Skin is warm and dry.      Capillary Refill: Capillary refill takes less than 2 seconds.      Coloration: Skin is not pale.      Findings: Rash present. No erythema.     Neurological:      Mental Status: She is alert and oriented to person, place, and time.      Motor: Weakness present. No abnormal muscle tone.      Gait: Gait abnormal.     Psychiatric:         Behavior: Behavior normal.         Thought Content: Thought content normal.         Judgment: Judgment normal.

## 2025-07-17 NOTE — PATIENT INSTRUCTIONS
Medicare Preventive Visit Patient Instructions  Thank you for completing your Welcome to Medicare Visit or Medicare Annual Wellness Visit today. Your next wellness visit will be due in one year (7/18/2026).  The screening/preventive services that you may require over the next 5-10 years are detailed below. Some tests may not apply to you based off risk factors and/or age. Screening tests ordered at today's visit but not completed yet may show as past due. Also, please note that scanned in results may not display below.  Preventive Screenings:  Service Recommendations Previous Testing/Comments   Colorectal Cancer Screening  * Colonoscopy    * Fecal Occult Blood Test (FOBT)/Fecal Immunochemical Test (FIT)  * Fecal DNA/Cologuard Test  * Flexible Sigmoidoscopy Age: 45-75 years old   Colonoscopy: every 10 years (may be performed more frequently if at higher risk)  OR  FOBT/FIT: every 1 year  OR  Cologuard: every 3 years  OR  Sigmoidoscopy: every 5 years  Screening may be recommended earlier than age 45 if at higher risk for colorectal cancer. Also, an individualized decision between you and your healthcare provider will decide whether screening between the ages of 76-85 would be appropriate. Colonoscopy: 05/21/2025  FOBT/FIT: Not on file  Cologuard: Not on file  Sigmoidoscopy: Not on file    Screening Current     Breast Cancer Screening Age: 40+ years old  Frequency: every 1-2 years  Not required if history of left and right mastectomy Mammogram: 08/29/2024    Screening Current   Cervical Cancer Screening Between the ages of 21-29, pap smear recommended once every 3 years.   Between the ages of 30-65, can perform pap smear with HPV co-testing every 5 years.   Recommendations may differ for women with a history of total hysterectomy, cervical cancer, or abnormal pap smears in past. Pap Smear: 04/25/2024    Screening Not Indicated   Hepatitis C Screening Once for adults born between 1945 and 1965  More frequently in  patients at high risk for Hepatitis C Hep C Antibody: Not on file        Diabetes Screening 1-2 times per year if you're at risk for diabetes or have pre-diabetes Fasting glucose: 78 mg/dL (2/26/2025)  A1C: No results in last 5 years (No results in last 5 years)  Screening Current   Cholesterol Screening Once every 5 years if you don't have a lipid disorder. May order more often based on risk factors. Lipid panel: 02/26/2025    Screening Not Indicated  History Lipid Disorder     Other Preventive Screenings Covered by Medicare:  Abdominal Aortic Aneurysm (AAA) Screening: covered once if your at risk. You're considered to be at risk if you have a family history of AAA.  Lung Cancer Screening: covers low dose CT scan once per year if you meet all of the following conditions: (1) Age 55-77; (2) No signs or symptoms of lung cancer; (3) Current smoker or have quit smoking within the last 15 years; (4) You have a tobacco smoking history of at least 20 pack years (packs per day multiplied by number of years you smoked); (5) You get a written order from a healthcare provider.  Glaucoma Screening: covered annually if you're considered high risk: (1) You have diabetes OR (2) Family history of glaucoma OR (3)  aged 50 and older OR (4)  American aged 65 and older  Osteoporosis Screening: covered every 2 years if you meet one of the following conditions: (1) You're estrogen deficient and at risk for osteoporosis based off medical history and other findings; (2) Have a vertebral abnormality; (3) On glucocorticoid therapy for more than 3 months; (4) Have primary hyperparathyroidism; (5) On osteoporosis medications and need to assess response to drug therapy.   Last bone density test (DXA Scan): 04/14/2025.  HIV Screening: covered annually if you're between the age of 15-65. Also covered annually if you are younger than 15 and older than 65 with risk factors for HIV infection. For pregnant patients, it is  covered up to 3 times per pregnancy.    Immunizations:  Immunization Recommendations   Influenza Vaccine Annual influenza vaccination during flu season is recommended for all persons aged >= 6 months who do not have contraindications   Pneumococcal Vaccine   * Pneumococcal conjugate vaccine = PCV13 (Prevnar 13), PCV15 (Vaxneuvance), PCV20 (Prevnar 20)  * Pneumococcal polysaccharide vaccine = PPSV23 (Pneumovax) Adults 19-63 yo with certain risk factors or if 65+ yo  If never received any pneumonia vaccine: recommend Prevnar 20 (PCV20)  Give PCV20 if previously received 1 dose of PCV13 or PPSV23   Hepatitis B Vaccine 3 dose series if at intermediate or high risk (ex: diabetes, end stage renal disease, liver disease)   Respiratory syncytial virus (RSV) Vaccine - COVERED BY MEDICARE PART D  * RSVPreF3 (Arexvy) CDC recommends that adults 60 years of age and older may receive a single dose of RSV vaccine using shared clinical decision-making (SCDM)   Tetanus (Td) Vaccine - COST NOT COVERED BY MEDICARE PART B Following completion of primary series, a booster dose should be given every 10 years to maintain immunity against tetanus. Td may also be given as tetanus wound prophylaxis.   Tdap Vaccine - COST NOT COVERED BY MEDICARE PART B Recommended at least once for all adults. For pregnant patients, recommended with each pregnancy.   Shingles Vaccine (Shingrix) - COST NOT COVERED BY MEDICARE PART B  2 shot series recommended in those 19 years and older who have or will have weakened immune systems or those 50 years and older     Health Maintenance Due:      Topic Date Due   • Breast Cancer Screening: Mammogram  08/29/2026   • Colorectal Cancer Screening  05/20/2030   • Hepatitis C Screening  Discontinued     Immunizations Due:      Topic Date Due   • COVID-19 Vaccine (6 - 2024-25 season) 09/01/2024   • Influenza Vaccine (1) 09/01/2025     Advance Directives   What are advance directives?  Advance directives are legal  documents that state your wishes and plans for medical care. These plans are made ahead of time in case you lose your ability to make decisions for yourself. Advance directives can apply to any medical decision, such as the treatments you want, and if you want to donate organs.   What are the types of advance directives?  There are many types of advance directives, and each state has rules about how to use them. You may choose a combination of any of the following:  Living will:  This is a written record of the treatment you want. You can also choose which treatments you do not want, which to limit, and which to stop at a certain time. This includes surgery, medicine, IV fluid, and tube feedings.   Durable power of  for healthcare (DPAHC):  This is a written record that states who you want to make healthcare choices for you when you are unable to make them for yourself. This person, called a proxy, is usually a family member or a friend. You may choose more than 1 proxy.  Do not resuscitate (DNR) order:  A DNR order is used in case your heart stops beating or you stop breathing. It is a request not to have certain forms of treatment, such as CPR. A DNR order may be included in other types of advance directives.  Medical directive:  This covers the care that you want if you are in a coma, near death, or unable to make decisions for yourself. You can list the treatments you want for each condition. Treatment may include pain medicine, surgery, blood transfusions, dialysis, IV or tube feedings, and a ventilator (breathing machine).  Values history:  This document has questions about your views, beliefs, and how you feel and think about life. This information can help others choose the care that you would choose.  Why are advance directives important?  An advance directive helps you control your care. Although spoken wishes may be used, it is better to have your wishes written down. Spoken wishes can be  misunderstood, or not followed. Treatments may be given even if you do not want them. An advance directive may make it easier for your family to make difficult choices about your care.   Urinary Incontinence   Urinary incontinence (UI)  is when you lose control of your bladder. UI develops because your bladder cannot store or empty urine properly. The 3 most common types of UI are stress incontinence, urge incontinence, or both.  Medicines:   May be given to help strengthen your bladder control. Report any side effects of medication to your healthcare provider.  Do pelvic muscle exercises often:  Your pelvic muscles help you stop urinating. Squeeze these muscles tight for 5 seconds, then relax for 5 seconds. Gradually work up to squeezing for 10 seconds. Do 3 sets of 15 repetitions a day, or as directed. This will help strengthen your pelvic muscles and improve bladder control.  Train your bladder:  Go to the bathroom at set times, such as every 2 hours, even if you do not feel the urge to go. You can also try to hold your urine when you feel the urge to go. For example, hold your urine for 5 minutes when you feel the urge to go. As that becomes easier, hold your urine for 10 minutes.   Self-care:   Keep a UI record.  Write down how often you leak urine and how much you leak. Make a note of what you were doing when you leaked urine.  Drink liquids as directed. You may need to limit the amount of liquid you drink to help control your urine leakage. Do not drink any liquid right before you go to bed. Limit or do not have drinks that contain caffeine or alcohol.   Prevent constipation.  Eat a variety of high-fiber foods. Good examples are high-fiber cereals, beans, vegetables, and whole-grain breads. Walking is the best way to trigger your intestines to have a bowel movement.  Exercise regularly and maintain a healthy weight.  Weight loss and exercise will decrease pressure on your bladder and help you control your  leakage.   Use a catheter as directed  to help empty your bladder. A catheter is a tiny, plastic tube that is put into your bladder to drain your urine.   Go to behavior therapy as directed.  Behavior therapy may be used to help you learn to control your urge to urinate.    Cigarette Smoking and Your Health   Risks to your health if you smoke:  Nicotine and other chemicals found in tobacco damage every cell in your body. Even if you are a light smoker, you have an increased risk for cancer, heart disease, and lung disease. If you are pregnant or have diabetes, smoking increases your risk for complications.   Benefits to your health if you stop smoking:   You decrease respiratory symptoms such as coughing, wheezing, and shortness of breath.   You reduce your risk for cancers of the lung, mouth, throat, kidney, bladder, pancreas, stomach, and cervix. If you already have cancer, you increase the benefits of chemotherapy. You also reduce your risk for cancer returning or a second cancer from developing.   You reduce your risk for heart disease, blood clots, heart attack, and stroke.   You reduce your risk for lung infections, and diseases such as pneumonia, asthma, chronic bronchitis, and emphysema.  Your circulation improves. More oxygen can be delivered to your body. If you have diabetes, you lower your risk for complications, such as kidney, artery, and eye diseases. You also lower your risk for nerve damage. Nerve damage can lead to amputations, poor vision, and blindness.  You improve your body's ability to heal and to fight infections.  For more information and support to stop smoking:   [employer].gov  Phone: 1- 447 - 865-1597  Web Address: www.[employer]  Narcotic (Opioid) Safety    Use narcotics safely:  Take prescribed narcotics exactly as directed  Do not give narcotics to others or take narcotics that belong to someone else  Do not mix narcotics without medicines or alcohol  Do not drive or operate heavy  machinery after you take the narcotic  Monitor for side effects and notify your healthcare provider if you experienced side effects such as nausea, sleepiness, itching, or trouble thinking clearly.    Manage constipation:    Constipation is the most common side effect of narcotic medicine. Constipation is when you have hard, dry bowel movements, or you go longer than usual between bowel movements. Tell your healthcare provider about all changes in your bowel movements while you are taking narcotics. He or she may recommend laxative medicine to help you have a bowel movement. He or she may also change the kind of narcotic you are taking, or change when you take it. The following are more ways you can prevent or relieve constipation:    Drink liquids as directed.  You may need to drink extra liquids to help soften and move your bowels. Ask how much liquid to drink each day and which liquids are best for you.  Eat high-fiber foods.  This may help decrease constipation by adding bulk to your bowel movements. High-fiber foods include fruits, vegetables, whole-grain breads and cereals, and beans. Your healthcare provider or dietitian can help you create a high-fiber meal plan. Your provider may also recommend a fiber supplement if you cannot get enough fiber from food.  Exercise regularly.  Regular physical activity can help stimulate your intestines. Walking is a good exercise to prevent or relieve constipation. Ask which exercises are best for you.  Schedule a time each day to have a bowel movement.  This may help train your body to have regular bowel movements. Bend forward while you are on the toilet to help move the bowel movement out. Sit on the toilet for at least 10 minutes, even if you do not have a bowel movement.    Store narcotics safely:   Store narcotics where others cannot easily get them.  Keep them in a locked cabinet or secure area. Do not  keep them in a purse or other bag you carry with you. A person  may be looking for something else and find the narcotics.  Make sure narcotics are stored out of the reach of children.  A child can easily overdose on narcotics. Narcotics may look like candy to a small child.    The best way to dispose of narcotics:      The laws vary by country and area. In the [place], the best way is to return the narcotics through a take-back program. This program is offered by the [place] Drug Enforcement Agency (DANIEL). The following are options for using the program:  Take the narcotics to a DANIEL collection site.  The site is often a law enforcement center. Call your local law enforcement center for scheduled take-back days in your area. You will be given information on where to go if the collection site is in a different location.  Take the narcotics to an approved pharmacy or hospital.  A pharmacy or hospital may be set up as a collection site. You will need to ask if it is a DANIEL collection site if you were not directed there. A pharmacy or doctor's office may not be able to take back narcotics unless it is a DANIEL site.  Use a mail-back system.  This means you are given containers to put the narcotics into. You will then mail them in the containers.  Use a take-back drop box.  This is a place to leave the narcotics at any time. People and animals will not be able to get into the box. Your local law enforcement agency can tell you where to find a drop box in your area.    Other ways to manage pain:   Ask your healthcare provider about non-narcotic medicines to control pain.  Nonprescription medicines include NSAIDs (such as ibuprofen) and acetaminophen. Prescription medicines include muscle relaxers, antidepressants, and steroids.  Pain may be managed without any medicines.  Some ways to relieve pain include massage, aromatherapy, or meditation. Physical or occupational therapy may also help.    For more information:   Drug Enforcement Administration  [address] [place] [address]  [address]  Phone: 5- 630 - 149-1269  Web Address: https://www.deadiversion.[employer]Plan[employer].gov/drug_disposal/    [place] Food and Drug Administration  [address] [place] [address]  Phone: 8- 368 - 729-5654  Web Address: http://www.fda.gov     © Copyright [employer] 2018 Information is for End User's use only and may not be sold, redistributed or otherwise used for commercial purposes. All illustrations and images included in CareNotes® are the copyrighted property of [employer] or [employer]      Medicare Preventive Visit Patient Instructions  Thank you for completing your Welcome to Medicare Visit or Medicare Annual Wellness Visit today. Your next wellness visit will be due in one year (7/18/2026).  The screening/preventive services that you may require over the next 5-10 years are detailed below. Some tests may not apply to you based off risk factors and/or age. Screening tests ordered at today's visit but not completed yet may show as past due. Also, please note that scanned in results may not display below.  Preventive Screenings:  Service Recommendations Previous Testing/Comments   Colorectal Cancer Screening  * Colonoscopy    * Fecal Occult Blood Test (FOBT)/Fecal Immunochemical Test (FIT)  * Fecal DNA/Cologuard Test  * Flexible Sigmoidoscopy Age: 45-75 years old   Colonoscopy: every 10 years (may be performed more frequently if at higher risk)  OR  FOBT/FIT: every 1 year  OR  Cologuard: every 3 years  OR  Sigmoidoscopy: every 5 years  Screening may be recommended earlier than age 45 if at higher risk for colorectal cancer. Also, an individualized decision between you and your healthcare provider will decide whether screening between the ages of 76-85 would be appropriate. Colonoscopy: 05/21/2025  FOBT/FIT: Not on file  Cologuard: Not on file  Sigmoidoscopy: Not on file    Screening Current     Breast Cancer Screening Age: 40+ years old  Frequency: every 1-2 years  Not required if history  of left and right mastectomy Mammogram: 08/29/2024    Screening Current   Cervical Cancer Screening Between the ages of 21-29, pap smear recommended once every 3 years.   Between the ages of 30-65, can perform pap smear with HPV co-testing every 5 years.   Recommendations may differ for women with a history of total hysterectomy, cervical cancer, or abnormal pap smears in past. Pap Smear: 04/25/2024    Screening Not Indicated   Hepatitis C Screening Once for adults born between 1945 and 1965  More frequently in patients at high risk for Hepatitis C Hep C Antibody: Not on file        Diabetes Screening 1-2 times per year if you're at risk for diabetes or have pre-diabetes Fasting glucose: 78 mg/dL (2/26/2025)  A1C: No results in last 5 years (No results in last 5 years)  Screening Current   Cholesterol Screening Once every 5 years if you don't have a lipid disorder. May order more often based on risk factors. Lipid panel: 02/26/2025    Screening Not Indicated  History Lipid Disorder     Other Preventive Screenings Covered by Medicare:  Abdominal Aortic Aneurysm (AAA) Screening: covered once if your at risk. You're considered to be at risk if you have a family history of AAA.  Lung Cancer Screening: covers low dose CT scan once per year if you meet all of the following conditions: (1) Age 55-77; (2) No signs or symptoms of lung cancer; (3) Current smoker or have quit smoking within the last 15 years; (4) You have a tobacco smoking history of at least 20 pack years (packs per day multiplied by number of years you smoked); (5) You get a written order from a healthcare provider.  Glaucoma Screening: covered annually if you're considered high risk: (1) You have diabetes OR (2) Family history of glaucoma OR (3)  aged 50 and older OR (4)  American aged 65 and older  Osteoporosis Screening: covered every 2 years if you meet one of the following conditions: (1) You're estrogen deficient and at risk for  osteoporosis based off medical history and other findings; (2) Have a vertebral abnormality; (3) On glucocorticoid therapy for more than 3 months; (4) Have primary hyperparathyroidism; (5) On osteoporosis medications and need to assess response to drug therapy.   Last bone density test (DXA Scan): 04/14/2025.  HIV Screening: covered annually if you're between the age of 15-65. Also covered annually if you are younger than 15 and older than 65 with risk factors for HIV infection. For pregnant patients, it is covered up to 3 times per pregnancy.    Immunizations:  Immunization Recommendations   Influenza Vaccine Annual influenza vaccination during flu season is recommended for all persons aged >= 6 months who do not have contraindications   Pneumococcal Vaccine   * Pneumococcal conjugate vaccine = PCV13 (Prevnar 13), PCV15 (Vaxneuvance), PCV20 (Prevnar 20)  * Pneumococcal polysaccharide vaccine = PPSV23 (Pneumovax) Adults 19-65 yo with certain risk factors or if 65+ yo  If never received any pneumonia vaccine: recommend Prevnar 20 (PCV20)  Give PCV20 if previously received 1 dose of PCV13 or PPSV23   Hepatitis B Vaccine 3 dose series if at intermediate or high risk (ex: diabetes, end stage renal disease, liver disease)   Respiratory syncytial virus (RSV) Vaccine - COVERED BY MEDICARE PART D  * RSVPreF3 (Arexvy) CDC recommends that adults 60 years of age and older may receive a single dose of RSV vaccine using shared clinical decision-making (SCDM)   Tetanus (Td) Vaccine - COST NOT COVERED BY MEDICARE PART B Following completion of primary series, a booster dose should be given every 10 years to maintain immunity against tetanus. Td may also be given as tetanus wound prophylaxis.   Tdap Vaccine - COST NOT COVERED BY MEDICARE PART B Recommended at least once for all adults. For pregnant patients, recommended with each pregnancy.   Shingles Vaccine (Shingrix) - COST NOT COVERED BY MEDICARE PART B  2 shot series  recommended in those 19 years and older who have or will have weakened immune systems or those 50 years and older     Health Maintenance Due:      Topic Date Due   • Breast Cancer Screening: Mammogram  08/29/2026   • Colorectal Cancer Screening  05/20/2030   • Hepatitis C Screening  Discontinued     Immunizations Due:      Topic Date Due   • COVID-19 Vaccine (6 - 2024-25 season) 09/01/2024   • Influenza Vaccine (1) 09/01/2025     Advance Directives   What are advance directives?  Advance directives are legal documents that state your wishes and plans for medical care. These plans are made ahead of time in case you lose your ability to make decisions for yourself. Advance directives can apply to any medical decision, such as the treatments you want, and if you want to donate organs.   What are the types of advance directives?  There are many types of advance directives, and each state has rules about how to use them. You may choose a combination of any of the following:  Living will:  This is a written record of the treatment you want. You can also choose which treatments you do not want, which to limit, and which to stop at a certain time. This includes surgery, medicine, IV fluid, and tube feedings.   Durable power of  for healthcare (DPAHC):  This is a written record that states who you want to make healthcare choices for you when you are unable to make them for yourself. This person, called a proxy, is usually a family member or a friend. You may choose more than 1 proxy.  Do not resuscitate (DNR) order:  A DNR order is used in case your heart stops beating or you stop breathing. It is a request not to have certain forms of treatment, such as CPR. A DNR order may be included in other types of advance directives.  Medical directive:  This covers the care that you want if you are in a coma, near death, or unable to make decisions for yourself. You can list the treatments you want for each condition.  Treatment may include pain medicine, surgery, blood transfusions, dialysis, IV or tube feedings, and a ventilator (breathing machine).  Values history:  This document has questions about your views, beliefs, and how you feel and think about life. This information can help others choose the care that you would choose.  Why are advance directives important?  An advance directive helps you control your care. Although spoken wishes may be used, it is better to have your wishes written down. Spoken wishes can be misunderstood, or not followed. Treatments may be given even if you do not want them. An advance directive may make it easier for your family to make difficult choices about your care.   Urinary Incontinence   Urinary incontinence (UI)  is when you lose control of your bladder. UI develops because your bladder cannot store or empty urine properly. The 3 most common types of UI are stress incontinence, urge incontinence, or both.  Medicines:   May be given to help strengthen your bladder control. Report any side effects of medication to your healthcare provider.  Do pelvic muscle exercises often:  Your pelvic muscles help you stop urinating. Squeeze these muscles tight for 5 seconds, then relax for 5 seconds. Gradually work up to squeezing for 10 seconds. Do 3 sets of 15 repetitions a day, or as directed. This will help strengthen your pelvic muscles and improve bladder control.  Train your bladder:  Go to the bathroom at set times, such as every 2 hours, even if you do not feel the urge to go. You can also try to hold your urine when you feel the urge to go. For example, hold your urine for 5 minutes when you feel the urge to go. As that becomes easier, hold your urine for 10 minutes.   Self-care:   Keep a UI record.  Write down how often you leak urine and how much you leak. Make a note of what you were doing when you leaked urine.  Drink liquids as directed. You may need to limit the amount of liquid you drink  to help control your urine leakage. Do not drink any liquid right before you go to bed. Limit or do not have drinks that contain caffeine or alcohol.   Prevent constipation.  Eat a variety of high-fiber foods. Good examples are high-fiber cereals, beans, vegetables, and whole-grain breads. Walking is the best way to trigger your intestines to have a bowel movement.  Exercise regularly and maintain a healthy weight.  Weight loss and exercise will decrease pressure on your bladder and help you control your leakage.   Use a catheter as directed  to help empty your bladder. A catheter is a tiny, plastic tube that is put into your bladder to drain your urine.   Go to behavior therapy as directed.  Behavior therapy may be used to help you learn to control your urge to urinate.    Cigarette Smoking and Your Health   Risks to your health if you smoke:  Nicotine and other chemicals found in tobacco damage every cell in your body. Even if you are a light smoker, you have an increased risk for cancer, heart disease, and lung disease. If you are pregnant or have diabetes, smoking increases your risk for complications.   Benefits to your health if you stop smoking:   You decrease respiratory symptoms such as coughing, wheezing, and shortness of breath.   You reduce your risk for cancers of the lung, mouth, throat, kidney, bladder, pancreas, stomach, and cervix. If you already have cancer, you increase the benefits of chemotherapy. You also reduce your risk for cancer returning or a second cancer from developing.   You reduce your risk for heart disease, blood clots, heart attack, and stroke.   You reduce your risk for lung infections, and diseases such as pneumonia, asthma, chronic bronchitis, and emphysema.  Your circulation improves. More oxygen can be delivered to your body. If you have diabetes, you lower your risk for complications, such as kidney, artery, and eye diseases. You also lower your risk for nerve damage. Nerve  damage can lead to amputations, poor vision, and blindness.  You improve your body's ability to heal and to fight infections.  For more information and support to stop smoking:   Smokefree.gov  Phone: 0- 386 - 607-1435  Web Address: www.[employer]  Narcotic (Opioid) Safety    Use narcotics safely:  Take prescribed narcotics exactly as directed  Do not give narcotics to others or take narcotics that belong to someone else  Do not mix narcotics without medicines or alcohol  Do not drive or operate heavy machinery after you take the narcotic  Monitor for side effects and notify your healthcare provider if you experienced side effects such as nausea, sleepiness, itching, or trouble thinking clearly.    Manage constipation:    Constipation is the most common side effect of narcotic medicine. Constipation is when you have hard, dry bowel movements, or you go longer than usual between bowel movements. Tell your healthcare provider about all changes in your bowel movements while you are taking narcotics. He or she may recommend laxative medicine to help you have a bowel movement. He or she may also change the kind of narcotic you are taking, or change when you take it. The following are more ways you can prevent or relieve constipation:    Drink liquids as directed.  You may need to drink extra liquids to help soften and move your bowels. Ask how much liquid to drink each day and which liquids are best for you.  Eat high-fiber foods.  This may help decrease constipation by adding bulk to your bowel movements. High-fiber foods include fruits, vegetables, whole-grain breads and cereals, and beans. Your healthcare provider or dietitian can help you create a high-fiber meal plan. Your provider may also recommend a fiber supplement if you cannot get enough fiber from food.  Exercise regularly.  Regular physical activity can help stimulate your intestines. Walking is a good exercise to prevent or relieve constipation. Ask  which exercises are best for you.  Schedule a time each day to have a bowel movement.  This may help train your body to have regular bowel movements. Bend forward while you are on the toilet to help move the bowel movement out. Sit on the toilet for at least 10 minutes, even if you do not have a bowel movement.    Store narcotics safely:   Store narcotics where others cannot easily get them.  Keep them in a locked cabinet or secure area. Do not  keep them in a purse or other bag you carry with you. A person may be looking for something else and find the narcotics.  Make sure narcotics are stored out of the reach of children.  A child can easily overdose on narcotics. Narcotics may look like candy to a small child.    The best way to dispose of narcotics:      The laws vary by country and area. In the [place], the best way is to return the narcotics through a take-back program. This program is offered by the [place] Drug Enforcement Agency (DANIEL). The following are options for using the program:  Take the narcotics to a DANIEL collection site.  The site is often a law enforcement center. Call your local law enforcement center for scheduled take-back days in your area. You will be given information on where to go if the collection site is in a different location.  Take the narcotics to an approved pharmacy or hospital.  A pharmacy or hospital may be set up as a collection site. You will need to ask if it is a DANIEL collection site if you were not directed there. A pharmacy or doctor's office may not be able to take back narcotics unless it is a DANIEL site.  Use a mail-back system.  This means you are given containers to put the narcotics into. You will then mail them in the containers.  Use a take-back drop box.  This is a place to leave the narcotics at any time. People and animals will not be able to get into the box. Your local law enforcement agency can tell you where to find a drop box in your area.    Other ways to  manage pain:   Ask your healthcare provider about non-narcotic medicines to control pain.  Nonprescription medicines include NSAIDs (such as ibuprofen) and acetaminophen. Prescription medicines include muscle relaxers, antidepressants, and steroids.  Pain may be managed without any medicines.  Some ways to relieve pain include massage, aromatherapy, or meditation. Physical or occupational therapy may also help.    For more information:   Drug Enforcement Administration  [address] [place] [address]  Phone: 1- 843 - 516-9912  Web Address: https://www.deadiversion.[facility].gov/drug_disposal/     Food and Drug Administration  [address] [place] [address]  Phone: 8- 522 - 347-1762  Web Address: http://www.fda.gov   © Copyright [employer] 2018 Information is for End User's use only and may not be sold, redistributed or otherwise used for commercial purposes. All illustrations and images included in CareNotes® are the copyrighted property of [employer].A[employer]M., Inc. or [facility]

## 2025-07-17 NOTE — ASSESSMENT & PLAN NOTE
Orders:    oxyCODONE (ROXICODONE) 5 immediate release tablet; Take 1 tablet (5 mg total) by mouth daily as needed for moderate pain Max Daily Amount: 5 mg

## 2025-07-24 ENCOUNTER — OFFICE VISIT (OUTPATIENT)
Dept: GYNECOLOGY | Facility: CLINIC | Age: 70
End: 2025-07-24
Payer: MEDICARE

## 2025-07-24 VITALS — WEIGHT: 141 LBS | BODY MASS INDEX: 24.2 KG/M2 | DIASTOLIC BLOOD PRESSURE: 70 MMHG | SYSTOLIC BLOOD PRESSURE: 122 MMHG

## 2025-07-24 DIAGNOSIS — N81.4 UTERINE PROLAPSE: ICD-10-CM

## 2025-07-24 DIAGNOSIS — Z01.419 ENCOUNTER FOR ANNUAL ROUTINE GYNECOLOGICAL EXAMINATION: Primary | ICD-10-CM

## 2025-07-24 DIAGNOSIS — N95.2 VAGINAL ATROPHY: ICD-10-CM

## 2025-07-24 DIAGNOSIS — Z12.31 ENCOUNTER FOR SCREENING MAMMOGRAM FOR BREAST CANCER: ICD-10-CM

## 2025-07-24 PROCEDURE — 99213 OFFICE O/P EST LOW 20 MIN: CPT | Performed by: OBSTETRICS & GYNECOLOGY

## 2025-07-24 PROCEDURE — G0101 CA SCREEN;PELVIC/BREAST EXAM: HCPCS | Performed by: OBSTETRICS & GYNECOLOGY

## 2025-07-24 RX ORDER — DOXYCYCLINE HYCLATE 50 MG/1
1 CAPSULE ORAL 2 TIMES DAILY
COMMUNITY
Start: 2025-05-05

## 2025-07-24 NOTE — PROGRESS NOTES
Name: Nae Hernandez      : 1955      MRN: [id number]  Encounter Provider: Radha Arce DO  Encounter Date: 2025   Encounter department: [facility]  :  Assessment & Plan  Encounter for annual routine gynecological examination         Encounter for screening mammogram for breast cancer    Orders:    Mammo screening bilateral w 3d and cad; Future      She does not require a Pap    mammogram reviewed with her including breast density.  Mammogram ordered for her  Discussed self breast exams    colon cancer screening-colonoscopy done in May, recall in 5 years    Osteopenia - she does not want treatment as she did not tolerate a bisphophonate in the past.     Prolapse -pessary check done without difficulty.  She will continue using vaginal estrogen.  She does not need a new prescription.  She will return in 3 months for a pessary check.    discussed preventive care, regular exercise and a healthy diet    I wished her the best with her son.      History of Present Illness   HPI  Nae Hernandez is a 70 y.o. female who presents for yearly and pessary check.  She has no GYN complaints.  She has been going through a difficult time with her son.  He is a  and has been having some issues that seem to be related to his time in this    DEXA in April with osteopenia and elevated risk of hip and major osteoporotic fracture.  She does not want treatment.  She has not tolerated it in the past.  She has considered PT   Normal 3D mammogram last August with scattered fibroglandular densities and average risk.        Review of Systems   Constitutional: Negative.    Gastrointestinal: Negative.    Genitourinary: Negative.           Objective   /70 (BP Location: Left arm, Patient Position: Sitting)   Wt 64 kg (141 lb)   LMP  (LMP Unknown)   BMI 24.20 kg/m²      Physical Exam  Vitals and nursing note reviewed. Exam conducted with a chaperone present.   Constitutional:       Appearance: She is  well-developed.   HENT:      Head: Normocephalic and atraumatic.   Neck:      Thyroid: No thyromegaly.     Cardiovascular:      Rate and Rhythm: Normal rate and regular rhythm.   Pulmonary:      Effort: Pulmonary effort is normal.      Breath sounds: Normal breath sounds.   Chest:   Breasts:     Right: Normal.      Left: Normal.      Comments: Examined seated and supine  Abdominal:      Palpations: Abdomen is soft.   Genitourinary:     Vagina: Normal.      Cervix: Normal.      Uterus: Normal.       Adnexa: Right adnexa normal and left adnexa normal.      Rectum: Normal.     Musculoskeletal:      Cervical back: Neck supple.     Skin:     General: Skin is warm and dry.     Neurological:      Mental Status: She is alert.     Psychiatric:         Mood and Affect: Mood normal.

## 2025-07-26 NOTE — PROGRESS NOTES
Pessary    Date/Time: 7/24/2025 1:30 PM    Performed by: Radha Arce DO  Authorized by: Radha Arce DO    Universal Protocol:  Consent: Verbal consent obtained  Risks and benefits: risks, benefits and alternatives were discussed  Consent given by: patient  Patient understanding: patient states understanding of the procedure being performed  Patient identity confirmed: verbally with patient    Indication:     Indication for pessary: uterine prolapse    Pre-procedure:     Pessary procedure type:  Cleaning/check  Problems:     Pessary complications: none    Procedure:     Pessary type:  Shaatz    Patient tolerance of procedure:  Tolerated well, no immediate complications  Comments:     Procedure comments:  Shaatz pessary removed without difficulty, cleaned and reinserted.  There is no excoriation or irritation.  She will return in 3 months for a pessary check.

## 2025-07-30 DIAGNOSIS — I74.09 AORTOILIAC OCCLUSIVE DISEASE (HCC): ICD-10-CM

## 2025-07-30 DIAGNOSIS — I73.9 PAD (PERIPHERAL ARTERY DISEASE) (HCC): ICD-10-CM

## 2025-07-30 DIAGNOSIS — E78.2 MIXED HYPERLIPIDEMIA: ICD-10-CM

## 2025-07-30 RX ORDER — ATORVASTATIN CALCIUM 10 MG/1
10 TABLET, FILM COATED ORAL
Qty: 100 TABLET | Refills: 1 | Status: SHIPPED | OUTPATIENT
Start: 2025-07-30

## 2025-08-05 ENCOUNTER — TELEPHONE (OUTPATIENT)
Age: 70
End: 2025-08-05

## 2025-08-05 DIAGNOSIS — Z87.891 PERSONAL HISTORY OF NICOTINE DEPENDENCE: ICD-10-CM

## 2025-08-05 DIAGNOSIS — Z12.2 SCREENING FOR LUNG CANCER: Primary | ICD-10-CM

## 2025-08-06 ENCOUNTER — TELEPHONE (OUTPATIENT)
Age: 70
End: 2025-08-06

## 2025-08-19 ENCOUNTER — CLINICAL SUPPORT (OUTPATIENT)
Age: 70
End: 2025-08-19
Payer: MEDICARE

## 2025-08-19 DIAGNOSIS — I73.00 RAYNAUD'S DISEASE WITHOUT GANGRENE: Primary | ICD-10-CM

## 2025-08-19 PROCEDURE — 96372 THER/PROPH/DIAG INJ SC/IM: CPT

## 2025-08-19 PROCEDURE — PBNCHG PB NO CHARGE PLACEHOLDER

## 2025-08-19 RX ADMIN — CYANOCOBALAMIN 1000 MCG: 1000 INJECTION, SOLUTION INTRAMUSCULAR; SUBCUTANEOUS at 12:56

## (undated) DEVICE — SUT MONOCRYL 3-0 SH 27 IN Y416H

## (undated) DEVICE — 3M™ IOBAN™ 2 ANTIMICROBIAL INCISE DRAPE 6648EZ: Brand: IOBAN™ 2

## (undated) DEVICE — DRESSING MEPILEX AG BORDER 4 X 8 IN

## (undated) DEVICE — SYRINGE 1ML TB 25G X 5/8 NON SAFETY

## (undated) DEVICE — ADHESIVE SKN CLSR HISTOACRYL FLEX 0.5ML LF

## (undated) DEVICE — CHLORAPREP HI-LITE 26ML ORANGE

## (undated) DEVICE — 40529 DERMAPROX PAD 11'' X 15'' X 1'': Brand: 40529 DERMAPROX PAD 11'' X 15'' X 1''

## (undated) DEVICE — INTENDED FOR TISSUE SEPARATION, AND OTHER PROCEDURES THAT REQUIRE A SHARP SURGICAL BLADE TO PUNCTURE OR CUT.: Brand: BARD-PARKER SAFETY BLADES SIZE 15, STERILE

## (undated) DEVICE — MICROPUNCTURE 501

## (undated) DEVICE — SUT PROLENE 6-0 BV130 30 IN 8709H

## (undated) DEVICE — STOPCOCK 3-WAY

## (undated) DEVICE — SUT MONOCRYL 2-0 CT-1 36 IN Y945H

## (undated) DEVICE — SPONGE SCRUB 4 PCT CHLORHEXIDINE

## (undated) DEVICE — SUT MONOCRYL 4-0 PS-2 27 IN Y426H

## (undated) DEVICE — DRAPE EQUIPMENT RF WAND

## (undated) DEVICE — TRAY FOLEY 16FR URIMETER SURESTEP

## (undated) DEVICE — ULTRASOUND GEL STERILE FOIL PK

## (undated) DEVICE — INSTRUMENT POUCH: Brand: CONVERTORS

## (undated) DEVICE — COBAN 4 IN STERILE

## (undated) DEVICE — STOCKINETTE REGULAR

## (undated) DEVICE — SYRINGE 10ML LL

## (undated) DEVICE — SURGICEL FIBRILLAR 1 X 2

## (undated) DEVICE — IV CATH INTROCAN 18G X 1 1/4 SAFETY

## (undated) DEVICE — GLOVE SRG BIOGEL 7.5

## (undated) DEVICE — 2000CC GUARDIAN II: Brand: GUARDIAN

## (undated) DEVICE — 40601 PROLONGED POSITIONING SYSTEM: Brand: 40601 PROLONGED POSITIONING SYSTEM

## (undated) DEVICE — PETRI DISH STERILE

## (undated) DEVICE — IV EXTENSION TUBING 33 IN

## (undated) DEVICE — SUT SILK 3-0 30 IN A304H

## (undated) DEVICE — SCD SEQUENTIAL COMPRESSION COMFORT SLEEVE MEDIUM KNEE LENGTH: Brand: KENDALL SCD

## (undated) DEVICE — IV CATH 16 G SAFETY

## (undated) DEVICE — SUT SILK 2-0 30 IN A305H

## (undated) DEVICE — BAG DECANTER

## (undated) DEVICE — INTENDED FOR TISSUE SEPARATION, AND OTHER PROCEDURES THAT REQUIRE A SHARP SURGICAL BLADE TO PUNCTURE OR CUT.: Brand: BARD-PARKER ® CARBON RIB-BACK BLADES

## (undated) DEVICE — PREP PAD BNS: Brand: CONVERTORS

## (undated) DEVICE — GLOVE INDICATOR PI UNDERGLOVE SZ 8 BLUE

## (undated) DEVICE — DRAPE C-ARM X-RAY

## (undated) DEVICE — DRAPE PROBE NEO-PROBE/ULTRASOUND

## (undated) DEVICE — SUT SILK 4-0 30 IN A303H

## (undated) DEVICE — SUT PROLENE 7-0 BV 175-6 24 IN 8735H

## (undated) DEVICE — PROXIMATE SKIN STAPLERS (35 WIDE) CONTAINS 35 STAINLESS STEEL STAPLES (FIXED HEAD): Brand: PROXIMATE

## (undated) DEVICE — STERILE BETHLEHEM FEM POP PACK: Brand: CARDINAL HEALTH